# Patient Record
Sex: FEMALE | Race: WHITE
[De-identification: names, ages, dates, MRNs, and addresses within clinical notes are randomized per-mention and may not be internally consistent; named-entity substitution may affect disease eponyms.]

---

## 2021-07-27 ENCOUNTER — HOSPITAL ENCOUNTER (OUTPATIENT)
Dept: HOSPITAL 97 - DS | Age: 61
End: 2021-07-27
Attending: INTERNAL MEDICINE
Payer: COMMERCIAL

## 2021-07-27 VITALS — OXYGEN SATURATION: 95 % | DIASTOLIC BLOOD PRESSURE: 81 MMHG | TEMPERATURE: 97.7 F | SYSTOLIC BLOOD PRESSURE: 127 MMHG

## 2021-07-27 VITALS — BODY MASS INDEX: 14.6 KG/M2

## 2021-07-27 DIAGNOSIS — R74.8: ICD-10-CM

## 2021-07-27 DIAGNOSIS — E87.1: Primary | ICD-10-CM

## 2021-07-27 PROCEDURE — 82728 ASSAY OF FERRITIN: CPT

## 2021-07-27 PROCEDURE — 82306 VITAMIN D 25 HYDROXY: CPT

## 2021-07-27 PROCEDURE — 96365 THER/PROPH/DIAG IV INF INIT: CPT

## 2021-07-27 PROCEDURE — 86334 IMMUNOFIX E-PHORESIS SERUM: CPT

## 2021-07-27 PROCEDURE — 82533 TOTAL CORTISOL: CPT

## 2021-07-27 PROCEDURE — 82024 ASSAY OF ACTH: CPT

## 2021-07-27 PROCEDURE — 83970 ASSAY OF PARATHORMONE: CPT

## 2021-07-27 PROCEDURE — 36415 COLL VENOUS BLD VENIPUNCTURE: CPT

## 2021-07-27 PROCEDURE — 86255 FLUORESCENT ANTIBODY SCREEN: CPT

## 2021-07-27 PROCEDURE — 82977 ASSAY OF GGT: CPT

## 2021-10-15 ENCOUNTER — HOSPITAL ENCOUNTER (EMERGENCY)
Dept: HOSPITAL 97 - ER | Age: 61
Discharge: HOME | End: 2021-10-15
Payer: COMMERCIAL

## 2021-10-15 VITALS — OXYGEN SATURATION: 95 % | DIASTOLIC BLOOD PRESSURE: 83 MMHG | TEMPERATURE: 98.4 F | SYSTOLIC BLOOD PRESSURE: 118 MMHG

## 2021-10-15 DIAGNOSIS — G40.909: Primary | ICD-10-CM

## 2021-10-15 DIAGNOSIS — F17.210: ICD-10-CM

## 2021-10-15 LAB
BUN BLD-MCNC: 15 MG/DL (ref 7–18)
GLUCOSE SERPLBLD-MCNC: 162 MG/DL (ref 74–106)
HCT VFR BLD CALC: 37.9 % (ref 36–45)
LYMPHOCYTES # SPEC AUTO: 1.2 K/UL (ref 0.7–4.9)
PMV BLD: 7.3 FL (ref 7.6–11.3)
POTASSIUM SERPL-SCNC: 3.5 MMOL/L (ref 3.5–5.1)
RBC # BLD: 3.86 M/UL (ref 3.86–4.86)

## 2021-10-15 PROCEDURE — 80048 BASIC METABOLIC PNL TOTAL CA: CPT

## 2021-10-15 PROCEDURE — 36415 COLL VENOUS BLD VENIPUNCTURE: CPT

## 2021-10-15 PROCEDURE — 71045 X-RAY EXAM CHEST 1 VIEW: CPT

## 2021-10-15 PROCEDURE — 99284 EMERGENCY DEPT VISIT MOD MDM: CPT

## 2021-10-15 PROCEDURE — 80185 ASSAY OF PHENYTOIN TOTAL: CPT

## 2021-10-15 PROCEDURE — 85025 COMPLETE CBC W/AUTO DIFF WBC: CPT

## 2021-10-15 NOTE — ER
Nurse's Notes                                                                                     

 [facility]                                                                 

Name: Rona Nelson                                                                              

Age: 61 yrs                                                                                       

Sex: Female                                                                                       

: 1960                                                                                   

MRN: [id number]                                                                                   

Arrival Date: 10/15/2021                                                                          

Time: 18:40                                                                                       

Account#: [id number]                                                                            

Bed 19                                                                                            

Private MD:                                                                                       

Diagnosis: recurrent seizure                                                                      

                                                                                                  

Presentation:                                                                                     

10/15                                                                                             

18:40 Chief complaint: EMS states: seizure, approximately 4 min. Coronavirus screen: At this  tc5 

      time, unable to obtain information related to travel outside the [place] Ebola Screen:         

      Unable to complete the Ebola screening because:. Risk Assessment: Do you want to hurt       

      yourself or someone else? Unable to obtain. Onset of symptoms was October 15, 2021 at       

      18:30.                                                                                      

18:40 Method Of Arrival: EMS                                                                  tc5 

18:40 Acuity: [facility]                                                                           tc5 

19:10 Initial Sepsis Screen: Does the patient meet any 2 criteria? No. Patient's initial      cc4 

      sepsis screen is negative.                                                                  

21:35 Initial Sepsis Screen: Does the patient have a suspected source of infection?.          cc4 

                                                                                                  

Triage Assessment:                                                                                

18:44 General: Appears distressed, slender, Behavior is unresponsive. Smells of ciggarettes.  tc5 

19:10 Pain: Denies pain.                                                                      cc4 

                                                                                                  

Historical:                                                                                       

- Allergies:                                                                                      

18:42 No Known Allergies;                                                                     tc5 

                                                                                                  

- Immunization history:: Adult Immunizations.                                                     

- Social history:: Smoking status: Patient reports the use of cigarette tobacco                   

  products, unknown amount.                                                                       

                                                                                                  

                                                                                                  

Screenin:10 Abuse screen: Denies threats or abuse. Nutritional screening: No deficits noted.        cc4 

      Tuberculosis screening: No symptoms or risk factors identified. Fall Risk None              

      identified.                                                                                 

                                                                                                  

Assessment:                                                                                       

19:10 General: Appears Sleeping; arouses easily to verbal stimuli; oriented to place;         cc4 

      following commands; requesting to use bathroom; assisted onto bedpan \T\ voided 300 ml      

      clear yellow urine; IV NS patent/infusing left AC \T\ open rate with no infiltration          

      noted of site; SR's padded x 2; O2 intact \T\ 2L/NBP; SR with no ectopy; VSS; stretcher       

      low position.. General: no seizure activity noted..                                         

19:10 Neuro: Level of Consciousness is post ictal, Arouses easily \T\ following commands..      cc4

      Oriented to person. Cardiovascular: No deficits noted. Rhythm is sinus rhythm.              

      Respiratory: No deficits noted. Airway is patent Breath sounds are clear bilaterally.       

      GI: No deficits noted. Abdomen is flat, non-distended. : Reports urinary frequency,       

      since  reports months and seeing urologist with no improvement in urgency. EENT:     

      No deficits noted. Derm: No deficits noted. Skin is intact. Musculoskeletal: No             

      deficits noted. Capillary refill < 3 seconds.                                               

20:00 Reassessment: Patient appears in no apparent distress at this time. awake \T\ more alert; cc4

      no seizure activity noted; assisted onto bedpan \T\ voided 350 ml clear yellow urine;       

      states, "I want to go home".                                                                

                                                                                                  

Vital Signs:                                                                                      

18:40  / 84; Pulse 90; Resp 28; Temp 97.7; Pulse Ox 91% 4 lpm ; Weight 40.82 kg; Height tc5 

      5 ft. 2 in. (157.48 cm);                                                                    

19:10  / 91; Pulse 85; Resp 24; Temp 97.6; Pulse Ox 99% on 2 lpm NC;                    cc4 

20:00  / 79; Pulse 85; Resp 18; Pulse Ox 97% on 2 lpm NC;                               cc4 

21:00  / 97; Pulse 85; Resp 16; Pulse Ox 98% on 2 lpm NC;                               cc4 

21:35  / 83; Pulse 85; Resp 16; Temp 98.4; Pulse Ox 95% on R/A;                         cc4 

18:40 Body Mass Index 16.46 (40.82 kg, 157.48 cm)                                             tc5 

                                                                                                  

ED Course:                                                                                        

18:40 Patient arrived in ED.                                                                  tc5 

18:40 Anastasiia Tavarez, RN is Primary Nurse.                                                tc5 

18:42 Triage completed.                                                                       tc5 

18:54 Emmanuel Gomez MD is Attending Physician.                                              kdr 

19:10 Arm band placed on.                                                                     cc4 

19:10 Patient has correct armband on for positive identification. Bed in low position. Call   cc4 

      light in reach. Side rails up X2. Seizure precautions initiated. Oral airway \T\ suction    

      placed \T\ bedside;  arriving to bedside.                                              

19:36 CXR XRAY In Process Unspecified.                                                        EDMS

20:18 Attending Physician role handed off by Emmanuel Gomez MD                               ps1 

20:18 Molina Gregory MD is Attending Physician.                                             ps1 

21:35 IV discontinued, intact, bleeding controlled, No redness/swelling at site. Pressure     cc4 

      dressing applied.                                                                           

22:04 No provider procedures requiring assistance completed.                                  cc4 

                                                                                                  

Administered Medications:                                                                         

21:33 Drug: Keppra (levETIRAcetam) 1000 mg Route: PO;                                         cc4 

21:35 Follow up: Response: No adverse reaction; No change in condition                        cc4 

                                                                                                  

                                                                                                  

Point of Care Testing:                                                                            

      Blood Glucose:                                                                              

18:45 Blood Glucose: 200 mg/dL;                                                               tc5 

      Ranges:                                                                                     

                                                                                                  

Outcome:                                                                                          

20:58 Discharge ordered by MD.                                                                ps1 

21:35 Discharged to home with .                                                        cc4 

21:35 Condition: improved                                                                         

21:35 Discharge instructions given to patient, with  Instructed on discharge               

      instructions, follow up and referral plans. Demonstrated understanding of instructions,     

      follow-up care.                                                                             

23:22 Patient left the ED.                                                                    cc4 

                                                                                                  

Signatures:                                                                                       

Dispatcher [employer]                           EDMS                                                 

Emmanuel Gomez MD MD   kdr                                                  

Molina Gregory MD MD   ps1                                                  

Alivia Beckwith, RN                    RN   cc4                                                  

Anastasiia Tavarez RN                  RN   tc5                                                  

                                                                                                  

**************************************************************************************************

## 2021-10-15 NOTE — RAD REPORT
EXAM DESCRIPTION:  RAD - Chest Single View - 10/15/2021 7:36 pm

 

CLINICAL HISTORY:  Seizure

 

COMPARISON:  Chest Pa And Lat (2 Views) dated 7/19/2021; CHEST PA AND LAT 2 VIEW dated 1/23/2014; DOT
ST SINGLE VIEW dated 12/25/2013; CHEST SINGLE VIEW dated 12/24/2013; CTANGIO CHEST FOR PE dated 12/18
/2013

 

FINDINGS:  Lines: None.

Lungs: No evidence of edema or pneumonia.

Pleural: No significant pleural effusions or pneumothorax.

Cardiac: The heart size is within normal limits.

Bones: No acute fractures. Remote right-sided rib fractures.

Other:

 

IMPRESSION:  No acute cardiopulmonary disease.

## 2021-10-15 NOTE — EDPHYS
Physician Documentation                                                                           

 [facility]                                                                 

Name: Rona Nelson                                                                              

Age: 61 yrs                                                                                       

Sex: Female                                                                                       

: 1960                                                                                   

MRN: [id number]                                                                                   

Arrival Date: 10/15/2021                                                                          

Time: 18:40                                                                                       

Account#: [id number]                                                                            

Bed 19                                                                                            

Private MD:                                                                                       

ED Physician Molina Gregory                                                                      

HPI:                                                                                              

10/15                                                                                             

18:57 This 61 yrs old  Female presents to ER via EMS with complaints of Breakthrough kdr 

      seizure.                                                                                    

18:57 The patient presents with a history of multiple seizures, an unknown number. Character  kdr 

      of seizure(s): Loss of consciousness: the patient experienced loss of consciousness,        

      Motor activity: generalized, shaking all over, Incontinence: none. Seizure onset: just      

      prior to arrival, today. Context: the seizure(s) was witnessed, by a bystander, by EMS      

      personnel, occurred at home, occurred while the patient was Unknown. Contributing           

      factors: unknown. Seizure Hx: it is unknown whether or not the patient has a previous       

      seizure history. Associated injury: The patient did not suffer any apparent associated      

      injury. EMS care: versed, Patient was given 2 mg of Versed intranasal followed by 2 mg      

      of Versed IV. Current symptoms: confusion, Postictal. The patient has experienced           

      similar episodes in the past, multiple times. It is unknown whether or not the patient      

      has recently seen a physician.                                                              

                                                                                                  

Historical:                                                                                       

- Allergies:                                                                                      

18:42 No Known Allergies;                                                                     tc5 

                                                                                                  

- Immunization history:: Adult Immunizations.                                                     

- Social history:: Smoking status: Patient reports the use of cigarette tobacco                   

  products, unknown amount.                                                                       

                                                                                                  

                                                                                                  

ROS:                                                                                              

18:57 Constitutional: Negative for fever, chills, and weight loss, Eyes: Negative for injury, kdr 

      pain, redness, and discharge, Neck: Negative for injury, pain, and swelling,                

      Cardiovascular: Negative for chest pain, palpitations, and edema, Abdomen/GI: Negative      

      for abdominal pain, nausea, vomiting, diarrhea, and constipation.                           

18:57 Respiratory: Positive for Congestion and coarse breath sounds bilaterally.                  

                                                                                                  

Exam:                                                                                             

18:57 Constitutional:  This is a well developed, well nourished patient who is somnolent but  kdr 

      in no acute distress. Head/Face:  Normocephalic, atraumatic. Eyes:  Pupils equal round      

      and reactive to light, extra-ocular motions intact.  Lids and lashes normal.                

      Conjunctiva and sclera are non-icteric and not injected.  Cornea within normal limits.      

      Periorbital areas with no swelling, redness, or edema. Neck:  Trachea midline, no           

      thyromegaly or masses palpated, and no cervical lymphadenopathy.  Supple, full range of     

      motion without nuchal rigidity, or vertebral point tenderness.  No Meningismus.             

      Chest/axilla:  Normal chest wall appearance and motion.  Nontender with no deformity.       

      No lesions are appreciated. Cardiovascular:  Regular rate and rhythm with a normal S1       

      and S2.  No gallops, murmurs, or rubs.  Normal PMI, no JVD.  No pulse deficits.             

      Abdomen/GI:  Soft, non-tender, with normal bowel sounds.  No distension or tympany.  No     

      guarding or rebound.  No evidence of tenderness throughout. Back:  No spinal                

      tenderness.  No costovertebral tenderness.  Full range of motion. Skin:  Warm, dry with     

      normal turgor.  Normal color with no rashes, no lesions, and no evidence of cellulitis.     

      MS/ Extremity:  Pulses equal, no cyanosis.  Neurovascular intact.  Full, normal range       

      of motion.                                                                                  

18:57 Respiratory: the patient does not display signs of respiratory distress,  Respirations:     

      normal, Breath sounds: rales, Coarse breath sound.                                          

                                                                                                  

Vital Signs:                                                                                      

18:40  / 84; Pulse 90; Resp 28; Temp 97.7; Pulse Ox 91% 4 lpm ; Weight 40.82 kg; Height tc5 

      5 ft. 2 in. (157.48 cm);                                                                    

19:10  / 91; Pulse 85; Resp 24; Temp 97.6; Pulse Ox 99% on 2 lpm NC;                    cc4 

20:00  / 79; Pulse 85; Resp 18; Pulse Ox 97% on 2 lpm NC;                               cc4 

21:00  / 97; Pulse 85; Resp 16; Pulse Ox 98% on 2 lpm NC;                               cc4 

21:35  / 83; Pulse 85; Resp 16; Temp 98.4; Pulse Ox 95% on R/A;                         cc4 

18:40 Body Mass Index 16.46 (40.82 kg, 157.48 cm)                                             tc5 

                                                                                                  

MDM:                                                                                              

20:58 Patient medically screened.                                                             ps1 

20:59 Data reviewed: vital signs, nurses notes, lab test result(s). Counseling: I had a       ps1 

      detailed discussion with the patient and/or guardian regarding: the historical points,      

      exam findings, and any diagnostic results supporting the discharge/admit diagnosis, lab     

      results, the need for outpatient follow up, to return to the emergency department if        

      symptoms worsen or persist or if there are any questions or concerns that arise at home.    

                                                                                                  

10/15                                                                                             

18:54 Order name: CBC with Diff; Complete Time: 20:18                                         kdr 

10/15                                                                                             

18:54 Order name: Chem 7; Complete Time: 20:18                                                kdr 

10/15                                                                                             

18:54 Order name: CXR XRAY; Complete Time: :                                              kdr 

10/15                                                                                             

18:54 Order name: Dilantin; Complete Time: 20:18                                              kdr 

                                                                                                  

Administered Medications:                                                                         

21:33 Drug: Keppra (levETIRAcetam) 1000 mg Route: PO;                                         cc4 

21:35 Follow up: Response: No adverse reaction; No change in condition                        cc4 

                                                                                                  

                                                                                                  

Point of Care Testing:                                                                            

      Blood Glucose:                                                                              

18:45 Blood Glucose: 200 mg/dL;                                                               tc5 

      Ranges:                                                                                     

      Critical Glucose Levels:Adult <50 mg/dl or >400 mg/dl  <40 mg/dl or >180 mg/dl       

Disposition Summary:                                                                              

10/15/21 20:58                                                                                    

Discharge Ordered                                                                                 

      Location: Home                                                                          ps1 

      Problem: new                                                                            ps1 

      Symptoms: are unchanged                                                                 ps1 

      Condition: Stable                                                                       ps1 

      Diagnosis                                                                                   

        - recurrent seizure                                                                   ps1 

      Followup:                                                                               ps1 

        - With: Private Physician                                                                  

        - When: As needed                                                                          

        - Reason: Recheck today's complaints, Continuance of care, Re-evaluation by your           

      physician                                                                                   

      Followup:                                                                               ps1 

        - With: Emergency Department                                                               

        - When: As needed                                                                          

        - Reason: Fever > 102 F, If symptoms return, Worsening of condition                        

      Discharge Instructions:                                                                     

        - Discharge Summary Sheet                                                             ps1 

        - Seizure, Adult                                                                      ps1 

      Forms:                                                                                      

        - Medication Reconciliation Form                                                      ps1 

        - Thank You Letter                                                                    ps1 

        - Antibiotic Education                                                                ps1 

        - Prescription Opioid Use                                                             ps1 

Signatures:                                                                                       

Dispatcher [employer]                           EDEmmanuel Crow MD MD   kdr                                                  

Molina Gergory MD MD   ps1                                                  

Alivia Beckwith, RN                    RN   cc4                                                  

Anastasiia Tavarez RN                  RN   tc5                                                  

                                                                                                  

**************************************************************************************************

## 2022-01-05 ENCOUNTER — HOSPITAL ENCOUNTER (EMERGENCY)
Dept: HOSPITAL 97 - ER | Age: 62
LOS: 1 days | Discharge: ANOTHER HOSPITAL | End: 2022-01-06
Payer: COMMERCIAL

## 2022-01-05 DIAGNOSIS — Z86.73: ICD-10-CM

## 2022-01-05 DIAGNOSIS — Z79.82: ICD-10-CM

## 2022-01-05 DIAGNOSIS — G45.0: ICD-10-CM

## 2022-01-05 DIAGNOSIS — R29.711: ICD-10-CM

## 2022-01-05 DIAGNOSIS — Z72.0: ICD-10-CM

## 2022-01-05 DIAGNOSIS — I63.9: Primary | ICD-10-CM

## 2022-01-05 DIAGNOSIS — Z20.822: ICD-10-CM

## 2022-01-05 DIAGNOSIS — Z88.6: ICD-10-CM

## 2022-01-05 DIAGNOSIS — I10: ICD-10-CM

## 2022-01-05 DIAGNOSIS — Z88.5: ICD-10-CM

## 2022-01-05 LAB
ALBUMIN SERPL BCP-MCNC: 3.6 G/DL (ref 3.4–5)
ALP SERPL-CCNC: 134 U/L (ref 45–117)
ALT SERPL W P-5'-P-CCNC: 40 U/L (ref 12–78)
AST SERPL W P-5'-P-CCNC: 55 U/L (ref 15–37)
BUN BLD-MCNC: 18 MG/DL (ref 7–18)
GLUCOSE SERPLBLD-MCNC: 104 MG/DL (ref 74–106)
HCT VFR BLD CALC: 37.8 % (ref 36–45)
INR BLD: 0.84
LYMPHOCYTES # SPEC AUTO: 0.9 K/UL (ref 0.7–4.9)
METHAMPHET UR QL SCN: NEGATIVE
MORPHOLOGY BLD-IMP: (no result)
PMV BLD: 7.6 FL (ref 7.6–11.3)
POTASSIUM SERPL-SCNC: 3.7 MMOL/L (ref 3.5–5.1)
RBC # BLD: 3.77 M/UL (ref 3.86–4.86)
THC SERPL-MCNC: POSITIVE NG/ML
TROPONIN (EMERG DEPT USE ONLY): 0.39 NG/ML (ref 0–0.04)

## 2022-01-05 PROCEDURE — 84484 ASSAY OF TROPONIN QUANT: CPT

## 2022-01-05 PROCEDURE — 99291 CRITICAL CARE FIRST HOUR: CPT

## 2022-01-05 PROCEDURE — 70496 CT ANGIOGRAPHY HEAD: CPT

## 2022-01-05 PROCEDURE — 70498 CT ANGIOGRAPHY NECK: CPT

## 2022-01-05 PROCEDURE — 80307 DRUG TEST PRSMV CHEM ANLYZR: CPT

## 2022-01-05 PROCEDURE — 51702 INSERT TEMP BLADDER CATH: CPT

## 2022-01-05 PROCEDURE — 72125 CT NECK SPINE W/O DYE: CPT

## 2022-01-05 PROCEDURE — 85025 COMPLETE CBC W/AUTO DIFF WBC: CPT

## 2022-01-05 PROCEDURE — 92977: CPT

## 2022-01-05 PROCEDURE — 70450 CT HEAD/BRAIN W/O DYE: CPT

## 2022-01-05 PROCEDURE — 80048 BASIC METABOLIC PNL TOTAL CA: CPT

## 2022-01-05 PROCEDURE — 93005 ELECTROCARDIOGRAM TRACING: CPT

## 2022-01-05 PROCEDURE — 99292 CRITICAL CARE ADDL 30 MIN: CPT

## 2022-01-05 PROCEDURE — 85610 PROTHROMBIN TIME: CPT

## 2022-01-05 PROCEDURE — 85730 THROMBOPLASTIN TIME PARTIAL: CPT

## 2022-01-05 PROCEDURE — 80329 ANALGESICS NON-OPIOID 1 OR 2: CPT

## 2022-01-05 PROCEDURE — 80320 DRUG SCREEN QUANTALCOHOLS: CPT

## 2022-01-05 PROCEDURE — 36415 COLL VENOUS BLD VENIPUNCTURE: CPT

## 2022-01-05 PROCEDURE — 81003 URINALYSIS AUTO W/O SCOPE: CPT

## 2022-01-05 PROCEDURE — 80076 HEPATIC FUNCTION PANEL: CPT

## 2022-01-05 NOTE — P.HP
Certification for Inpatient


With expected LOS: >2 Midnights


Patient will require the following post-hospital care: None


Practitioner: I am a practitioner with admitting privileges, knowledge of 

patient current condition, hospital course, and medical plan of care.


Services: Services provided to patient in accordance with Admission requirements

found in Title 42 Section 412.3 of the Code of Federal Regulations





Patient History


Date of Service: 22


Reason for admission: Prolonged seizure activity


History of Present Illness: 





61-year-old female with past medical history of hypertension, CAD status post 

PCI about a year ago, history of recurrent seizure activity on escalating 

medications follows with Dr. Rashid, on phenytoin, Vimpat and Xcopri; history of 

chronic tobacco use admitted after developing what spouse described as a seizure

activity.  He states patient was mainly shaking her upper extremities but she 

was awake during the episode and able to talk to him.  Event lasted for about 30

-40 minutes before arrival of EMS.  The activity was aborted with administration

of Versed.  Patient was transferred to the emergency room.  On arrival in the 

emergency room she was noted with inability to move left upper and lower 

extremity.  Head CT initially done was negative, CT of the neck shows no 

evidence of cervical fracture.  Patient had a CTA head and neck done with 

findings of high grade left proximal vertebral artery stenosis, moderate 

stenosis of the distal proximal and distal right carotid  vessels.  There was 

also dilatation of the third and lateral ventricles.  Neurology evaluation was 

consulted and possibility of Manuel's paralysis post seizure versus acute CVA was 

considered.  Decision made to administer TPA.  Prior to administration of TPA 

patient weakness started to improve.  At the time of evaluation during end of IV

TPA administration.  Patient power and strength of the extremities have returned

back to normal.  Patient is complaining of cough and chest congestion.  She is 

vaccinated against Covid.  She denies any fever or chills.  She states she has 

been having recurrent UTI symptoms despite chronic nitrofurantoin use.  She 

states she has a CVA 3 years ago and was managed at [facility].  History 

supplemented by spouse sitting at bedside.  Spouse states she is adherent with 

her medication.  Has UA was positive for UTI.  Her urine drug screen was 

positive for THC.


She has been admitted for presumed acute CVA with UTI.  Covid screen is pending


Allergies





codeine Allergy (Verified 13 10:23)


   Nausea/Vomiting





Home Medications: 








Losartan Potassium [Cozaar*] 50 mg PO DAILY #30 tablet 13 


Atorvastatin Calcium [Lipitor] 40 mg PO DAILY 21 


Budesonide/Formoterol Fumarate [Symbicort 160-4.5 Mcg Inhaler] 2 puff IH BID 

21 


Carvedilol [Coreg] 3.125 mg PO BID 21 


Lacosamide [Vimpat*] 50 mg PO BID 21 


Levetiracetam [Keppra] 1,500 mg PO DAILY 21 


Levetiracetam [Keppra] 2,000 mg PO DAILY 21 


Magnesium Oxide [Magnesium] 500 mg PO BID 21 


Montelukast [Singulair] 10 mg PO DAILY 21 


Phenytoin Sodium Extended [Dilantin] 100 mg PO TID 21 


Potassium Chloride 10 meq PO DAILY 21 


Ticagrelor [Brilinta] 90 mg PO DAILY 21 








- Past Medical/Surgical History


Diabetic: No


-: COPD


-: HTN


-: High Cholesterol


-: osteoprena


-: Recurrent UTI


-: CAD status post PCI


-: History of chronic seizure activity


-: History of CVA


-: Chronic tobacco use


-: back surgery


-: 





- Family History


Family History: Reviewed- Non-Contributory





- Social History


Smoking Status: Heavy Tobacco smoker (>10 cigarettes/day)


Smoking therapy provided: Yes


Patient receptive to therapy: No


Alcohol use: Yes


CD- Drugs: No


Caffeine use: No


Place of Residence: Home





Review of Systems


10-point ROS is otherwise unremarkable


General: Weakness


ENT: Nose Congestion


Respiratory: Cough


Cardiovascular: Unremarkable


Gastrointestinal: Unremarkable


Genitourinary: Dysuria, Urgency


Neurological: Weakness, Seizures





Physical Examination





- Physical Exam


General: Alert, Oriented x3, Cooperative (on  )


HEENT: Atraumatic, Normocephalic, PERRLA


Neck: Supple, 2+ carotid pulse no bruit, JVD not distended


Respiratory: Normal air movement, Diminished


Cardiovascular: No edema, Normal pulses, Regular rate/rhythm, Normal S1 S2


Gastrointestinal: Normal bowel sounds, Soft and benign, Non-distended


Musculoskeletal: No clubbing, No swelling


Integumentary: No rashes, No breakdown, No significant lesion


Neurological: Normal gait, Normal speech, Normal strength at 5/5 x4 extr, 

Cranial nerves 3-12 intact (no pr)


External genitalia: No edema, No lesions





- Studies


Laboratory Data (last 24 hrs)





22 19:06: PT 9.6, INR 0.84, APTT 22.1 L


22 19:06: WBC 19.90 H, Hgb 12.6, Hct 37.8, Plt Count 336


22 19:06: Sodium 129 L, Potassium 3.7, BUN 18, Creatinine 0.55, Glucose 

104, Total Bilirubin 0.2, AST 55 H, ALT 40, Alkaline Phosphatase 134 H





Imagings Data: 


   RADIOLOGY SERVICES REPORT


                      (Continued)


Name: JORGE LUIS ORDOÑEZ                                                          

                                      





CC: ROBBIE MORALEZ; JORGE LUIS JUAREZ   / Report: [id number]      


               Radiology Services Report         Page 1 of 





   


                                               


            RADIOLOGY SERVICES REPORT





CC: ROBBIE MORALEZ; JORGE LUIS JUAREZ   / Report: [id number]      


               Radiology Services Report         Page 1 of 1





FINDINGS:  Moderate calcified plaque within the distal right carotid 

artery/proximal right carotid bulb/ proximal right external carotid artery


 


Mild calcified plaque within the remainder of common carotid, internal carotid 

and external carotid arteries bilaterally


 


High-grade stenosis proximal left vertebral artery. Right vertebral artery 

unremarkable. Distal left vertebral artery terminates into the PICA


 


IMPRESSION:  Moderate calcified plaque within the distal right carotid 

artery/proximal right carotid bulb/ proximal right external carotid artery 

results in an approximately 50% stenosis.


 


High-grade stenosis proximal left vertebral artery.


 


NASCET criteria used.


 


Mild  0-49% stenosis


Moderate 50-69% stenosis


Severe 70-99% stenosis








Dictated By: Aron Smart MD   22


Signed By:  Aron Smart MD   22





: CARMINA 22








Assessment and Plan





- Problems (Diagnosis)


(1) TIA (transient ischemic attack)


Current Visit: Yes   Status: Acute   





(2) Seizure-like activity


Current Visit: Yes   Status: Acute   





(3) UTI (urinary tract infection)


Current Visit: Yes   Status: Acute   





(4) Polysubstance (excluding opioids) dependence


Current Visit: Yes   Status: Acute   





(5) Tobacco use


Current Visit: Yes   Status: Acute   





(6) COPD (chronic obstructive pulmonary disease)


Current Visit: Yes   Status: Acute   





(7) CAD (coronary artery disease)


Current Visit: Yes   Status: Acute   





- Plan


Presumed left-sided CVA/TIA versus Manuel's paralysismay be due to TIA versus 

Manuel's paralysis


Resolution of symptoms prior to and start of TPA administration not consistent 

with acute CVA


-We will obtain MRI in the a.m.


Start aspirin/Plavix


Obtain lipid panel/TSH/homocystine/


Neurology eval in a.m.


Plan for transfer to higher level of care given significant left proximal 

vertebral artery as well as moderate right carotid artery stenosis.  Will 

initiate transfer now however if unable to do transfer then will admit here for 

close monitoring and management








Recurrent seizure activitystatus post Keppra loading


Continue Keppra/phenytoin


Follow Keppra phenytoin level and adjust


Defer to neurology


Ativan as needed recurrent activity


For seizure precaution





UTIfollow urine culture/blood culture


Start empirical Rocephin


Hold nitrofurantoin for now





CADhistory of status post PCI, stable, mild elevation in troponin noted


We will trend troponin


Follow with aspirin use








Chronic tobacco usecessation advised, start nicotine patch





History of polysubstance abuseTHC noted on urine tox, cessation advised





COPDstable, as needed duo nebs





DVT prophylaxissubcutaneous Lovenox post TPA window








Advance directivefull code








After discussion with neurology, ER team discussed with to facilitate transfer





- Advance Directives


Does patient have a Living Will: No


Does patient have a Durable POA for Healthcare: No


Time Spent Managing Pts Care (In Minutes): 65

## 2022-01-05 NOTE — RAD REPORT
EXAM DESCRIPTION:  CT - Head C Spine Mpr Wo Con - 1/5/2022 8:25 pm

 

CLINICAL HISTORY:  Head and neck injury status post fall. Head and neck pain . Seizure

 

COMPARISON:  None.

 

TECHNIQUE:  Computed axial tomography of the head and cervical spine was obtained.

 

Sagittal and coronal reconstruction was performed.

 

All CT scans are performed using dose optimization technique as appropriate and may include automated
 exposure control or mA/KV adjustment according to patient size.

 

FINDINGS:  An intracranial bleed is not seen. Prominent cerebral atrophy. The lateral and ventricles 
are moderately dilated. . . An extra-axial fluid collection is not noted.Fluid within the visualized 
sinuses and mastoids is not seen

 

A cervical fracture is not visualized. No dislocation is noted. Mild posterior subluxation C5 on C6 w
ith disc space narrowing and osteophytes.

 

IMPRESSION:  Moderate dilatation of the third and lateral ventricles probably related to white matter
 atrophy. Hydrocephalus is probably less likely and should be correlated clinically.

 

A cervical fracture is not visualized.  If the patient continues to have symptoms to suggest intracra
nial /spinal cord pathology then MRI would be recommended

## 2022-01-05 NOTE — ER
Nurse's Notes                                                                                     

 [facility]                                                                 

Name: Rona Nelson                                                                              

Age: 61 yrs                                                                                       

Sex: Female                                                                                       

: 1960                                                                                   

MRN: [id number]                                                                                   

Arrival Date: 2022                                                                          

Time: 18:24                                                                                       

Account#: [id number]                                                                            

Bed 4                                                                                             

Private MD:                                                                                       

Diagnosis: Cerebral infarction, unspecified;VERTEBROBASILAR SYNDROME                              

                                                                                                  

Presentation:                                                                                     

                                                                                             

18:32 Chief complaint: EMS states: "the  called us reporting that the pt was having    jd3 

      seizures. one the scene we found out that she had been seizing for about 45 min and had     

      accouple more after we started working on her. she has a history of seizures and the        

       tries not to call EMS, but he said with how long she had been seizing that he       

      needed to call. we started a 22 G IV tot he right AC and gave 4 of Zofran and 2 mg of       

      Versed which generally works in the past to help her stop seizing. we then had to put       

      an nonrebreather on her because her oxygen saturation dropped.". Coronavirus screen: At     

      this time, the client does not indicate any symptoms associated with coronavirus-19.        

      Ebola Screen: Patient negative for fever greater than or equal to 101.5 degrees             

      Fahrenheit, and additional compatible Ebola Virus Disease symptoms. Initial Sepsis          

      Screen: Does the patient meet any 2 criteria? No. Patient's initial sepsis screen is        

      negative. Does the patient have a suspected source of infection? No. Patient's initial      

      sepsis screen is negative. Risk Assessment: Do you want to hurt yourself or someone         

      else? Patient reports no desire to harm self or others. Onset of symptoms was 2022.                                                                                   

18:32 Method Of Arrival: EMS: [facility]                                                    jd3 

18:32 Acuity: [facility]                                                                           jd3 

                                                                                                  

Historical:                                                                                       

- Allergies:                                                                                      

18:37 ACETAMINOPHEN;                                                                          jd3 

18:37 Hydrocodone-Acetaminophen;                                                              jd3 

- Home Meds:                                                                                      

18:37 Aspirin Oral [Active]; Lipitor Oral [Active]; Claritin Oral [Active]; losartan oral     jd3 

      [Active]; Phenytoin Oral [Active]; atorvastatin oral [Active]; montelukast oral             

      [Active]; carvedilol oral [Active]; Keppra Oral [Active];                                   

- PMHx:                                                                                           

18:37 Seizure; CVA; Hypertensive disorder; COPD; epilepsy;                                    jd3 

                                                                                                  

- Immunization history:: Adult Immunizations unknown.                                             

- Social history:: Smoking status: Patient reports the use of cigarette tobacco                   

  products.                                                                                       

                                                                                                  

                                                                                                  

Screenin:41 Abuse screen: Denies threats or abuse. Denies injuries from another. Nutritional        as6 

      screening: No deficits noted. Tuberculosis screening: No symptoms or risk factors           

      identified. Fall Risk Fall in past 12 months (25 points). Secondary diagnosis (15           

      points) seizures, IV access (20 points). Gait- Impaired (20 pts.). Mental Status-           

      Oriented to own ability (0 pts). Total Rider Fall Scale indicates High Risk Score (45       

      or more points). Side Rails Up X 2 Frequent Obs/Assessments Occuring Family Present and     

      informed to notify staff if the need to leave the bedside As available patient and          

      family educated on Fall Prevention Program and Strategies.                                  

20:46 The patient has not been NPO before screening. The patient is currently on the          as6 

      following diet: regular The patient is alert, able to follow commands. The patient          

      exhibits slurred or garbled speech. The patient is exhibiting difficulty speaking. The      

      patient is exhibiting difficulty understanding words. The patient is unable to swallow      

      own secretions without drooling or the need for suction. Bedside swallow screening          

      discontinued. Patient kept NPO until cleared by Speech Therapy or Physician. The            

      patient failed the bedside swallow screening. The patient will be kept NPO until            

      cleared by Speech Therapy or Physician. Provider notified of bedside swallow screening      

      results: Jose FIGUEROA.                                                                   

                                                                                                  

Assessment:                                                                                       

19:42 General: Appears uncomfortable, Behavior is cooperative, anxious, drowsy. Pain:         as6 

      Complains of pain in headache. Neuro: Level of Consciousness is obeys commands, post        

      ictal, Oriented to person, place, time, situation, Reports headache numbness in left        

      hand. Cardiovascular: Capillary refill < 3 seconds Patient's skin is warm and dry.          

      Respiratory: Airway is patent Trachea midline Respiratory effort is even, unlabored,        

      Respiratory pattern is regular, symmetrical, Breath sounds with crackles bilaterally.       

      Derm: Skin is intact. Musculoskeletal:.                                                     

20:11 General: pt unable to move l arm, no sensory perception to l arm, provider notified .   as6 

                                                                                             

00:17 General: pt coughing up mild amounts of blood, provider notified .                      as6 

                                                                                                  

Vital Signs:                                                                                      

                                                                                             

18:41  / 92; Pulse 95; Resp 28 S; Temp 97.3(A); Pulse Ox 92% on 95% Non-rebreather      jd3 

      mask; Weight 52.16 kg (R); Height 5 ft. 2 in. (157.48 cm) (R); Pain 0/10;                   

19:40  / 86; Pulse 99; Resp 22 S; Pulse Ox 98% on Non-rebreather mask;                  as6 

20:40  / 75; Pulse 96; Resp 20 S; Pulse Ox 96% on 5 lpm NC;                             as6 

21:09 Weight 35.24 kg;                                                                        as6 

22:00  / 83; Pulse 97; Resp 22 S; Pulse Ox 98% on 3 lpm NC;                             as6 

22:56  / 75; Pulse 92; Resp 18 S; Pulse Ox 95% on Non-rebreather mask;                  as6 

                                                                                             

00:00  / 102; Pulse 103; Resp 20 S; Pulse Ox 93% on 3 lpm NC;                           as6 

                                                                                             

21:09 Body Mass Index 14.21 (35.24 kg, 157.48 cm)                                             as6 

                                                                                                  

Danevang Coma Score:                                                                               

                                                                                             

22:54 Eye Response: to voice(3). Verbal Response: oriented(5). Motor Response: obeys          as6 

      commands(6). Total: 14.                                                                     

                                                                                                  

NIH Stroke Scale Scores:                                                                          

20:30 NIHSS Score: 11                                                                         jmm 

20:44 NIHSS Score: 13                                                                         as6 

                                                                                                  

ED Course:                                                                                        

18:24 Patient arrived in ED.                                                                  ds1 

18:25 Jsoe Saez PA is PHCP.                                                              jmm 

18:25 Jose Monge MD is Attending Physician.                                                [facility] 

18:32 Dequan Torrez, RN is Primary Nurse.                                                  jd3 

18:37 Triage completed.                                                                       jd3 

18:43 Arm band placed on.                                                                     jd3 

19:06 Tay Huntley, RN is Primary Nurse.                                                    as6 

19:41 Bed in low position. Call light in reach. Side rails up X2. Adult w/ patient. Cardiac   as6 

      monitor on. Pulse ox on. NIBP on.                                                           

20:25 CT Head C Spine In Process Unspecified.                                                 EDMS

20:36 CT Head Angio In Process Unspecified.                                                   EDMS

20:36 CT Neck Angio In Process Unspecified.                                                   EDMS

21:00 García cath inserted, using sterile technique, 16 Fr., by me, balloon inflated, to       as6 

      gravity drainage.                                                                           

21:53 Inserted saline lock: 22 gauge in left hand, using aseptic technique.                   as6 

21:53 Maintain EMS IV. Dressing intact. Good blood return noted. Site clean \T\ dry. Gauge \T\    as
6

      site: 22 r forearm .                                                                        

                                                                                             

00:43 CT Head Brain wo Cont In Process Unspecified.                                           EDMS

02:35 No provider procedures requiring assistance completed. Patient transferred, IV remains  as6 

      in place.                                                                                   

02:36 Seizure precautions initiated.                                                          as6 

                                                                                                  

Administered Medications:                                                                         

                                                                                             

20:16 Drug: Zofran (Ondansetron) 4 mg Route: IVP; Site: right antecubital;                    as6 

22:57 Follow up: Response: No adverse reaction                                                as6 

21:49 Drug: Keppra (levETIRAcetam) 20 mg/kg Route: IV; Rate: calculated rate; Site: right     as6 

      forearm;                                                                                    

23:05 Follow up: Response: No adverse reaction; IV Status: Completed infusion; IV Intake:     as6 

      100ml                                                                                       

21:49 Drug: NS 0.9% 1000 ml Route: IV; Rate: 1 bolus; Site: left forearm;                     as6 

21:50 Drug: Aspirin Chewable Tablet 324 mg Route: PO;                                         as6 

22:58 Follow up: Response: No adverse reaction                                                as6 

21:53 Drug: foLIC Acid 1 mg Route: IVPB; Site: right forearm;                                 as6 

23:05 Follow up: Response: No adverse reaction; IV Status: Completed infusion; IV Intake: 62csqf3 

22:10 Drug: Alteplase {Co-Signature: tk1 (Mehreen Richardson).} Route: IV Thrombolytics; Rate:      as6 

      calculated rate;                                                                            

                                                                                             

03:10 Follow up: Response: No adverse reaction                                                as6 

                                                                                             

22:40 Drug: Rocephin (cefTRIAXone) 1 grams Route: IV; Rate: calculated rate; Site: left hand; as6 

23:05 Follow up: Response: No adverse reaction; IV Status: Completed infusion; IV Intake: 20uhpc3 

                                                                                             

00:09 Drug: ProTONIX (pantoprazole) 40 mg Route: IVP; Site: right forearm;                    as6 

03:10 Follow up: Response: No adverse reaction                                                as6 

                                                                                                  

                                                                                                  

Intake:                                                                                           

                                                                                             

23:05 IV: 100ml; Total: 100ml.                                                                as6 

23:05 IV: 50ml; Total: 150ml.                                                                 as6 

23:05 IV: 50ml; Total: 200ml.                                                                 as6 

                                                                                                  

Outcome:                                                                                          

23:58 ER care complete, transfer ordered by MD. crowley 

                                                                                             

02:35 Transferred by ground EMS to [facility], Transfer form completed. X-rays sent as6 

      w/ patient.                                                                                 

      Condition: stable                                                                           

03:10 Patient left the ED.                                                                    as6 

                                                                                                  

                                                                                                  

NIH Stroke Scale - NIH Stroke Score                                                               

Date: 2022                                                                                  

Time: 20:30                                                                                       

Total Score = 11                                                                                  

  1a. Level of Consciousness (LOC) - 0(Alert)                                                     

  1b. Level of Consciousness (LOC) (Month \T\ Age) - 0(Both)                                      

  1c. LOC Commands (Open \T\ Closes Eyes/) - 0(Both)                                          

   2. Best Gaze (Lateral Gaze Paresis) - 0(Normal)                                                

   3. Visual Field Loss - 1(Partial hemianopia)                                                   

   4. Facial Palsy - 0(Normal)                                                                    

  5a. Left Arm: Motor (10-second hold) - 4(No movement)                                           

  5b. Right Arm: Motor (10-second hold) - 0(No drift)                                             

  6a. Left Leg: Motor (5-second hold - always test supine) - 1(Drift)                             

  6b. Right Leg: Motor (5-second hold - always test supine) - 0(No drift)                         

   7. Limb Ataxia (finger/nose \T\ heel/shin - test with eyes open) - 1(Present in one            

      limb)                                                                                       

   8. Sensory Loss (pinprick arms/legs/face) - 1(Mild to moderate loss)                           

   9. Best Language: Aphasia (description/naming/reading) - 1(Mild to moderate                    

      aphasia)                                                                                    

  10. Dysarthria (speech clarity - read or repeat words) - 1(Mild to Moderate)                    

  11. Extinction and Inattention (visual/tactile/auditory/spatial/personal) -                     

      1(Present)                                                                                  

Initials: keo                                                                                     

                                                                                                  

                                                                                                  

NIH Stroke Scale - NIH Stroke Score                                                               

Date: 2022                                                                                  

Time: 20:44                                                                                       

Total Score = 13                                                                                  

  1a. Level of Consciousness (LOC) - 1(Not Alert)                                                 

  1b. Level of Consciousness (LOC) (Month \T\ Age) - 0(Both)                                      

  1c. LOC Commands (Open \T\ Closes Eyes/) - 0(Both)                                          

   2. Best Gaze (Lateral Gaze Paresis) - 0(Normal)                                                

   3. Visual Field Loss - 1(Partial hemianopia)                                                   

   4. Facial Palsy - 0(Normal)                                                                    

  5a. Left Arm: Motor (10-second hold) - 4(No movement)                                           

  5b. Right Arm: Motor (10-second hold) - 0(No drift)                                             

  6a. Left Leg: Motor (5-second hold - always test supine) - 1(Drift)                             

  6b. Right Leg: Motor (5-second hold - always test supine) - 0(No drift)                         

   7. Limb Ataxia (finger/nose \T\ heel/shin - test with eyes open) - 1(Present in one            

      limb)                                                                                       

   8. Sensory Loss (pinprick arms/legs/face) - 1(Mild to moderate loss)                           

   9. Best Language: Aphasia (description/naming/reading) - 1(Mild to moderate                    

      aphasia)                                                                                    

  10. Dysarthria (speech clarity - read or repeat words) - 1(Mild to Moderate)                    

  11. Extinction and Inattention (visual/tactile/auditory/spatial/personal) -                     

      2(Profound)                                                                                 

Initials: as6                                                                                     

                                                                                                  

Signatures:                                                                                       

Dispatcher [employer]                           EDJose Calixto PA PA jmm Sanford, Demi                                ds1                                                  

Dequan Torrez RN                    RN   jTay Kilpatrick RN                      RN   as6                                                  

Mehreen Richardson                                 tk1                                                  

                                                                                                  

Corrections: (The following items were deleted from the chart)                                    

                                                                                             

18:41 18:37 PMHx: Chronic obstructive lung disease; jd3                               jd3         

18:43 18:41  / 92; Pulse 95bpm; Resp 28bpm; Spontaneous; Pulse Ox 92% 02 60%    jd3         

      Non-rebreather mask; Temp 97.3F Axillary; 52.16 kg Reported; Height 5 ft. 2 in.             

      Reported; BMI: 21.0; Pain 0/10; jd3                                                         

                                                                                                  

**************************************************************************************************

## 2022-01-05 NOTE — EDPHYS
Physician Documentation                                                                           

 [facility]                                                                 

Name: Rona Nelson                                                                              

Age: 61 yrs                                                                                       

Sex: Female                                                                                       

: 1960                                                                                   

MRN: [id number]                                                                                   

Arrival Date: 2022                                                                          

Time: 18:24                                                                                       

Account#: [id number]                                                                            

Bed 4                                                                                             

Private MD:                                                                                       

ED Physician Jose Monge                                                                         

HPI:                                                                                              

                                                                                             

18:35 This 61 yrs old Female presents to ER via EMS with complaints of Seizure.               jmm 

18:35 This is a 61-year-old female with a history of epilepsy, CVA, hypertension, COPD that   jm 

      presents emergency department after a 45-minute long seizure which occurred at home.        

      Patient arrived EMS after being administered 2 mg of Versed. Patient denies chest pain.     

      Patient was administered nonrebreather after administration of Versed due to                

      respiratory depression..                                                                    

                                                                                                  

Historical:                                                                                       

- Allergies:                                                                                      

18:37 ACETAMINOPHEN;                                                                          jd3 

18:37 Hydrocodone-Acetaminophen;                                                              jd3 

- Home Meds:                                                                                      

18:37 Aspirin Oral [Active]; Lipitor Oral [Active]; Claritin Oral [Active]; losartan oral     jd3 

      [Active]; Phenytoin Oral [Active]; atorvastatin oral [Active]; montelukast oral             

      [Active]; carvedilol oral [Active]; Keppra Oral [Active];                                   

- PMHx:                                                                                           

18:37 Seizure; CVA; Hypertensive disorder; COPD; epilepsy;                                    jd3 

                                                                                                  

- Immunization history:: Adult Immunizations unknown.                                             

- Social history:: Smoking status: Patient reports the use of cigarette tobacco                   

  products.                                                                                       

                                                                                                  

                                                                                                  

ROS:                                                                                              

18:35 Constitutional: Negative for fever, chills, and weight loss, Cardiovascular: Negative   jm 

      for chest pain, palpitations, and edema, Respiratory: Negative for shortness of breath,     

      cough, wheezing, and pleuritic chest pain.                                                  

18:35 Neuro: Positive for seizure activity.                                                       

18:35 All other systems are negative.                                                             

                                                                                                  

Exam:                                                                                             

18:35 Head/Face:  atraumatic. Eyes:  EOMI, no conjunctival erythema appreciated ENT:  Moist   jmm 

      Mucus Membranes Neck:  Trachea midline, Supple Chest/axilla:  Normal chest wall             

      appearance and motion.   Cardiovascular:  Regular rate and rhythm.  No edema                

      appreciated Respiratory:  Normal respirations, no respiratory distress appreciated          

      Abdomen/GI:  Non distended, soft Back:  Normal ROM Skin:  General appearance color          

      normal                                                                                      

18:35 Constitutional: The patient appears in no acute distress, alert, awake.                     

18:35 Neuro: Orientation: is normal, Mentation: is normal, Memory: is normal.                     

                                                                                                  

Vital Signs:                                                                                      

18:41  / 92; Pulse 95; Resp 28 S; Temp 97.3(A); Pulse Ox 92% on 95% Non-rebreather      jd3 

      mask; Weight 52.16 kg (R); Height 5 ft. 2 in. (157.48 cm) (R); Pain 0/10;                   

19:40  / 86; Pulse 99; Resp 22 S; Pulse Ox 98% on Non-rebreather mask;                  as6 

20:40  / 75; Pulse 96; Resp 20 S; Pulse Ox 96% on 5 lpm NC;                             as6 

21:09 Weight 35.24 kg;                                                                        as6 

22:00  / 83; Pulse 97; Resp 22 S; Pulse Ox 98% on 3 lpm NC;                             as6 

22:56  / 75; Pulse 92; Resp 18 S; Pulse Ox 95% on Non-rebreather mask;                  as6 

                                                                                             

00:00  / 102; Pulse 103; Resp 20 S; Pulse Ox 93% on 3 lpm NC;                           as6 

                                                                                             

21:09 Body Mass Index 14.21 (35.24 kg, 157.48 cm)                                             as6 

                                                                                                  

NIH Stroke Scale Scores:                                                                          

                                                                                             

20:30 NIHSS Score: 11                                                                         [facility] 

20:44 NIHSS Score: 13                                                                         as6 

                                                                                                  

[place] Coma Score:                                                                               

22:54 Eye Response: to voice(3). Verbal Response: oriented(5). Motor Response: obeys          as6 

      commands(6). Total: 14.                                                                     

                                                                                                  

MDM:                                                                                              

18:35 Patient medically screened.                                                             [facility] 

23:54 Data reviewed: vital signs, nurses notes. Counseling: I had a detailed discussion with  [facility] 

      the patient and/or guardian regarding: the historical points, exam findings, and any        

      diagnostic results supporting the discharge/admit diagnosis, lab results, radiology         

      results, the need for further work-up and treatment in the hospital, the need to            

      transfer to another facility. Consent for treatment: Risk, benefits, and alternatives       

      discussed with Pt/guardian concerning: TPA. ED course: I discussed the patient with Dr. Hudson. Recommended TPA. I discussed the patient with Dr. Natalie reilly whom accepted the      

      patient for admission. After evaluation by Dr. Sandra reilly and discussion with Dr. Martinez     

      and evaluation of the CTA, recommended transfer for intra-arterial angiography..            

23:56 ED course: I discussed the patient with [facility] Stroke unit whom accepted the  [facility] 

      patient for transfer. .                                                                     

                                                                                                  

                                                                                             

18:35 Order name: Acetaminophen                                                               [facility] 

                                                                                             

18:35 Order name: Basic Metabolic Panel                                                       [facility] 

                                                                                             

18:35 Order name: CBC with Diff                                                               [facility] 

                                                                                             

18:35 Order name: ETOH Level; Complete Time: 19:43                                            [facility] 

                                                                                             

18:35 Order name: Hepatic Function; Complete Time: 20:40                                      [facility] 

                                                                                             

18:35 Order name: PT-INR; Complete Time: 19:43                                                [facility] 

                                                                                             

18:35 Order name: Ptt, Activated; Complete Time: 19:43                                        [facility] 

                                                                                             

18:35 Order name: Salicylate; Complete Time: 20:40                                            [facility] 

                                                                                             

18:35 Order name: Urine Drug Screen; Complete Time: 20:19                                     [facility] 

                                                                                             

18:35 Order name: Acetaminophen Level; Complete Time: 20:40                                   [facility]

                                                                                             

18:35 Order name: Troponin (emerg Dept Use Only); Complete Time: 20:40                        [facility] 

                                                                                             

18:35 Order name: Basic Metabolic Panel; Complete Time: 20:40                                 MS

                                                                                             

18:35 Order name: CBC with Automated Diff; Complete Time: 20:40                               MS

                                                                                             

19:53 Order name: Urine Dipstick-Ancillary; Complete Time: 19:54                              MS

                                                                                             

19:58 Order name: Manual Differential; Complete Time: 20:40                                   MS

                                                                                             

20:09 Order name: CT Head C Spine; Complete Time: 20:57                                       [facility] 

                                                                                             

20:16 Order name: SARS-COV-2 RT PCR (Document "Date of Onset" if Symptomatic)                 [facility] 

                                                                                             

20:17 Order name: SARS-COV-2 RT PCR; Complete Time: 01:43                                     [facility]

                                                                                             

20:20 Order name: CT Head Angio; Complete Time: 21:18                                         [facility] 

                                                                                             

20:20 Order name: CT Neck Angio; Complete Time: 21:18                                         [facility] 

                                                                                             

00:04 Order name: CT Head Brain wo Cont                                                       [facility] 

                                                                                             

18:35 Order name: EKG; Complete Time: 18:35                                                   [facility] 

                                                                                             

18:35 Order name: EKG - Nurse/Tech; Complete Time: 19:12                                      [facility] 

                                                                                             

18:35 Order name: IV Saline Lock; Complete Time: 19:12                                        [facility] 

                                                                                             

18:35 Order name: Labs collected and sent; Complete Time: 19:12                               [facility] 

                                                                                             

18:35 Order name: Urine Dipstick-Ancillary (obtain specimen); Complete Time: 19:54            [facility] 

                                                                                                  

Administered Medications:                                                                         

20:16 Drug: Zofran (Ondansetron) 4 mg Route: IVP; Site: right antecubital;                    as6 

22:57 Follow up: Response: No adverse reaction                                                as6 

21:49 Drug: Keppra (levETIRAcetam) 20 mg/kg Route: IV; Rate: calculated rate; Site: right     as6 

      forearm;                                                                                    

23:05 Follow up: Response: No adverse reaction; IV Status: Completed infusion; IV Intake:     as6 

      100ml                                                                                       

21:49 Drug: NS 0.9% 1000 ml Route: IV; Rate: 1 bolus; Site: left forearm;                     as6 

21:50 Drug: Aspirin Chewable Tablet 324 mg Route: PO;                                         as6 

22:58 Follow up: Response: No adverse reaction                                                as6 

21:53 Drug: foLIC Acid 1 mg Route: IVPB; Site: right forearm;                                 as6 

23:05 Follow up: Response: No adverse reaction; IV Status: Completed infusion; IV Intake: 50fxku3 

22:10 Drug: Alteplase {Co-Signature: tk1 (Mehreen Richardson).} Route: IV Thrombolytics; Rate:      as6 

      calculated rate;                                                                            

                                                                                             

03:10 Follow up: Response: No adverse reaction                                                as6 

                                                                                             

22:40 Drug: Rocephin (cefTRIAXone) 1 grams Route: IV; Rate: calculated rate; Site: left hand; as6 

23:05 Follow up: Response: No adverse reaction; IV Status: Completed infusion; IV Intake: 34ykhc8 

                                                                                             

00:09 Drug: ProTONIX (pantoprazole) 40 mg Route: IVP; Site: right forearm;                    as6 

03:10 Follow up: Response: No adverse reaction                                                as6 

                                                                                                  

                                                                                                  

Disposition Summary:                                                                              

22 23:58                                                                                    

Transfer Ordered                                                                                  

      Transfer Location: [facility] 

      Reason: Higher level of care                                                            jm 

      Condition: Stable                                                                       jmm 

      Problem: new                                                                            jmm 

      Symptoms: have improved                                                                 jmm 

      Accepting Physician: Dr. Dawn(22 03:10)                                        as6 

      Diagnosis                                                                                   

        - Cerebral infarction, unspecified                                                    [facility] 

        - VERTEBROBASILAR SYNDROME                                                            [facility] 

      Forms:                                                                                      

        - Medication Reconciliation Form                                                      [facility] 

        - SBAR form                                                                           [facility] 

                                                                                                  

NIH Stroke Scale - NIH Stroke Score                                                               

Date: 2022                                                                                  

Time: 20:30                                                                                       

Total Score = 11                                                                                  

  1a. Level of Consciousness (LOC) - 0(Alert)                                                     

  1b. Level of Consciousness (LOC) (Month \T\ Age) - 0(Both)                                      

  1c. LOC Commands (Open \T\ Closes Eyes/) - 0(Both)                                          

   2. Best Gaze (Lateral Gaze Paresis) - 0(Normal)                                                

   3. Visual Field Loss - 1(Partial hemianopia)                                                   

   4. Facial Palsy - 0(Normal)                                                                    

  5a. Left Arm: Motor (10-second hold) - 4(No movement)                                           

  5b. Right Arm: Motor (10-second hold) - 0(No drift)                                             

  6a. Left Leg: Motor (5-second hold - always test supine) - 1(Drift)                             

  6b. Right Leg: Motor (5-second hold - always test supine) - 0(No drift)                         

   7. Limb Ataxia (finger/nose \T\ heel/shin - test with eyes open) - 1(Present in one            

      limb)                                                                                       

   8. Sensory Loss (pinprick arms/legs/face) - 1(Mild to moderate loss)                           

   9. Best Language: Aphasia (description/naming/reading) - 1(Mild to moderate                    

      aphasia)                                                                                    

  10. Dysarthria (speech clarity - read or repeat words) - 1(Mild to Moderate)                    

  11. Extinction and Inattention (visual/tactile/auditory/spatial/personal) -                     

      1(Present)                                                                                  

Initials: [facility]                                                                                     

                                                                                                  

                                                                                                  

NIH Stroke Scale - NIH Stroke Score                                                               

Date: 2022                                                                                  

Time: 20:44                                                                                       

Total Score = 13                                                                                  

  1a. Level of Consciousness (LOC) - 1(Not Alert)                                                 

  1b. Level of Consciousness (LOC) (Month \T\ Age) - 0(Both)                                      

  1c. LOC Commands (Open \T\ Closes Eyes/) - 0(Both)                                          

   2. Best Gaze (Lateral Gaze Paresis) - 0(Normal)                                                

   3. Visual Field Loss - 1(Partial hemianopia)                                                   

   4. Facial Palsy - 0(Normal)                                                                    

  5a. Left Arm: Motor (10-second hold) - 4(No movement)                                           

  5b. Right Arm: Motor (10-second hold) - 0(No drift)                                             

  6a. Left Leg: Motor (5-second hold - always test supine) - 1(Drift)                             

  6b. Right Leg: Motor (5-second hold - always test supine) - 0(No drift)                         

   7. Limb Ataxia (finger/nose \T\ heel/shin - test with eyes open) - 1(Present in one            

      limb)                                                                                       

   8. Sensory Loss (pinprick arms/legs/face) - 1(Mild to moderate loss)                           

   9. Best Language: Aphasia (description/naming/reading) - 1(Mild to moderate                    

      aphasia)                                                                                    

  10. Dysarthria (speech clarity - read or repeat words) - 1(Mild to Moderate)                    

  11. Extinction and Inattention (visual/tactile/auditory/spatial/personal) -                     

      2(Profound)                                                                                 

Initials: as6                                                                                     

                                                                                                  

Addendum:                                                                                         

01/10/2022                                                                                        

     07:02 Co-signature as Attending Physician, Jose Monge MD.                            rn     

     07:02 I agree with the assessment and plan of care. Attestation: The patient's        rn     

           history, exam findings, diagnostics, and a summary of any interventions or             

           procedures was reviewed in detail with Jose FIGUEROA.                                

                                                                                                  

Signatures:                                                                                       

Dispatcher [employer]                           EDJose Calixto PA PA jmm Nieto, Roman, MD MD rn Davies, Jonathon, RN RN [id number] Slawson, Ashby, RN RN   [id number]                                                  

Mehreen Richardson                                 [id number]                                                  

                                                                                                  

Corrections: (The following items were deleted from the chart)                                    

                                                                                             

18:41 18:37 PMHx: Chronic obstructive lung disease; archana magaña         

19:12 18:35 Suicide Screening ([place]) ordered. [facility]                                 lang         

20:12 18:36 Head Brain Wo Cont+CT.RAD.BRZ ordered. [facility]        

21:01 20:17 NIHSS Score: 4 keo crowley         

                                                                                             

03:10  23:58 Dr. Dawn [facility]                                                     [id number]         

                                                                                                  

**************************************************************************************************

## 2022-01-05 NOTE — RAD REPORT
[address] [facility]. They state they are no longer taking transfer tonight. States can potentially triage the patient in the morning. EXAM DESCRIPTION:  Maegan Angio1/5/2022 8:36 pm

 

CLINICAL HISTORY:  Left arm weakness/numbness

 

COMPARISON:  None

 

TECHNIQUE:  50 cc Isovue 370 was administered intravenously. 3D MIP reconstruction performed

 

All CT scans are performed using dose optimization technique as appropriate and may include automated
 exposure control or mA/KV adjustment according to patient size.

 

FINDINGS:  Moderate calcified plaque within the distal right carotid artery/proximal right carotid bu
lb/ proximal right external carotid artery

 

Mild calcified plaque within the remainder of common carotid, internal carotid and external carotid a
rteries bilaterally

 

High-grade stenosis proximal left vertebral artery. Right vertebral artery unremarkable. Distal left 
vertebral artery terminates into the PICA

 

IMPRESSION:  Moderate calcified plaque within the distal right carotid artery/proximal right carotid 
bulb/ proximal right external carotid artery results in an approximately 50% stenosis.

 

High-grade stenosis proximal left vertebral artery.

 

NASCET criteria used.

 

Mild  0-49% stenosis

Moderate 50-69% stenosis

Severe 70-99% stenosis

## 2022-01-05 NOTE — XMS REPORT
Continuity of Care Document

                           Created on:2022



Patient:RONA NELSON

Sex:Female

:1960

External Reference #:[id number]





Demographics







                          Address                   [address] [place] [address]

 

                          Home Phone                [phone] [facility]

 

                          Work Phone                [phone]

 

                          Mobile Phone              [phone]

 

                          Email Address             DECLINE 10/15/21

 

                          Preferred Language        English

 

                          Marital Status            Unknown

 

                          Jewish Affiliation     Unknown

 

                          Race                      Unknown

 

                          Additional Race(s)        Unavailable



                                                    White

 

                          Ethnic Group              Unknown









Author







                          Organization              [facility]

t

 

                          Address                   [address]



                                                    [place] [address]

 

                          Phone                     [phone]









Support







                Name            Relationship    Address         Phone

 

                LESLIE          P               [address] [phone]



                                                [place] [address] 

 

                SUGEY,  R       Unavailable     [address] [phone]



                                                [place] [address] 

 

                NELSON          Unavailable     [address] [phone]



                                                [place] [address] 

 

                SUGEY           Unavailable     [address] [phone]



                                                [place] [address] 

 

                Leslie          Spouse          [address] [phone]



                                                [place] [address] 









Care Team Providers







                    Name                Role                Phone

 

                    AMANDA           Primary Care Physician Unavailable

 

                    ALDA Barba            Attending Clinician Unavailable

 

                    WING WILCOX      Attending Clinician Unavailable

 

                    Grisel Bennett  Attending Clinician [phone]

 

                    Radiology           Attending Clinician Unavailable

 

                    RADIOLOGY           Attending Clinician Unavailable

 

                    HARRISON Carter MD           Attending Clinician [phone]

 

                    HARRISON Ngo DO     Attending Clinician [phone]

 

                    Amanda           Attending Clinician [phone]

 

                    AMANDA           Attending Clinician Unavailable

 

                    Doctor Unassigned,  Name Attending Clinician Unavailable

 

                    GRISEL SIBLEY      Attending Clinician Unavailable

 

                    ROBB Calderon     Attending Clinician [phone]

 

                    Marino ECHOLS           Attending Clinician [phone]

 

                    Pob,  Lab Main      Attending Clinician Unavailable

 

                    MARINO              Attending Clinician Unavailable

 

                    1,  Lab             Attending Clinician Unavailable

 

                    Raquel WILSON,  S       Attending Clinician [phone]

 

                    WALDEMAR                Attending Clinician Unavailable

 

                    Lucio Patel   Admitting Clinician Unavailable

 

                    WILLAM BARNEY      Admitting Clinician Unavailable

 

                    HARRISON Carter MD           Admitting Clinician [phone]

 

                    GRISEL SIBLEY      Admitting Clinician Unavailable

 

                    HARRISON CARTER              Admitting Clinician Unavailable

 

                    AMANDA           Admitting Clinician Unavailable

 

                    LETI SYKES     Admitting Clinician Unavailable









Payers







           Payer Name Policy Type Policy Number Effective Date Expiration Date MAJO nassar

 

           [employer] [place]            [id number] 2020            



                                            00:00:00              

 

           [employer]                 [id number]  2018            



           HMO/POS/OPEN                       00:00:00              



           ACCESS                                                 

 

           [employer] II              [id number]  2018 00:00:00 



                                            00:00:00              







Problems







       Condition Condition Condition Status Onset  Resolution Last   Treating Co

mments 

Source



       Name   Details Category        Date   Date   Treatment Clinician        



                                                 Date                 

 

       Acute  Acute  Disease Active 2020                             [facility]



       cystitis cystitis                                              ity of



       without without               00:00:                             [place]



       hematuria hematuria               00                                 Medi

alfonzo



                                                                      Branch

 

       Seizures Seizures Disease Active 2020                             [facility]

rs



                                                                  ity of



                                   00:00:                             [place]



                                   00                                 Medical



                                                                      Branch

 

       CVA           Diagnosis Active 2019               Mem

oria



                                             14:48:00               l



                CVA                00:00:                             [place]



                                   00                                 



                                                                      



              Active                                                  



                                                                      



                                                                      



              2019                                                  



                                                                      



                                                                      



                                                                      



                                                                      



                                                                      



                                                                      



                                                                      



                                                                      



                     [facility]                                                  



                                                                      



                                                                      

 

       NSTEMI,        Diagnosis Active 2019               Me

moria



       SEIZURES                                22:12:00               l



                NSTEMI,               00:00:                             [place]



              SEIZURES               00                                 



                                                                      



                                                                      



              Active                                                  



                                                                      



                                                                      



              2019                                                  



                                                                      



                                                                      



                                                                      



                                                                      



                                                                      



                                                                      



                                                                      



                                                                      



                     [facility]                                                  



                                                                      



                                                                      

 

       SONG        Diagnosis Active 2019               

[facility]



       BILLING                                08:14:00               l



                                   00:00:                             Julius



              SONG               00                                 



              BILLING                                                  



                                                                      



                                                                      



              Active                                                  



                                                                      



                                                                      



              2019                                                  



                                                                      



                                                                      



                                                                      



                                                                      



                                                                      



                                                                      



                                                                      



                                                                      



                     [facility]                                                  



                                                                      



                                                                      

 

       Cerebral        Problem Active               2021               Mem

oria



       infarction                                    00:56:47               l



       (disorder)   Cerebral                                                  He

rmann



              infarction                                                  



              (disorder)                                                  



                                                                      



                                                                      



               Active                                                  



                                                                      



                                                                      



                                                                      



                                                                      



              Problem                                                  



                                                                      



                                                                      



              2021                                                  



                                                                      



                                                                      



                                                                      



                                                                      



                 [facility]                                                  



              Neuro                                                   



                                                                      



                                                                      

 

       Epilepsy        Problem Active               2021               Mem

oria



       characteri                                    00:56:47               l



       zed by   Epilepsy                                                  [facility]

n



       intractabl characteri                                                  



       e complex zed by                                                  



       partial intractabl                                                  



       seizures e complex                                                  



       (disorder) partial                                                  



              seizures                                                  



              (disorder)                                                  



                                                                      



                                                                      



               Active                                                  



                                                                      



                                                                      



                                                                      



                                                                      



              Problem                                                  



                                                                      



                                                                      



              2021                                                  



                                                                      



                                                                      



                                                                      



                                                                      



                 [facility]                                                  



              Neuro                                                   



                                                                      



                                                                      

 

       Hypertensi        Problem Active               2021               M

emoria



       ve                                        00:56:47               l



       disorder,                                                         [place]



       systemic Hypertensi                                                  



       arterial ve                                                      



       (disorder) disorder,                                                  



              systemic                                                  



              arterial                                                  



              (disorder)                                                  



                                                                      



                                                                      



               Active                                                  



                                                                      



                                                                      



                                                                      



                                                                      



              Problem                                                  



                                                                      



                                                                      



              2021                                                  



                                                                      



                                                                      



                                                                      



                                                                      



                 [facility]                                                  



              Neuro                                                   



                                                                      



                                                                      

 

       Hyperlipid        Problem Active               2021               M

emoria



       emia                                      00:56:47               l



       (disorder)                                                         [facility]



              Hyperlipid                                                  



              emia                                                    



              (disorder)                                                  



                                                                      



                                                                      



               Active                                                  



                                                                      



                                                                      



                                                                      



                                                                      



              Problem                                                  



                                                                      



                                                                      



              2021                                                  



                                                                      



                                                                      



                                                                      



                                                                      



                 [facility]                                                  



              Neuro                                                   



                                                                      



                                                                      

 

       Hyponatrem        Problem Active               2021               M

emoria



       ia                                        00:56:47               l



       (disorder)                                                         [facility] [place]



              Hyponatrem                                                  



              ia                                                      



              (disorder)                                                  



                                                                      



                                                                      



               Active                                                  



                                                                      



                                                                      



                                                                      



                                                                      



              Problem                                                  



                                                                      



                                                                      



              2021                                                  



                                                                      



                                                                      



                                                                      



                                                                      



                 [facility]                                                  



              Neuro                                                   



                                                                      



                                                                      

 

       NON-ST        Diagnosis Active               2019               Mem

oria



       ELEVATION                                    22:12:00               l



       (NSTEMI)   NON-ST                                                  [facility]

n



       MYOCARDIAL ELEVATION                                                  



       INF    (NSTEMI)                                                  



              MYOCARDIAL                                                  



              INF                                                     



                                                                      



                                                                      



              Active                                                  



                                                                      



                                                                      



                                                                      



                                                                      



                                                                      



                                                                      



                                                                      



                                                                      



                                                                      



                                                                      



                   [facility]                                                  



                                                                      



                                                                      

 

       UNSPECIFIE        Diagnosis Active               2019              

 [facility]



       D                                         22:12:00               l



       CONVULSION                                                         [facility]

n



       S      UNSPECIFIE                                                  



              D                                                       



              CONVULSION                                                  



              S                                                       



                                                                      



                 Active                                                  



                                                                      



                                                                      



                                                                      



                                                                      



                                                                      



                                                                      



                                                                      



                                                                      



                                                                      



                                                                      



                      [facility]                                                  



                                                                      



                                                                      

 

       No known No known Disease                                           Unive

rs



       active active                                                  ity of



       problems problems                                                  [facility]







Allergies, Adverse Reactions, Alerts







       Allergy Allergy Status Severity Reaction(s) Onset  Inactive Treating Comm

ents 

Source



       Name   Type                        Date   Date   Clinician        

 

       Hydrocod Propensi Active        Nausea                       Univer

s



       one    ty to                and/or 1-10                        ity of



              adverse               Vomiting 00:00:                      [place]



              reaction                      00                          [facility]

 

       HYDROCOD DRUG   Active        N/V                          Univers



       ONE    INGREDI                      1-10                        ity of



                                          00:00:                      [place]



                                          00                          [facility]

 

       codeine DA     Active SV            -                      [facility]



                                          2-30                        [place]



                                          00:00:                      d



                                          00                          [facility]

 

       hydrocod DA     Active MO            2020                      [facility]



       one                                2-30                        [place]



                                          00:00:                      d



                                          00                          [facility]

 

       codeine DA     Active SV     NAUSEA 2020                      [facility]



                                          2-30                        [place]



                                          00:00:                      d



                                          00                          [facility]

 

       hydrocod DA     Active MO     NAUSEA 2020                      [facility]



       one                                2-30                        [place]



                                          00:00:                      d



                                          00                          [facility]

 

       Codeine Drug   Active        Nausea 2019                      [facility]



              Allergy               and/or 0-23                        ity of



                                   Vomiting 00:00:                      [place]



                                          00                          [facility]

 

       CODEINE DRUG   Active        N/V    2019                      [facility]



              INGREDI                      0-23                        ity of



                                          00:00:                      [place]



                                          00                          [facility]

 

       [place]    Propensi Active        Other - See 2018               [place], 

Univers



       Derivati ty to                comments 0-18                 sneezingC ity

 of



       ves    adverse                      00:00:               [place], [place]



              reaction                      00                   sneezing [facility]

l



              s                                                       Branch

 

       Ragweed Propensi Active        Other - See 2018               [place],

 Univers



              ty to                comments 0-18                 sneezingC ity o

f



              adverse                      00:00:               [place], [place]



              reaction                      00                   sneezing Med[facility]

l



              s                                                       Branch

 

       OAK    Drug   Active High   Other-Cmnt 2018                      Univer

s



       DERIVATI Class                       0-18                        ity of



       VES                                00:00:                      [place]



                                          00                          Medical



                                                                      Branch

 

       RAGWEED Drug   Active High   Other-Cmnt 2018                      Unive

rs



              Class                       0-18                        ity of



                                          00:00:                      [place]



                                          00                          Medical



                                                                      Branch

 

       OAK    Allergy Active High   Other  2018                      [facility]



       DERIVATI                             0-18                        [facility]



       V[facility]                                00:00:                      [facility]



                                          00                          Center

 

       RAGWEED Allergy Active High   Other  2018                      [facility]



                                          0-18                        [facility]



                                          00:00[facility]                      [facility]



                                          00                          Center

 

       No Known DA     Active U             2018                      [facility]



       Allergie                             0-08                        [place]



       s                                  00:00:                      d



                                          00                          Medical



                                                                      Center

 

       No Known DA     Active U             -                      [facility]



       Allergie                             0-08                        [place]



       s                                  00:00:                      d



                                          00                          Medical



                                                                      Center

 

       codeine codeine Active                                           [facility]



                                                                      l



                                                                      [place]







Social History







           Social Habit Start Date Stop Date  Quantity   Comments   Source

 

           History [facility]                                             University o

f



           Alcohol Std                                             [place] [facility]



           Drinks                                                 Branch

 

           History [facility]                                             University o

f



           Alcohol Binge                                             [place] [facility]

al



                                                                  Branch

 

           Sex Assigned At                                             Universit

y of



           Birth                                                  [place] [facility]



                                                                  Branch

 

           Exposure to                       Not sure              [facility]



           SARS-CoV-2                                             [facility]



           (event)                                                Branch

 

           Alcohol intake 2020 Current drinker            Unive

rsity of



                      00:00:00   00:00:00   of alcohol            [place] [facility]



                                            (finding)             Branch

 

           Tobacco use and 2020 Never used            Universit

y of



           exposure   00:00:00   00:00:00                         [place] [facility]



                                                                  Branch

 

           History SDOH 2020 17                    University o

f



           Education  00:00:00   00:00:00                         [place] [facility]



                                                                  Branch

 

           History SDOH 2020 5                     University o

f



           Financial  00:00:00   00:00:00                         [place] [facility]



                                                                  Branch

 

           History SDOH Food 2020 1                     Univers

ity of



           Worry      00:00:00   00:00:00                         [place] [facility]



                                                                  Branch

 

           History SDOH Food 2020 1                     Univers

ity of



           Scarcity   00:00:00   00:00:00                         [place] [facility]



                                                                  Branch

 

           History SDOH 2020 2                     University o

f



           Transport Med 00:00:00   00:00:00                         [place] [facility]

al



                                                                  Branch

 

           History SDOH 2020 2                     [place] [facility]

f



           Transport Non-Med 00:00:00   00:00:00                         [facility]

 

           Social History 2020                       [facility] SILVIA penabiju



                      19:21:28   19:21:28                         

 

           History [facility] 2019-10-23 2019-10-23 5                     [place] [facility]

f



           Alcohol Frequency 00:00:00   00:00:00                         [facility]

 

           Tobacco Comment 2019-10-23 2019-10-23 prn 1  a week            Univer

sity of



                      00:00:00   00:00:00                         [facility]









                Smoking Status  Start Date      Stop Date       Source

 

                Current some day 2020 00:00:00                 [facility] 

[facility] [place]



                smoker                                          [facility]

 

                Unknown if ever smoked                                 [facility]

 

                Social History  2019 04:21:27 2019 04:21:27 [facility]

 [facility]







Medications







       Ordered Filled Start  Stop   Current Ordering Indication Dosage Frequency

 Signature

                    Comments            Components          Source



     Medication Medication Date Date Medication? Clinician                (SIG) 

          



     Name Name                                                   

 

     Levetiracet            Yes                      1,500 mg =           

[facility]



     am 750 MG      4-29                               2 tab, PO,           l



     Oral Tablet      19:26:                               BID, # 360           

[place]



     [Keppra]      00                                 tab, 3           



                                                  Refill(s),           



                                                  Pharmacy:           



                                                  [facility]           



                                                  #[id number],           



                                                  154.94,           



                                                  cm,            



                                                  10/23/20           



                                                  10:48:00           



                                                  CDT,           



                                                  Height,           



                                                  39.545,           



                                                  kg,            



                                                  21           



                                                  14:07:00           



                                                  CDT,           



                                                  Weight           

 

     phenytoin            Yes                      See            [facility]



     100 mg oral      4-29                               Instructio           l



     capsule,      19:26:                               ns, 300mg           [facility]

biju



     extended      00                                 alternatin           



     release                                         g with           



                                                  400mg, #           



                                                  120 cap, 6           



                                                  Refill(s),           



                                                  Pharmacy:           



                                                  [facility]           



                                                  #[id number],           



                                                  154.94,           



                                                  cm,            



                                                  10/23/20           



                                                  10:48:00           



                                                  CDT,           



                                                  Height,           



                                                  39.545,           



                                                  kg,            



                                                  21           



                                                  14:07:00           



                                                  CDT,           



                                                  Weight           

 

     Levetiracet            Yes                      1,500 mg =           

[facility]



     am 750 MG      4-29                               2 tab, PO,           l



     Oral Tablet      19:26:                               BID, # 360           

[place]



     [Keppra]      00                                 tab, 3           



                                                  Refill(s),           



                                                  Pharmacy:           



                                                  [facility]           



                                                  #[id number],           



                                                  154.94,           



                                                  cm,            



                                                  10/23/20           



                                                  10:48:00           



                                                  CDT,           



                                                  Height,           



                                                  39.545,           



                                                  kg,            



                                                  21           



                                                  14:07:00           



                                                  CDT,           



                                                  Weight           

 

     phenytoin            Yes                      See            [facility]



     100 mg oral                                     Instructio           l



     capsule,      19:26:                               ns, 300mg           [facility]

biju



     extended      00                                 alternatin           



     release                                         g with           



                                                  400mg, #           



                                                  120 cap, 6           



                                                  Refill(s),           



                                                  Pharmacy:           



                                                  [facility]           



                                                  [facility]           



                                                  #[id number],           



                                                  154.94,           



                                                  cm,            



                                                  10/23/20           



                                                  10:48:00           



                                                  CDT,           



                                                  Height,           



                                                  39.545,           



                                                  kg,            



                                                  21           



                                                  14:07:00           



                                                  CDT,           



                                                  Weight           

 

     naproxen      0 - No             500mg      500 mg,           Univ

ers



     (NAPROSYN)      1-10 01-10                          Oral,           ity of



     tablet 500      23:00: 22:19                          ONCE, 1           [place]

as



     mg        00   :00                           dose, [facility]



                                                  1/10/21 at           Branch



                                                  1700,           



                                                  Routine           

 

     naproxen            Yes       [id number] 500mg      Take 1           U

nivers



     (NAPROSYN)      1-10                               tablet by           ity 

of



     500 mg      00:00:                               mouth 2           [place]



     tablet      00                                 (two)           Medical



                                                  times           [place]



                                                  daily with           



                                                  meals.           

 

     iohexol      2020- No             100mL      100 mL,           Unive

rs



     (OMNIPAQUE                                Intravenou           it

y of



     350       00:15: 00:11                          s, ONCE, 1           [place]



     BULK-100      00   :00                           dose, Mon           Medica

l



     mL)                                          20           Branch



     injection                                         at 1815,           



     100 mL                                         Routine           

 

     atorvastati      2020      Yes            40mg      40 mg,           Univ

ers



     n (LIPITOR)                                     Oral, QHS,           it

y of



     tablet 40      03:00:                               First dose           [place]



     mg        00                                 on [facility]



                                                  20 at           Branch



                                                  2100,           



                                                  Until           



                                                  Discontinu           



                                                  ed,            



                                                  Routine           

 

     cefTRIAXone      2020 2020- No             1000mg      1,000 mg,         

  Univers



     (ROCEPHIN)      08                          IV             ity of



     1,000 mg in      00:00: 23:59                          Piggyback,          

 [place]



     NaCl 0.9%      00   :00                           Q24H ABX,           Medic

al



     (NS) 50 mL                                         6 doses,           Branc

h



     MINI-BAG                                         First dose           



                                                  (after           



                                                  last           



                                                  modificati           



                                                  on) on Mon           



                                                  20 at           



                                                  1800, Last           



                                                  dose on           



                                                  Sat            



                                                  20 at           



                                                  1800, 50           



                                                  mL<br>Reas           



                                                  on for           



                                                  Anti-Infec           



                                                  tive:           



                                                  Empiric           



                                                  Therapy           



                                                  for            



                                                  Suspected           



                                                  Infection<           



                                                  br>Empiric           



                                                  Therapy           



                                                  Site:           



                                                  Urine<br>D           



                                                  uration of           



                                                  therapy:           



                                                  72 hours           

 

     aspirin 81      2020      Yes            81mg      Take 81 mg           U

nivers



     mg EC      02                               by mouth.           ity of



     tablet      23:07:                                              [address]

 

     azelastine      2020      Yes            1{spray      Use 1           Uni

vers



     137 mcg                           }         Spray in           ity of



     (0.1 %)      23:07:                               each           [place]



     nasal spray      05                                 nostril.           Medi

alfonzo



                                                                 Branch

 

     budesonide-      2020      Yes            2{puff}      Inhale 2          

 Univers



     formoterol      02                               Puffs.           ity of



     (SYMBICORT)      23:07:                                              [place]



     160-4.5                                                      Medical



     mcg/actuati                                                        Branch



     on inhaler                                                        

 

     carvedilol      2020      Yes            3.125mg      Take 3.125         

  Univers



     3.125 mg      02                               mg by           ity of



     tablet      23:07:                               mouth.           [address]

 

     melatonin      2020      Yes                      Take  by           Univ

ers



     10 mg Cap      02                               mouth.           ity of



               23:07:                                              [address]

 

     ticagrelor      2020      Yes            90mg      Take 90 mg           U

nivers



     (BRILINTA)                                     by mouth.           ity 

of



     90 mg      23:07:                                              [address]

 

     cyanocobala      2020      Yes            2500ug      Place           Uni

vers



     min,                                     2,500 mcg           ity of



     vitamin      23:07:                               under the           [place]



     B-12, 5,000      05                                 tongue           Medica

l



     mcg [address]                                         daily.           [facility]

 

     ipratropium      2020      Yes            2{spray      Use 2           Un

neela



     0.03 %                           }         Sprays in           ity of



     nasal spray      23:07:                               each           [place]



               05                                 nostril 2           Medical



                                                  (two)           [facility]



                                                  times           



                                                  daily.           

 

     losartan 50      2020      Yes            50mg      Take 50 mg           

[facility]



     mg tablet      02                               by mouth           [facility] of



               23:07:                               daily.           [address]

 

     Lacosamide      2020      Yes            1{tbl}      Take 1           [facility]

[facility]



     (VIMPAT)                                     tablet by           ity of



     200 mg      23:07:                               mouth 2           [place]



     tablet                                       (two)           Medical



                                                  times           [facility]



                                                  daily.           

 

     montelukast      2020      Yes            10mg      Take 10 mg           

[facility]



     (SINGULAIR)      02                               by mouth           ity 

of



     10 mg      23:07:                               daily.           [place]



     tablet                                                      Medical



                                                                 [facility]

 

     phenytoin      2020      Yes            100mg      Take 100           [facility]i

vers



     sodium      1-02                               mg by           ity of



     extended      23:07:                               mouth 4           [place]



     (DILANTIN      05                                 (four)           Medical



     ORAL)                                         times           [facility]anch



                                                  daily.           

 

     aspirin 81      2020      Yes            81mg      Take 81 mg           U

nivers



     mg EC      02                               by mouth.           ity of



     tablet      23:07:                                              [address]



                                                                 Branch

 

     azelastine      2020      Yes            1{spray      Use 1           [facility]

vers



     137 mcg      02                     }         Spray in           ity of



     (0.1 %)      23:07:                               each           [place]



     nasal spray      05                                 nostril.           Medi

alfonzo



                                                                 Branch

 

     budesonide-      2020      Yes            2{puff}      Inhale 2          

 [facility]



     formoterol      02                               Puffs.           ity of



     (SYMBICORT)      23:07:                                              [place]



     160-4.5      05                                                Medical



     mcg/actuati                                                        [facility]



     on inhaler                                                        

 

     carvedilol      2020      Yes            3.125mg      Take 3.125         

  [facility]



     3.125 mg      1-02                               mg by           ity of



     tablet      23:07:                               mouth.           [address]

 

     melatonin      2020      Yes                      Take  by           [facility]

ers



     10 mg Cap      02                               mouth.           ity of



               23:07:                                              [address]

 

     ticagrelor      2020      Yes            90mg      Take 90 mg           U

nivers



     (BRILINTA)      02                               by mouth.           ity 

of



     90 mg      23:07:                                              [address]



                                                                 Branch

 

     cyanocobala      2020      Yes            2500ug      Place           Uni

vers



     min,      -02                               2,500 mcg           ity of



     vitamin      23:07:                               under the           [place]



     B-12, 5,000      05                                 tongue           Medica

l



     mcg Subl                                         daily.           [facility]

 

     ipratropium      2020      Yes            2{spray      Use 2           Un

neela



     0.03 %                           }         Sprays in           ity of



     nasal spray      23:07:                               each           [place]



               05                                 nostril 2           Medical



                                                  (two)           [facility]



                                                  times           



                                                  daily.           

 

     losartan 50      2020      Yes            50mg      Take 50 mg           

[facility]



     mg tablet      02                               by mouth           ity of



               23:07:                               daily.           [address]

 

     Lacosamide      2020      Yes            1{tbl}      Take 1           Uni

vers



     (VIMPAT)      1-02                               tablet by           ity of



     200 mg      23:07:                               mouth 2           [place]



     tablet      05                                 (two)           Medical



                                                  times           [facility]



                                                  daily.           

 

     montelukast      2020      Yes            10mg      Take 10 mg           

[facility]



     (SINGULAIR)      -02                               by mouth           ity 

of



     10 mg      23:07:                               daily.           [place]



     tablet      05                                                Medical



                                                                 [facility]

 

     phenytoin      2020      Yes            100mg      Take 100           Uni

vers



     sodium      1-02                               mg by           ity of



     extended      23:07:                               mouth 4           [place]



     (DILANTIN      05                                 (four)           Medical



     ORAL)                                         times           [facility]



                                                  daily.           

 

     aspirin 81      2020      Yes            81mg      Take 81 mg           U

nivers



     mg EC      02                               by mouth.           ity of



     tablet      23:07:                                              [address]

 

     azelastine      2020      Yes            1{spray      Use 1           Uni

vers



     137 mcg      02                     }         Spray in           ity of



     (0.1 %)      23:07:                               each           [place]



     nasal spray      05                                 nostril.           Medi

alfonzo



                                                                 Br[facility]

 

     budesonide-      2020      Yes            2{puff}      Inhale 2          

 [facility]



     formoterol      -02                               Puffs.           ity of



     (SYMBICORT)      23:07:                                              [place]



     160-4.5                                                      Medical



     mcg/actuati                                                        [facility]



     on inhaler                                                        

 

     carvedilol      2020      Yes            3.125mg      Take 3.125         

  [facility]



     3.125 mg      1-02                               mg by           ity of



     tablet      23:07:                               mouth.           [address]

 

     melatonin      2020      Yes                      Take  by           Un[facility]

ers



     10 mg Cap      02                               mouth.           ity of



               23:07:                                              [address]

 

     ticagrelor      2020      Yes            90mg      Take 90 mg           U

nivers



     (BRILINTA)      02                               by mouth.           ity 

of



     90 mg      23:07:                                              [address]

 

     cyanocobala      2020      Yes            2500ug      Place           Uni

vers



     min,      1-02                               2,500 mcg           ity of



     vitamin      23:07:                               under the           [place]



     B-12, 5,000      05                                 tongue           Medica

l



     mcg Subl                                         daily.           [facility]

 

     ipratropium      2020      Yes            2{spray      Use 2           Un

neela



     0.03 %      02                     }         Sprays in           ity of



     nasal spray      23:07:                               each           [place]



               05                                 nostril 2           Medical



                                                  (two)           [facility]



                                                  times           



                                                  daily.           

 

     losartan 50      2020      Yes            50mg      Take 50 mg           

[facility]



     mg tablet      02                               by mouth           ity of



               23:07:                               daily.           [address]

 

     Lacosamide      2020      Yes            1{tbl}      Take 1           [facility]i

vers



     (VIMPAT)      1-02                               tablet by           ity of



     200 mg      23:07:                               mouth 2           [place]



     tablet      05                                 (two)           Medical



                                                  times           [facility]



                                                  daily.           

 

     montelukast      2020      Yes            10mg      Take 10 mg           

[facility]



     (SINGULAIR)      02                               by mouth           ity 

of



     10 mg      23:07:                               daily.           [place]



     tablet      [address] [place] [id number]

 

     phenytoin      2020      Yes            100mg      Take 100           Uni

vers



     sodium      1-02                               mg by           ity of



     extended      23:07:                               mouth 4           [place]



     (DILANTIN      05                                 (four)           Medical



     ORAL)                                         times           [facility]anch



                                                  daily.           

 

     aspirin 81      2020      Yes            81mg      Take 81 mg           U

nivers



     mg EC      02                               by mouth.           ity of



     tablet      23:07:                                              [address]

 

     azelastine      2020      Yes            1{spray      Use 1           Uni

vers



     137 mcg      -02                     }         Spray in           ity of



     (0.1 %)      23:07:                               each           [place]



     nasal spray      05                                 nostril.           [facility]

 

     budesonide-      2020      Yes            2{puff}      Inhale 2          

 [facility]



     formoterol      -02                               Puffs.           ity of



     (SYMBICORT)      23:07:                                              [place]



     160-4.5                                                      Medical



     mcg/actuati                                                        Branch



     on inhaler                                                        

 

     carvedilol      2020      Yes            3.125mg      Take 3.125         

  [facility]



     3.125 mg      1-02                               mg by           ity of



     tablet      23:07:                               mouth.           [address]

 

     melatonin      2020      Yes                      Take  by           Univ

ers



     10 mg Cap      -02                               mouth.           ity of



               23:07:                                              [address]

 

     ticagrelor      2020      Yes            90mg      Take 90 mg           U

nivers



     (BRILINTA)      02                               by mouth.           ity 

of



     90 mg      23:07:                                              [address]

 

     cyanocobala      2020      Yes            2500ug      Place           Uni

vers



     min,      1-02                               2,500 mcg           ity of



     vitamin      23:07:                               under the           [place]



     B-12, 5,000      05                                 tongue           Medica

l



     mcg Subl                                         daily.           Branch

 

     ipratropium      2020      Yes            2{spray      Use 2           Un

neela



     0.03 %      02                     }         Sprays in           ity of



     nasal spray      23:07:                               each           [place]



               05                                 nostril 2           Medical



                                                  (two)           Branch



                                                  times           



                                                  daily.           

 

     losartan 50      2020      Yes            50mg      Take 50 mg           

[facility]



     mg tablet      02                               by mouth           ity of



               23:07:                               daily.           [address]

 

     Lacosamide      2020      Yes            1{tbl}      Take 1           Uni

vers



     (VIMPAT)      1-02                               tablet by           ity of



     200 mg      23:07:                               mouth 2           [place]



     tablet      05                                 (two)           Medical



                                                  times           Branch



                                                  daily.           

 

     montelukast      2020      Yes            10mg      Take 10 mg           

[facility]



     (SINGULAIR)                                     by mouth           ity 

of



     10 mg      23:07:                               daily.           [place]



     tablet      05                                                Medical



                                                                 Branch

 

     phenytoin      2020      Yes            100mg      Take 100           Uni

vers



     sodium      1-02                               mg by           ity of



     extended      23:07:                               mouth 4           [place]



     (DILANTIN      05                                 (four)           Medical



     ORAL)                                         times           Branch



                                                  daily.           

 

     ibuprofen      2020 2020- No             200mg      Take 200           Un

neela



     200 mg       11-02                          mg by           ity of



     tablet      19:13: 00:00                          mouth.           [place]



               22   :00                                          Medical



                                                                 Branch

 

     ticagrelor      2020      Yes            90mg      90 mg,           Unive

rs



     (BRILINTA)                                     Oral,           ity of



     tablet 90      15:00:                               DAILY,           [place]



     mg        00                                 First dose           Medical



                                                  on [facility]



                                                  20 at           



                                                  0900,           



                                                  Until           



                                                  Discontinu           



                                                  ed,            



                                                  Routine           

 

     losartan      2020      Yes            50mg      50 mg,           [facility]



     (COZAAR)                                     Oral,           ity of



     tablet 50      15:00:                               DAILY,           [place]



     mg        00                                 First dose           Medical



                                                  on [facility]



                                                  20 at           



                                                  0900,           



                                                  Until           



                                                  Discontinu           



                                                  ed,            



                                                  Routine           

 

     montelukast      2020      Yes            10mg      10 mg,           Univ

ers



     (SINGULAIR)                                     Oral,           ity of



     tablet 10      15:00:                               DAILY,           [place]



     mg        00                                 First dose           Medical



                                                  on [facility]



                                                  20 at           



                                                  0900,           



                                                  Until           



                                                  Discontinu           



                                                  ed,            



                                                  Routine           

 

     aspirin EC      2020      Yes            81mg      81 mg,           Unive

rs



     tablet 81                                     Oral,           ity of



     mg        15:00:                               DAILY,           [place]



               00                                 First dose           Medical



                                                  on [facility]



                                                  20 at           



                                                  0900,           



                                                  Until           



                                                  Discontinu           



                                                  ed,            



                                                  Routine           

 

     thiamine      2020      Yes            100mg      100 mg,           Unive

rs



     (VITAMIN                                     Oral,           ity of



     B1) tablet      15:00:                               DAILY,           [place]



     100 mg      00                                 First dose           Medical



                                                  on [facility]



                                                  20 at           



                                                  0900,           



                                                  Until           



                                                  Discontinu           



                                                  ed,            



                                                  Routine           

 

     foLIC acid      -      Yes            1mg       1 mg,           Univer

s



     (FOLATE)                                     Oral,           ity of



     tablet 1 mg      15:00:                               [place],           [place]

[place]



               00                                 First dose           Medical



                                                  on [facility]



                                                  20 at           



                                                  0900,           



                                                  Until           



                                                  Discontinu           



                                                  ed,            



                                                  Routine           

 

     pantoprazol      2020-1      Yes            40mg      40 mg,           Univ

ers



     e                                        Oral,           ity of



     (PROTONIX)      15:00:                               DAILY,           [place]



     EC tablet      00                                 First dose           Medi

alfonzo



     40 mg                                         on Mon           Branch



                                                  20 at           



                                                  0900,           



                                                  Until           



                                                  Discontinu           



                                                  ed,            



                                                  Routine           

 

     levETIRAcet      2020      Yes            2000mg      2,000 mg,          

 [facility]



     am (KEPPRA)                                     Oral, BID,           it

y of



     tablet      14:00:                               First dose           [place]



     2,000 mg      00                                 (after           Medical



                                                  last           Branch



                                                  modificati           



                                                  on) on Mon           



                                                  20 at           



                                                  0800,           



                                                  Until           



                                                  Discontinu           



                                                  ed,            



                                                  Routine           

 

     phenytoin      2020      Yes            100mg      100 mg,           Univ

ers



     Extended                                     Oral, QID,           ity o

f



     (DILANTIN      14:00:                               First dose           [place]

[place]



     KAPSEAL)      00                                 on Mon           Medical



     capsule 100                                         20 at           Br

anch



     mg                                           0800,           



                                                  Until           



                                                  Discontinu           



                                                  ed             

 

     carvediloL      2020      Yes            3.125mg      3.125 mg,          

 [facility]



     (COREG)                                     Oral, BID           ity of



     tablet      14:00:                               MEALS,           [place]



     3.125 mg      00                                 First dose           Medic

al



                                                  on [facility]



                                                  20 at           



                                                  0800,           



                                                  Until           



                                                  Discontinu           



                                                  ed,            



                                                  Routine           

 

     enoxaparin      2020      Yes            30mg      30 mg,           Un[facility]

rs



     (LOVENOX)                                     Subcutaneo           ity 

of



     injection      04:30:                               us, Q24H,           [facility]

as



     30 mg      00                                 First dose           Medical



                                                  on [facility]



                                                  20 at           



                                                  2230,           



                                                  Until           



                                                  Discontinu           



                                                  ed,            



                                                  Routine           

 

     budesonide-      2020      Yes            2{puff}      2 Puff,           

[facility]



     formoteroL                                     Inhalation           ity

 of



     (SYMBICORT)      04:15:                               , BID,           [place]

s



     160-4.5      00                                 First dose           Medica

l



     mcg/actuati                                         on [facility]



     on inhaler                                         20 at           



     2 Puff                                         2215,           



                                                  Until           



                                                  Discontinu           



                                                  ed,            



                                                  Routine           

 

     clonazePAM      2020 2020- No             .5mg      Take 0.5           Un

neela



     0.5 mg                                mg by           ity of



     tablet      04:01: 00:00                          mouth.           [place]



               50   :00                                          Medical



                                                                 Branch

 

     lisinopril      2020 2020- No             5mg       Take 5 mg           U

nivers



     5 mg tablet                                by mouth.           it

y of



               04:01: 00:00                                         [place]



               50   :00                                          Medical



                                                                 Branch

 

     furosemide      2020 2020- No             20mg      Take 20 mg           

[facility]



     20 mg                                by mouth           ity of



     tablet      04:01: 00:00                          daily.           [place]



               50   :00                                          [facility]

 

     oxazepam      2020      Yes            15mg      15 mg,           [facility]



     (SERAX)                                     Oral,           ity of



     capsule 15      03:35:                               Q4HPRN,           [place]

s



     mg        47                                 Starting           [facility]



                                                  20 at           



                                                  2135,           



                                                  Until           



                                                  Discontinu           



                                                  ed,            



                                                  Routine,           



                                                  Only while           



                                                  awake for           



                                                  DBP equal           



                                                  to or           



                                                  greater           



                                                  than 100,           



                                                  HR equal           



                                                  to or           



                                                  greater           



                                                  than 100.           

 

     acetaminoph      2020      Yes            650mg      650 mg,           Un

neela



     en                                       Oral,           ity of



     (TYLENOL)      03:07:                               Q6HPRN,           [place]



     tablet 650      17                                 Starting           Medic

al



     mg                                           [facility]



                                                  20 at           



                                                  2107,           



                                                  Until           



                                                  Discontinu           



                                                  ed,            



                                                  Routine,           



                                                  Pain           



                                                  (scale           



                                                  4-6)           

 

     docusate      2020      Yes            100mg      100 mg,           Unive

rs



     (COLACE)                                     Oral,           ity of



     capsule 100      03:07:                               QDAILYPRN,           

[place]



     mg        17                                 Starting           [facility]



                                                  20 at           



                                                  2107,           



                                                  Until           



                                                  Discontinu           



                                                  ed,            



                                                  Routine,           



                                                  Constipati           



                                                  on             

 

     levETIRAcet      2020- No        [id number] 1500mg      Take 2        

   [facility]



     am 750 mg                                tablets by           ity

 of



     tablet      00:00: 05:59                          mouth           [place]



               00   :00                           every           Medical



                                                  morning           Branch



                                                  for 90           



                                                  days.           

 

     levETIRAcet      2020- No        [id number] 2000mg      Take 2        

   [facility]



     am 1,000 mg                                tablets by           i

ty of



     tablet      00:00: 05:59                          mouth at           [place]



               00   :00                           bedtime           Medical



                                                  for 90           Branch



                                                  days.           

 

     levETIRAcet      2020- No        [id number] 1500mg      Take 2        

   [facility]



     am 750 mg                                tablets by           ity

 of



     tablet      00:00: 05:59                          mouth           [place]



               00   :00                           every           Medical



                                                  morning           Branch



                                                  for 90           



                                                  days.           

 

     levETIRAcet      2020- No        [id number] 2000mg      Take 2        

   [facility]



     am 1,000 mg                                tablets by           i

ty of



     tablet      00:00: 05:59                          mouth at           [place]



               00   :00                           bedtime           Medical



                                                  for 90           Branch



                                                  days.           

 

     levETIRAcet      2020- No        [id number] 1500mg      Take 2        

   [facility]



     am 750 mg                                tablets by           ity

 of



     tablet      00:00: 05:59                          mouth           [place]



               00   :00                           every           Medical



                                                  morning           Branch



                                                  for 90           



                                                  days.           

 

     levETIRAcet      2020 No        [id number] 2000mg      Take 2        

   [facility]



     am 1,000 mg                                tablets by           i

ty of



     tablet      00:00: 05:59                          mouth at           [place]



               00   :00                           bedtime           Medical



                                                  for 90           Branch



                                                  days.           

 

     levETIRAcet      2020 No        [id number] 1500mg      Take 2        

   [facility]



     am 750 mg                                tablets by           ity

 of



     tablet      00:00: 05:59                          mouth           [place]



               00   :00                           every           Medical



                                                  morning           Branch



                                                  for 90           



                                                  days.           

 

     levETIRAcet      2020 No        [id number] 2000mg      Take 2        

   [facility]



     am 1,000 mg                                tablets by           i

ty of



     tablet      00:00: 05:59                          mouth at           [place]



               00   :00                           bedtime           Medical



                                                  for 90           Branch



                                                  days.           

 

     cefdinir      2020 No        [id number] 300mg      Take 1           U

nivers



     300 mg                                capsule by           ity of



     capsule      00:00: 05:59                          mouth 2           [place]



               00   :00                           (two)           Medical



                                                  times           Branch



                                                  daily for           



                                                  5 days.           

 

     cefTRIAXone      2020 No             1000mg      1,000 mg,         

  [facility]



     (ROCEPHIN)                                IV             ity of



     1,000 mg in      00:00: 23:32                          [place], [place]



     NaCl 0.9%      00   :00                           ONCE, 1           Medical



     (NS) 50 mL                                         dose, Sun           [facility]

ch



     MINI-BAG                                         20 at           



                                                  1800, 50           



                                                  mL<br>Reas           



                                                  on for           



                                                  Anti-Infec           



                                                  tive:           



                                                  Empiric           



                                                  Therapy           



                                                  for            



                                                  Suspected           



                                                  Infection<           



                                                  br>Empiric           



                                                  Therapy           



                                                  Site:           



                                                  Urine<br>D           



                                                  uration of           



                                                  therapy:           



                                                  72 hours           

 

     levETIRAcet      2020 No             1000mg      1,000 mg,         

  [facility]



     am (KEPPRA)                                IV             ity of



     in NACL      22:15: 21:32                          Infusion,           [place]

s



     (ISO-OS)      00   :00                           ONCE, 1           Medical



     1,000                                         dose, Sun           Branch



     mg/100 mL                                         20 at           



     RTU                                          1615, 100           



                                                  mL             

 

     NaCl 0.9%      2020 No             1000mL      at 999           Uni

vers



     (NS) bolus                                mL/hr,           ity of



     infusion      21:15: 22:21                          1,000 mL,           [facility]

as



     1,000 mL      00   :00                           IV             Medical



                                                  Infusion,           Branch



                                                  ONCE, 1           



                                                  dose, Sun           



                                                  20 at           



                                                  1515, ASAP           

 

     phenytoin      -      Yes                      See            [facility]



     100 mg oral      0-23                               Instructio           l



     capsule,      16:05:                               ns, 300mg           [facility]

biju



     extended      00                                 alternatin           



     release                                         g with           



                                                  400mg, #           



                                                  120 cap, 6           



                                                  Refill(s),           



                                                  Pharmacy:           



                                                  [facility]           



                                                  #[id number],           



                                                  154.94,           



                                                  cm,            



                                                  10/23/20           



                                                  10:48:00           



                                                  CDT,           



                                                  Height,           



                                                  44.091,           



                                                  kg,            



                                                  10/23/20           



                                                  10:48:00           



                                                  CDT,           



                                                  Weight           

 

     lacosamide      2020      Yes                      200 mg = 1           M

[facility]



     200 MG Oral      0-23                               tab, PO,           l



     Tablet      16:05:                               BID, # 60           [facility]

n



     [Vimpat]      00                                 tab, 4           



                                                  Refill(s),           



                                                  Pharmacy:           



                                                  [facility]           



                                                  #[id number],           



                                                  154.94,           



                                                  cm,            



                                                  10/23/20           



                                                  10:48:00           



                                                  CDT,           



                                                  Height,           



                                                  44.091,           



                                                  kg,            



                                                  10/23/20           



                                                  10:48:00           



                                                  CDT,           



                                                  Weight           

 

     phenytoin      2020      Yes                      See            [facility]



     100 mg oral      0-23                               Instructio           l



     capsule,      16:05:                               ns, 300mg           [facility]

biju



     extended      00                                 alternatin           



     release                                         g with           



                                                  400mg, #           



                                                  120 cap, 6           



                                                  Refill(s),           



                                                  Pharmacy:           



                                                  [facility]           



                                                  #[id number],           



                                                  154.94,           



                                                  cm,            



                                                  10/23/20           



                                                  10:48:00           



                                                  CDT,           



                                                  Height,           



                                                  44.091,           



                                                  kg,            



                                                  10/23/20           



                                                  10:48:00           



                                                  CDT,           



                                                  Weight           

 

     lacosamide      -      Yes                      200 mg = 1           M

[facility]oria



     200 MG Oral      0-23                               tab, PO,           l



     Tablet      16:05:                               BID, # 60           [facility]

n



     [Vimpat]      00                                 tab, 4           



                                                  Refill(s),           



                                                  Pharmacy:           



                                                  [facility]           



                                                  #[id number],           



                                                  154.94,           



                                                  cm,            



                                                  10/23/20           



                                                  10:48:00           



                                                  CDT,           



                                                  Height,           



                                                  44.091,           



                                                  kg,            



                                                  10/23/20           



                                                  10:48:00           



                                                  CDT,           



                                                  Weight           

 

     lacosamide      2020-0      Yes                      100 mg = 1           M

[facility]



     100 MG Oral      5-26                               tab, PO,           l



     Tablet      21:08:                               BID, # 60           [facility]

n



     [Vimpat]      00                                 tab, 2           



                                                  Refill(s),           



                                                  Pharmacy:           



                                                  [facility]           



                                                  [facility]           



                                                  #[id number]           

 

     lacosamide      2020-0      Yes                      100 mg = 1           M

[facility]oria



     100 MG Oral      5-26                               tab, PO,           l



     Tablet      21:08:                               BID, # 60           [facility]

n



     [Vimpat]      00                                 tab, 2           



                                                  Refill(s),           



                                                  Pharmacy:           



                                                  [facility]           



                                                  [facility]           



                                                  #[id number]           

 

     lacosamide      2020-0      Yes                      50 mg = 1           Me

moria



     50 MG Oral      4-20                               tab, PO,           l



     Tablet      22:01:                               BID, # 60           [facility]

n



     [Vimpat]      00                                 tab, 2           



                                                  Refill(s),           



                                                  Pharmacy:           



                                                  [facility]           



                                                  [facility]           



                                                  #[id number]           

 

     lacosamide      2020-0      Yes                      50 mg = 1           Me

moria



     50 MG Oral      4-20                               tab, PO,           l



     Tablet      22:01:                               BID, # 60           [facility]

n



     [Vimpat]      00                                 tab, 2           



                                                  Refill(s),           



                                                  Pharmacy:           



                                                  [facility]           



                                                  [facility]           



                                                  #[id number]           

 

     phenytoin      2020-0 2020- No             18mg/kg      898 mg           Un

neela



     (DILANTIN)      -20                          (rounded           ity 

of



     injection      01:00: 01:17                          from 898.2           T

[facility]as



     898 mg      00   :00                           mg = 18           Medical



                                                  mg/kg           Branch



                                                  ?49.9 kg),           



                                                  IV             



                                                  Piggyback,           



                                                  Administer           



                                                  over 20           



                                                  Minutes,           



                                                  ONCE, 1           



                                                  dose, Sun           



                                                  20 at           



                                                  2000, STAT           

 

     Levetiracet      2020-0      Yes                      1,500 mg =           

[facility]



     am 750 MG      4-03                               2 tab, PO,           l



     Oral Tablet      20:20:                               BID, # 360           

[facility]



     [Keppra]      00                                 tab, 3           



                                                  Refill(s),           



                                                  Pharmacy:           



                                                  [facility]           



                                                  [facility]ORE           



                                                  #[id number]           

 

     Levetiracet      2020-0      Yes                      1,500 mg =           

[facility]



     am 750 MG      4-03                               2 tab, PO,           l



     Oral Tablet      20:20:                               BID, # 360           

[facility]



     [Keppra]      00                                 tab, 3           



                                                  Refill(s),           



                                                  Pharmacy:           



                                                  [facility]           



                                                  DRUG STORE           



                                                  #[id number]           

 

     lamotrigine      2020-0      Yes                      25 mg = 1           [facility]

[facility]



     25 MG Oral      3-13                               tab, PO,           l



     Tablet      19:16:                               BID, # 60           [facility]

n



     [Lamictal]      00                                 tab, 3           



                                                  Refill(s),           



                                                  Pharmacy:           



                                                  [facility]           



                                                  #[id number]           

 

     lamotrigine      2020-0      Yes                      25 mg = 1           [facility]

[facility]



     25 MG Oral      3-13                               tab, PO,           l



     Tablet      19:16:                               BID, # 60           [facility]

n



     [Lamictal]      00                                 tab, 3           



                                                  Refill(s),           



                                                  Pharmacy:           



                                                  [facility]           



                                                  #[id number]           

 

     losartan 50      2020-0      Yes                      50 mg = 1           [facility]

[facility]



     mg oral      3-13                               tab, PO,           l



     tablet      18:54:                               Daily, 0           [place]



               00                                 Refill(s)           

 

     Potassium      2020-0      Yes                      10 mEq,           [facility]

ia



     Chloride      3-13                               PO, ONCE,           l



               18:54:                               0              [place]



               00                                 Refill(s)           

 

     montelukast      2020-0      Yes                      10 mg = 1           [facility]

[facility]



     10 mg oral      3-13                               tab, PO,           l



     tablet      18:54:                               Daily, 0           [facility]



               00                                 Refill(s)           

 

     Ipratropium      2020-0      Yes                      500            [facility]

a



               3-13                               microgram,           l



               18:54:                               NEB, BID,           [facility]



               00                                 0              



                                                  Refill(s)           

 

     losartan 50      2020-0      Yes                      50 mg = 1           [facility]

[facility]



     mg oral      3-13                               tab, PO,           l



     tablet      18:54:                               Daily, 0           [place]



               00                                 Refill(s)           

 

     Potassium      2020-0      Yes                      10 mEq,           [facility]

ia



     Chloride      3-13                               PO, ONCE,           l



               18:54:                               0              [place]



               00                                 Refill(s)           

 

     montelukast      2020-0      Yes                      10 mg = 1           [facility]

[facility]



     10 mg oral      3-13                               tab, PO,           l



     tablet      18:54:                               Daily, 0           [place]



               00                                 Refill(s)           

 

     Ipratropium      2020-0      Yes                      500            [facility]

a



               3-13                               microgram,           l



               18:54:                               NEB, BID,           [place]



               00                                 0              



                                                  Refill(s)           

 

     aspirin 81      -      Yes            81mg      Take 81 mg           U

nivers



     mg EC      1-14                               by mouth.           ity of



     tablet      16:50:                                              [address]

 

     azelastine      2019      Yes            1{spray      Use 1           Uni

vers



     137 mcg      1-14                     }         Spray in           ity of



     (0.1 %)      16:50:                               each           [place]



     nasal spray                                       nostril.           [facility]

 

     budesonide-      2019      Yes            2{puff}      Inhale 2          

 [facility]



     formoterol      1-14                               Puffs.           ity of



     (SYMBICORT)      16:50:                                              [place]



     160-4.5                                                      Medical



     mcg/actuati                                                        [facility]



     on inhaler                                                        

 

     carvedilol      -      Yes            3.125mg      Take 3.125         

  [facility]



     3.125 mg      1-14                               mg by           ity of



     tablet      16:50:                               mouth.           [address]

 

     clonazePAM      -      Yes            .5mg      Take 0.5           Uni

vers



     0.5 mg      1-14                               mg by           ity of



     tablet      16:50:                               mouth.           [address]

 

     ibuprofen      -      Yes            200mg      Take 200           Uni

vers



     200 mg      1-14                               mg by           ity of



     tablet      16:50:                               mouth.           [address]

 

     lisinopril      -      Yes            5mg       Take 5 mg           Un

[facility]ela



     5 mg tablet      1-14                               by mouth.           ity

 of



               16:50:                                              [address]

 

     melatonin      2019      Yes                      Take  by           Univ

ers



     10 mg Cap      1-14                               mouth.           ity of



               16:50:                                              [address]

 

     ticagrelor      2019      Yes            90mg      Take 90 mg           U

nivers



     (BRILINTA)      1-14                               by mouth.           ity 

of



     90 mg      16:50:                                              [address]

 

     cyanocobala      2019      Yes            2500ug      Place           Uni

vers



     min,      1-14                               2,500 mcg           ity of



     vitamin      16:50:                               under the           [place]



     B-12, 5,000                                       tongue           Med[facility]

l



     mcg Subl                                         daily.           [facility]

 

     furosemide      2019      Yes            20mg      Take 20 mg           U

nivers



     20 mg      1-14                               by mouth           ity of



     tablet      16:50:                               daily.           [address]

 

     ipratropium      2019      Yes            2{spray      Use 2           Un

neela



     0.03 %      1-14                     }         Sprays in           ity of



     nasal spray      16:50:                               each           [place]



               54                                 nostril 2           Medical



                                                  (two)           Branch



                                                  times           



                                                  daily.           

 

     losartan      2019      Yes            100mg      Take 100           Univ

ers



     100 mg      1-14                               mg by           ity of



     tablet      16:50:                               mouth           [place]



               54                                 daily.           Medical



                                                                 Branch

 

     aspirin 81      2019      Yes            81mg      Take 81 mg           U

nivers



     mg EC      1-14                               by mouth.           ity of



     tablet      16:50:                                              [address]

 

     azelastine      2019      Yes            1{spray      Use 1           Uni

vers



     137 mcg      1-14                     }         Spray in           ity of



     (0.1 %)      16:50:                               each           [place]



     nasal spray      54                                 nostril.           Medi

alfonzo



                                                                 Branch

 

     budesonide-      2019      Yes            2{puff}      Inhale 2          

 [facility]



     formoterol      1-14                               Puffs.           ity of



     (SYMBICORT)      16:50:                                              [place]



     160-4.5                                                      Medical



     mcg/actuati                                                        [facility]anch



     on inhaler                                                        

 

     carvedilol      -      Yes            3.125mg      Take 3.125         

  [facility]



     3.125 mg      1-14                               mg by           ity of



     tablet      16:50:                               mouth.           [address]

 

     clonazePAM      2019      Yes            .5mg      Take 0.5           Uni

vers



     0.5 mg      1-14                               mg by           ity of



     tablet      16:50:                               mouth.           [address]

 

     ibuprofen      2019      Yes            200mg      Take 200           Uni

vers



     200 mg      1-14                               mg by           ity of



     tablet      16:50:                               mouth.           [address]

 

     lisinopril      2019      Yes            5mg       Take 5 mg           Un

neela



     5 mg tablet      1-14                               by mouth.           ity

 of



               16:50:                                              [address]

 

     melatonin      2019      Yes                      Take  by           Univ

ers



     10 mg Cap      1-14                               mouth.           ity of



               16:50:                                              [address]

 

     ticagrelor      2019      Yes            90mg      Take 90 mg           U

nivers



     (BRILINTA)      1-14                               by mouth.           ity 

of



     90 mg      16:50:                                              [address]

 

     cyanocobala      2019      Yes            2500ug      Place           Uni

vers



     min,      1-14                               2,500 mcg           ity of



     vitamin      16:50:                               under the           [place]



     B-12, 5,000                                       tongue           Medica

l



     mcg Subl                                         daily.           [facility]

 

     furosemide      2019      Yes            20mg      Take 20 mg           U

nivers



     20 mg      1-14                               by mouth           ity of



     tablet      16:50:                               daily.           [address]

 

     ipratropium      2019      Yes            2{spray      Use 2           Un

neela



     0.03 %      1-14                     }         Sprays in           ity of



     nasal spray      16:50:                               each           [place]



               54                                 nostril 2           Medical



                                                  (two)           Branch



                                                  times           



                                                  daily.           

 

     losartan      2019      Yes            100mg      Take 100           Univ

ers



     100 mg      1-14                               mg by           ity of



     tablet      16:50:                               mouth           [place]



               54                                 daily.           Medical



                                                                 Branch

 

     aspirin 81      -      Yes            81mg      Take 81 mg           U

nivers



     mg EC      1-14                               by mouth.           ity of



     tablet      16:50:                                              [address]

 

     azelastine      2019      Yes            1{spray      Use 1           Uni

vers



     137 mcg      1-14                     }         Spray in           ity of



     (0.1 %)      16:50:                               each           [place]



     nasal spray      54                                 nostril.           Medi

alfonzo



                                                                 Branch

 

     budesonide-      -      Yes            2{puff}      Inhale 2          

 [facility]



     formoterol      1-14                               Puffs.           ity of



     (SYMBICORT)      16:50:                                              [place]



     160-4.5                                                      Medical



     mcg/actuati                                                        Br[facility]



     on inhaler                                                        

 

     carvedilol      -      Yes            3.125mg      Take 3.125         

  [facility]



     3.125 mg      1-14                               mg by           ity of



     tablet      16:50:                               mouth.           [address]

 

     clonazePAM      -      Yes            .5mg      Take 0.5           Uni

vers



     0.5 mg      1-14                               mg by           ity of



     tablet      16:50:                               mouth.           [address]

 

     ibuprofen      -      Yes            200mg      Take 200           Uni

vers



     200 mg      1-14                               mg by           ity of



     tablet      16:50:                               mouth.           [address]

 

     lisinopril      -      Yes            5mg       Take 5 mg           Un

neela



     5 mg tablet      1-14                               by mouth.           ity

 of



               16:50:                                              [address]

 

     melatonin      2019      Yes                      Take  by           Univ

ers



     10 mg Cap      1-14                               mouth.           ity of



               16:50:                                              [address]

 

     ticagrelor      2019      Yes            90mg      Take 90 mg           U

nivers



     (BRILINTA)      1-14                               by mouth.           ity 

of



     90 mg      16:50:                                              [address]

 

     cyanocobala      2019      Yes            2500ug      Place           Uni

vers



     min,      1-14                               2,500 mcg           ity of



     vitamin      16:50:                               under the           [place]



     B-12, 5,000                                       tongue           Medica

l



     mcg Subl                                         daily.           [facility]

 

     furosemide      2019      Yes            20mg      Take 20 mg           U

nivers



     20 mg      1-14                               by mouth           ity of



     tablet      16:50:                               daily.           [address]

 

     ipratropium      -      Yes            2{spray      Use 2           Un

neela



     0.03 %      1-14                     }         Sprays in           ity of



     nasal spray      16:50:                               each           [place]



               54                                 nostril 2           Medical



                                                  (two)           Branch



                                                  times           



                                                  daily.           

 

     losartan      -      Yes            100mg      Take 100           Univ

ers



     100 mg      1-14                               mg by           ity of



     tablet      16:50:                               mouth           [place]



               54                                 daily.           Medical



                                                                 Branch

 

     aspirin 81      -      Yes            81mg      Take 81 mg           U

nivers



     mg EC      1-14                               by mouth.           ity of



     tablet      16:50:                                              [address]

 

     azelastine      -1      Yes            1{spray      Use 1           Uni

vers



     137 mcg      1-14                     }         Spray in           ity of



     (0.1 %)      16:50:                               each           [place]



     nasal spray      54                                 nostril.           Medi

alfonzo



                                                                 Branch

 

     budesonide-      2019      Yes            2{puff}      Inhale 2          

 [facility]



     formoterol      1-14                               Puffs.           ity of



     (SYMBICORT)      16:50:                                              [place]



     160-4.5                                                      Medical



     mcg/actuati                                                        Branch



     on inhaler                                                        

 

     carvedilol      2019      Yes            3.125mg      Take 3.125         

  [facility]



     3.125 mg      1-14                               mg by           ity of



     tablet      16:50:                               mouth.           [address]

 

     clonazePAM      2019      Yes            .5mg      Take 0.5           Uni

vers



     0.5 mg      1-14                               mg by           ity of



     tablet      16:50:                               mouth.           [address]

 

     ibuprofen      2019      Yes            200mg      Take 200           Uni

vers



     200 mg      1-14                               mg by           ity of



     tablet      16:50:                               mouth.           [address]

 

     lisinopril      2019      Yes            5mg       Take 5 mg           Un

neela



     5 mg tablet      1-14                               by mouth.           ity

 of



               16:50:                                              [address]

 

     melatonin      2019      Yes                      Take  by           Univ

ers



     10 mg Cap      1-14                               mouth.           ity of



               16:50:                                              [address]

 

     ticagrelor      2019      Yes            90mg      Take 90 mg           U

nivers



     (BRILINTA)      1-14                               by mouth.           ity 

of



     90 mg      16:50:                                              [address]

 

     cyanocobala      2019      Yes            2500ug      Place           Uni

vers



     min,      1-14                               2,500 mcg           ity of



     vitamin      16:50:                               under the           [place]



     B-12, 5,000      54                                 tongue           Medica

l



     mcg Subl                                         daily.           [facility]

 

     furosemide      2019      Yes            20mg      Take 20 mg           U

nivers



     20 mg      1-14                               by mouth           ity of



     tablet      16:50:                               daily.           [address]

 

     ipratropium      2019      Yes            2{spray      Use 2           Un

neela



     0.03 %      1-14                     }         Sprays in           ity of



     nasal spray      16:50:                               each           [place]



               54                                 nostril 2           Medical



                                                  (two)           Branch



                                                  times           



                                                  daily.           

 

     losartan      2019      Yes            100mg      Take 100           Univ

ers



     100 mg      1-14                               mg by           ity of



     tablet      16:50:                               mouth           [place]



               54                                 daily.           Medical



                                                                 Branch

 

     aspirin 81      2019      Yes            81mg      Take 81 mg           U

nivers



     mg EC      1-14                               by mouth.           ity of



     tablet      16:50:                                              [address]

 

     azelastine      2019      Yes            1{spray      Use 1           Uni

vers



     137 mcg      1-14                     }         Spray in           ity of



     (0.1 %)      16:50:                               each           [place]



     nasal spray      54                                 nostril.           Medi

alfonzo



                                                                 Branch

 

     budesonide-      2019      Yes            2{puff}      Inhale 2          

 [facility]



     formoterol      1-14                               Puffs.           ity of



     (SYMBICORT)      16:50:                                              [place]



     160-4.5                                                      Medical



     mcg/actuati                                                        Branch



     on inhaler                                                        

 

     carvedilol      2019      Yes            3.125mg      Take 3.125         

  [facility]



     3.125 mg      1-14                               mg by           ity of



     tablet      16:50:                               mouth.           [address]

 

     clonazePAM      2019      Yes            .5mg      Take 0.5           Uni

vers



     0.5 mg      1-14                               mg by           ity of



     tablet      16:50:                               mouth.           [address]

 

     ibuprofen      2019      Yes            200mg      Take 200           Uni

vers



     200 mg      1-14                               mg by           ity of



     tablet      16:50:                               mouth.           [address]

 

     lisinopril      2019      Yes            5mg       Take 5 mg           Un

neela



     5 mg tablet      1-14                               by mouth.           ity

 of



               16:50:                                              [address]

 

     melatonin      2019      Yes                      Take  by           Univ

ers



     10 mg Cap      1-14                               mouth.           ity of



               16:50:                                              [address]

 

     ticagrelor      2019      Yes            90mg      Take 90 mg           U

nivers



     (BRILINTA)      1-14                               by mouth.           ity 

of



     90 mg      16:50:                                              [address]

 

     cyanocobala      2019      Yes            2500ug      Place           Uni

vers



     min,      1-14                               2,500 mcg           ity of



     vitamin      16:50:                               under the           [place]



     B-12, 5,000                                       tongue           Med[facility]

l



     mcg Subl                                         daily.           [facility]

 

     furosemide      2019      Yes            20mg      Take 20 mg           U

nivers



     20 mg      1-14                               by mouth           ity of



     tablet      16:50:                               daily.           [address]

 

     ipratropium      2019      Yes            2{spray      Use 2           Un

neela



     0.03 %      1-14                     }         Sprays in           ity of



     nasal spray      16:50:                               each           [place]



               54                                 nostril 2           Medical



                                                  (two)           [facility]



                                                  times           



                                                  daily.           

 

     losartan      2019      Yes            100mg      Take 100           Univ

ers



     100 mg      1-14                               mg by           ity of



     tablet      16:50:                               mouth           [place]



               54                                 daily.           Medical



                                                                 Br[facility]

 

     aspirin 81      2019      Yes            81mg      Take 81 mg           U

nivers



     mg EC      1-14                               by mouth.           ity of



     tablet      16:50:                                              [address]

 

     azelastine      -      Yes            1{spray      Use 1           Uni

vers



     137 mcg      1-14                     }         Spray in           ity of



     (0.1 %)      16:50:                               each           [place]



     nasal spray      54                                 nostril.           Medi

alfonzo



                                                                 Branch

 

     budesonide-      -      Yes            2{puff}      Inhale 2          

 [facility]



     formoterol      1-14                               Puffs.           ity of



     (SYMBICORT)      16:50:                                              [place]



     160-4.5                                                      Medical



     mcg/actuati                                                        [facility]



     on inhaler                                                        

 

     carvedilol      2019      Yes            3.125mg      Take 3.125         

  [facility]



     3.125 mg      1-14                               mg by           ity of



     tablet      16:50:                               mouth.           [address]

 

     clonazePAM      2019      Yes            .5mg      Take 0.5           Uni

vers



     0.5 mg      1-14                               mg by           ity of



     tablet      16:50:                               mouth.           [address]

 

     ibuprofen      2019      Yes            200mg      Take 200           Uni

vers



     200 mg      1-14                               mg by           ity of



     tablet      16:50:                               mouth.           [address]

 

     lisinopril      2019      Yes            5mg       Take 5 mg           Un

neela



     5 mg tablet      1-14                               by mouth.           ity

 of



               16:50:                                              [address]

 

     melatonin      2019      Yes                      Take  by           Univ

ers



     10 mg Cap      1-14                               mouth.           ity of



               16:50:                                              [address]

 

     ticagrelor      2019      Yes            90mg      Take 90 mg           U

nivers



     (BRILINTA)      1-14                               by mouth.           ity 

of



     90 mg      16:50:                                              [address]

 

     cyanocobala      2019      Yes            2500ug      Place           Uni

vers



     min,      1-14                               2,500 mcg           ity of



     vitamin      16:50:                               under the           [place]



     B-12, 5,000                                       tongue           Med[facility]

l



     mcg Subl                                         daily.           [facility]

 

     furosemide      2019      Yes            20mg      Take 20 mg           U

nivers



     20 mg      1-14                               by mouth           ity of



     tablet      16:50:                               daily.           [address]

 

     ipratropium      2019      Yes            2{spray      Use 2           Un

neela



     0.03 %      1-14                     }         Sprays in           ity of



     nasal spray      16:50:                               each           [place]



               54                                 nostril 2           Medical



                                                  (two)           Branch



                                                  times           



                                                  daily.           

 

     losartan      -      Yes            100mg      Take 100           Univ

ers



     100 mg      1-14                               mg by           ity of



     tablet      16:50:                               mouth           [place]



               54                                 daily.           Medical



                                                                 Branch

 

     melatonin            Yes                      Take  by           Univ

ers



     10 mg Cap      7-27                               mouth.           ity of



               21:03:                                              [address]

 

     ticagrelor            Yes            90mg      Take 90 mg           U

nivers



     (BRILINTA)      7-27                               by mouth.           ity 

of



     90 mg      21:03:                                              [place]



     tablet      54                                                Medical



                                                                 Branch

 

     albuterol            Yes            2{puff}      Inhale 2           U

nivers



     (PROVENTIL      7-27                               Puffs.           ity of



     HFA) 90      21:03:                                              [facility] [place] [facility]/actuati                                                      Medical



     on inhaler                                                        Branch

 

     aspirin 81            Yes            81mg      Take 81 mg           U

nivers



     mg EC      7-27                               by mouth.           ity of



     tablet      21:03:                                              [address]

 

     azelastine            Yes            1{spray      Use 1           Uni

vers



     137 mcg      7-27                     }         Spray in           ity of



     (0.1 %)      21:03:                               each           [place]



     nasal spray      54                                 nostril.           [facility]

 

     budesonide-            Yes            2{puff}      Inhale 2          

 [facility]



     formoterol      7-27                               Puffs.           ity of



     (SYMBICORT)      21:03:                                              [place]



     160-4.5      54                                                Medical



     mcg/actuati                                                        [facility]



     on inhaler                                                        

 

     carvedilol            Yes            3.125mg      Take 3.125         

  [facility]



     3.125 mg      7-27                               mg by           ity of



     tablet      21:03:                               mouth.           [address]

 

     clonazePAM            Yes            .5mg      Take 0.5           Uni

vers



     0.5 mg      7-27                               mg by           ity of



     tablet      21:03:                               mouth.           [address]

 

     ibuprofen            Yes            200mg      Take 200           Uni

vers



     200 mg      7-27                               mg by           ity of



     tablet      21:03:                               mouth.           [address]

 

     lisinopril            Yes            5mg       Take 5 mg           Un

neela



     5 mg tablet      7-27                               by mouth.           ity

 of



               21:03:                                              [address]

 

     melatonin            Yes                      Take  by           Univ

ers



     10 mg Cap      7-27                               mouth.           ity of



               21:03:                                              [address]

 

     ticagrelor            Yes            90mg      Take 90 mg           U

nivers



     (BRILINTA)      7-27                               by mouth.           ity 

of



     90 mg      21:03:                                              [address]



                                                                 Branch

 

     albuterol            Yes            2{puff}      Inhale 2           U

nivers



     (PROVENTIL      7-27                               Puffs.           ity of



     HFA) 90      21:03:                                              [facility] [place] [facility]/actuati                                                      Medical



     on inhaler                                                        [facility]

 

     aspirin 81            Yes            81mg      Take 81 mg           U

nivers



     mg EC      7-27                               by mouth.           ity of



     tablet      21:03:                                              [address]

 

     azelastine            Yes            1{spray      Use 1           Uni

vers



     137 mcg      7-27                     }         Spray in           ity of



     (0.1 %)      21:03:                               each           [place]



     nasal spray      54                                 nostril.           Medi

alfonzo



                                                                 Branch

 

     budesonide-            Yes            2{puff}      Inhale 2          

 [facility]



     formoterol      7-27                               Puffs.           ity of



     (SYMBICORT)      21:03:                                              [place]



     160-4.5      54                                                Medical



     mcg/actuati                                                        [facility]



     on inhaler                                                        

 

     carvedilol            Yes            3.125mg      Take 3.125         

  [facility]



     3.125 mg      7-27                               mg by           ity of



     tablet      21:03:                               mouth.           [address]

 

     clonazePAM            Yes            .5mg      Take 0.5           Uni

vers



     0.5 mg      7-27                               mg by           ity of



     tablet      21:03:                               mouth.           [address]

 

     ibuprofen            Yes            200mg      Take 200           Uni

vers



     200 mg      7-27                               mg by           ity of



     tablet      21:03:                               mouth.           [address]

 

     lisinopril            Yes            5mg       Take 5 mg           Un

neela



     5 mg tablet      7-27                               by mouth.           ity

 of



               21:03:                                              [address]

 

     melatonin            Yes                      Take  by           Univ

ers



     10 mg Cap      7-27                               mouth.           ity of



               21:03:                                              [address]

 

     ticagrelor            Yes            90mg      Take 90 mg           U

nivers



     (BRILINTA)      7-27                               by mouth.           ity 

of



     90 mg      21:03:                                              [address]

 

     albuterol            Yes            2{puff}      Inhale 2           U

nivers



     (PROVENTIL      7-27                               Puffs.           ity of



     HFA) 90      21:03:                                              [facility] [place] [facility]/actuati                                                      Medical



     on inhaler                                                        Branch

 

     aspirin 81            Yes            81mg      Take 81 mg           U

nivers



     mg EC      7-27                               by mouth.           ity of



     tablet      21:03:                                              [address]

 

     azelastine            Yes            1{spray      Use 1           Uni

vers



     137 mcg      7-27                     }         Spray in           ity of



     (0.1 %)      21:03:                               each           [place]



     nasal spray      54                                 nostril.           [facility]

 

     budesonide-            Yes            2{puff}      Inhale 2          

 [facility]



     formoterol      7-27                               Puffs.           ity of



     (SYMBICORT)      21:03:                                              [place]



     160-4.5                                                      Medical



     mcg/actuati                                                        Br[facility]



     on inhaler                                                        

 

     carvedilol            Yes            3.125mg      Take 3.125         

  [facility]



     3.125 mg      7-27                               mg by           ity of



     tablet      21:03:                               mouth.           [address]

 

     clonazePAM            Yes            .5mg      Take 0.5           Uni

vers



     0.5 mg      7-27                               mg by           ity of



     tablet      21:03:                               mouth.           [address]

 

     ibuprofen            Yes            200mg      Take 200           Uni

vers



     200 mg      7-27                               mg by           ity of



     tablet      21:03:                               mouth.           [address]

 

     lisinopril            Yes            5mg       Take 5 mg           Un

neela



     5 mg tablet      7-27                               by mouth.           ity

 of



               21:03:                                              [address]

 

     melatonin            Yes                      Take  by           Univ

ers



     10 mg Cap      7-27                               mouth.           ity of



               21:03:                                              [address]

 

     ticagrelor            Yes            90mg      Take 90 mg           U

nivers



     (BRILINTA)      7-27                               by mouth.           ity 

of



     90 mg      21:03:                                              [address]

 

     albuterol            Yes            2{puff}      Inhale 2           U

nivers



     (PROVENTIL      7-27                               Puffs.           ity of



     HFA) 90      21:03:                                              [facility] [place] [facility]/actu[facility]                                                      Medical



     on inhaler                                                        [facility]

 

     aspirin 81            Yes            81mg      Take 81 mg           U

nivers



     mg EC      7-27                               by mouth.           ity of



     tablet      21:03:                                              [address]

 

     azelastine            Yes            1{spray      Use 1           Uni

vers



     137 mcg      7-27                     }         Spray in           ity of



     (0.1 %)      21:03:                               each           [place]



     nasal spray      54                                 nostril.           [facility]

 

     budesonide-            Yes            2{puff}      Inhale 2          

 [facility]



     formoterol      7-27                               Puffs.           ity of



     (SYMBICORT)      21:03:                                              [place]



     160-4.5      [address] [place] [address]



     mcg/actuati                                                        [facility]



     on inhaler                                                        

 

     carvedilol            Yes            3.125mg      Take 3.125         

  [facility]



     3.125 mg      7-27                               mg by           ity of



     tablet      21:03:                               mouth.           [address]

 

     clonazePAM            Yes            .5mg      Take 0.5           Uni

vers



     0.5 mg      7-27                               mg by           ity of



     tablet      21:03:                               mouth.           [address]

 

     ibuprofen            Yes            200mg      Take 200           Uni

vers



     200 mg      7-27                               mg by           ity of



     tablet      21:03:                               mouth.           [address]

 

     lisinopril            Yes            5mg       Take 5 mg           Un

neela



     5 mg tablet      7-27                               by mouth.           ity

 of



               21:03:                                              [address]

 

     melatonin            Yes                      Take  by           Univ

ers



     10 mg Cap      7-27                               mouth.           ity of



               21:03:                                              [address]

 

     ticagrelor            Yes            90mg      Take 90 mg           U

nivers



     (BRILINTA)      7-27                               by mouth.           ity 

of



     90 mg      21:03:                                              [address]

 

     albuterol            Yes            2{puff}      Inhale 2           U

nivers



     (PROVENTIL      7-27                               Puffs.           ity of



     HFA) 90      21:03:                                              [facility] [place] [facility]/actuati                                                      Medical



     on inhaler                                                        [facility]

 

     aspirin 81            Yes            81mg      Take 81 mg           U

nivers



     mg EC      7-27                               by mouth.           ity of



     tablet      21:03:                                              [address]

 

     azelastine            Yes            1{spray      Use 1           Uni

vers



     137 mcg      7-27                     }         Spray in           ity of



     (0.1 %)      21:03:                               each           [place]



     nasal spray      54                                 nostril.           [facility]

 

     budesonide-            Yes            2{puff}      Inhale 2          

 [facility]



     formoterol      7-27                               Puffs.           ity of



     (SYMBICORT)      21:03:                                              [place]



     160-4.5      54                                                Medical



     [facility]



     [facility] inhal[facility]                                                        

 

     carvedilol            Yes            3.125mg      Take 3.125         

  [facility]



     3.125 mg      7-27                               mg by           ity of



     tablet      21:03:                               mouth.           [address]

 

     clonazePAM            Yes            .5mg      Take 0.5           Uni

vers



     0.5 mg      7-27                               mg by           ity of



     tablet      21:03:                               mouth.           [address]

 

     ibuprofen            Yes            200mg      Take 200           Uni

vers



     200 mg      7-27                               mg by           ity of



     tablet      21:03:                               mouth.           [address]

 

     lisinopril            Yes            5mg       Take 5 mg           Un

neela



     5 mg tablet      7-27                               by mouth.           ity

 of



               21:03:                                              [address]

 

     melatonin            Yes                      Take  by           Univ

ers



     10 mg Cap                                     mouth.           ity of



               21:03:                                              [address]

 

     ticagrelor            Yes            90mg      Take 90 mg           U

nivers



     (BRILINTA)      7-                               by mouth.           ity 

of



     90 mg      21:03:                                              [place]



     tablet      [address] [place] [address]

 

     albuterol      -      Yes            2{puff}      Inhale 2           U

nivers



     (PROVENTIL      7-                               Puffs.           ity of



     HFA) 90      21:03:                                              [facility] [place] [facility]                                                      Medical



     on inhaler                                                        [facility]

 

     aspirin 81      2019-      Yes            81mg      Take 81 mg           U

nivers



     mg EC                                     by mouth.           ity of



     tablet      21:03:                                              [address] [address]

 

     azelastine            Yes            1{spray      Use 1           Uni

vers



     137 mcg      -                     }         Spray in           ity of



     (0.1 %)      21:03:                               each           [place]



     nasal spray      54                                 nostril.           [facility]

 

     budesonide-      -      Yes            2{puff}      Inhale 2          

 [facility]



     formoterol      7-27                               Puffs.           ity of



     (SYMBICORT)      21:03:                                              [place]



     160-4.5      54                                                Medical



     mcg/actuati                                                        [place]



     on inhaler                                                        

 

     carvedilol      -      Yes            3.125mg      Take 3.125         

  [facility]



     3.125 mg      7-27                               mg by           ity of



     tablet      21:03:                               mouth.           [address]

 

     clonazePAM      -      Yes            .5mg      Take 0.5           Uni

vers



     0.5 mg      7-27                               mg by           ity of



     tablet      21:03:                               mouth.           [address]

 

     ibuprofen      -      Yes            200mg      Take 200           Uni

vers



     200 mg      7-27                               mg by           ity of



     tablet      21:03:                               mouth.           [address]

 

     lisinopril            Yes            5mg       Take 5 mg           Un

neela



     5 mg tablet                                     by mouth.           ity

 of



               21:03:                                              [address]

 

     levETIRAcet      - 2019- No             1000mg      1,000 mg,         

  [facility]



     am (KEPPRA)                                IV             ity of



     in NACL      18:15: 17:55                          Piggyback,           [place]

as



     (ISO-OS)      00   :00                           ONCE, 1           Medical



     1,000                                         dose, Sat           [place]



     mg/100 mL                                         19 at           



     RTU                                          1315, 100           



                                                  mL             

 

     fosphenytoi       2019- No             1000mg{      1,000 mg         

  [facility]



     n (CEREBYX)                      phenyto      PE, Slow           

ity of



     injection      18:15: 17:59                in'equi      IV Push,           

[place]



     1,000 mg PE      00   :00                 valent}      ONCE, 1           Me

dical



                                                  dose, Sat           [place]



                                                  19 at           



                                                  1315, ASAP           

 

     NaCl 0.9%       2019- No             1000mL      at 999           Uni

vers



     (NS) bolus                                mL/hr,           ity of



     infusion      18:00: 20:38                          1,000 mL,           [place]

as



     1,000 mL      00   :00                           IV             Medical



                                                  Infusion,           [place]



                                                  ONCE, 1           



                                                  dose, Sat           



                                                  19 at           



                                                  1300, STAT           

 

     LORazepam      2019- No             2mg       2 mg, Slow           U

nivers



     (ATIVAN)                                IV Push,           ity of



     injection 2      16:45: 15:32                          ONCE, 1           [place]

[place]



     mg        00   :00                           dose, Sat           [facility]



                                                  19 at           [place]



                                                  1145, STAT           

 

     fluconazole       2019- No        [id number] 150mg      Take 1         

  [facility]



     150 mg                                tablet by           ity of



     tablet      00:00: 04:59                          mouth once           [place]

s



               00   :00                           now for 1           Medical



                                                  dose.           [place]

 

     Ticagrelor            Yes                      90 mg = 1           Me

moria



     90 MG Oral      5-31                               tab, PO,           l



     Tablet      17:48:                               BID, # 60           [facility]

n



     [Brilinta]      00                                 tab, 2           



                                                  Refill(s),           



                                                  Pharmacy:           



                                                  [facility]           



                                                  [id number]           

 

     Levetiracet            Yes                      1,500 mg =           

[facility]



     am 750 MG      5-31                               2 tab, PO,           l



     Oral Tablet      17:48:                               BID, # 120           

[place]



     [Keppra]      00                                 tab, 2           



                                                  Refill(s),           



                                                  Pharmacy:           



                                                  [facility]           



                                                  [facility]           



                                                  [id number]           

 

     Aspirin 81      -      Yes                      81 mg = 1           Me

moria



     MG Chewable      5-31                               tab, CHEW,           l



     Tablet      17:48:                               Daily, # 1           [facility]

nn



               00                                 tab, 3           



                                                  Refill(s),           



                                                  Pharmacy:           



                                                  [facility]           



                                                  [facility]           



                                                  [id number]           

 

     lisinopril            Yes                      5 mg = 1           Mem

oria



     5 mg oral      5-31                               tab, PO,           l



     tablet      17:48:                               Daily, #           [place]



               00                                 30 tab, 2           



                                                  Refill(s),           



                                                  Pharmacy:           



                                                  [facility]           



                                                  [facility] [place] [address]           

 

     carvedilol      -      Yes                      3.125 mg =           M

[facility]



     3.125 mg      5-31                               1 tab, PO,           l



     oral tablet      17:48:                               Q12H, # 60           

[facility]



               00                                 tab, 2           



                                                  Refill(s),           



                                                  Pharmacy:           



                                                  [facility]           



                                                  [facility] [place] [address]           

 

     Ticagrelor      -      Yes                      90 mg = 1           Me

moria



     90 MG Oral      5-31                               tab, PO,           l



     Tablet      17:48:                               BID, # 60           [facility]

n



     [Brilinta]      00                                 tab, 2           



                                                  Refill(s),           



                                                  Pharmacy:           



                                                  [facility]           



                                                  [facility] [place] [address]           

 

     Levetiracet            Yes                      1,500 mg =           

[facility]



     am 750 MG      5-31                               2 tab, PO,           l



     Oral Tablet      17:48:                               BID, # 120           

[place]



     [Keppra]      00                                 tab, 2           



                                                  Refill(s),           



                                                  Pharmacy:           



                                                  [facility]           



                                                  [facility] [place] [address]           

 

     Aspirin 81            Yes                      81 mg = 1           Me

moria



     MG Chewable      5-31                               tab, CHEW,           l



     Tablet      17:48:                               Daily, # 1           [facility]

nn



               00                                 tab, 3           



                                                  Refill(s),           



                                                  Pharmacy:           



                                                  [facility]           



                                                  [facility] [place] [address]           

 

     lisinopril            Yes                      5 mg = 1           Mem

oria



     5 mg oral      5-31                               tab, PO,           l



     tablet      17:48:                               Daily, #           [place]



               00                                 30 tab, 2           



                                                  Refill(s),           



                                                  Pharmacy:           



                                                  [facility]           



                                                  [facility] [place] [address]           

 

     carvedilol            Yes                      3.125 mg =           M

[facility]



     3.125 mg      5-31                               1 tab, PO,           l



     oral tablet      17:48:                               Q12H, # 60           

[place]



               00                                 tab, 2           



                                                  Refill(s),           



                                                  Pharmacy:           



                                                  [facility]           



                                                  [facility] [place] [address]           

 

     potassium      -      No                       Notes:           [facility]

a



     phosphate      5-31                               (Same as:           l



               10:36:                               K              [place]



               00                                 Phosphate.           



                                                  )  Do not           



                                                  infuse           



                                                  phosphorou           



                                                  s              



                                                  concurrent           



                                                  ly in the           



                                                  same line           



                                                  as TPN or           



                                                  IVF that           



                                                  contains           



                                                  calcium.           



                                                  For double           



                                                  lumen           



                                                  central           



                                                  lines,           



                                                  phosphorou           



                                                  s may be           



                                                  infused in           



                                                  a separate           



                                                  lumen from           



                                                  TPN.  1           



                                                  mMol           



                                                  phoshate           



                                                  has 1.47           



                                                  mEq            



                                                  potassium           



                                                  Infuse           



                                                  over 4           



                                                  hours           

 

     sodium      2019-0      No                       Notes:           [facility]



     phosphate      5-31                               Infuse           l



               10:36:                               over 4           [facility]



               00                                 hour. Do           



                                                  not infuse           



                                                  phosphorou           



                                                  s              



                                                  concurrent           



                                                  ly in the           



                                                  same line           



                                                  as TPN or           



                                                  IVF that           



                                                  contains           



                                                  calcium.           



                                                  For double           



                                                  lumen           



                                                  central           



                                                  lines,           



                                                  phosphorou           



                                                  s may be           



                                                  infused in           



                                                  a separate           



                                                  lumen from           



                                                  TPN.           

 

     Potassium            No                       Notes:           [facility]

a



     Chloride      5-31                               (Same as:           l



               10:36:                               KCL)           [place]



               00                                 Infuse           



                                                  over 2           



                                                  hours.           

 

     potassium            No                       Notes:           [facility]

a



     phosphate-s      5-31                               (Same as:           l



     odium      10:36:                               Phos-NaK)           [place]



     phosphate      00                                 Each 1.5           



     250 mg-280                                         gm pkt has           



     mg-160 mg                                         250mg           



     oral powder                                         phosphorou           



     for                                          s. Mix           



     reconstitut                                         w/2.5oz           



     ion                                          water and           



                                                  stir.           

 

     Calcium            No                       Notes:           [facility]



     Gluconate                                     WASTE: F/P           l



               10:36:                               - Sink; E           [place]



                                                -              



                                                  Municipal           



                                                  Trash Bin           

 

     Magnesium            No                       Notes:           [facility]

a



     Sulfate                                     WASTE: F/P           l



               10:36:                               - Sink; E           [place]



                                                -              



                                                  Municipal           



                                                  Trash Bin           

 

     Magnesium            No                       Notes:           [facility]

a



     Oxide                                     (Same as:           l



               10:36:                               Mag-Ox           [place]



               00                                 400)           



                                                  Magnesium           



                                                  oxide           



                                                  [id number]           



                                                  g              



                                                  elemental           



                                                  magnesium           



                                                  Dose=____m           



                                                  g              



                                                  magnesium           



                                                  oxide           



                                                  (___mg           



                                                  elemental           



                                                  magnesium)           

 

     potassium            No                       Notes:           [facility]

a



     phosphate      5-31                               (Same as:           l



               10:36:                               K              [facility]



               00                                 Phosphate.           



                                                  )  Do not           



                                                  infuse           



                                                  phosphorou           



                                                  s              



                                                  concurrent           



                                                  ly in the           



                                                  same line           



                                                  as TPN or           



                                                  IVF that           



                                                  contains           



                                                  calcium.           



                                                  For double           



                                                  lumen           



                                                  central           



                                                  lines,           



                                                  phosphorou           



                                                  s may be           



                                                  infused in           



                                                  a separate           



                                                  lumen from           



                                                  TPN.  1           



                                                  mMol           



                                                  phoshate           



                                                  has 1.47           



                                                  mEq            



                                                  potassium           



                                                  Infuse           



                                                  over 4           



                                                  hours           

 

     sodium            No                       Notes:           [facility]



     phosphate      5-31                               Infuse           l



               10:36:                               over 4           [facility]



               00                                 hour. Do           



                                                  not infuse           



                                                  phosphorou           



                                                  s              



                                                  concurrent           



                                                  ly in the           



                                                  same line           



                                                  as TPN or           



                                                  IVF that           



                                                  contains           



                                                  calcium.           



                                                  For double           



                                                  lumen           



                                                  central           



                                                  lines,           



                                                  phosphorou           



                                                  s may be           



                                                  infused in           



                                                  a separate           



                                                  lumen from           



                                                  TPN.           

 

     Potassium            No                       Notes:           [facility]

a



     Chloride      5-31                               (Same as:           l



               10:36:                               KCL)           [place]



               00                                 Infuse           



                                                  over 2           



                                                  hours.           

 

     potassium            No                       Notes:           [facility]

a



     phosphate-s      5-31                               (Same as:           l



     odium      10:36:                               Phos-NaK)           [facility]



     phosphate      00                                 Each 1.5           



     250 mg-280                                         gm pkt has           



     mg-160 mg                                         250mg           



     oral powder                                         phosphorou           



     for                                          s. Mix           



     reconstitut                                         w/2.5oz           



     ion                                          water and           



                                                  stir.           

 

     Calcium            No                       Notes:           [facility]



     Gluconate                                     WASTE: F/P           l



               10:36:                               - Sink; E           [facility]



               00                                 -              



                                                  [facility]           



                                                  Trash Bin           

 

     Magnesium            No                       Notes:           [facility]

a



     Sulfate                                     WASTE: F/P           l



               10:36:                               - Sink; E           [place]



                                                -              



                                                  Municipal           



                                                  Trash Bin           

 

     Magnesium            No                       Notes:           [facility]

a



     Oxide                                     (Same as:           l



               10:36:                               Mag-Ox           [facility]



               00                                 400)           



                                                  Magnesium           



                                                  oxide           



                                                  [id number]           



                                                  g              



                                                  elemental           



                                                  magnesium           



                                                  Dose=____m           



                                                  g              



                                                  magnesium           



                                                  oxide           



                                                  (___mg           



                                                  elemental           



                                                  magnesium)           

 

     Lisinopril            No                       Notes:           [facility]

ia



               -29                               (Same as:           l



               19:03:                               Prinivil,           [place]



               00                                 Zestril)           

 

     Lisinopril            No                       Notes:           [facility]

ia



               -29                               (Same as:           l



               19:03:                               Prinivil,           [facility]



               00                                 Zestril)           

 

     sodium            No                       Notes:           [facility]



     phosphate      5-28                               Infuse           l



               14:20:                               over 4           [place]



               00                                 hour. Do           



                                                  not infuse           



                                                  phosphorou           



                                                  s              



                                                  concurrent           



                                                  ly in the           



                                                  same line           



                                                  as TPN or           



                                                  IVF that           



                                                  contains           



                                                  calcium.           



                                                  For double           



                                                  lumen           



                                                  central           



                                                  lines,           



                                                  phosphorou           



                                                  s may be           



                                                  infused in           



                                                  a separate           



                                                  lumen from           



                                                  TPN.           

 

     potassium            No                       Notes:           [facility]

a



     phosphate      -                               (Same as:           l



               14:20:                               K              [facility]                                 Phosphate.           



                                                  )  Do not           



                                                  infuse           



                                                  phosphorou           



                                                  s              



                                                  concurrent           



                                                  ly in the           



                                                  same line           



                                                  as TPN or           



                                                  IVF that           



                                                  contains           



                                                  calcium.           



                                                  For double           



                                                  lumen           



                                                  central           



                                                  lines,           



                                                  phosphorou           



                                                  s may be           



                                                  infused in           



                                                  a separate           



                                                  lumen from           



                                                  TPN.  1           



                                                  mMol           



                                                  phoshate           



                                                  has 1.47           



                                                  mEq            



                                                  potassium           



                                                  Infuse           



                                                  over 4           



                                                  hours           

 

     sodium            No                       Notes:           [facility]



     phosphate      5-28                               Infuse           l



               14:20:                               over 4           [facility]



               00                                 hour. Do           



                                                  not infuse           



                                                  phosphorou           



                                                  s              



                                                  concurrent           



                                                  ly in the           



                                                  same line           



                                                  as TPN or           



                                                  IVF that           



                                                  contains           



                                                  calcium.           



                                                  For double           



                                                  lumen           



                                                  central           



                                                  lines,           



                                                  phosphorou           



                                                  s may be           



                                                  infused in           



                                                  a separate           



                                                  lumen from           



                                                  TPN.           

 

     potassium            No                       Notes:           [facility]

a



     phosphate      5-28                               (Same as:           l



               14:20:                               K              [facility]



               00                                 Phosphate.           



                                                  )  Do not           



                                                  infuse           



                                                  phosphorou           



                                                  s              



                                                  concurrent           



                                                  ly in the           



                                                  same line           



                                                  as TPN or           



                                                  IVF that           



                                                  contains           



                                                  calcium.           



                                                  For double           



                                                  lumen           



                                                  central           



                                                  lines,           



                                                  phosphorou           



                                                  s may be           



                                                  infused in           



                                                  a separate           



                                                  lumen from           



                                                  TPN.  1           



                                                  mMol           



                                                  phoshate           



                                                  has 1.47           



                                                  mEq            



                                                  potassium           



                                                  Infuse           



                                                  over 4           



                                                  hours           

 

     potassium            No                       Notes:           [facility]

a



     phosphate-s                                     (Same as:           l



     odium      14:20:                               Phos-NaK)           [place]



     phosphate      00                                 Each 1.5           



     250 mg-280                                         gm pkt has           



     mg-160 mg                                         250mg           



     oral powder                                         phosphorou           



     for                                          s. Mix           



     reconstitut                                         w/2.5oz           



     ion                                          water and           



                                                  stir.           

 

     Magnesium            No                       Notes:           [facility]

a



     Sulfate                                     WASTE: F/P           l



               14:20:                               - Sink; E                                            -              



                                                  [facility]           



                                                  Trash Bin           

 

     Magnesium            No                       Notes:           [facility]

a



     Oxide                                     (Same as:           l



               14:20:                               Mag-Ox           [place]



               00                                 400)           



                                                  Magnesium           



                                                  oxide           



                                                  [id number]           



                                                  g              



                                                  elemental           



                                                  magnesium           



                                                  Dose=____m           



                                                  g              



                                                  magnesium           



                                                  oxide           



                                                  (___mg           



                                                  elemental           



                                                  magnesium)           

 

     Calcium            No                       Notes:           [facility]



     Gluconate                                     WASTE: F/P           l



               14:20:                               - Sink; E                                            -              



                                                  [facility]           



                                                  Trash Bin           

 

     Calcium            No                       Notes:           [facility]



     Carbonate                                     (Same As:           l



     500 MG      14:20:                               Tums)           [place]



     Chewable      00                                 Calcium           



     Tablet                                         Carbonate           



                                                  500 mg =           



                                                  200 mg           



                                                  elemental           



                                                  calcium           



                                                  Dose =           



                                                  ______ mg           



                                                  calcium           



                                                  carbonate           



                                                  (______ mg           



                                                  elemental           



                                                  calcium)           

 

     Potassium            No                       Notes:           [facility]

a



     Chloride                                     (Same as:           l



               14:20:                               KCL)           [place]



               00                                 Infuse           



                                                  over 2           



                                                  hours.           

 

     potassium            No                       Notes:           [facility]

a



     phosphate-s                                     (Same as:           l



     odium      14:20:                               Phos-NaK)           [facility]



     phosphate      00                                 Each 1.5           



     250 mg-280                                         gm pkt has           



     mg-160 mg                                         250mg           



     oral powder                                         phosphorou           



     for                                          s. Mix           



     reconstitut                                         w/2.5oz           



     ion                                          water and           



                                                  stir.           

 

     Magnesium            No                       Notes:           [facility]

a



     Sulfate                                     WASTE: F/P           l



               14:20:                               - Sink; E           [place]



                                                -              



                                                  Municipal           



                                                  Trash Bin           

 

     Magnesium            No                       Notes:           [facility]

a



     Oxide                                     (Same as:           l



               14:20:                               Mag-Ox                                            400)           



                                                  Magnesium           



                                                  oxide           



                                                  [id number]           



                                                  g              



                                                  elemental           



                                                  magnesium           



                                                  Dose=____m           



                                                  g              



                                                  magnesium           



                                                  oxide           



                                                  (___mg           



                                                  elemental           



                                                  magnesium)           

 

     Calcium            No                       Notes:           [facility]



     Gluconate                                     WASTE: F/P           l



               14:20:                               - Sink; E                                            -              



                                                  [facility]           



                                                  Trash Bin           

 

     Calcium            No                       Notes:           [facility]



     Carbonate                                     (Same As:           l



     500 MG      14:20:                               Tums)           [place]



     Chewable      00                                 Calcium           



     Tablet                                         Carbonate           



                                                  500 mg =           



                                                  200 mg           



                                                  elemental           



                                                  calcium           



                                                  Dose =           



                                                  ______ mg           



                                                  calcium           



                                                  carbonate           



                                                  (______ mg           



                                                  elemental           



                                                  calcium)           

 

     Potassium            No                       Notes:           [facility]

a



     Chloride                                     (Same as:           l



               14:20:                               KCL)                                            Infuse           



                                                  over 2           



                                                  hours.           

 

     Lasix            No                       Notes:           [facility]



               -                               (Same as:           l



               14:00:                               Lasix)                                            ***            



                                                  MEDICATION           



                                                  WASTE ***           



                                                  Product           



                                                  Size:  40           



                                                  mg Product           



                                                  Wasted:           



                                                  ___ mg           

 

     carvedilol            No                       Notes:           [facility]

[facility]



               -                               Give with           l



               14:00:                               food.           [facility]                                 (Same As:           



                                                  Coreg)           

 

     Lasix            No                       Notes:           [facility]



               -                               (Same as:           l



               14:00:                               Lasix)                                            ***            



                                                  MEDICATION           



                                                  WASTE ***           



                                                  Product           



                                                  Size:  40           



                                                  mg Product           



                                                  Wasted:           



                                                  ___ mg           

 

     carvedilol            No                       Notes:           [facility]

[facility]



               -28                               Give with           l



               14:00:                               food.                                            (Same As:           



                                                  [facility])           

 

     Budesonide            No                       Notes:           [facility]

[facility]



               -                               (Same As:           l



               01:00:                               Pulmicort)                                                           

 

     Budesonide            No                       Notes:           [facility]

[facility]



               -28                               (Same As:           l



               01:00:                               Pulmicort)                                                           

 

     Calcium            No                       1,000 mg,           [facility]

[facility]



     Gluconate                                     Route:           l



               21:45:                               IVPB, Drug                                            form: INJ,           



                                                  ONCE,           



                                                  Dosing           



                                                  Weight           



                                                  50.5, kg,           



                                                  Start           



                                                  date:           



                                                  19           



                                                  16:45:00           



                                                  CDT, Stop           



                                                  date:           



                                                  19           



                                                  16:45:00           



                                                  CDT            

 

     Calcium            No                       1,000 mg,           [facility]

ia



     Gluconate                                     Route:           l



               21:45:                               IVPB, Drug           [place]                                 form: INJ,           



                                                  ONCE,           



                                                  Dosing           



                                                  Weight           



                                                  50.5, kg,           



                                                  Start           



                                                  date:           



                                                  19           



                                                  16:45:00           



                                                  CDT, Stop           



                                                  date:           



                                                  19           



                                                  16:45:00           



                                                  CDT            

 

     iodixanol      2019-0      No                       70 mL,           [facility]

a



                                              Route:           l



               17:50:                               IVP, Drug           [place]                                 Form:           



                                                  SOLN,           



                                                  Dosing           



                                                  Weight           



                                                  50.5, kg,           



                                                  ONCALL,           



                                                  STAT,           



                                                  Start           



                                                  date:           



                                                  19           



                                                  12:50:00           



                                                  CDT,           



                                                  Duration:           



                                                  1 doses or           



                                                  times,           



                                                  Dose =           



                                                  2.2ml/kg,           



                                                  Max dose =           



                                                  100ml --           



                                                  "To be           



                                                  infused by           



                                                  Radiology           



                                                  Staff           



                                                  ONLY"           

 

     iodixanol      2019-0      No                       70 mL,           [facility]

a



                                              Route:           l



               17:50:                               IVP, Drug           [place]                                 Form:           



                                                  SOLN,           



                                                  Dosing           



                                                  Weight           



                                                  50.5, kg,           



                                                  ONCALL,           



                                                  STAT,           



                                                  Start           



                                                  date:           



                                                  19           



                                                  12:50:00           



                                                  CDT,           



                                                  Duration:           



                                                  1 doses or           



                                                  times,           



                                                  Dose =           



                                                  2.2ml/kg,           



                                                  Max dose =           



                                                  100ml --           



                                                  "To be           



                                                  infused by           



                                                  Radiology           



                                                  Staff           



                                                  ONLY"           

 

     Levofloxaci      -      No                       Notes: Do           [facility]

[facility]



     n         -27                               not give           l



               14:00:                               w/antacids           [facility]



               00                                 , dairy           



                                                  pdt &           



                                                  minerals           



                                                  Take 1 hr           



                                                  before or           



                                                  2 hr after           



                                                  dairy           



                                                  products           

 

     Levofloxaci      -0      No                       Notes: Do           [facility]

[facility]



     n         -27                               not give           l



               14:00:                               w/antacids           [place]



               00                                 , dairy           



                                                  pdt &           



                                                  minerals           



                                                  Take 1 hr           



                                                  before or           



                                                  2 hr after           



                                                  dairy           



                                                  products           

 

     Omeprazole      -      No                       20 mg,           [facility]

[facility]



                                              Route: PO,           l



               12:30:                               Drug form:           [place]



               00                                 ECCAP,           



                                                  Before           



                                                  Breakfast,           



                                                  Dosing           



                                                  Weight           



                                                  50.5, kg,           



                                                  Start           



                                                  date:           



                                                  19           



                                                  7:30:00           



                                                  CDT,           



                                                  Duration:           



                                                  30 day,           



                                                  Stop date:           



                                                  19           



                                                  7:30:00           



                                                  CDT            

 

     Protonix      -0      No                       Notes:           [facility]



               5-27                               Tablet           l



               12:30:                               should not           [place]



               00                                 be chewed           



                                                  or             



                                                  crushed.           



                                                  (Same as:           



                                                  Protonix)           

 

     Omeprazole            No                       20 mg,           [facility]

[facility]



                                              Route: PO,           l



               12:30:                               Drug form:           [place]



               00                                 ECCAP,           



                                                  Before           



                                                  Breakfast,           



                                                  Dosing           



                                                  Weight           



                                                  50.5, kg,           



                                                  Start           



                                                  date:           



                                                  19           



                                                  7:30:00           



                                                  CDT,           



                                                  Duration:           



                                                  30 day,           



                                                  Stop date:           



                                                  19           



                                                  7:30:00           



                                                  CDT            

 

     Protonix      2019-0      No                       Notes:           [facility]



               5-27                               Tablet           l



               12:30:                               should not           [place]



               00                                 be chewed           



                                                  or             



                                                  crushed.           



                                                  (Same as:           



                                                  Protonix)           

 

     Sodium      2019-0      No                       250 mL,           [facility]



     Chloride      5-27                               Rate: To           l



     0.9%      12:10:                               prime line           [place]



     (titrate)      00                                 and flush           



     250 mL                                         remaining           



                                                  blood           



                                                  products.,           



                                                  Dosing           



                                                  Weight           



                                                  50.5, kg,           



                                                  Route: IV,           



                                                  Total           



                                                  Volume:           



                                                  250, Start           



                                                  Date:           



                                                  19           



                                                  7:10:00           



                                                  CDT,           



                                                  Duration:           



                                                  1 day,           



                                                  Stop date:           



                                                  19           



                                                  7:09:00           



                                                  CDT,           



                                                  Replace           



                                                  Every: 24           



                                                  hr             

 

     Sodium      2019-0      No                       250 mL,           [facility]



     Chloride      5-27                               Rate: To           l



     0.9%      12:10:                               prime line           [facility]



     (titrate)      00                                 and flush           



     250 mL                                         remaining           



                                                  blood           



                                                  products.,           



                                                  Dosing           



                                                  Weight           



                                                  50.5, kg,           



                                                  Route: IV,           



                                                  Total           



                                                  Volume:           



                                                  250, Start           



                                                  Date:           



                                                  19           



                                                  7:10:00           



                                                  CDT,           



                                                  Duration:           



                                                  1 day,           



                                                  Stop date:           



                                                  19           



                                                  7:09:00           



                                                  CDT,           



                                                  Replace           



                                                  Every: 24           



                                                  hr             

 

     Sodium      2019-0      No                       250 mL,           [facility]



     Chloride      5-27                               Rate: To           l



     0.9%      11:02:                               prime line           [place]



     (titrate)      00                                 and flush           



     250 mL                                         remaining           



                                                  blood           



                                                  products.,           



                                                  Dosing           



                                                  Weight           



                                                  50.5, kg,           



                                                  Route: IV,           



                                                  Total           



                                                  Volume:           



                                                  250,           



                                                  Priority:           



                                                  Routine,           



                                                  Start           



                                                  Date:           



                                                  19           



                                                  6:02:00           



                                                  CDT,           



                                                  Duration:           



                                                  30 day,           



                                                  Stop date:           



                                                  19           



                                                  6:01:00           



                                                  CDT,           



                                                  Replace           



                                                  Every: 24           



                                                  hr             

 

     Sodium      2019-0      No                       250 mL,           [facility]



     Chloride      5-27                               Rate: To           l



     0.9%      11:02:                               prime line           [place]



     (titrate)      00                                 and flush           



     250 mL                                         remaining           



                                                  blood           



                                                  products.,           



                                                  Dosing           



                                                  Weight           



                                                  50.5, kg,           



                                                  Route: IV,           



                                                  Total           



                                                  Volume:           



                                                  250,           



                                                  Priority:           



                                                  Routine,           



                                                  Start           



                                                  Date:           



                                                  19           



                                                  6:02:00           



                                                  CDT,           



                                                  Duration:           



                                                  30 day,           



                                                  Stop date:           



                                                  19           



                                                  6:01:00           



                                                  CDT,           



                                                  Replace           



                                                  Every: 24           



                                                  hr             

 

     NS (Bolus)            No                       250 mL,           [facility]

[facility]



     IV        -                               1000           l



               04:55:                               ml/hr,           [facility]



                                                Infuse           



                                                  Over: 15           



                                                  minutes,           



                                                  Route: IV,           



                                                  250, Drug           



                                                  form: INJ,           



                                                  ONCE,           



                                                  Priority:           



                                                  STAT,           



                                                  Dosing           



                                                  Weight           



                                                  50.5 kg,           



                                                  Start           



                                                  date:           



                                                  19           



                                                  23:55:00           



                                                  CDT, Stop           



                                                  date:           



                                                  19           



                                                  23:55:00           



                                                  CDT            

 

     NS (Bolus)            No                       250 mL,           [facility]

[facility]



     IV        -                               1000           l



               04:55:                               ml/hr,           [place]



               00                                 Infuse           



                                                  Over: 15           



                                                  minutes,           



                                                  Route: IV,           



                                                  250, Drug           



                                                  form: INJ,           



                                                  ONCE,           



                                                  Priority:           



                                                  STAT,           



                                                  Dosing           



                                                  Weight           



                                                  50.5 kg,           



                                                  Start           



                                                  date:           



                                                  19           



                                                  23:55:00           



                                                  CDT, Stop           



                                                  date:           



                                                  19           



                                                  23:55:00           



                                                  CDT            

 

     nxstage            No                       Notes:           [facility]



     pureflow      5-27                               NxStage           l



     rfp-[id number]      04:19:                               [id number] =           [facility]

nn



     5000ml SOLN      00                                 K4/Ca3           



     5,000 mL                                         Total           



                                                  ingredient           



                                                  s in bag           



                                                  Na             



                                                  140meq/L;           



                                                  K 4meq/L;           



                                                  HCO            



                                                  35meq/L;           



                                                  Ca 3meq/L;           



                                                  Magnesium           



                                                  1meq/L; CL           



                                                  113meq/L;           



                                                  Glucose           



                                                  100mg/dL;           



                                                  "Break           



                                                  seal           



                                                  Between           



                                                  compartmen           



                                                  ts and mix           



                                                  before           



                                                  hanging"           

 

     nxstage            No                       Notes:           [facility]



     pureflow      5-27                               NxStage           l



     rfp-401      04:19:                               RFP-401 =           [place]

nn



     5000ml SOLN      00                                 K4/Ca3           



     5,000 mL                                         Total           



                                                  ingredient           



                                                  s in bag           



                                                  Na             



                                                  140meq/L;           



                                                  K 4meq/L;           



                                                  HCO            



                                                  35meq/L;           



                                                  Ca 3meq/L;           



                                                  Magnesium           



                                                  1meq/L; CL           



                                                  113meq/L;           



                                                  Glucose           



                                                  100mg/dL;           



                                                  "Break           



                                                  seal           



                                                  Between           



                                                  compartmen           



                                                  ts and mix           



                                                  before           



                                                  hanging"           

 

     Calcium            No                       250 mL,           [facility]



     Chloride      5-27                               250 ml/hr,           l



     0.0014      03:51:                               Infuse           [facility]



     MEQ/ML /      00                                 Over: 1           



     Potassium                                         hr, Route:           



     Chloride                                         IV, 250,           



     0.004                                         Drug form:           



     MEQ/ML /                                         INJ, ONCE,           



     Sodium                                         Priority:           



     Chloride                                         STAT,           



     0.103                                         Dosing           



     MEQ/ML /                                         Weight           



     Sodium                                         50.5 kg,           



     Lactate                                         Start           



     0.028                                         date:           



     MEQ/ML                                         19           



     Injectable                                         22:51:00           



     Solution                                         CDT, Stop           



                                                  date:           



                                                  19           



                                                  22:51:00           



                                                  CDT            

 

     Calcium      2019-      No                       250 mL,           [facility]



     Chloride      5-27                               250 ml/hr,           l



     0.0014      03:51:                               Infuse           [facility]



     MEQ/ML /      00                                 Over: 1           



     Potassium                                         hr, Route:           



     Chloride                                         IV, 250,           



     0.004                                         Drug form:           



     MEQ/ML /                                         INJ, ONCE,           



     Sodium                                         Priority:           



     Chloride                                         STAT,           



     0.103                                         Dosing           



     MEQ/ML /                                         Weight           



     Sodium                                         50.5 kg,           



     Lactate                                         Start           



     0.028                                         date:           



     MEQ/ML                                         19           



     Injectable                                         22:51:00           



     Solution                                         CDT, Stop           



                                                  date:           



                                                  19           



                                                  22:51:00           



                                                  CDT            

 

     Flagyl            No                       Notes:           [facility]



               5-26                               (Same as:           l



               16:00:                               Flagyl)           [place]



               00                                 Take with           



                                                  food/           



                                                  avoid           



                                                  alcohol           

 

     Flagyl            No                       Notes:           [facility]



               5-26                               (Same as:           l



               16:00:                               Flagyl)           [facility]



               00                                 Take with           



                                                  food/           



                                                  avoid           



                                                  alcohol           

 

     Melatonin            No                       Notes:           [facility]

a



               5-26                               (Same as:           l



               15:34:                               Melatonin)           [facility]



               00                                                

 

     Melatonin            No                       Notes:           [facility]

a



               5-26                               (Same as:           l



               15:34:                               Melatonin)           [place]



               00                                                

 

     Levofloxaci            No                       Notes: Do           [facility]

[facility]



     n         5-26                               not give           l



               15:24:                               w/antacids           [facility]



               00                                 , dairy           



                                                  pdt &           



                                                  minerals           



                                                  Take 1 hr           



                                                  before or           



                                                  2 hr after           



                                                  dairy           



                                                  products           

 

     Levofloxaci            No                       Notes: Do           [facility]

[facility]



     n         5-26                               not give           l



               15:24:                               w/antacids           [place]



               00                                 , dairy           



                                                  pdt &           



                                                  minerals           



                                                  Take 1 hr           



                                                  before or           



                                                  2 hr after           



                                                  dairy           



                                                  products           

 

     Vancomycin            No                         mg:           [facility]

[facility]



               5-26                               infuse           l



               15:00:                               over 2.5           [place]



               00                                 hours  For           



                                                  adult           



                                                  patients           



                                                  only:           



                                                  Round to           



                                                  nearest           



                                                  250 mg per           



                                                  Medical           



                                                  Staff           



                                                  approval           



                                                  ***            



                                                  MEDICATION           



                                                  WASTE ***           



                                                  Product           



                                                  Size:           



                                                  1000 mg           



                                                  Product           



                                                  Wasted:           



                                                  ___ mg           

 

     Vancomycin      2019-      No                         mg:           [facility]

[facility]



               5-26                               infuse           l



               15:00:                               over 2.5           [place]



               00                                 hours  For           



                                                  adult           



                                                  patients           



                                                  only:           



                                                  Round to           



                                                  nearest           



                                                  250 mg per           



                                                  Medical           



                                                  Staff           



                                                  approval           



                                                  ***            



                                                  MEDICATION           



                                                  WASTE ***           



                                                  Product           



                                                  Size:           



                                                  1000 mg           



                                                  Product           



                                                  Wasted:           



                                                  ___ mg           

 

     potassium            No                       Notes:           [facility]

a



     phosphate-s      -26                               (Same as:           l



     odium      14:54:                               Phos-NaK)           



     phosphate      00                                 Each 1.5           



     250 mg-280                                         gm pkt has           



     mg-160 mg                                         250mg           



     oral powder                                         phosphorou           



     for                                          s. Mix           



     reconstitut                                         w/2.5oz           



     ion                                          water and           



                                                  stir.           

 

     potassium            No                       Notes:           [facility]

a



     phosphate      5-26                               (Same as:           l



               14:54:                               K              [facility]                                 Phosphate.           



                                                  )  Do not           



                                                  infuse           



                                                  phosphorou           



                                                  s              



                                                  concurrent           



                                                  ly in the           



                                                  same line           



                                                  as TPN or           



                                                  IVF that           



                                                  contains           



                                                  calcium.           



                                                  For double           



                                                  lumen           



                                                  central           



                                                  lines,           



                                                  phosphorou           



                                                  s may be           



                                                  infused in           



                                                  a separate           



                                                  lumen from           



                                                  TPN.  1           



                                                  mMol           



                                                  phoshate           



                                                  has 1.47           



                                                  mEq            



                                                  potassium           



                                                  Infuse           



                                                  over 4           



                                                  hours           

 

     Potassium            No                       Notes:           [facility]

a



     Chloride      -                               (Same as:           l



               14:54:                               K-Dur 20)                                            "Do Not           



                                                  Crush"           



                                                  Give with           



                                                  food and           



                                                  full glass           



                                                  of water           



                                                  For            



                                                  patients           



                                                  unable to           



                                                  swallow           



                                                  tablet,           



                                                  dissolve           



                                                  in one           



                                                  half glass           



                                                  of water.           



                                                  Allow           



                                                  about 2           



                                                  minutes           



                                                  for the           



                                                  tablets to           



                                                  disintegra           



                                                  te. Stir           



                                                  before           



                                                  giving to           



                                                  prepare           



                                                  slurry and           



                                                  administer           



                                                  . Please           



                                                  exclude           



                                                                      Patient



                                                            



                                                  s with           



                                                  feeding           



                                                  tube less           



                                                  than 14           



                                                  [place]           



                                                  ([place],           



                                                  J-tube           



                                                  etc) and           



                                                  pediatric           



                                                  and            



                                                             



                                                  patients.           

 

     sodium            No                       Notes:           [facility]



     phosphate      -                               Infuse           l



               14:54:                               over 4           [facility]



               00                                 hour. Do           



                                                  not infuse           



                                                  phosphorou           



                                                  s              



                                                  concurrent           



                                                  ly in the           



                                                  same line           



                                                  as TPN or           



                                                  IVF that           



                                                  contains           



                                                  calcium.           



                                                  For double           



                                                  lumen           



                                                  central           



                                                  lines,           



                                                  phosphorou           



                                                  s may be           



                                                  infused in           



                                                  a separate           



                                                  lumen from           



                                                  TPN.           

 

     Magnesium            No                       Notes:           [facility]

a



     Sulfate                                     WASTE: F/P           l



               14:54:                               - Sink; E                                            -              



                                                  Municipal           



                                                  Trash Bin           

 

     Magnesium            No                       Notes:           [facility]

a



     Oxide      -                               (Same as:           l



               14:54:                               Mag-Ox                                            400)           



                                                  Magnesium           



                                                  oxide           



                                                  [id number]           



                                                  g              



                                                  elemental           



                                                  magnesium           



                                                  Dose=____m           



                                                  g              



                                                  magnesium           



                                                  oxide           



                                                  (___mg           



                                                  elemental           



                                                  magnesium)           

 

     Calcium            No                       Notes:           [facility]



     Chloride                                     WASTE: F/P           l



               14:54:                               - Sink; E                                            -              



                                                  Municipal           



                                                  Trash Bin           

 

     potassium            No                       Notes:           [facility]

a



     phosphate-s      -                               (Same as:           l



     odium      14:54:                               Phos-NaK)           [place]



     phosphate      00                                 Each 1.5           



     250 mg-280                                         gm pkt has           



     mg-160 mg                                         250mg           



     oral powder                                         phosphorou           



     for                                          s. Mix           



     reconstitut                                         w/2.5oz           



     ion                                          water and           



                                                  stir.           

 

     potassium            No                       Notes:           [facility]

a



     phosphate      5-26                               (Same as:           l



               14:54:                               K              [facility]



                                                Phosphate.           



                                                  )  Do not           



                                                  infuse           



                                                  phosphorou           



                                                  s              



                                                  concurrent           



                                                  ly in the           



                                                  same line           



                                                  as TPN or           



                                                  IVF that           



                                                  contains           



                                                  calcium.           



                                                  For double           



                                                  lumen           



                                                  central           



                                                  lines,           



                                                  phosphorou           



                                                  s may be           



                                                  infused in           



                                                  a separate           



                                                  lumen from           



                                                  TPN.  1           



                                                  mMol           



                                                  phoshate           



                                                  has 1.47           



                                                  mEq            



                                                  potassium           



                                                  Infuse           



                                                  over 4           



                                                  hours           

 

     Potassium            No                       Notes:           [facility]

a



     Chloride      -                               (Same as:           l



               14:54:                               K-Dur 20)                                            "Do Not           



                                                  Crush"           



                                                  Give with           



                                                  food and           



                                                  full glass           



                                                  of water           



                                                  For            



                                                  patients           



                                                  unable to           



                                                  swallow           



                                                  tablet,           



                                                  dissolve           



                                                  in one           



                                                  half glass           



                                                  of water.           



                                                  Allow           



                                                  about 2           



                                                  minutes           



                                                  for the           



                                                  tablets to           



                                                  disintegra           



                                                  te. Stir           



                                                  before           



                                                  giving to           



                                                  prepare           



                                                  slurry and           



                                                  administer           



                                                  . Please           



                                                  exclude           



                                                                      Patient



                                                            



                                                  s with           



                                                  feeding           



                                                  tube less           



                                                  than 14           



                                                  [place]           



                                                  ([place],           



                                                  J-tube           



                                                  etc) and           



                                                  pediatric           



                                                  and            



                                                             



                                                  patients.           

 

     sodium            No                       Notes:           [facility]



     phosphate      5-26                               Infuse           l



               14:54:                               over 4           [place]



               00                                 hour. Do           



                                                  not infuse           



                                                  phosphorou           



                                                  s              



                                                  concurrent           



                                                  ly in the           



                                                  same line           



                                                  as TPN or           



                                                  IVF that           



                                                  contains           



                                                  calcium.           



                                                  For double           



                                                  lumen           



                                                  central           



                                                  lines,           



                                                  phosphorou           



                                                  s may be           



                                                  infused in           



                                                  a separate           



                                                  lumen from           



                                                  TPN.           

 

     Magnesium            No                       Notes:           [facility]

a



     Sulfate                                     WASTE: F/P           l



               14:54:                               - Sink; E                                            -              



                                                  Municipal           



                                                  Trash Bin           

 

     Magnesium            No                       Notes:           [facility]

a



     Oxide      -                               (Same as:           l



               14:54:                               Mag-Ox           [facility]



               00                                 400)           



                                                  Magnesium           



                                                  oxide           



                                                  512kg=654n           



                                                  g              



                                                  elemental           



                                                  magnesium           



                                                  Dose=____m           



                                                  g              



                                                  magnesium           



                                                  oxide           



                                                  (___mg           



                                                  elemental           



                                                  magnesium)           

 

     Calcium            No                       Notes:           [facility]



     Chloride      -                               WASTE: F/P           l



               14:54:                               - Sink; E                                            -              



                                                  Municipal           



                                                  Trash Bin           

 

     nxstage            No                       Notes:           [facility]



     pureflow      -26                               NxStage           l



     rfp-400      14:53:                               RFP-400 =           [facility]

nn



     5000ml SOLN      00                                 K2/Ca3           



     5,000 mL                                         Total           



                                                  ingredient           



                                                  s in bag           



                                                  Na             



                                                  140meq/L;           



                                                  K 2meq/L;           



                                                  HCO            



                                                  35meq/L;           



                                                  Ca 3meq/L;           



                                                  Magnesium           



                                                  1meq/L; CL           



                                                  111meq/L;           



                                                  Glucose           



                                                  100mg/dL;           



                                                  "Break           



                                                  seal           



                                                  Between           



                                                  compartmen           



                                                  ts and mix           



                                                  before           



                                                  hanging"           

 

     nxstage            No                       Notes:           [facility]



     pureflow      5-26                               NxStage           l



     rfp-400      14:53:                               RFP-400 =           [facility]

nn



     5000ml SOLN      00                                 K2/Ca3           



     5,000 mL                                         Total           



                                                  ingredient           



                                                  s in bag           



                                                  Na             



                                                  140meq/L;           



                                                  K 2meq/L;           



                                                  HCO            



                                                  35meq/L;           



                                                  Ca 3meq/L;           



                                                  Magnesium           



                                                  1meq/L; CL           



                                                  111meq/L;           



                                                  Glucose           



                                                  100mg/dL;           



                                                  "Break           



                                                  seal           



                                                  Between           



                                                  compartmen           



                                                  ts and mix           



                                                  before           



                                                  hanging"           

 

     Fentanyl            No                       Notes:           [facility]



               5-26                               (Same as:           l



               14:44:                               Sublimaze)           [facility]



                                                 Preservat           



                                                  fly free.           

 

     Fentanyl            No                       Notes:           [facility]



               5-26                               (Same as:           l



               14:44:                               Sublimaze)           [place]



                                                 Preservat           



                                                  fly free.           

 

     heparin            No                       Notes:           [facility]



               5-26                               porcine           l



               13:00:                               heparin           [place]



                                                               

 

     heparin            No                       Notes:           [facility]



               5-26                               porcine           l



               13:00:                               heparin           [facility]



                                                               

 

     Lasix            No                       Notes:           [facility]



               5-26                               (Same as:           l



               12:13:                               Lasix)           [place]



                                                ***            



                                                  MEDICATION           



                                                  WASTE ***           



                                                  Product           



                                                  Size:  40           



                                                  mg Product           



                                                  Wasted:           



                                                  ___ mg           

 

     Lasix            No                       Notes:           [facility]



               5-26                               (Same as:           l



               12:13:                               Lasix)           [facility]



                                                ***            



                                                  MEDICATION           



                                                  WASTE ***           



                                                  Product           



                                                  Size:  40           



                                                  mg Product           



                                                  Wasted:           



                                                  ___ mg           

 

     Keppra            No                       Notes:           [facility]



               5-26                               Same as           l



               03:00:                               Keppra           [facility]



                                                               

 

     Keppra            No                       Notes:           [facility]



               5-26                               Same as           l



               03:00:                               Keppra           [place]



                                                               

 

     Seroquel            No                       Notes:           [facility]



               5-26                               (Same as:           l



               02:00:                               SEROquel)           [facility]



                                                               

 

     Seroquel            No                       Notes:           [facility]



               5-26                               (Same as:           l



               02:00:                               SEROquel)           [place]



                                                               

 

     Vancomycin            No                        2001 mg:           [facility]

[facility]



               5-26                               infuse           l



               01:24:                               over 2.5           [facility]



               00                                 hours  For           



                                                  adult           



                                                  patients           



                                                  only:           



                                                  Round to           



                                                  nearest           



                                                  250 mg per           



                                                  Medical           



                                                  Staff           



                                                  approval           



                                                  ***            



                                                  MEDICATION           



                                                  WASTE ***           



                                                  Product           



                                                  Size:           



                                                  1000 mg           



                                                  Product           



                                                  Wasted:           



                                                  ___ mg           

 

     Vancomycin            No                         mg:           [facility]

[facility]



               5-26                               infuse           l



               01:24:                               over 2.5           [place]



               00                                 hours  For           



                                                  adult           



                                                  patients           



                                                  only:           



                                                  Round to           



                                                  nearest           



                                                  250 mg per           



                                                  Medical           



                                                  Staff           



                                                  approval           



                                                  ***            



                                                  MEDICATION           



                                                  WASTE ***           



                                                  Product           



                                                  Size:           



                                                  1000 mg           



                                                  Product           



                                                  Wasted:           



                                                  ___ mg           

 

     Ativan            Yes                      Notes:           [facility]



               5-25                               (Same as:           l



               23:43:                               Ativan)           



               00                                                

 

     Ativan            Yes                      Notes:           [facility]



               5-25                               (Same as:           l



               23:43:                               Ativan)                                                           

 

     Calcium            No                       1,000 mL,           [facility]

[facility]



     Chloride      5-25                               1,000           l



     0.0014      22:06:                               ml/hr,           [facility]



     MEQ/ML /      00                                 Infuse           



     Potassium                                         Over: 1           



     Chloride                                         hr, Route:           



     0.004                                         IV, 1,000,           



     MEQ/ML /                                         Drug form:           



     Sodium                                         INJ, ONCE,           



     Chloride                                         Priority:           



     0.103                                         STAT,           



     MEQ/ML /                                         Dosing           



     Sodium                                         Weight           



     Lactate                                         50.5 kg,           



     0.028                                         Start           



     MEQ/ML                                         date:           



     Injectable                                         19           



     Solution                                         17:06:00           



                                                  CDT, Stop           



                                                  date:           



                                                  19           



                                                  17:06:00           



                                                  CDT            

 

     Calcium            No                       1,000 mL,           [facility]

ia



     Chloride      5-25                               1,000           l



     0.0014      22:06:                               ml/hr,           [place]



     MEQ/ML /      00                                 Infuse           



     Potassium                                         Over: 1           



     Chloride                                         hr, Route:           



     0.004                                         IV, 1,000,           



     MEQ/ML /                                         Drug form:           



     Sodium                                         INJ, ONCE,           



     Chloride                                         Priority:           



     0.103                                         STAT,           



     MEQ/ML /                                         Dosing           



     Sodium                                         Weight           



     Lactate                                         50.5 kg,           



     0.028                                         Start           



     MEQ/ML                                         date:           



     Injectable                                         19           



     Solution                                         17:06:00           



                                                  CDT, Stop           



                                                  date:           



                                                  19           



                                                  17:06:00           



                                                  CDT            

 

     Phenytoin            No                       Notes:           [facility]

a



               5-25                               (Same as:           l



               22:00:                               Dilantin)                                            Do not           



                                                  open,           



                                                  crush, or           



                                                  chew.           

 

     Phenytoin            No                       Notes:           [facility]

a



               5-25                               (Same as:           l



               22:00:                               Dilantin)           [place]                                 Do not           



                                                  open,           



                                                  crush, or           



                                                  chew.           

 

     NS (Bolus)            No                       1,000 mL,           [facility]

[facility]



     IV        5-25                               1,000           l



               16:15:                               ml/hr,           [place]



               00                                 Infuse           



                                                  Over: 1           



                                                  hr, Route:           



                                                  IV, 1,000,           



                                                  Drug form:           



                                                  INJ, ONCE,           



                                                  Priority:           



                                                  STAT,           



                                                  Dosing           



                                                  Weight           



                                                  50.5 kg,           



                                                  Start           



                                                  date:           



                                                  19           



                                                  11:15:00           



                                                  CDT, Stop           



                                                  date:           



                                                  19           



                                                  11:15:00           



                                                  CDT            

 

     NS (Bolus)            No                       1,000 mL,           [facility]

[facility]



     IV        5-25                               1,000           l



               16:15:                               ml/hr,           [facility]



               00                                 Infuse           



                                                  Over: 1           



                                                  hr, Route:           



                                                  IV, 1,000,           



                                                  Drug form:           



                                                  INJ, ONCE,           



                                                  Priority:           



                                                  STAT,           



                                                  Dosing           



                                                  Weight           



                                                  50.5 kg,           



                                                  Start           



                                                  date:           



                                                  19           



                                                  11:15:00           



                                                  CDT, Stop           



                                                  date:           



                                                  19           



                                                  11:15:00           



                                                  CDT            

 

     Ipratropium            No                       Notes: SEE           

[facility]



     Bromide 0.2      5-25                               RT             l



     MG/ML      16:10:                               DOCUMENTAT           [facility]

n



     Inhalant      00                                 ION (Same           



     Solution                                         as:Atroven           



                                                  t)             

 

     Xopenex            No                       Notes: SEE           [facility]

[facility]



               5-25                               RT             l



               16:10:                               DOCUMENTAT           [facility]



               00                                 ION (Same           



                                                  as:Xopenex           



                                                  )              



                                                  Non-Formul           



                                                  sasha            

 

     Ipratropium            No                       Notes: SEE           

[facility]



     Bromide 0.2      5-25                               RT             l



     MG/ML      16:10:                               DOCUMENTAT           [facility]

n



     Inhalant      00                                 ION (Same           



     Solution                                         as:Atroven           



                                                  t)             

 

     Xopenex            No                       Notes: SEE           [facility]

[facility]



               5-25                               RT             l



               16:10:                               DOCUMENTAT           [place]



               00                                 ION (Same           



                                                  as:Xopenex           



                                                  )              



                                                  Non-Formul           



                                                  sasha            

 

     Sodium            No                       Notes:           [facility]



     Chloride      5-25                               Same as:           l



     1.2 MEQ/ML      16:09:                               HYPER-SAL           [facility]

[facility]



     Inhalant      00                                                



     Solution                                                        

 

     Sodium            No                       Notes:           [facility]



     Chloride      5-25                               Same as:           l



     1.2 MEQ/ML      16:09:                               HYPER-SAL           [facility]

[facility]



     Inhalant      00                                                



     Solution                                                        

 

     Losartan            No                       Notes:           [facility]



               5-25                               (Same as:           l



               14:00:                               Cozaar)           [facility]



               00                                                

 

     Losartan      2019-0      No                       Notes:           [facility]



               5-25                               (Same as:           l



               14:00:                               Cozaar)           [place]                                                

 

     Protonix      -0      No                       Notes:           [facility]



               5-25                               Tablet           l



               12:30:                               should not                                            be chewed           



                                                  or             



                                                  crushed.           



                                                  (Same as:           



                                                  Protonix)           

 

     Omeprazole            No                       20 mg,           [facility]

[facility]



               5-25                               Route: PO,           l



               12:30:                               Drug form:           [place]



               00                                 ECCAP,           



                                                  Before           



                                                  Breakfast,           



                                                  Dosing           



                                                  Weight           



                                                  50.5, kg,           



                                                  Start           



                                                  date:           



                                                  19           



                                                  7:30:00           



                                                  CDT,           



                                                  Duration:           



                                                  30 day,           



                                                  Stop date:           



                                                  19           



                                                  7:30:00           



                                                  CDT            

 

     Protonix      0      No                       Notes:           [facility]



               5-25                               Tablet           l



               12:30:                               should not                                            be chewed           



                                                  or             



                                                  crushed.           



                                                  (Same as:           



                                                  Protonix)           

 

     Omeprazole            No                       20 mg,           [facility]

[facility]



               5-25                               Route: PO,           l



               12:30:                               Drug form:           [place]                                 ECCAP,           



                                                  Before           



                                                  Breakfast,           



                                                  Dosing           



                                                  Weight           



                                                  50.5, kg,           



                                                  Start           



                                                  date:           



                                                  19           



                                                  7:30:00           



                                                  CDT,           



                                                  Duration:           



                                                  30 day,           



                                                  Stop date:           



                                                  19           



                                                  7:30:00           



                                                  CDT            

 

     vancomycin            No                         mg:           [facility]

[facility]



     + Sodium      5-25                               infuse           l



     Chloride      10:00:                               over 2.5           [facility]

nn



     0.9%       00                                 hours  For           



     mL                                           adult           



                                                  patients           



                                                  only:           



                                                  Round to           



                                                  nearest           



                                                  250 mg per           



                                                  Medical           



                                                  Staff           



                                                  approval           



                                                  ***            



                                                  MEDICATION           



                                                  WASTE ***           



                                                  Product           



                                                  Size:           



                                                  1000 mg           



                                                  Product           



                                                  Wasted:           



                                                  ___ mg           

 

     vancomycin      -      No                         mg:           [facility]

[facility]



     + Sodium      5-25                               infuse           l



     Chloride      10:00:                               over 2.5           [facility]

nn



     0.9%       00                                 hours  For           



     mL                                           adult           



                                                  patients           



                                                  only:           



                                                  Round to           



                                                  nearest           



                                                  250 mg per           



                                                  Medical           



                                                  Staff           



                                                  approval           



                                                  ***            



                                                  MEDICATION           



                                                  WASTE ***           



                                                  Product           



                                                  Size:           



                                                  1000 mg           



                                                  Product           



                                                  Wasted:           



                                                  ___ mg           

 

     Calcium      -0      No                       1,000 mL,           [facility]

[facility]



     Chloride      5-25                               1,000           l



     0.0014      09:37:                               ml/hr,           [facility]



     MEQ/ML /      00                                 Infuse           



     Potassium                                         Over: 1           



     Chloride                                         hr, Route:           



     0.004                                         IV, 1,000,           



     MEQ/ML /                                         Drug form:           



     Sodium                                         INJ, ONCE,           



     Chloride                                         Priority:           



     0.103                                         STAT,           



     MEQ/ML /                                         Dosing           



     Sodium                                         Weight           



     Lactate                                         50.5 kg,           



     0.028                                         Start           



     MEQ/ML                                         date:           



     Injectable                                         19           



     Solution                                         4:37:00           



                                                  CDT, Stop           



                                                  date:           



                                                  19           



                                                  4:37:00           



                                                  CDT            

 

     Calcium      2019-0      No                       1,000 mL,           [facility]

[facility]



     Chloride      5-25                               1,000           l



     0.0014      09:37:                               ml/hr,           [place]



     MEQ/ML /      00                                 Infuse           



     Potassium                                         Over: 1           



     Chloride                                         hr, Route:           



     0.004                                         IV, 1,000,           



     MEQ/ML /                                         Drug form:           



     Sodium                                         INJ, ONCE,           



     Chloride                                         Priority:           



     0.103                                         STAT,           



     MEQ/ML /                                         Dosing           



     Sodium                                         Weight           



     Lactate                                         50.5 kg,           



     0.028                                         Start           



     MEQ/ML                                         date:           



     Injectable                                         19           



     Solution                                         4:37:00           



                                                  CDT, Stop           



                                                  date:           



                                                  19           



                                                  4:37:00           



                                                  CDT            

 

     NS (B[facility])      2019-0      No                       1,000 mL,           [facility]

[facility]



     IV        5-25                               1,000           l



               09:35:                               ml/hr,           [place]



               00                                 Infuse           



                                                  Over: 1           



                                                  hr, Route:           



                                                  IV, 1,000,           



                                                  Drug form:           



                                                  INJ, ONCE,           



                                                  Priority:           



                                                  STAT,           



                                                  Dosing           



                                                  Weight           



                                                  50.5 kg,           



                                                  Start           



                                                  date:           



                                                  19           



                                                  4:35:00           



                                                  CDT, Stop           



                                                  date:           



                                                  19           



                                                  4:35:00           



                                                  CDT            

 

     NS (B[facility])      2019-0      No                       1,000 mL,           [facility]

[facility]



     IV        5-25                               1,000           l



               09:35:                               ml/hr,           [facility]



               00                                 Infuse           



                                                  Over: 1           



                                                  hr, Route:           



                                                  IV, 1,000,           



                                                  Drug form:           



                                                  INJ, ONCE,           



                                                  Priority:           



                                                  STAT,           



                                                  Dosing           



                                                  Weight           



                                                  50.5 kg,           



                                                  Start           



                                                  date:           



                                                  19           



                                                  4:35:00           



                                                  CDT, Stop           



                                                  date:           



                                                  19           



                                                  4:35:00           



                                                  CDT            

 

     NS (Bolus)      2019-0      No                       500 mL,           [facility]

[facility]



     IV        5-25                               2000           l



               09:32:                               ml/hr,           [facility]



               00                                 Infuse           



                                                  Over: 15           



                                                  minutes,           



                                                  Route: IV,           



                                                  500, Drug           



                                                  form: INJ,           



                                                  ONCE,           



                                                  Dosing           



                                                  Weight           



                                                  50.5 kg,           



                                                  Start           



                                                  date:           



                                                  19           



                                                  4:32:00           



                                                  CDT, Stop           



                                                  date:           



                                                  19           



                                                  4:32:00           



                                                  CDT            

 

     NS (Bolus)      2019-0      No                       500 mL,           [facility]

[facility]



     IV        5-25                               2000           l



               09:32:                               ml/hr,           [place]



               00                                 Infuse           



                                                  Over: 15           



                                                  minutes,           



                                                  Route: IV,           



                                                  500, Drug           



                                                  form: INJ,           



                                                  ONCE,           



                                                  Dosing           



                                                  Weight           



                                                  50.5 kg,           



                                                  Start           



                                                  date:           



                                                  19           



                                                  4:32:00           



                                                  CDT, Stop           



                                                  date:           



                                                  19           



                                                  4:32:00           



                                                  CDT            

 

     heparin      -      No                       Notes:           [facility]



     additive      5-25                               Total           l



     25,000 unit      03:35:                               Concentrat           

[place]



     [12       00                                 ion = 50           



     unit/kg/hr]                                         unit/ ml           



     + Premix                                         Total           



     Diluent                                         volume =           



     Sodium                                         500 ml           



     Chloride                                         Send Med           



     0.45% 500                                         Request 2           



     mL                                           hours           



                                                  prior to           



                                                  next bag           

 

     Heparin 30            No                       Route:           [facility]

[facility]



     unit/kg      5-25                               IVP, PRN,           l



     Bolus      03:35:                               1,500           [facility]



     (Heparin      00                                 unit, 1.5           



     Dosing                                         mL, Drug           



     Weight)                                         form: INJ,           



                                                  PRN,           



                                                  Heparin           



                                                  Protocol,           



                                                  Start           



                                                  date:           



                                                  19           



                                                  22:35:00           



                                                  CDT Stop           



                                                  date:           



                                                  19           



                                                  22:34:00           



                                                  CDT, 30           



                                                  day            

 

     Heparin 60            No                       Route:           [facility]

[facility]



     unit/kg      5-25                               IVP, PRN,           l



     Bolus      03:35:                               3,000           [place]



     (Heparin      00                                 unit, 3           



     Dosing                                         mL, Drug           



     Weight)                                         form: INJ,           



                                                  PRN,           



                                                  Heparin           



                                                  Protocol,           



                                                  Start           



                                                  date:           



                                                  19           



                                                  22:35:00           



                                                  CDT Stop           



                                                  date:           



                                                  19           



                                                  22:34:00           



                                                  CDT, 30           



                                                  day            

 

     Heparin -            No                       3,000           [facility]



     one time      5-25                               unit, 3           l



     bolus for      03:35:                               mL, Route:           

[facility]



     ACS       00                                 IVP, Drug           



                                                  form: INJ,           



                                                  ONCE,           



                                                  Dosing           



                                                  Weight           



                                                  50.5, kg,           



                                                  Priority:           



                                                  STAT,           



                                                  Start           



                                                  date:           



                                                  19           



                                                  22:35:00           



                                                  CDT, Stop           



                                                  date:           



                                                  19           



                                                  22:35:00           



                                                  CDT            

 

     heparin      -0      No                       Notes:           [facility]



     additive      5-25                               Total           l



     25,000 unit      03:35:                               Concentrat           

[place]



     [12       00                                 ion = 50           



     unit/kg/hr]                                         unit/ ml           



     + Premix                                         Total           



     Diluent                                         volume =           



     Sodium                                         500 ml           



     Chloride                                         Send Med           



     0.45% 500                                         Request 2           



     mL                                           hours           



                                                  prior to           



                                                  next bag           

 

     Heparin 30            No                       Route:           [facility]

[facility]



     unit/kg      5-25                               IVP, PRN,           l



     Bolus      03:35:                               1,500           [facility]



     (Heparin      00                                 unit, 1.5           



     Dosing                                         mL, Drug           



     Weight)                                         form: INJ,           



                                                  PRN,           



                                                  Heparin           



                                                  Protocol,           



                                                  Start           



                                                  date:           



                                                  19           



                                                  22:35:00           



                                                  CDT Stop           



                                                  date:           



                                                  19           



                                                  22:34:00           



                                                  CDT, 30           



                                                  day            

 

     Heparin 60            No                       Route:           [facility]

[facility]



     unit/kg      5-25                               IVP, PRN,           l



     Bolus      03:35:                               3,000           [facility]



     (Heparin      00                                 unit, 3           



     Dosing                                         mL, Drug           



     Weight)                                         form: INJ,           



                                                  PRN,           



                                                  Heparin           



                                                  Protocol,           



                                                  Start           



                                                  date:           



                                                  19           



                                                  22:35:00           



                                                  CDT Stop           



                                                  date:           



                                                  19           



                                                  22:34:00           



                                                  CDT, 30           



                                                  day            

 

     Heparin -            No                       3,000           [facility]



     one time      5-25                               unit, 3           l



     bolus for      03:35:                               mL, Route:           [facility]

[facility]



     ACS       00                                 IVP, Drug           



                                                  form: INJ,           



                                                  ONCE,           



                                                  Dosing           



                                                  Weight           



                                                  50.5, kg,           



                                                  Priority:           



                                                  STAT,           



                                                  Start           



                                                  date:           



                                                  19           



                                                  22:35:00           



                                                  CDT, Stop           



                                                  date:           



                                                  19           



                                                  22:35:00           



                                                  CDT            

 

     NS (Bolus)            No                       500 mL,           [facility]

[facility]



     IV        5-25                               500 ml/hr,           l



               02:25:                               Infuse           [place]



               00                                 Over: 1           



                                                  hr, Route:           



                                                  IV, 500,           



                                                  Drug form:           



                                                  INJ, ONCE,           



                                                  Priority:           



                                                  STAT,           



                                                  Dosing           



                                                  Weight           



                                                  50.5 kg,           



                                                  Start           



                                                  date:           



                                                  19           



                                                  21:25:00           



                                                  CDT, Stop           



                                                  date:           



                                                  19           



                                                  21:25:00           



                                                  CDT            

 

     NS (Bolus)            No                       500 mL,           [facility]

[facility]



     IV        5-25                               500 ml/hr,           l



               02:25:                               Infuse           [place]



               00                                 Over: 1           



                                                  hr, Route:           



                                                  IV, 500,           



                                                  Drug form:           



                                                  INJ, ONCE,           



                                                  Priority:           



                                                  STAT,           



                                                  Dosing           



                                                  Weight           



                                                  50.5 kg,           



                                                  Start           



                                                  date:           



                                                  19           



                                                  21:25:00           



                                                  CDT, Stop           



                                                  date:           



                                                  19           



                                                  21:25:00           



                                                  CDT            

 

     Flagyl            No                       Notes:           [facility]



               5-25                               (Same as:           l



               02:00:                               Flagyl)                                            Avoid           



                                                  alcohol.           

 

     cefepime            No                       Notes:           [facility]



               5-25                               (Same As:           l



               02:00:                               Maxipime)                                             ***           



                                                  MEDICATION           



                                                  WASTE ***           



                                                  Product           



                                                  Size:           



                                                  1000 mg           



                                                  Product           



                                                  Wasted:           



                                                  ___ mg           

 

     Flagyl            No                       Notes:           [facility]



               5-25                               (Same as:           l



               02:00:                               Flagyl)           [place]



               00                                 Avoid           



                                                  alcohol.           

 

     cefepime            No                       Notes:           [facility]



               5-25                               (Same As:           l



               02:00:                               Maxipime)           [place]



               00                                  ***           



                                                  MEDICATION           



                                                  WASTE ***           



                                                  Product           



                                                  Size:           



                                                  1000 mg           



                                                  Product           



                                                  Wasted:           



                                                  ___ mg           

 

     vancomycin            No                        2001 mg:           [facility]

[facility]



               5-25                               infuse           l



               01:42:                               over 2.5           [place]



               00                                 hours           

 

     vancomycin            No                        2001 mg:           [facility]

[facility]



               5-25                               infuse           l



               01:42:                               over 2.5           [facility]



               00                                 hours           

 

     pantoprazol            No                       Notes: For           

[facility]



     e         5-25                               IV push           l



               01:11:                               reconstitu           [facility]



               00                                 te with 10           



                                                  ml 0.9%           



                                                  sodium           



                                                  chloride           



                                                  and push           



                                                  over 2           



                                                  minutes.           



                                                  (Same as:           



                                                  Protonix)           

 

     pantoprazol            No                       Notes: For           

[facility]



     e         5-25                               IV push           l



               01:11:                               reconstitu           [place]



               00                                 te with 10           



                                                  ml 0.9%           



                                                  sodium           



                                                  chloride           



                                                  and push           



                                                  over 2           



                                                  minutes.           



                                                  (Same as:           



                                                  Protonix)           

 

     NS (Bolus)            No                       500 mL,           [facility]

[facility]



     IV        5-24                               250 ml/hr,           l



               23:39:                               Infuse           [facility]



               00                                 Over: 2           



                                                  hr, Route:           



                                                  IV, 500,           



                                                  Drug form:           



                                                  INJ, ONCE,           



                                                  Priority:           



                                                  STAT,           



                                                  Dosing           



                                                  Weight           



                                                  50.5 kg,           



                                                  Start           



                                                  date:           



                                                  19           



                                                  18:39:00           



                                                  CDT, Stop           



                                                  date:           



                                                  19           



                                                  18:39:00           



                                                  CDT            

 

     NS (Bolus)            No                       500 mL,           [facility]

[facility]



     IV        5-24                               250 ml/hr,           l



               23:39:                               Infuse           [facility]



               00                                 Over: 2           



                                                  hr, Route:           



                                                  IV, 500,           



                                                  Drug form:           



                                                  INJ, ONCE,           



                                                  Priority:           



                                                  STAT,           



                                                  Dosing           



                                                  Weight           



                                                  50.5 kg,           



                                                  Start           



                                                  date:           



                                                  19           



                                                  18:39:00           



                                                  CDT, Stop           



                                                  date:           



                                                  19           



                                                  18:39:00           



                                                  CDT            

 

     Ondansetron            No                       Notes:           [facility]

[facility]



     2 MG/ML      5-24                               (Same as:           l



     Injectable      18:55:                               Zofran)           [facility]

[facility]



     Solution      00                                 ***            



     [Zofran]                                         MEDICATION           



                                                  WASTE ***           



                                                  Product           



                                                  Size:  4           



                                                  mg Product           



                                                  Wasted:           



                                                  ___ mg           

 

     Ondansetron            No                       Notes:           [facility]

[facility]



     2 MG/ML      5-24                               (Same as:           l



     Injectable      18:55:                               Zofran)           [facility]

[facility]



     Solution                                       ***            



     [Zofran]                                         MEDICATION           



                                                  WASTE ***           



                                                  Product           



                                                  Size:  4           



                                                  mg Product           



                                                  Wasted:           



                                                  ___ mg           

 

     heparin            No                       Notes:           [facility]



               5-23                               porcine           l



               21:00:                               heparin           [facility]                                                

 

     heparin            No                       Notes:           [facility]



               5-23                               porcine           l



               21:00:                               heparin           [facility]                                                

 

     Losartan            No                       Notes:           [facility]



               5-23                               (Same as:           l



               17:57:                               Cozaar)                                                           

 

     Losartan            No                       Notes:           [facility]



               5-23                               (Same as:           l



               17:57:                               Cozaar)                                                           

 

     Brilinta            No                       Notes:           [facility]



               5-23                               (Same as:           l



               14:00:                               Brilinta)                                                           

 

     Plavix            No                       Notes:           [facility]



               5-23                               (Same As:           l



               14:00:                               Plavix)                                                           

 

     Flagyl            No                       Notes:           [facility]



               5-23                               (Same as:           l



               14:00:                               Flagyl)                                            Avoid           



                                                  alcohol.           

 

     Brilinta            No                       Notes:           [facility]



               5-23                               (Same as:           l



               14:00:                               Brilinta)                                                           

 

     Plavix            No                       Notes:           [facility]



               5-23                               (Same As:           l



               14:00:                               Plavix)                                                           

 

     Flagyl            No                       Notes:           [facility]



               5-23                               (Same as:           l



               14:00:                               Flagyl)                                            Avoid           



                                                  alcohol.           

 

     Ondansetron            No                       Notes:           [facility]

[facility]



               5-23                               (Same as:           l



               13:56:                               Zofran)           [facility]



                                                ***            



                                                  MEDICATION           



                                                  WASTE ***           



                                                  Product           



                                                  Size:  4           



                                                  mg Product           



                                                  Wasted:           



                                                  ___ mg           

 

     Ondansetron            No                       Notes:           [facility]

[facility]



               5-23                               (Same as:           l



               13:56:                               Zofran)           [place]



                                                ***            



                                                  MEDICATION           



                                                  WASTE ***           



                                                  Product           



                                                  Size:  4           



                                                  mg Product           



                                                  Wasted:           



                                                  ___ mg           

 

     Lactated            No                       1,000 mL,           [facility]

[facility]



     Ringers IV                                     Rate: 75           l



     1,000 mL      06:10:                               ml/hr,                                            Infuse           



                                                  over: 13.3           



                                                  hr, Route:           



                                                  IV, Dosing           



                                                  Weight           



                                                  50.5 kg,           



                                                  Total           



                                                  Volume:           



                                                  1,000,           



                                                  Start           



                                                  date:           



                                                  19           



                                                  1:10:00           



                                                  CDT,           



                                                  Duration:           



                                                  30 day,           



                                                  Stop date:           



                                                  19           



                                                  1:09:00           



                                                  CDT, 1.51,           



                                                  m2             

 

     Lactated      2019-0      No                       1,000 mL,           [facility]

[facility]



     Ringers IV      5-23                               Rate: 75           l



     1,000 mL      06:10:                               ml/hr,           [facility]



               00                                 Infuse           



                                                  over: 13.3           



                                                  hr, Route:           



                                                  IV, Dosing           



                                                  Weight           



                                                  50.5 kg,           



                                                  Total           



                                                  Volume:           



                                                  1,000,           



                                                  Start           



                                                  date:           



                                                  19           



                                                  1:10:00           



                                                  CDT,           



                                                  Duration:           



                                                  30 day,           



                                                  Stop date:           



                                                  19           



                                                  1:09:00           



                                                  CDT, 1.51,           



                                                  m2             

 

     normal      2019-0      No                       1,000 mL,           [facility]

a



     saline 0.9%      5-23                               Rate: 75           l



     IV 1,000 mL      03:37:                               ml/hr,           [facility]

[facility]



                                                Infuse           



                                                  over: 13.3           



                                                  hr, Route:           



                                                  IVPB,           



                                                  Dosing           



                                                  Weight           



                                                  50.5 kg,           



                                                  Total           



                                                  Volume:           



                                                  1,000,           



                                                  Start           



                                                  date:           



                                                  19           



                                                  22:37:00           



                                                  CDT,           



                                                  Duration:           



                                                  30 day,           



                                                  Stop date:           



                                                  19           



                                                  22:36:00           



                                                  CDT, 1.51,           



                                                  m2             

 

     normal      2019-0      No                       1,000 mL,           [facility]

a



     saline 0.9%      5-23                               Rate: 75           l



     IV 1,000 mL      03:37:                               ml/hr,           [facility]

[facility]



               00                                 Infuse           



                                                  over: 13.3           



                                                  hr, Route:           



                                                  IVPB,           



                                                  Dosing           



                                                  Weight           



                                                  50.5 kg,           



                                                  Total           



                                                  Volume:           



                                                  1,000,           



                                                  Start           



                                                  date:           



                                                  19           



                                                  22:37:00           



                                                  CDT,           



                                                  Duration:           



                                                  30 day,           



                                                  Stop date:           



                                                  19           



                                                  22:36:00           



                                                  CDT, 1.51,           



                                                  m2             

 

     NS (Bolus)      2019-0      No                       500 mL,           [facility]

[facility]



     IV        5-23                               500 ml/hr,           l



               03:13:                               Infuse           [facility]



               00                                 Over: 1           



                                                  hr, Route:           



                                                  IV, 500,           



                                                  Drug form:           



                                                  INJ, ONCE,           



                                                  Priority:           



                                                  STAT,           



                                                  Dosing           



                                                  Weight           



                                                  50.5 kg,           



                                                  Start           



                                                  date:           



                                                  19           



                                                  22:13:00           



                                                  CDT, Stop           



                                                  date:           



                                                  19           



                                                  22:13:00           



                                                  CDT            

 

     NS (Bolus)      2019-0      No                       500 mL,           [facility]

[facility]



     IV        5-23                               500 ml/hr,           l



               03:13:                               Infuse           [facility]



               00                                 Over: 1           



                                                  hr, Route:           



                                                  IV, 500,           



                                                  Drug form:           



                                                  INJ, ONCE,           



                                                  Priority:           



                                                  STAT,           



                                                  Dosing           



                                                  Weight           



                                                  50.5 kg,           



                                                  Start           



                                                  date:           



                                                  19           



                                                  22:13:00           



                                                  CDT, Stop           



                                                  date:           



                                                  19           



                                                  22:13:00           



                                                  CDT            

 

     Lipitor            No                       Notes:           [facility]



                                              (Same as:           l



               02:00:                               Lipitor)                                                           

 

     Lipitor            No                       Notes:           [facility]



                                              (Same as:           l



               02:00:                               Lipitor)                                                           

 

     Versed            No                       Notes:           [facility]



                                              (Same as:           l



               01:16:                               Versed)                                            ***            



                                                  MEDICATION           



                                                  WASTE ***           



                                                  Product           



                                                  Size:  2           



                                                  mg Product           



                                                  Wasted:           



                                                  ___ mg           

 

     Versed            No                       Notes:           [facility]



                                              (Same as:           l



               01:16:                               Versed)           [facility]                                 ***            



                                                  MEDICATION           



                                                  WASTE ***           



                                                  Product           



                                                  Size:  2           



                                                  mg Product           



                                                  Wasted:           



                                                  ___ mg           

 

     Brilinta            No                       Notes:           [facility]



                                              (Same as:           l



               00:26:                               Brilinta)                                                           

 

     Brilinta            No                       Notes:           [facility]



                                              (Same as:           l



               00:26:                               Brilinta)                                                           

 

     Brilinta            No                       Notes:           [facility]



                                              (Same as:           l



               22:29:                               Brilinta)                                                           

 

     Brilinta            No                       Notes:           [facility]



                                              (Same as:           l



               22:29:                               Brilinta)                                                           

 

     Nitroglycer            No                       Notes:           [facility]

[facility]



     in                                       (Same           l



               22:15:                               as:Nitroqu                                            ick,           



                                                  Nitrostat)           



                                                  "Do Not           



                                                  Crush"           



                                                  Sublingual           



                                                  tablet           

 

     Sodium            No                       500 mL,           [facility]



     Chloride                                     Rate: 20           l



     0.9%       22:15:                               ml/hr,           [facility]

[facility]



     mL        00                                 Infuse           



                                                  over: 25           



                                                  hr, Route:           



                                                  IV, Dosing           



                                                  Weight           



                                                  50.5 kg,           



                                                  Total           



                                                  Volume:           



                                                  500, Start           



                                                  date:           



                                                  19           



                                                  17:15:00           



                                                  CDT,           



                                                  Duration:           



                                                  24 hr,           



                                                  Stop date:           



                                                  19           



                                                  17:14:00           



                                                  CDT, 1.51,           



                                                  m2             

 

     Nitroglycer            No                       Notes:           [facility]

[facility]



     in                                       (Same           l



               22:15:                               as:Nitroqu           [place]



               00                                 ick,           



                                                  Nitrostat)           



                                                  "Do Not           



                                                  Crush"           



                                                  Sublingual           



                                                  tablet           

 

     Sodium            No                       500 mL,           [facility]



     Chloride                                     Rate: 20           l



     0.9%       22:15:                               ml/hr,           [facility]

[facility]



     mL        00                                 Infuse           



                                                  over: 25           



                                                  hr, Route:           



                                                  IV, Dosing           



                                                  Weight           



                                                  50.5 kg,           



                                                  Total           



                                                  Volume:           



                                                  500, Start           



                                                  date:           



                                                  19           



                                                  17:15:00           



                                                  CDT,           



                                                  Duration:           



                                                  24 hr,           



                                                  Stop date:           



                                                  19           



                                                  17:14:00           



                                                  CDT, 1.51,           



                                                  m2             

 

     propofol 10      2019-0      No                       Notes: If           [facility]

[facility]



     mg/mL                                     Diprivan -           l



     (Titrate.)      21:26:                               change           [facility]

nn



     IV 1,000 mg      00                                 bottle &           



                                                  tubing           



                                                  every 12           



                                                  hr Per           



                                                  state           



                                                  nursing           



                                                  law            



                                                  propofol           



                                                  can only           



                                                  be given           



                                                  by a nurse           



                                                  if patient           



                                                  is             



                                                  intubated           



                                                  or being           



                                                  intubated           



                                                  (unless           



                                                  the nurse           



                                                  is a           



                                                  CRNA).           



                                                  Same as:           



                                                  Diprivan           

 

     Vecuronium            Yes                      Notes:           [facility]

[facility]



               -                               (Same As:           l



               21:26:                               Norcuron)           [facility]



               00                                                

 

     propofol 10      2019-0      No                       Notes: If           [facility]

[facility]



     mg/mL                                     Diprivan -           l



     (Titrate.)      21:26:                               change           [facility]

nn



     IV 1,000 mg      00                                 bottle &           



                                                  tubing           



                                                  every 12           



                                                  hr Per           



                                                  state           



                                                  nursing           



                                                  law            



                                                  propofol           



                                                  can only           



                                                  be given           



                                                  by a nurse           



                                                  if patient           



                                                  is             



                                                  intubated           



                                                  or being           



                                                  intubated           



                                                  (unless           



                                                  the nurse           



                                                  is a           



                                                  CRNA).           



                                                  Same as:           



                                                  Diprivan           

 

     Vecuronium            Yes                      Notes:           [facility]

[facility]



               -22                               (Same As:           l



               21:26:                               Norcuron)           [facility]



                                                               

 

     Plavix            No                       Notes:           [facility]



               5-22                               (Same As:           l



               20:00:                               Plavix)           [facility]



               00                                                

 

     Plavix            No                       Notes:           [facility]



               5-22                               (Same As:           l



               20:00:                               Plavix)           [place]



                                                               

 

     Dexmedetomi            No                       Notes: Use           

[facility]



     dine      5-22                               the            l



               19:05:                               following           [place]



               00                                 cdm for           



                                                  [id number].           

 

     Dexmedetomi            No                       Notes: Use           

[facility]



     dine      5-22                               the            l



               19:05:                               following           [facility]



               00                                 cdm for           



                                                  [id number].           

 

     Aspirin            No                       Notes: Do           [facility]

[facility]



               5-22                               not crush           l



               14:00:                               or chew.           [place]



               00                                 (Same As:           



                                                  Ecotrin)           

 

     pantoprazol            No                       Notes: For           

[facility]



     e         5-22                               IV push           l



               14:00:                               reconstitu           [facility]



               00                                 te with 10           



                                                  ml 0.9%           



                                                  sodium           



                                                  chloride           



                                                  and push           



                                                  over 2           



                                                  minutes.           



                                                  (Same as:           



                                                  Protonix)           

 

     Aspirin            No                       Notes: Do           [facility]

[facility]



               5-22                               not crush           l



               14:00:                               or chew.           [facility]



               00                                 (Same As:           



                                                  Ecotrin)           

 

     pantoprazol            No                       Notes: For           

[facility]



     e         5-22                               IV push           l



               14:00:                               reconstitu           [place]



               00                                 te with 10           



                                                  ml 0.9%           



                                                  sodium           



                                                  chloride           



                                                  and push           



                                                  over 2           



                                                  minutes.           



                                                  (Same as:           



                                                  Protonix)           

 

     Heparin 30            No                       Route:           [facility]

[facility]



     unit/kg      5-22                               IVP, PRN,           l



     Bolus      05:58:                               1,500           [place]



     (Heparin      00                                 unit, 1.5           



     Dosing                                         mL, Drug           



     Weight)                                         form: INJ,           



                                                  PRN,           



                                                  Heparin           



                                                  Protocol,           



                                                  Start           



                                                  date:           



                                                  19           



                                                  0:58:00           



                                                  CDT Stop           



                                                  date:           



                                                  19           



                                                  0:57:00           



                                                  CDT, 30           



                                                  day            

 

     heparin            No                       Notes:           [facility]



     additive      5-22                               Total           l



     25,000 unit      05:58:                               Concentrat           

[facility]



     [12       00                                 ion = 50           



     unit/kg/hr]                                         unit/ ml           



     + Premix                                         Total           



     Diluent                                         volume =           



     Sodium                                         500 ml           



     Chloride                                         Send Med           



     0.45% 500                                         Request 2           



     mL                                           hours           



                                                  prior to           



                                                  next bag           

 

     Heparin -            No                       2,500           [facility]



     one time      5-22                               unit, 2.5           l



     bolus for      05:58:                               mL, Route:           [facility]

[facility]



     ACS       00                                 IVP, Drug           



                                                  form: INJ,           



                                                  ONCE,           



                                                  Dosing           



                                                  Weight           



                                                  50.5, kg,           



                                                  Priority:           



                                                  STAT,           



                                                  Start           



                                                  date:           



                                                  19           



                                                  0:58:00           



                                                  CDT, Stop           



                                                  date:           



                                                  19           



                                                  0:58:00           



                                                  CDT            

 

     Heparin 60            No                       Route:           [facility]

[facility]



     unit/kg      5-22                               IVP, PRN,           l



     Bolus      05:58:                               3,000           [facility]



     (Heparin      00                                 unit, 3           



     Dosing                                         mL, Drug           



     Weight)                                         form: INJ,           



                                                  PRN,           



                                                  Heparin           



                                                  Protocol,           



                                                  Start           



                                                  date:           



                                                  19           



                                                  0:58:00           



                                                  CDT Stop           



                                                  date:           



                                                  19           



                                                  0:57:00           



                                                  CDT, 30           



                                                  day            

 

     Heparin 30            No                       Route:           [facility]

[facility]



     unit/kg      5-22                               IVP, PRN,           l



     Bolus      05:58:                               1,500           [facility]



     (Heparin      00                                 unit, 1.5           



     Dosing                                         mL, Drug           



     Weight)                                         form: INJ,           



                                                  PRN,           



                                                  Heparin           



                                                  Protocol,           



                                                  Start           



                                                  date:           



                                                  19           



                                                  0:58:00           



                                                  CDT Stop           



                                                  date:           



                                                  19           



                                                  0:57:00           



                                                  CDT, 30           



                                                  day            

 

     heparin            No                       Notes:           [facility]



     additive      5-22                               Total           l



     25,000 unit      05:58:                               Concentrat           

[place]



     [12       00                                 ion = 50           



     unit/kg/hr]                                         unit/ ml           



     + Premix                                         Total           



     Diluent                                         volume =           



     Sodium                                         500 ml           



     Chloride                                         Send Med           



     0.45% 500                                         Request 2           



     mL                                           hours           



                                                  prior to           



                                                  next bag           

 

     Heparin -            No                       2,500           [facility]



     one time      5-22                               unit, 2.5           l



     bolus for      05:58:                               mL, Route:           [facility]

[facility]



     ACS       00                                 IVP, Drug           



                                                  form: INJ,           



                                                  ONCE,           



                                                  Dosing           



                                                  Weight           



                                                  50.5, kg,           



                                                  Priority:           



                                                  STAT,           



                                                  Start           



                                                  date:           



                                                  19           



                                                  0:58:00           



                                                  CDT, Stop           



                                                  date:           



                                                  19           



                                                  0:58:00           



                                                  CDT            

 

     Heparin 60            No                       Route:           [facility]

[facility]



     unit/kg      5-22                               IVP, PRN,           l



     Bolus      05:58:                               3,000           [facility]



     (Heparin      00                                 unit, 3           



     Dosing                                         mL, Drug           



     Weight)                                         form: INJ,           



                                                  PRN,           



                                                  Heparin           



                                                  Protocol,           



                                                  Start           



                                                  date:           



                                                  19           



                                                  0:58:00           



                                                  CDT Stop           



                                                  date:           



                                                  19           



                                                  0:57:00           



                                                  CDT, 30           



                                                  day            

 

     ocular            No                       Notes:           [facility]



     lubricant      5-22                               (Same as:           l



               05:00:                               Lacri-Lube           [place]



               00                                 ,              



                                                  Puralube,           



                                                  Duratears           



                                                  Naturale,           



                                                  Artificial           



                                                  Tears, and           



                                                  Tears           



                                                  Again )           

 

     heparin            No                       Notes:           [facility]



     sodium,      5-22                               porcine           l



     porcine      05:00:                               heparin           [place]



     2500 UNT/ML      00                                                



     Injectable                                                        



     Solution                                                        

 

     ocular            No                       Notes:           [facility]



     lubricant      5-22                               (Same as:           l



               05:00:                               Lacri-Lube           [place]



               00                                 ,              



                                                  Puralube,           



                                                  Duratears           



                                                  Naturale,           



                                                  Artificial           



                                                  Tears, and           



                                                  Tears           



                                                  Again )           

 

     heparin            No                       Notes:           [facility]



     sodium,      5-22                               porcine           l



     porcine      05:00:                               heparin           [facility]



     2500 UNT/ML      00                                                



     Injectable                                                        



     Solution                                                        

 

     Albuterol            No                       Notes:           [facility]

a



     0.833 MG/ML      5-22                               (Same as:           l



              04:47:                               Duoneb)           [place]



     Ipratropium      00                                                



     Bromide                                                        



     0.167 MG/ML                                                        



     Inhalant                                                        



     Solution                                                        



     [DuoNeb]                                                        

 

     Albuterol            No                       Notes:           [facility]

a



     0.833 MG/ML      5-22                               (Same as:           l



     /         04:47:                               Duoneb)           [place]



     Ipratropium      00                                                



     Bromide                                                        



     0.167 MG/ML                                                        



     Inhalant                                                        



     Solution                                                        



     [DuoNeb]                                                        

 

     Budesonide            No                       Notes:           [facility]

[facility]



     0.25 MG/ML      5-22                               (Same As:           l



     Inhalant      04:21:                               Pulmicort)           [facility]

[facility]



     Solution      00                                                



     [Pulmicort]                                                        

 

     Budesonide            No                       Notes:           [facility]

[facility]



     0.25 MG/ML      5-22                               (Same As:           l



     Inhalant      04:21:                               Pulmicort)           [facility]

[facility]



     Solution      00                                                



     [Pulmicort]                                                        

 

     Potassium            No                       Notes:           [facility]

a



     Chloride      5-22                               (Same as:           l



               03:01:                               Potassium           [place]                                 Chloride)           

 

     Potassium            No                       Notes:           [facility]

a



     Chloride      5-22                               (Same as:           l



               03:01:                               Potassium           [place]                                 Chloride)           

 

     Keppra            No                       Notes:           [facility]



               5-22                               Same as           l



               02:00:                               Keppra                                            Mix with           



                                                  100 mL NS,           



                                                  LR or D5W           



                                                   ***           



                                                  MEDICATION           



                                                  WASTE ***           



                                                  Product           



                                                  Size:  500           



                                                  mg Product           



                                                  Wasted:           



                                                  ___ mg           

 

     chlorhexidi            No                       Notes:           [facility]

[facility]



     ne        -22                               (Same As:           l



     gluconate      02:00:                               Peridex)           [facility]

[facility]



     1.2 MG/ML                                                      



     Mouthwash                                                        

 

     Saline            No                       Notes:           [facility]



     Flush 0.9%      5-22                               (Same as:           l



               02:00:                               BD             [facility]                                 Posiflush)           

 

     Keppra            No                       Notes:           [facility]



               5-22                               Same as           l



               02:00:                               Keppra                                            Mix with           



                                                  100 mL NS,           



                                                  LR or D5W           



                                                   ***           



                                                  MEDICATION           



                                                  WASTE ***           



                                                  Product           



                                                  Size:  500           



                                                  mg Product           



                                                  Wasted:           



                                                  ___ mg           

 

     chlorhexidi            No                       Notes:           [facility]

[facility]



     ne        5-22                               (Same As:           l



     gluconate      02:00:                               Peridex)           [facility]

[facility]



     1.2 MG/ML                                                      



     Mouthwash                                                        

 

     Saline      2019-0      No                       Notes:           [facility]



     Flush 0.9%      5-22                               (Same as:           l



               02:00:                               BD             [facility]



               00                                 Posiflush)           

 

     Symbicort            Yes                      2 puff,           [facility]

[facility]



     160/4.5      5-22                               INHALER,           l



     inhalation      00:35:                               BID, # 1           [facility]

[facility]



     aerosol      00                                 ea, 3           



     with                                         Refill(s)           



     adapter                                                        

 

     phenytoin            Yes                      300 mg = 3           [facility]

[facility]



     100 mg oral      5-22                               cap, PO,           l



     capsule,      00:35:                               Bedtime, 0           [facility]

[facility]



     extended      00                                 Refill(s)           



     release                                                        

 

     losartan            No                       100 mg = 1           [facility]

[facility]



     100 mg oral      5-22                               tab, PO,           l



     tablet      00:35:                               Daily, #           [facility]



               00                                 30 tab, 0           



                                                  Refill(s)           

 

     Furosemide            No                       20 mg = 1           [facility]

[facility]



     20 MG Oral      5-22                               tab, PO,           l



     Tablet      00:35:                               Daily, #           [facility]



     [Lasix]      00                                 30 tab, 0           



                                                  Refill(s)           

 

     clonazePAM            Yes                      0.5 mg = 1           [facility]

[facility]



     0.5 mg oral      5-22                               tab, PO,           l



     tablet      00:35:                               BID, PRN           [facility]



               00                                 anxiety, #           



                                                  60 tab, 0           



                                                  Refill(s)           

 

     bisoprolol            No                       5 mg = 1           [facility]

[facility]



     5 mg oral      5-22                               tab, PO,           l



     tablet      00:35:                               Daily, #           [facility]



               00                                 30 tab, 0           



                                                  Refill(s)           

 

     atorvastati            Yes                      40 mg = 1           [facility]

[facility]



     n 40 MG      5-22                               tab, PO,           l



     Oral Tablet      00:35:                               Bedtime, #           

[place]



     [Lipitor]      00                                 30 tab, 0           



                                                  Refill(s)           

 

     Albuterol            Yes                      2.49 mg =           [facility]

[facility]



     0.83 MG/ML      5-22                               3 mL, NEB,           l



     Inhalant      00:35:                               Q6H, PRN           [facility]

nn



     Solution      00                                 as needed           



                                                  for            



                                                  shortness           



                                                  of breath,           



                                                  # 120 ea,           



                                                  3              



                                                  Refill(s)           

 

     thiamine            Yes                      100 mg = 1           [facility]

[facility]



     100 mg oral      5-22                               tab, PO,           l



     tablet      00:35:                               Daily, 0           [facility]



               00                                 Refill(s)           

 

     Vitamin B12            Yes                      100            [facility]

a



     100 mcg      5-22                               microgram           l



     oral tablet      00:35:                               = 1 tab,           [facility]

[facility]



               00                                 PO, Daily,           



                                                  # 30 tab,           



                                                  0              



                                                  Refill(s)           

 

     Symbicort            Yes                      2 puff,           [facility]

[facility]



     160/4.5      5-22                               INHALER,           l



     inhalation      00:35:                               BID, # 1           [facility]

[facility]



     aerosol      00                                 ea, 3           



     with                                         Refill(s)           



     adapter                                                        

 

     phenytoin            Yes                      300 mg = 3           [facility]

[facility]



     100 mg oral      5-22                               cap, PO,           l



     capsule,      00:35:                               Bedtime, 0           [facility]

[facility]



     extended      00                                 Refill(s)           



     release                                                        

 

     losartan            No                       100 mg = 1           [facility]

[facility]



     100 mg oral      5-22                               tab, PO,           l



     tablet      00:35:                               Daily, #           [place]



               00                                 30 tab, 0           



                                                  Refill(s)           

 

     Furosemide            No                       20 mg = 1           [facility]

[facility]



     20 MG Oral      5-22                               tab, PO,           l



     Tablet      00:35:                               Daily, #           [place]



     [Lasix]      00                                 30 tab, 0           



                                                  Refill(s)           

 

     clonazePAM            Yes                      0.5 mg = 1           [facility]

[facility]



     0.5 mg oral      5-22                               tab, PO,           l



     tablet      00:35:                               BID, PRN           [place]



               00                                 anxiety, #           



                                                  60 tab, 0           



                                                  Refill(s)           

 

     bisoprolol            No                       5 mg = 1           [facility]

[facility]



     5 mg oral      5-22                               tab, PO,           l



     tablet      00:35:                               Daily, #           [place]



               00                                 30 tab, 0           



                                                  Refill(s)           

 

     atorvastati            Yes                      40 mg = 1           [facility]

[facility]



     n 40 MG      5-22                               tab, PO,           l



     Oral Tablet      00:35:                               Bedtime, #           

[facility]



     [Lipitor]      00                                 30 tab, 0           



                                                  Refill(s)           

 

     Albuterol            Yes                      2.49 mg =           [facility]

[facility]



     0.83 MG/ML      5-22                               3 mL, NEB,           l



     Inhalant      00:35:                               Q6H, PRN           [facility]

nn



     Solution      00                                 as needed           



                                                  for            



                                                  shortness           



                                                  of breath,           



                                                  # 120 ea,           



                                                  3              



                                                  Refill(s)           

 

     thiamine            Yes                      100 mg = 1           [facility]

[facility]



     100 mg oral      5-22                               tab, PO,           l



     tablet      00:35:                               Daily, 0           [facility]



               00                                 Refill(s)           

 

     Vitamin B12            Yes                      100            [facility]

[facility]



     100 mcg      5-22                               microgram           l



     oral tablet      00:35:                               = 1 tab,           [facility]

[facility]



               00                                 PO, Daily,           



                                                  # 30 tab,           



                                                  0              



                                                  Refill(s)           

 

     Ceftriaxone            No                       Notes:           [facility]

[facility]



               5-22                               (Same As:           l



               00:34:                               Rocephin).           [place]



               00                                    ***           



                                                  MEDICATION           



                                                  WASTE ***           



                                                  Product           



                                                  Size:           



                                                  1000 mg           



                                                  Product           



                                                  Wasted:           



                                                  ___ mg           

 

     Ceftriaxone            No                       Notes:           [facility]

[facility]



               -22                               (Same As:           l



               00:34:                               Rocephin).           [place]



               00                                    ***           



                                                  MEDICATION           



                                                  WASTE ***           



                                                  Product           



                                                  Size:           



                                                  1000 mg           



                                                  Product           



                                                  Wasted:           



                                                  ___ mg           

 

     chlorhexidi            No                       Notes:           [facility]

[facility]



     ne                                       (Same As:           l



     gluconate      00:19:                               Peridex)           [facility]

[facility]



     1.2 MG/ML      00                                                



     Mouthwash                                                        

 

     potassium            No                       Notes:           [facility]

[facility]



     phosphate                                     (Same as:           l



               00:19:                               K              [facility]



               00                                 Phosphate.           



                                                  )  Do not           



                                                  infuse           



                                                  phosphorou           



                                                  s              



                                                  concurrent           



                                                  ly in the           



                                                  same line           



                                                  as TPN or           



                                                  IVF that           



                                                  contains           



                                                  calcium.           



                                                  For double           



                                                  lumen           



                                                  central           



                                                  lines,           



                                                  phosphorou           



                                                  s may be           



                                                  infused in           



                                                  a separate           



                                                  lumen from           



                                                  TPN.  1           



                                                  mMol           



                                                  phoshate           



                                                  has 1.47           



                                                  mEq            



                                                  potassium           



                                                  Infuse           



                                                  over 4           



                                                  hours           

 

     Magnesium            No                       Notes:           [facility]

a



     Oxide                                     (Same as:           l



               00:19:                               Mag-Ox           [place]



               00                                 400)           



                                                  Magnesium           



                                                  oxide           



                                                  [id number]=[id number]           



                                                  g              



                                                  elemental           



                                                  magnesium           



                                                  Dose=____m           



                                                  g              



                                                  magnesium           



                                                  oxide           



                                                  (___mg           



                                                  elemental           



                                                  magnesium)           

 

     potassium            No                       Notes:           [facility]

a



     phosphate-s                                     (Same as:           l



     odium      00:19:                               Phos-NaK)           [place]



     phosphate      00                                 Each 1.5           



     250 mg-280                                         gm pkt has           



     mg-160 mg                                         250mg           



     oral powder                                         phosphorou           



     for                                          s. Mix           



     reconstitut                                         w/2.5oz           



     ion                                          water and           



                                                  stir.           

 

     Magnesium            No                       Notes:           [facility]

a



     Sulfate                                     WASTE: F/P           l



               00:19:                               - Sink; E           [place]



               00                                 -              



                                                  Municipal           



                                                  Trash Bin           

 

     Calcium            No                       Notes:           [facility]



     Carbonate                                     (Same As:           l



     500 MG      00:19:                               Tums)           [place]



     Chewable      00                                 Calcium           



     Tablet                                         Carbonate           



                                                  500 mg =           



                                                  200 mg           



                                                  elemental           



                                                  calcium           



                                                  Dose =           



                                                  ______ mg           



                                                  calcium           



                                                  carbonate           



                                                  (______ mg           



                                                  elemental           



                                                  calcium)           

 

     Calcium            No                       Notes:           [facility]



     Gluconate                                     WASTE: F/P           l



               00:19:                               - Sink; E           [facility]



               00                                 -              



                                                  Municipal           



                                                  Trash Bin           

 

     Potassium            No                       Notes:           [facility]

a



     Chloride      5-22                               (Same as:           l



               00:19:                               KCL)           [place]



               00                                 Infuse           



                                                  over 2           



                                                  hours.           

 

     sodium            No                       Notes:           [facility]



     phosphate      5-22                               Infuse           l



               00:19:                               over 4           [place]



               00                                 hour. Do           



                                                  not infuse           



                                                  phosphorou           



                                                  s              



                                                  concurrent           



                                                  ly in the           



                                                  same line           



                                                  as TPN or           



                                                  IVF that           



                                                  contains           



                                                  calcium.           



                                                  For double           



                                                  lumen           



                                                  central           



                                                  lines,           



                                                  phosphorou           



                                                  s may be           



                                                  infused in           



                                                  a separate           



                                                  lumen from           



                                                  TPN.           

 

     Saline            No                       Notes:           [facility]



     Flush 0.9%      5-22                               (Same as:           l



               00:19:                               BD             [place]



               00                                 Posiflush)           

 

     Nystatin            No                       Notes:           [facility]



     100 UNT/MG      -22                               (Same           l



     Topical      00:19:                               as:Mycosta           [facility]

[facility]



     Powder      00                                 tin,           



                                                  Nilstat)           



                                                  for            



                                                  external           



                                                  use only.           

 

     chlorhexidi            No                       Notes:           [facility]

[facility]



     ne        -22                               (Same As:           l



     gluconate      00:19:                               Peridex)           [facility]

[facility]



     1.2 MG/ML      00                                                



     Mouthwash                                                        

 

     potassium            No                       Notes:           [facility]

a



     phosphate      5-22                               (Same as:           l



               00:19:                               K              [facility]



               00                                 Phosphate.           



                                                  )  Do not           



                                                  infuse           



                                                  phosphorou           



                                                  s              



                                                  concurrent           



                                                  ly in the           



                                                  same line           



                                                  as TPN or           



                                                  IVF that           



                                                  contains           



                                                  calcium.           



                                                  For double           



                                                  lumen           



                                                  central           



                                                  lines,           



                                                  phosphorou           



                                                  s may be           



                                                  infused in           



                                                  a separate           



                                                  lumen from           



                                                  TPN.  1           



                                                  mMol           



                                                  phoshate           



                                                  has 1.47           



                                                  mEq            



                                                  potassium           



                                                  Infuse           



                                                  over 4           



                                                  hours           

 

     Magnesium            No                       Notes:           [facility]

a



     Oxide      -22                               (Same as:           l



               00:19:                               Mag-Ox           [facility]



               00                                 400)           



                                                  Magnesium           



                                                  oxide           



                                                  [id number]           



                                                  g              



                                                  elemental           



                                                  magnesium           



                                                  Dose=____m           



                                                  g              



                                                  magnesium           



                                                  oxide           



                                                  (___mg           



                                                  elemental           



                                                  magnesium)           

 

     potassium            No                       Notes:           [facility]

a



     phosphate-s      -22                               (Same as:           l



     odium      00:19:                               Phos-NaK)           [place]



     phosphate      00                                 Each 1.5           



     250 mg-280                                         gm pkt has           



     mg-160 mg                                         250mg           



     oral powder                                         phosphorou           



     for                                          s. Mix           



     reconstitut                                         w/2.5oz           



     ion                                          water and           



                                                  stir.           

 

     Magnesium            No                       Notes:           [facility]

a



     Sulfate      -22                               WASTE: F/P           l



               00:19:                               - Sink; E           [facility]



               00                                 -              



                                                  Municipal           



                                                  Trash Bin           

 

     Calcium            No                       Notes:           [facility]



     Carbonate      -22                               (Same As:           l



     500 MG      00:19:                               Tums)           [facility]



     Chewable      00                                 Calcium           



     Tablet                                         Carbonate           



                                                  500 mg =           



                                                  200 mg           



                                                  elemental           



                                                  calcium           



                                                  Dose =           



                                                  ______ mg           



                                                  calcium           



                                                  carbonate           



                                                  (______ mg           



                                                  elemental           



                                                  calcium)           

 

     Calcium            No                       Notes:           [facility]



     Gluconate      -                               WASTE: F/P           l



               00:19:                               - Sink; E           [place]



               00                                 -              



                                                  Municipal           



                                                  Trash Bin           

 

     Potassium            No                       Notes:           [facility]

a



     Chloride      -                               (Same as:           l



               00:19:                               KCL)           [facility]



                                                Infuse           



                                                  over 2           



                                                  hours.           

 

     sodium            No                       Notes:           [facility]



     phosphate      -                               Infuse           l



               00:19:                               over 4           [place]



               00                                 hour. Do           



                                                  not infuse           



                                                  phosphorou           



                                                  s              



                                                  concurrent           



                                                  ly in the           



                                                  same line           



                                                  as TPN or           



                                                  IVF that           



                                                  contains           



                                                  calcium.           



                                                  For double           



                                                  lumen           



                                                  central           



                                                  lines,           



                                                  phosphorou           



                                                  s may be           



                                                  infused in           



                                                  a separate           



                                                  lumen from           



                                                  TPN.           

 

     Saline            No                       Notes:           [facility]



     Flush 0.9%                                     (Same as:           l



               00:19:                               BD             [facility]



                                                Posiflush)           

 

     Nystatin            No                       Notes:           [facility]



     100 UNT/MG                                     (Same           l



     Topical      00:19:                               as:[facility]           [facility]

[facility]



     Powder      00                                 tin,           



                                                  Nilstat)           



                                                  for            



                                                  external           



                                                  use only.           

 

     Dilantin            No                       Notes:           [facility]



               5-21                               (Same as:           l



               22:23:                               Dilantin)                                             Rinse           



                                                  packet/syr           



                                                  jnenifer with           



                                                  water           



                                                  "Caution           



                                                  of             



                                                  interactio           



                                                  n with           



                                                  continuous           



                                                  NG             



                                                  feedings:           



                                                  Withhold           



                                                  administra           



                                                  tion of           



                                                  nutritiona           



                                                  l              



                                                  supplement           



                                                  s for 1-2           



                                                  hours           



                                                  before and           



                                                  after           



                                                  phenytoin           



                                                  dose".           

 

     Dilantin            No                       Notes:           [facility]



               5-21                               (Same as:           l



               22:23:                               Dilantin)           [place]



                                                 Rinse           



                                                  packet/syr           



                                                  jennifer with           



                                                  water           



                                                  "Caution           



                                                  of             



                                                  interactio           



                                                  n with           



                                                  continuous           



                                                  NG             



                                                  feedings:           



                                                  Withhold           



                                                  administra           



                                                  tion of           



                                                  nutritiona           



                                                  l              



                                                  supplement           



                                                  s for 1-2           



                                                  hours           



                                                  before and           



                                                  after           



                                                  phenytoin           



                                                  dose".           

 

     atorvastati            No                       Notes:           [facility]

[facility]



     n         5-21                               (Same as:           l



               21:56:                               Lipitor)           [facility]                                                

 

     atorvastati            No                       Notes:           [facility]

[facility]



     n         5-21                               (Same as:           l



               21:56:                               Lipitor)                                                           

 

     Aspirin            No                       Notes:           [facility]



               5-21                               Refrigerat           l



               18:34:                               e.             [place]                                                

 

     Aspirin      2019-0      No                       Notes:           [facility]



               5-21                               Refrigerat           l



               18:34:                               e.                                                             

 

     Midazolam      -0      No                       Notes:           [facility]

a



               5-21                               (Same as:           l



               18:07:                               Versed)                                                           

 

     Midazolam      -0      No                       Notes:           [facility]

a



               5-21                               (Same as:           l



               18:07:                               Versed)                                                           

 

     Fentanyl      -0      No                       50             [facility]



               5-21                               microgram,           l



               18:04:                               Route:                                            IVP, ONCE,           



                                                  Dosing           



                                                  Weight           



                                                  50.5, kg,           



                                                  Priority:           



                                                  STAT,           



                                                  Start           



                                                  date:           



                                                  19           



                                                  13:04:00           



                                                  CDT, Stop           



                                                  date:           



                                                  19           



                                                  13:04:00           



                                                  CDT            

 

     Midazolam      2019-0      No                       2 mg,           [facility]



               5-21                               Route:           l



               18:04:                               IVP, ONCE,                                            Dosing           



                                                  Weight           



                                                  50.5, kg,           



                                                  Priority:           



                                                  STAT,           



                                                  Start           



                                                  date:           



                                                  19           



                                                  13:04:00           



                                                  CDT, Stop           



                                                  date:           



                                                  19           



                                                  13:04:00           



                                                  CDT            

 

     Fentanyl      -0      No                       50             [facility]



               5-21                               microgram,           l



               18:04:                               Route:                                            IVP, ONCE,           



                                                  Dosing           



                                                  Weight           



                                                  50.5, kg,           



                                                  Priority:           



                                                  STAT,           



                                                  Start           



                                                  date:           



                                                  19           



                                                  13:04:00           



                                                  CDT, Stop           



                                                  date:           



                                                  19           



                                                  13:04:00           



                                                  CDT            

 

     Midazolam      -0      No                       2 mg,           [facility]



               5-21                               Route:           l



               18:04:                               IVP, ONCE,                                            Dosing           



                                                  Weight           



                                                  50.5, kg,           



                                                  Priority:           



                                                  STAT,           



                                                  Start           



                                                  date:           



                                                  19           



                                                  13:04:00           



                                                  CDT, Stop           



                                                  date:           



                                                  19           



                                                  13:04:00           



                                                  CDT            

 

     Isolyte S      -0      No                       Notes:           [facility]

a



     PH 7.4      5-21                               (Same as:           l



     1,000 mL      17:50:                               Isolyte S           [facility]

[facility]



               00                                 PH 7.4)           

 

     Isolyte S      -0      No                       Notes:           [facility]

a



     PH 7.4      5-21                               (Same as:           l



     1,000 mL      17:50:                               Isolyte S           [facility]

[facility]



               00                                 PH 7.4)           

 

     Fentanyl      -0      No                       1,000           [facility]



               5-21                               microgram,           l



               17:49:                               20 mL,                                            Rate:           



                                                  Titrate,           



                                                  Start           



                                                  Dose: 50           



                                                  microgram/           



                                                  hr,            



                                                  Titration:           



                                                  25             



                                                  microgram/           



                                                  hour every           



                                                  15             



                                                  minutes,           



                                                  Goal(s):           



                                                  BPS 3, Max           



                                                  Dose: 300           



                                                  microgram/           



                                                  hr, Route:           



                                                  IV, Dosing           



                                                  Weight           



                                                  50.5 kg,           



                                                  Total           



                                                  Volume:           



                                                  20, Start           



                                                  date:           



                                                  19           



                                                  12:49:00           



                                                  CDT,           



                                                  Durat...           

 

     Fentanyl      2019-      No                       1,000           [facility]



               5-21                               microgram,           l



               17:49:                               20 mL,           [facility]



               00                                 Rate:           



                                                  Titrate,           



                                                  Start           



                                                  Dose: 50           



                                                  microgram/           



                                                  hr,            



                                                  Titration:           



                                                  25             



                                                  microgram/           



                                                  hour every           



                                                  15             



                                                  minutes,           



                                                  Goal(s):           



                                                  BPS 3, Max           



                                                  Dose: 300           



                                                  microgram/           



                                                  hr, Route:           



                                                  IV, Dosing           



                                                  Weight           



                                                  50.5 kg,           



                                                  Total           



                                                  Volume:           



                                                  20, Start           



                                                  date:           



                                                  19           



                                                  12:49:00           



                                                  CDT,           



                                                  Durat...           

 

     Iohexol      -      No                       60 mL,           [facility]



               5-21                               Route:           l



               17:31:                               IVP, Drug                                            Form:           



                                                  SOLN,           



                                                  Dosing           



                                                  Weight           



                                                  50.5, kg,           



                                                  ONCALL,           



                                                  STAT,           



                                                  Start           



                                                  date:           



                                                  19           



                                                  12:31:00           



                                                  CDT,           



                                                  Duration:           



                                                  1 doses or           



                                                  times,           



                                                  Dose =           



                                                  2.2ml/kg,           



                                                  Max dose =           



                                                  100ml --           



                                                  "To be           



                                                  infused by           



                                                  Radiology           



                                                  Staff           



                                                  ONLY"           

 

     Iohexol            No                       60 mL,           [facility]



               5-21                               Route:           l



               17:31:                               IVP, Drug                                            Form:           



                                                  SOLN,           



                                                  Dosing           



                                                  Weight           



                                                  50.5, kg,           



                                                  ONCALL,           



                                                  STAT,           



                                                  Start           



                                                  date:           



                                                  19           



                                                  12:31:00           



                                                  CDT,           



                                                  Duration:           



                                                  1 doses or           



                                                  times,           



                                                  Dose =           



                                                  2.2ml/kg,           



                                                  Max dose =           



                                                  100ml --           



                                                  "To be           



                                                  infused by           



                                                  Radiology           



                                                  Staff           



                                                  ONLY"           

 

     Saline            No                       Notes:           [facility]



     Flush 0.9%      5-21                               Same as:           l



               16:52:                               BD             [place]                                 Posiflush           



                                                  Ster[place]           

 

     Keppra            No                       Notes:           [facility]



               5-21                               Same as           l



               16:52:                               Keppra           [place]



               00                                 Mix with           



                                                  100 mL NS,           



                                                  LR or D5W           



                                                   ***           



                                                  MEDICATION           



                                                  WASTE ***           



                                                  Product           



                                                  Size:  500           



                                                  mg Product           



                                                  Wasted:           



                                                  ___ mg           

 

     Saline            No                       Notes:           [facility]



     Flush 0.9%      5-21                               Same as:           l



               16:52:                               BD             [place]



               00                                 Posiflush           



                                                  Sterile           

 

     Keppra            No                       Notes:           [facility]



               5-21                               Same as           l



               16:52:                               Keppra           [place]



               00                                 Mix with           



                                                  100 mL NS,           



                                                  LR or D5W           



                                                   ***           



                                                  MEDICATION           



                                                  WASTE ***           



                                                  Product           



                                                  Size:  500           



                                                  mg Product           



                                                  Wasted:           



                                                  ___ mg           

 

     phenytoin      2018      Yes            100mg      Take 100           [facility]

vers



     Extended      0-26                               mg by           ity of



     100 mg      00:00:                               mouth.           [place]



     capsule                                                      [facility]

 

     atorvastati      2018      Yes            40mg      Take 40 mg           

[facility]



     n 40 mg      0-26                               by mouth.           ity of



     tablet      00:00:                                              [place]



               00                                                [facility]

 

     levETIRAcet      2018      Yes            750mg      Take 750           U

nivers



     am 750 mg      0-26                               mg by           ity of



     tablet      00:00:                               mouth.           [place]



                                                               [facility]

 

     phenytoin      2018      Yes            100mg      Take 100           [facility]i

vers



     Extended      0-26                               mg by           ity of



     100 mg      00:00:                               mouth.           [place]



     capsule                                                      [facility]

 

     atorvastati      2018      Yes            40mg      Take 40 mg           

[facility]



     n 40 mg      0-26                               by mouth.           ity of



     tablet      00:00:                                              [place]



                                                               [facility]

 

     levETIRAcet      2018      Yes            750mg      Take 750           U

nivers



     am 750 mg      0-26                               mg by           ity of



     tablet      00:00:                               mouth.           [place]



                                                               [facility]

 

     phenytoin      2018      Yes            100mg      Take 100           Uni

vers



     Extended      0-26                               mg by           ity of



     100 mg      00:00:                               mouth.           [place]



     capsule                                                      [facility]

 

     atorvastati      2018      Yes            40mg      Take 40 mg           

[facility]



     n 40 mg      0-26                               by mouth.           ity of



     tablet      00:00:                                              [place]



                                                               [facility]

 

     levETIRAcet      2018      Yes            750mg      Take 750           U

nivers



     am 750 mg      0-26                               mg by           ity of



     tablet      00:00:                               mouth.           [place]



                                                               [facility]

 

     phenytoin      2018      Yes            100mg      Take 100           Uni

vers



     Extended      0-26                               mg by           ity of



     100 mg      00:00:                               mouth.           [place]



     capsule                                                      [facility]

 

     atorvastati      2018      Yes            40mg      Take 40 mg           

[facility]



     n 40 mg      0-26                               by mouth.           ity of



     tablet      00:00:                                              [place]



                                                               [facility]

 

     levETIRAcet      2018      Yes            750mg      Take 750           U

nivers



     am 750 mg      0-26                               mg by           ity of



     tablet      00:00:                               mouth 2           [place]



                                                (two)           Medical



                                                  times           [facility]



                                                  daily.           

 

     phenytoin      2018      Yes            100mg      Take 100           [facility]

vers



     Extended      0-26                               mg by           ity of



     100 mg      00:00:                               mouth 4           [place]



     capsule                                       (four)           Medical



                                                  times           [facility]



                                                  daily.           

 

     atorvastati      2018      Yes            40mg      Take 40 mg           

[facility]



     n 40 mg      0-26                               by mouth.           ity of



     tablet      00:00:                                              [place]



               00                                                [facility]



                                                                 [facility]

 

     levETIRAcet      -      Yes            750mg      Take 750           U

nivers



     am 750 mg      0-26                               mg by           ity of



     tablet      00:00:                               mouth 2           [place]



               00                                 (two)           Medical



                                                  times           [facility]



                                                  daily.           

 

     phenytoin      2018      Yes            100mg      Take 100           [facility]

vers



     Extended      0-26                               mg by           ity of



     100 mg      00:00:                               mouth 4           [place]



     capsule                                       (four)           Medical



                                                  times           [facility]



                                                  daily.           

 

     atorvastati      2018      Yes            40mg      Take 40 mg           

[facility]



     n 40 mg      0-26                               by mouth.           ity of



     tablet      00:00:                                              [place]



               00                                                [facility]



                                                                 [facility]

 

     levETIRAcet      2018      Yes            750mg      Take 750           U

nivers



     am 750 mg      0-26                               mg by           ity of



     tablet      00:00:                               mouth 2           [place]



                                                (two)           Medical



                                                  times           [facility]



                                                  daily.           

 

     phenytoin      2018      Yes            100mg      Take 100           [facility]i

vers



     Extended      0-26                               mg by           ity of



     100 mg      00:00:                               mouth 4           [place]



     capsule                                       (four)           Medical



                                                  times           [facility]



                                                  daily.           

 

     atorvastati      2018      Yes            40mg      Take 40 mg           

[facility]



     n 40 mg      0-26                               by mouth.           ity of



     tablet      00:00:                                              [place]



               00                                                [facility]



                                                                 [facility]

 

     levETIRAcet      2018      Yes            750mg      Take 750           U

nivers



     am 750 mg      0-26                               mg by           ity of



     tablet      00:00:                               mouth 2           [place]



                                                (two)           Medical



                                                  times           [facility]



                                                  daily.           

 

     phenytoin      2018      Yes            100mg      Take 100           [facility]i

vers



     Extended      0-26                               mg by           ity of



     100 mg      00:00:                               mouth 4           [place]



     capsule                                       (four)           Medical



                                                  times           [facility]



                                                  daily.           

 

     atorvastati      2018      Yes            40mg      Take 40 mg           

[facility]



     n 40 mg      0-26                               by mouth.           ity of



     tablet      00:00:                                              [place]



               00                                                [facility]



                                                                 [facility]

 

     atorvastati      2018      Yes            40mg      Take 40 mg           

[facility]



     n 40 mg      0-26                               by mouth.           ity of



     tablet      00:00:                                              [place]



               00                                                [facility]



                                                                 [facility]

 

     levETIRAcet      2018      Yes            750mg      Take 750           U

nivers



     am 750 mg      0-26                               mg by           ity of



     tablet      00:00:                               mouth 2           [place]



               00                                 (two)           Medical



                                                  times           [facility]



                                                  daily.           

 

     phenytoin      2018      Yes            100mg      Take 100           [facility]i

vers



     Extended      0-26                               mg by           ity of



     100 mg      00:00:                               mouth 4           [place]



     capsule                                       (four)           Medical



                                                  times           [facility]



                                                  daily.           

 

     atorvastati      2018      Yes            40mg      Take 40 mg           

[facility]



     n 40 mg      0-26                               by mouth.           ity of



     tablet      00:00:                                              [place]



                                                               [facility]



                                                                 [facility]

 

     levETIRAcet      2018      Yes            750mg      Take 750           U

nivers



     am 750 mg      0-26                               mg by           ity of



     tablet      00:00:                               mouth 2           [place]



                                                (two)           Medical



                                                  times           [facility]



                                                  daily.           

 

     phenytoin      2018      Yes            100mg      Take 100           [facility]i

vers



     Extended      0-26                               mg by           ity of



     100 mg      00:00:                               mouth 4           [place]



     capsule                                       (four)           Medical



                                                  times           [facility]



                                                  daily.           

 

     atorvastati      2018      Yes            40mg      Take 40 mg           

[facility]



     n 40 mg      0-26                               by mouth.           ity of



     tablet      00:00:                                              [place]



                                                               [facility]

 

     levETIRAcet      2018      Yes            750mg      Take 750           U

nivers



     am 750 mg      0-26                               mg by           ity of



     tablet      00:00:                               mouth.           [place]



                                                               [facility]

 

     phenytoin      2018      Yes            100mg      Take 100           Uni

vers



     Extended      0-26                               mg by           ity of



     100 mg      00:00:                               mouth.           [place]



     capsule                                                      [facility]

 

     atorvastati      2018      Yes            40mg      Take 40 mg           

[facility]



     n 40 mg      0-26                               by mouth.           ity of



     tablet      00:00:                                              [place]



                                                               [facility]

 

     atorvastati      2018      Yes            40mg      Take 40 mg           

[facility]



     n 40 mg      0-26                               by mouth.           ity of



     tablet      00:00:                                              [place]



                                                               [facility]

 

     atorvastati      2018      Yes            40mg      Take 40 mg           

[facility]



     n 40 mg      0-26                               by mouth.           ity of



     tablet      00:00:                                              [place]



                                                               [facility]

 

     atorvastati      2018      Yes            40mg      Take 40 mg           

[facility]



     n 40 mg      0-26                               by mouth.           ity of



     tablet      00:00:                                              [place]



                                                               [facility]

 

     levETIRAcet      2018      Yes            750mg      Take 750           U

nivers



     am 750 mg      0-26                               mg by           ity of



     tablet      00:00:                               mouth.           [address]

 

     phenytoin      2018      Yes            100mg      Take 100           Uni

vers



     Extended      0-26                               mg by           ity of



     100 mg      00:00:                               mouth.           [place]



     capsule                                                      [facility]

 

     atorvastati      2018      Yes            40mg      Take 40 mg           

[facility]



     n 40 mg      0-26                               by mouth.           ity of



     tablet      00:00:                                              [place]



                                                               [facility]

 

     levETIRAcet      2018      Yes            750mg      Take 750           U

nivers



     am 750 mg      0-26                               mg by           ity of



     tablet      00:00:                               mouth.           [place]



               00                                                Medical



                                                                 [facility]

 

     levETIRAcet      2018- No             1500mg      Take 1,500        

   [facility]



     am 750 mg      0-26 11-02                          mg by           ity of



     tablet      00:00: 00:00                          mouth 2           [place]



               00   :00                           (two)           Medical



                                                  times           [facility]



                                                  daily.           

 

     phenytoin      2018- No             100mg      Take 100           Un

[facility]



     Extended      0-26 11-01                          mg by           ity of



     100 mg      00:00: 00:00                          mouth 4           [place]



     capsule      00   :00                           (four)           Medical



                                                  times           [facility]



                                                  daily.           







Immunizations







           Ordered    Filled Immunization Date       Status     Comments   [facility]

e



           Immunization Name Name                                        

 

           Pneumococcal            2020 Completed             [facility]

f



           Polysaccharide,            00:00:00                         [place] [facility]

ical



           PPSV23 (PNEUMOVAX)                                             [facility]

 

           Pneumococcal            2020 Completed             [facility] o

f



           Polysaccharide,            00:00:00                         [place] [facility]

ical



           PPSV23 (PNEUMOVAX)                                             [facility]

 

           Pneumococcal            2020 Completed             [facility] o

f



           Polysaccharide,            00:00:00                         [place] [facility]

ical



           PPSV23 (PNEUMOVAX)                                             [facility]

 

           Pneumococcal            2020 Completed             [facility] o

f



           Polysaccharide,            00:00:00                         [place] [facility]

ical



           PPSV23 (PNEUMOVAX)                                             [facility]

 

           Pneumococcal            2019-10-05 Completed             [facility] o

f



           Polysaccharide,            00:00:00                         [place] [facility]

ical



           PPSV23 (PNEUMOVAX)                                             [facility]

 

           Pneumococcal            2019-10-05 Completed             [facility] o

f



           Polysaccharide,            00:00:00                         [place] [facility]

ical



           PPSV23 (PNEUMOVAX)                                             [facility]

 

           Pneumococcal            2019-10-05 Completed             [facility] o

f



           Polysaccharide,            00:00:00                         [place] [facility]

ical



           PPSV23 (PNEUMOVAX)                                             [facility]

 

           Pneumococcal            2019-10-05 Completed             [facility] o

f



           Polysaccharide,            00:00:00                         [place] [facility]

ical



           PPSV23 (PNEUMOVAX)                                             [facility]

 

           Pneumococcal 13            2018 Completed             [facility]

y of



           Conjugate, PCV13            00:00:00                         [place] [facility]

d[facility]



           (Prevnar 13)                                             [facility]

 

           Pneumococcal 13            2018 Completed             [facility]

y of



           Conjugate, PCV13            00:00:00                         [place] [facility]

d[facility]



           (Prevnar 13)                                             [facility]

 

           Pneumococcal 13            2018 Completed             [facility]

y of



           Conjugate, PCV13            00:00:00                         [place] [facility]

d[facility]



           (Prevnar 13)                                             [facility]

 

           Pneumococcal 13            2018 Completed             [facility]

y of



           Conjugate, PCV13            00:00:00                         [place] [facility]

d[facility]



           (Prevnar 13)                                             [facility]







Vital Signs







             Vital Name   Observation Time Observation Value Comments     Source

 

             WEIGHT       2021   45.3 kg                   



                          04:45:00                               

 

             HEIGHT       2021   152.4 cm                  



                          07:00:00                               

 

             WEIGHT       2021   44.3 kg                   



                          07:00:00                               

 

             HEIGHT       2021   152.4 cm                  



                          21:45:00                               

 

             WEIGHT       2021   45.36 kg                  



                          21:45:00                               

 

             WEIGHT       2021   45.3 kg                   



                          04:45:00                               

 

             HEIGHT       2021   152.4 cm                  



                          07:00:00                               

 

             WEIGHT       2021   44.3 kg                   



                          07:00:00                               

 

             HEIGHT       2021   152.4 cm                  



                          21:45:00                               

 

             WEIGHT       2021   45.36 kg                  



                          21:45:00                               

 

             Systolic blood 2021   157 mm[Hg]                [facility]



             pressure     02:00:00                               [facility]

 

             Diastolic blood 2021   86 mm[Hg]                 [facility]

f



             pressure     02:00:00                               [facility]

 

             Heart rate   2021   82 /min                   [facility]



                          02:00:00                               [facility]

 

             Body temperature 2021   37.06 Veronica                 [facility]



                          02:00:00                               [facility]

 

             Respiratory rate 2021   25 /min                   [facility]



                          02:00:00                               [facility]

 

             Oxygen saturation 2021   96 /min                   [facility]

 



             in Arterial blood 02:00:00                               [facility]

[facility]



             by Pulse oximetry                                        [facility]

 

             Body weight  2021-01-10   43.092 kg                 [facility] 



                          21:16:00                               [facility]

 

             BMI          2021-01-10   17.38 kg/m2               [facility] 



                          21:16:00                               [facility]

 

             Systolic blood 2020   99 mm[Hg]                 [facility]



             pressure     17:54:00                               [facility]

 

             Diastolic blood 2020   69 mm[Hg]                 [facility]

f



             pressure     17:54:00                               [facility]

 

             Heart rate   2020   77 /min                   [facility] 



                          17:54:00                               [facility]

 

             Body temperature 2020   37 Veronica                    [facility]



                          17:54:00                               [facility]

 

             Respiratory rate 2020   15 /min                   [facility]



                          17:54:00                               [facility]

 

             Oxygen saturation 2020   91 /min      notified nurse Lilli styles of



             in Arterial blood 17:54:00                               [facility]

[facility]



             by Pulse oximetry                                        [facility]

 

             Body height  2020   157.5 cm                  [facility]



                          20:52:00                               [facility]

 

             Body weight  2020   45.36 kg                  [facility]



                          20:52:00                               [facility]

 

             BMI          2020   18.29 kg/m2               [facility]



                          20:52:00                               [facility]

 

             Systolic blood 2020   99 mm[Hg]                 [facility]



             pressure     17:54:00                               [facility]

 

             Diastolic blood 2020   69 mm[Hg]                 [facility]

f



             pressure     17:54:00                               [facility]

 

             Heart rate   2020   77 /min                   [facility]



                          17:54:00                               [facility]

 

             Body temperature 2020   37 Veronica                    [facility]



                          17:54:00                               [facility]

 

             Respiratory rate 2020   15 /min                   [facility]



                          17:54:00                               [facility]

 

             Oxygen saturation 2020   91 /min      notified nurse Lilli styles of



             in Arterial blood 17:54:00                               [facility]

[facility]



             by Pulse oximetry                                        Branch

 

             Body height  2020   157.5 cm                  [facility]



                          20:52:00                               [facility]

 

             Body weight  2020   45.36 kg                  [facility]



                          20:52:00                               [facility]

 

             BMI          2020   18.29 kg/m2               [facility]



                          20:52:00                               [facility]

 

             Systolic blood 2020   107 mm[Hg]                [facility]



             pressure     00:24:12                               [facility]

 

             Diastolic blood 2020   81 mm[Hg]                 [facility]

f



             pressure     00:24:12                               [facility]

 

             Heart rate   2020   89 /min                   [facility]



                          00:24:12                               [facility]

 

             Respiratory rate 2020   16 /min                   [facility]



                          00:24:12                               [facility]

 

             Body temperature 2020   37.22 Veronica                 [facility]



                          23:45:26                               [facility]

 

             Body height  2020   157.5 cm                  [facility]



                          23:12:00                               [facility]

 

             Body weight  2020   45.36 kg                  [facility]



                          23:12:00                               [facility]

 

             BMI          2020   18.29 kg/m2               [facility]



                          23:12:00                               [facility]

 

             Oxygen saturation 2020   93 /min                   [facility]



             in Arterial blood 23:12:00                               [facility]

[facility]



             by Pulse oximetry                                        Branch

 

             Systolic blood 2020   107 mm[Hg]                [facility]



             pressure     00:24:12                               [facility]

 

             Diastolic blood 2020   81 mm[Hg]                 [facility]

f



             pressure     00:24:12                               [facility]



                                                                 Branch

 

             Heart rate   2020   89 /min                   [facility]



                          00:24:12                               [facility]



                                                                 Branch

 

             Respiratory rate 2020   16 /min                   [facility]



                          00:24:12                               [facility]

 

             Body temperature 2020   37.22 Veronica                 [facility]



                          23:45:26                               [facility]



                                                                 Branch

 

             Body height  2020   157.5 cm                  [facility]



                          23:12:00                               [facility]

 

             Body weight  2020   45.36 kg                  [facility]



                          23:12:00                               [facility]

 

             BMI          2020   18.29 kg/m2               [facility]



                          23:12:00                               [facility]



                                                                 Branch

 

             Oxygen saturation 2020   93 /min                   [facility]



             in Arterial blood 23:12:00                               [facility]

[facility]



             by Pulse oximetry                                        Branch

 

             Systolic blood 2020   111 mm[Hg]                [facility]



             pressure     23:00:00                               [facility]



                                                                 Branch

 

             Diastolic blood 2020   83 mm[Hg]                 [facility]

f



             pressure     23:00:00                               [facility]



                                                                 Branch

 

             Heart rate   2020   100 /min                  [facility]



                          23:00:00                               [facility]



                                                                 Branch

 

             Respiratory rate 2020   29 /min                   [facility]



                          23:00:00                               [facility]

 

             Oxygen saturation 2020   97 /min                   [facility]



             in Arterial blood 23:00:00                               [facility]

[facility]



             by Pulse oximetry                                        Branch

 

             Body weight  2020   49.896 kg                 [facility]



                          22:09:00                               [facility]

 

             BMI          2020   19.49 kg/m2               [facility]



                          22:09:00                               [facility]

 

             Body temperature 2020   36.56 Veronica                 [facility]



                          22:08:00                               [facility]



                                                                 Branch

 

             Systolic blood 2020   111 mm[Hg]                [facility]



             pressure     23:00:00                               [facility]



                                                                 Branch

 

             Diastolic blood 2020   83 mm[Hg]                 [facility]

f



             pressure     23:00:00                               [facility]



                                                                 Branch

 

             Heart rate   2020   100 /min                  [facility]



                          23:00:00                               [facility]



                                                                 Branch

 

             Respiratory rate 2020   29 /min                   [facility]



                          23:00:00                               [facility]



                                                                 Branch

 

             Oxygen saturation 2020   97 /min                   [facility]



             in Arterial blood 23:00:00                               [facility]

[facility]



             by Pulse oximetry                                        Branch

 

             Body weight  2020   49.896 kg                 [facility]



                          22:09:00                               [facility]



                                                                 Branch

 

             BMI          2020   19.49 kg/m2               [facility]



                          22:09:00                               [facility]

 

             Body temperature 2020   36.56 Veronica                 [facility]



                          22:08:00                               [facility]

 

             Systolic blood 2019   113 mm[Hg]                [facility]



             pressure     20:40:00                               [facility]

 

             Diastolic blood 2019   88 mm[Hg]                 [place] [facility]

f



             pressure     20:40:00                               [facility]

 

             Heart rate   2019   93 /min                   [facility]



                          20:40:00                               [facility]

 

             Respiratory rate 2019   24 /min                   [facility]



                          20:40:00                               [facility]

 

             Oxygen saturation 2019   100 /min                  [facility]



             in Arterial blood 20:30:00                               [facility]

[facility]



             by Pulse oximetry                                        [facility]

 

             Body temperature 2019   36.94 Veronica                 [facility]



                          16:30:00                               [facility]

 

             Body weight  2019   47.628 kg                 [facility]



                          15:30:00                               [facility]

 

             Systolic (mm Hg) 2021                             [facility]



                          19:07:00                               

 

             Diastolic (mm Hg) 2021                             [facility]

[facility]



                          19:07:00                               

 

             Heart Rate   2021                             [facility]

n



                          19:07:00                               

 

             Respitory Rate 2021                             [facility]

[facility]



                          19:07:00                               

 

             Weight       2021                             [facility]

n



                          19:07:00                               

 

             Systolic (mm Hg) 2020-10-23                             [facility]

[facility]



                          15:48:00                               

 

             Diastolic (mm Hg) 2020-10-23                             [facility]

ermann



                          15:48:00                               

 

             Heart Rate   2020-10-23                             [facility]

n



                          15:48:00                               

 

             Respitory Rate 2020-10-23                             [facility]

[facility]



                          15:48:00                               

 

             Height       2020-10-23   154.94 cm                 [facility]

n



                          15:48:00                               

 

             Weight       2020-10-23                             [facility]

n



                          15:48:00                               

 

             BMI Calculated 2020-10-23                             [facility]

[facility]



                          15:48:00                               

 

             Systolic (mm Hg) 2020                             [facility]

[facility]



                          18:44:00                               

 

             Diastolic (mm Hg) 2020                             [facility]

[facility]



                          18:44:00                               

 

             Heart Rate   2020                             [facility]

n



                          18:44:00                               

 

             Respitory Rate 2020                             [facility]

[facility]



                          18:44:00                               

 

             Height       2020   157.48 cm                 [facility]

n



                          18:44:00                               

 

             Weight       2020                             [facility]

n



                          18:44:00                               

 

             BMI Calculated 2020                             [facility]

[facility]



                          18:44:00                               

 

             Systolic (mm Hg) 2019                             [facility]

[facility]



                          19:02:00                               

 

             Diastolic (mm Hg) 2019                             [facility]

[facility]



                          19:02:00                               

 

             Respitory Rate 2019                             [facility]

[facility]



                          19:02:00                               

 

             Systolic (mm Hg) 2019                             [facility]

[facility]



                          17:00:00                               

 

             Diastolic (mm Hg) 2019                             [facility]

[facility]



                          17:00:00                               

 

             Respitory Rate 2019                             [facility]

[facility]



                          17:00:00                               

 

             Temperature Oral 2019   98.8 F                    [facility]

[facility]



             (F)          17:00:00                               

 

             Systolic (mm Hg) 2019                             [facility]

[facility]



                          16:00:00                               

 

             Diastolic (mm Hg) 2019                             [facility]

[facility]



                          16:00:00                               

 

             Respitory Rate 2019                             [facility]

[facility]



                          16:00:00                               

 

             Temperature Oral 2019   97.9 F                    [facility]

[facility]



             (F)          13:00:00                               

 

             Temperature Oral 2019   98.0 F                    [facility]

[facility]



             (F)          10:39:00                               

 

             Height       2019   160.02 cm                 [facility]

n



                          01:29:00                               

 

             Weight       2019                             [facility]

n



                          01:29:00                               

 

             Height       2019   160.02 cm                 [facility]

n



                          12:21:00                               

 

             Height       2019   160.02 cm                 [facility]

n



                          08:29:00                               

 

             BMI Calculated 2019                             [facility]

[facility]



                          05:05:00                               

 

             Weight       2019                             [facility]

n



                          05:05:00                               

 

             BMI Calculated 2019                             [facility]

[facility]



                          16:42:00                               

 

             Weight       2019                             [facility]

n



                          16:42:00                               

 

             Heart Rate   2019                             [facility]

n



                          16:42:00                               







Procedures







                Procedure       Date / Time     Performing Clinician Source



                                Performed                       

 

                CT PELVIS WO CONTRAST 2021 00:57:34 FAUSTINA Sibley [facility]

 

                COVID-19 (ID NOW RAPID 2021-01-10 23:39:00 FAUSTINA Sibley [facility]

 

                XR LUMBAR SPINE 3 VW 2021-01-10 23:13:23 FAUSTINA Sibley [facility]

 

                XR FEMUR 2 VW LEFT 2021-01-10 22:39:58 FAUSTINA Sibley [facility]

 

                XR HIPS 2 VW LEFT 2021-01-10 22:39:58 FAUSTINA Sibley [facility]

 

                CONSENT/REFUSAL FOR 2021-01-10 21:05:48 Doctor Unassigned, Ashley

[facility]



                DIAGNOSIS AND TREATMENT                 [facility]         [facility] 

Branch

 

                OSMOLALITY SERUM 2020 10:41:00 Iván Harrington [facility] [place]

 

                PHENYTOIN FREE  2020 10:41:00 Iván Harrington [facility] [place]

 

                SEDIMENTATION RATE 2020 10:41:00 [facility]án [facility]

[facility]

 

                BASIC METABOLIC PANEL (NA, 2020 10:40:00 Nelida Harrington [facility]



                K, CL, CO2, GLUCOSE, BUN,                 [facility]



                CREATININE, CA)                                 

 

                CBC WITH DIFF   2020 10:40:00 Iván Harrington [facility] [place]

 

                OSMOLALITY URINE 2020 04:10:00 Iván Harrington [facility] [place]

 

                GALV/CLC ONLY - URINE DRUG 2020 04:10:00 Nelida Harrington [facility]



                ([id number]) -                 [facility]



                COMPREHENSIVE DRUG SCREEN                                 

 

                CREATININE, URINE RANDOM 2020 04:10:00 Iván Harrington

 [facility]

 

                UREA NITROGEN, URINE 2020 04:10:00 Iván Harrington

[facility] [place]



                RANDOM                          [facility]

 

                SODIUM, URINE RANDOM 2020 04:10:00 Iván Harrington

[facility]

 

                CT HEAD WO CONTRAST 2020 23:16:27 Carol Ngo [facility]

[facility]

 

                AC PANEL 21 + LACTIC ACID 2020 22:56:00 Carol Ngo

 [facility]

 

                XR CHEST 1 VW   2020 21:43:14 Carol Ngo [facility]

 

                CREATINE KINASE 2020 21:13:00 Carol Ngo [facility]



                                                                Medical Branch

 

                LIPASE          2020 21:13:00 Carol Ngo [facility]

 

                HEPATIC FUNCTION PANEL 2020 21:13:00 Carol Ngo [facility]



                ([id number]) (ALB,T.PRO,BILI                                 Medical 

Branch



                T,BU/BC,ALT,AST,ALK PHOS)                                 

 

                BASIC METABOLIC PANEL (NA, 2020 21:13:00 Carol Ngo [facility]



                K, CL, CO2, GLUCOSE, BUN,                                 Medica

l Branch



                CREATININE, CA)                                 

 

                PHENYTOIN       2020 21:13:00 Carol Ngo [facility]

 

                CBC WITH DIFF   2020 21:12:00 Carol Ngo [facility]

 

                URINALYSIS      2020 21:12:00 Carol Ngo [facility]

 

                COVID-19 (ID NOW RAPID 2020 21:12:00 Carol Ngo [facility]



                TESTING)                                        [facility]

 

                LACTIC ACID WHOLE BLOOD 2020 21:03:00 Carol Ngo [facility]

 

                EMERGENCY DEPARTMENT 2020 05:01:00 Doctor Unassigned, [facility]



                DOCUMENTS                       Needles         Medical Branch

 

                US RETROPERITONEAL 2020-10-19 17:30:11 Requisition, Paper [facility]



                COMPLETE                                        Medical Branch

 

                ASSIGNMENT OF BENEFITS 2020-10-19 15:54:32 Doctor Unassigned, [facility]



                                                Needles         Medical Branch

 

                CBC WITH DIFFERENTIAL 2020 22:22:00 Arjun Pichardo [facility]

 

                HEPATIC FUNCTION PANEL 2020 22:21:00 Arjun Pichardo [facility]



                ([id number]) (ALB,T.PRO,BILI                                 Medical 

Branch



                T,BU/BC,ALT,AST,ALK PHOS)                                 

 

                BASIC METABOLIC PANEL (NA, 2020 22:21:00 Arjun Pichardo [facility]



                K, CL, CO2, GLUCOSE, BUN,                                 Medica

l Branch



                CREATININE, CA)                                 

 

                PHENYTOIN       2020 22:21:00 Arjun Pichardo o

f [facility]

 

                POCT GLUCOSE (AUTOMATED) 2020 22:15:00 Arjun Pichardo

[facility]

 

                PHYSICIAN ORDERS 2020 06:01:00 Doctor Unassigned, [facility]

[facility] [place]



                                                Needles         [facility]

 

                ASSIGNMENT OF BENEFITS 2019 15:21:26 Doctor [facility]igned, 

[facility]

 

                URINALYSIS      2019 17:51:00 Meena García  [facility]

 

                BLOOD CULTURE SCREEN 2019 17:40:00 Meena García  [facility]

 

                LACTIC ACID WHOLE BLOOD 2019 17:40:00 Meena García  [facility]

 

                CT HEAD WO CONTRAST 2019 15:53:02 Meena García  [facility]

 

                XR CHEST 1 VW   2019 15:52:21 Meena García  [facility]

 

                TROPONIN I      2019 15:33:00 Meena García  [facility]

 

                COMP. METABOLIC PANEL 2019 15:33:00 Meena García  [facility]



                ([id number])                                         [facility]

 

                PHENYTOIN       2019 15:33:00 Meena García  [facility]

 

                PROTHROMBIN TIME / INR 2019 15:33:00 Meena García  [facility]

 

                ACTIVATED PARTIAL THRMPLAS 2019 15:33:00 Meena García  [facility]

 

                BLOOD CULTURE SCREEN 2019 15:32:00 Meena García  [facility]

 

                CBC WITH DIFFERENTIAL 2019 15:32:00 Meena García  [facility]

 

                Hip replacement                                 [facility]

 

                Operation on lumbar spine                                 Lesia Beal







Encounters







        Start   End     Encounter Admission Attending Care    Care    Encounter 

Source



        Date/Time Date/Time Type    Type    Clinicians Facility Department ID   

   

 

        2021-10-31         Emergency                 [facility]    [id number] 

[facility]



        20:34:59                                                         [facility]

 

        2021-10-30         Emergency                 [facility]    [id number] 

Univers



        16:18:49                                                         [facility]

 

        2021-10-30         Emergency                 [facility]    [id number] 

Univers



        02:17:58                                                         ity of



                                                                        [facility]

 

        2021-10-28         Emergency                 [facility]    [id number] 

Univers



        21:46:44                                                         ity of



                                                                        [facility]

 

        2021-10-28         Emergency                 [facility]    [id number] 

Univers



        18:23:54                                                         ity of



                                                                        [facility]

 

        2021         Inpatient         Jackson Barba HCAPM   JUDI     [id number]

-20 [facility]



        08:30:00                                                   [facility]

 

        2020         Inpatient EL      Jackson Barba HCAPM   DAYS    [id number]

-20 [facility]



        13:00:00                                                   [facility]

 

        2021-10-29 2021-10-29 Outpatient                 [facility]    [facility]    [id number]

[id number] [facility]



        13:45:00 13:45:00                                         08      l



                                                                        Julius

 

        2021 Emergency ER              [facility]    Emergency [id number]

[id number] [facility]



        21:27:00 21:27:00                                                 

 

        2021 Outpatient                 nullFlavo MNA     [id number]

[id number] Memoria



        18:45:00 04:59:59                         r       Neurology 07      l



                                                        Avi Madrid

 

        2021 Ambulatory                 nullFlavo MNA     [id number]

[id number] [facility]



        16:45:00 16:45:00 Pre-Reg                 r       Neurology 06      l



                                                        Avi Madrid

 

        2021-01-10 2021-01-10 Emergency         Maryjo, FAUSTINA [facility]    1.2.840.114 [id number]

[id number] Univers



        15:14:00 20:16:00                 Grisel   Tati 350.1.13.10         i

ty of



                                                [place] 4.2.7.2.686         [facility]  [phone]         Medi

alfonzo



                                                        084             Branch

 

        2020 [facility]         Radiology [facility]    1.2.840.114 [id number]

[id number] 



        17:10:00 23:59:00 Encounter                 [place] 350.1.13.10        

 



                                                [place] 4.2.7.2.686         



                                                [place]  [phone]         



                                                        [facility]             

 

        2020 [facility]         Radiology [facility]    1.2.840.114 [id number]

[id number] Univers



        17:10:00 23:59:00 Encounter                 [place] 350.1.13.10        

 ity of



                                                [place] 4.2.7.2.686         [facility]

s



                                                [place]  [phone]         Medi

alfonzo



                                                        801             Branch

 

        2020 Outpatient R       RADIOLOGY [facility]    [id number]

-20 Univers



        17:10:00 17:10:00                                           ity [facility]

 

        2020 Outpatient R       RADIOLOGY [facility]    [id number]

[id number] Univers



        00:00:00 00:00:00                                                 ity [facility]

 

        2020-11-10 2020-11-10 RefVioleta Millan UNIVERSIT 1.2.840.114 

[id number] 



        00:00:00 00:00:00                         Y HEALTH 350.1.13.10         



                                                CLINICS 4.2.7.2.686         



                                                        [phone]         



                                                        Critical access hospital             

 

        2020-11-10 2020-11-10 Violeta Levine UNIVERSIT 1.2.840.114 

[id number] Univers



        00:00:00 00:00:00                         Y HEALTH 350.1.13.10         i

[facility] 4.2.7.2.686         [place]

[place]



                                                        [phone]         [address]

 

        2020 Emergency         Carol Ngo  1.2.8

40.114 [id number] 



        14:48:00 17:06:00                 Violeta Carter   350.1.13.10       

  



                                                [facility] 4.2.7.2.686         



                                                        [phone]         



                                                        [facility]             

 

        2020 Emergency         Carol Ngo  1.2.8

40.114 [id number] 

[facility]



        14:48:00 17:06:00                 Violeta Carter   350.1.13.10       

  [facility] 4.2.7.2.686         [place]

as



                                                        [phone]         [address]

 

        2020-10-23 2020-10-24 Outpatient                 nullFlavo MNA     [id number]

[id number] [facility]



        15:15:00 04:59:59                         r       Neurology 05      l



                                                        Avi Madrid

 

        2020-10-19 2020-10-19 [facility]    1.2.840.114 [id number]

[id number] 



        10:45:00 23:59:00 Encounter         Gerber Duffy 350.1.13.10        

 



                                                [place] 4.2.7.2.686         



                                                [place]  [phone]         



                                                        806             

 

        2020-10-19 2020-10-19 [facility]    1.2.840.114 [id number]

[id number] Univers



        10:45:00 23:59:00 Encounter         Gerber Duffy 350.1.13.10        

 ity [facility] 4.2.7.2.686         [facility]  [phone]         Medi

alfonzo



                                                        806             Branch

 

        2020-10-19 2020-10-19 [facility]patient R       [facility]    [id number]

[id number] Univers



        10:45:00 10:45:00                 GERBER                   2010  ity [facility]

 

        2020-10-19 2020-10-19 Outpatient R       [facility]    [id number]

[id number] Univers



        00:00:00 00:00:00                 GERBER                           [facility]

 

        2020-10-19 2020-10-19 Orders          Doctor RODRÍGUEZ    1.2.840.114 [id number]

02 



        00:00:00 00:00:00 Only            Unassigned, DALLIN   350.1.13.10       

  



                                        [place] \[facility] [place] [facility]\"" 4.2.7.2.686         



                                                        [phone]         



                                                        009             

 

        2020-10-19 2020-10-19 Orders          Doctor  MARCOS    1.2.840.114 [id number]

02 Univers



        00:00:00 00:00:00 Only            Unassigned, DALLIN   350.1.13.10       

  ity 



                                        [place] \[facility] [place] [facility]\"" 4.2.7.2.686         [place]

as



                                                        [phone]         Medi

alfonzo



                                                        009             Branch

 

        2020 Outpatient                 nullFlavo MNA     [id number]

[id number] [facility]



        15:00:00 04:59:59                         r       Neurology 04      l



                                                        Avi         [place]

 

        2020 Ambulatory                 nullFlavo MNA     [id number]

[id number] [facility]



        15:00:00 15:00:00 Pre-Reg                 r       Neurology 03      l



                                                        Avi Madrid

 

        2020 Emergency X       FAUSTINA SIBLEY [facility]    ERT     [id number]

[id number] Univers



        10:20:43 12:42:00                                                 ity [facility]

 

        2020 Emergency         FAUSTINA Sibley [facility]    1.2.840.114 [id number]

[id number] 



        18:10:43 19:50:00                 Grisel Duffy 350.1.13.10         



                                                [place] 4.2.7.2.686         



                                                [place]  [phone]         



                                                        [facility]             

 

        2020 Emergency         FAUSTINA Sibley [facility]    1.2.840.114 [id number]

[id number] [facility]



        18:10:43 19:50:00                 Grisel Duffy 350.1.13.10         i

ty of



                                                [place] 4.2.7.2.686         [facility]  [phone]         [address]

 

        2020 Outpatient                 nullFlavo MNA     [id number]

[id number] [facility]



        18:15:00 04:59:59                         r       Neurology 01      l



                                                        Portal         [place]

 

        2020 Emergency         Celine Vera [facility]    1.2.840.

114 [id number] 



        17:07:29 21:14:00                 Arjun Pichardo [place] 350.1.13.10  

       



                                                [place] 4.2.7.2.686         



                                                [place]  [phone]         



                                                        [facility]             

 

        2020 Emergency         VeraCeline casanova [facility]    1.2.840.

114 [id number] 

[facility]



        17:07:29 21:14:00                 PichardoArjun Tati 350.1.13.10  

       ity of



                                                [place] 4.2.7.2.686         [facility]  [phone]         [address]

 

        2020 Outpatient                 nullFlavo MNA     [id number]

[id number] [facility]



        20:30:00 04:59:59                         r       Neurology 02      l



                                                        Portal         Julius

 

        2020 Outpatient                 nullFlavo MNA     [id number]

[id number] [facility]



        18:30:00 04:59:59                         r       Neurology 00      l



                                                        Portal         [place]ius

 

        2020 Technician         Ronald Corona [facility]    1.2.840.114 [id number]

[id number] 



        12:41:09 12:56:09 Visit           Lab Main [place] 350.1.13.10         



                                                [place] 4.2.7.2.[address] [place] [address] [phone]         



                                                [address]                 

 

        2020 Technician         Ronald Corona Lab Main [facility]    1.2.8

40.114 [id number] 

[facility]



        12:41:09 12:56:09 Visit           Gerber Patel 350.1.13.10 

        ity of



                                                [place] 4.2.7.2.686         [place]

[place]



                                                [place] [phone]         [address]                 

 

        2020 Orders          Doctor  MARCOS    1.2.840.114 [id number]

93 



        00:00:00 00:00:00 Only            Unassigned, DALLIN   350.1.13.10       

  



                                        [facility] 4.2.7.2.686         



                                                        [phone]         



                                                        009             

 

        2020 Orders          Doctor  MARCOS    1.2.840.114 [id number]

93 [facility]



        00:00:00 00:00:00 Only            Unassigned, DALLIN   350.1.13.10       

  ity of



                                        [facility] 4.2.7.2.686         [place]

as



                                                        [phone]         [address]

 

        2019 Emergency X       MARINO, [facility]    ERT     [id number]

76 Univers



        21:56:59 02:36:00                 ARJUN montoya [facility]

 

        2019 Outpatient R       AMANDA [facility]    [id number]

[id number] [facility]



        11:44:11 11:44:00                 GERBER montoya [facility]

 

        2019 Technician         1, [facility]    1.2.840.114 

[id number] 



        12:34:19 13:04:45 Visit                   Tati 350.1.13.10         



                                                [place] 4.2.7.2.686         



                                                [place]  [phone]         



                                                        [facility]             

 

        2019 Technician         1, [facility] Lab [facility]    1.2.840.114 

[id number] [facility]



        12:34:19 13:04:45 Visit           Gerber Patel 350.1.13.10 

        ity of



                                                [place] 4.2.7.2.686         [place]

[place]



                                                [place]  [phone]         [address]

 

        2019 Technician         1, Adc Lab [facility]    1.2.840.114 

[id number] [facility]



        10:21:56 10:36:56 Visit           Gerber Patel 350.1.13.10 

        ity of



                                                [place] 4.2.7.2.686         [facility]  [phone]         Medi

alfonzo



                                                        353             Branch

 

        2019 Orders          Doctor  MARCOS    1.2.840.114 [id number]

01 



        00:00:00 00:00:00 Only            Unassigned, DALLIN   350.1.13.10       

  ity of



                                        [place] \[facility] [place] [facility]\"" 4.2.7.2.686         [place]

as



                                                        [phone]         Medi

alfonzo



                                                        009             Branch

 

        2019 Emergency         García,  [facility]    1.2.583.283 [id number]

[id number] Univers



        10:22:50 16:11:00                 Meena S [place] 350.1.13.10         i

ty of



                                                [place] 4.2.7.2.686         [facility]  [phone]         Medi

alfonzo



                                                        084             Branch

 

        2019 Inpatient                 [facility] [id number]

[id number] [facility]



        16:38:00 21:00:00                         [address]

 

        2019 Outpatient                 [facility]    JUDI     [id number]   

 [facility]



        13:09:00 13:09:00                                                 

 

        2019 Inpatient E               [facility]    CAR     [id number]    

[facility]



        19:19:00 11:28:00                                                 







Results







           Test Description Test Time  Test Comments Results    Result Comments 

Source









                    FUNGUS CULTURE, BLOOD (ISOLATOR) 2021 06:27:00 









                      Test Item  Value      Reference Range Interpretation Comme

nts









             CULTURE (RODOLFO) (test code = 1095) No fungus isolated             

              



POCT-GLUCOSE [id number] 11:44:00





             Test Item    Value        Reference Range Interpretation Comments

 

             POC-GLUCOSE METER 111 mg/dL           H            : Notified

 RN/MD:



             (RODOLFO) (test code =                                        TESTED

 AT [facility] 6720



             1538)                                               [facility],



                                                                 [address]:



                                                                 /Techni

leno ID



                                                                 = [id number] for Joni Castaneda



BLOOD [id number] 09:00:00





             Test Item    Value        Reference Range Interpretation Comments

 

             CULTURE (RODOLFO) (test No growth in 5 days                         

  



             code = 1095)                                        



The specimen volume collected for this blood culture was below the optimum (10 
mL per bottle or 20 mL total).  Use of lower volumes may adversely affect 
recovery and/or detection times of some organisms.BLOOD [id number] 
09:00:00





             Test Item    Value        Reference Range Interpretation Comments

 

             CULTURE (BEAKER) (test No growth in 5 days                         

  



             code = 1095)                                        



The specimen volume collected for this blood culture was below the optimum (10 
mL per bottle or 20 mL total).  Use of lower volumes may adversely affect 
recovery and/or detection times of some organisms.POCT-GLUCOSE [id number] 
08:29:00





             Test Item    Value        Reference Range Interpretation Comments

 

             POC-GLUCOSE METER 76 mg/dL                         : TESTED A

T [facility] 6720



             (BEAKER) (test code =                                        [place],



             1538)                                               [address]:



                                                                 /Techni

leno ID =



                                                                 [id number] for Joni White



BASIC METABOLIC [id number] 05:44:00





             Test Item    Value        Reference Range Interpretation Comments

 

             SODIUM (BEAKER) 128 meq/L    136-145      L            



             (test code = 381)                                        

 

             POTASSIUM (BEAKER) 3.7 meq/L    3.5-5.1                   



             (test code = 379)                                        

 

             CHLORIDE (BEAKER) 96 meq/L            L            



             (test code = 382)                                        

 

             CO2 (BEAKER) (test 24 meq/L     22-29                     



             code = 355)                                         

 

             BLOOD UREA NITROGEN 6 mg/dL      7-21         L            



             (BEAKER) (test code                                        



             = 354)                                              

 

             CREATININE (BEAKER) 0.42 mg/dL   0.57-1.25    L            



             (test code = 358)                                        

 

             GLUCOSE RANDOM 84 mg/dL                         



             (BEAKER) (test code                                        



             = 652)                                              

 

             CALCIUM (BEAKER) 8.2 mg/dL    8.4-10.2     L            



             (test code = 697)                                        

 

             EGFR (BEAKER) (test 153 mL/min/1.73                           ESTIM

ATED GFR IS



             code = 1092) sq m                                   NOT AS ACCURATE

 AS



                                                                 CREATININE



                                                                 CLEARANCE IN



                                                                 PREDICTING



                                                                 GLOMERULAR



                                                                 FILTRATION RATE

.



                                                                 ESTIMATED GFR I

S



                                                                 NOT APPLICABLE 

FOR



                                                                 DIALYSIS PATIEN

TS.



 ID - JAVIER [id number] 05:44:00





             Test Item    Value        Reference Range Interpretation Comments

 

             MAGNESIUM (BEAKER) (test code = 1.5 mg/dL    1.6-2.6      L        

    



             627)                                                



 ID - JAVIER [id number] 05:44:00





             Test Item    Value        Reference Range Interpretation Comments

 

             PHOSPHORUS (BEAKER) (test code = 2.7 mg/dL    2.3-4.7              

     



             604)                                                



 ID - JAVIER LPOCT-GLUCOSE [id number] 22:07:00





             Test Item    Value        Reference Range Interpretation Comments

 

             POC-GLUCOSE METER 117 mg/dL           H            : TESTED A

T [facility] [address]



             (BEAKER) (test code =                                        CINTHIA ZAMUD[facility],



             1538)                                               [id number]:



                                                                 /Techni

leno ID



                                                                 = [id number] for [place]

NNIS,



                                                                 JUDY



POCT-GLUCOSE [id number] 17:55:00





             Test Item    Value        Reference Range Interpretation Comments

 

             POC-GLUCOSE METER 105 mg/dL                        : TESTED A

T [facility] [address]



             (BEAKER) (test code =                                        CINTHIA ZAMUD[facility],



             1538)                                               [id number]:



                                                                 /Techni

leno ID



                                                                 = [id number] for [place]

INSONU,



                                                                 SHWETA



CORTISOL,60 [id number] 13:19:00





             Test Item    Value        Reference Range Interpretation Comments

 

             CORTISOL BASELINE NETWORKED 15.4 mcg/dL                            



             (BEAKER) (test code = 2307)                                        

 

             CORTISOL 30 MINUTE NETWORKED 23.0 mcg/dL                           

 



             (BEAKER) (test code = 2308)                                        

 

             CORTISOL, 60 MINUTE (BEAKER) 26.2 ug/dL                            

 



             (test code = 1805)                                        



    ACTH STIMULATION TEST INTERPRETATION GUIDELINES(Synonyms: Cortrosyn Test, 
Cosyntropin or Corticotropin Stimulation Test)Adenocorticotropic hormone 
(ACTH)is a tropic hormone, made in the pituitary gland, which travels trhough 
the bloodstream and stimulates the cortex of the adrenal glands to release 
cortisol. Cortisol is a primary hormone, which aids the body's metabolism of 
fats, carbohydrates, and protein as well as sodium and potassium regulation.ACTH
 Stimulation Test: Exogenous administrationof biologically active ACTH 
stimulates the secretion of cortisol from the adrenal gland. This test is used 
to evaluate adrenal function by measuring cortisol levels at baseline and at 30 
and 60 minutesafter the administration of 250 micrograms of cosyntropin 
(Cortrosyn). Patients who have received exogenous corticosteroids immediately 
prior to performing the ACTH Stimulation Test will often have elevated baseline 
cortisol levels, which may lead to erroneous interpretation of test results. The
 notable exception is with dexamethasone.Normal Response: An increase in 
cortisol after stimulation by ACTHis normal. Post-stimulation cortisol 
concentration should be greater than 20 mcg/dL or the rate of rise from baseline
 cortisol should be greater than or equal to 9 mcg/dL.Patients with sepsis or 
septicshock: According to a study by Padma et al (WILLIAM 2000,283(8):3833-03), 
the ACTH Stimulation Test provides important prognostic information. This study 
defined 3 groups of patients with sepsis or septic shock:  1. Good Survival: Low
 basal cortisol (&lt;or=34 mcg/dL) and high ACTH response     (&gt;9mcg/dL)  2. 
 Intermediate Survival: Low basal cortisol (&lt;34 mcg/dL) and low response     
 to ACTH (&lt;or=9 mcg/dL) OR      High basal cortisol (&gt;34 mcg/dL) or high 
ACTH response (&gt;9 mcg/dL)  3.Poor Survival: High basal cortisol (&gt;34 
mcg/dL) and low ACTH response      (&lt;or=9 mcg/dL).Treatment of patients with 
relative adrenal dysfunction may be indicated based on test results and the cli
nical condition of the patient. Additional information, including treatment 
recommendations, is available in critically ill patients, approved by the 
Pharmacy, Nutrition, and Therapeutics Committee on 12/10/2004 and available 
through the Pharmacy Policy and Procedure Section on [employer]. ID - 
MEILY ALCANTARAISOL,30 [id number] 13:18:00





             Test Item    Value        Reference Range Interpretation Comments

 

             CORTISOL BASELINE NETWORKED 15.4 mcg/dL                            



             (BEAKER) (test code = 2307)                                        

 

             CORTISOL, 30 MINUTE (BEAKER) 23.0 ug/dL                            

 



             (test code = 1804)                                        



    ACTH STIMULATION TEST INTERPRETATION GUIDELINES(Synonyms: Cortrosyn Test, 
Cosyntropin or Corticotropin Stimulation Test)Adenocorticotropic hormone 
(ACTH)is a tropic hormone, made in the pituitary gland, which travels trhough 
the bloodstream and stimulates the cortex of the adrenal glands to release 
cortisol. Cortisol is a primary hormone, which aids the body's metabolism of 
fats, carbohydrates, and protein as well as sodium and potassium regulation.ACTH
 Stimulation Test: Exogenous administrationof biologically active ACTH 
stimulates the secretion of cortisol from the adrenal gland. This test is used 
to evaluate adrenal function by measuring cortisol levels at baseline and at 30 
and 60 minutesafter the administration of 250 micrograms of cosyntropin 
(Cortrosyn). Patients who have received exogenous corticosteroids immediately 
prior to performing the ACTH Stimulation Test will often have elevated baseline 
cortisol levels, which may lead to erroneous interpretation of test results. The
 notable exception is with dexamethasone.Normal Response: An increase in 
cortisol after stimulation by ACTHis normal. Post-stimulation cortisol 
concentration should be greater than 20 mcg/dL or the rate of rise from baseline
 cortisol should be greater than or equal to 9 mcg/dL.Patients with sepsis or 
septicshock: According to a study by Padma et al (WILLIAM 2000,283(8):5663-71), 
the ACTH Stimulation Test provides important prognostic information. This study 
defined 3 groups of patients with sepsis or septic shock:  1. Good Survival: Low
 basal cortisol (&lt;or=34 mcg/dL) and high ACTH response     (&gt;9mcg/dL)  2. 
 Intermediate Survival: Low basal cortisol (&lt;34 mcg/dL) and low response     
 to ACTH (&lt;or=9 mcg/dL) OR      High basal cortisol (&gt;34 mcg/dL) or high 
ACTH response (&gt;9 mcg/dL)  3.Poor Survival: High basal cortisol (&gt;34 
mcg/dL) and low ACTH response      (&lt;or=9 mcg/dL).Treatment of patients with 
relative adrenal dysfunction may be indicated based on test results and the cli
nical condition of the patient. Additional information, including treatment 
recommendations, is available in critically ill patients, approved by the 
Pharmacy, Nutrition, and Therapeutics Committee on 12/10/2004 and available 
through the Pharmacy Policy and Procedure Section on [employer]. ID - 
EMERSONCORTISOL,[id number] 12:15:00





             Test Item    Value        Reference Range Interpretation Comments

 

             CORTISOL, BASELINE (BEAKER) (test 15.4 ug/dL                       

      



             code = 1803)                                        



    ACTH STIMULATION TEST INTERPRETATION GUIDELINES(Synonyms: Cortrosyn Test, 
Cosyntropin or Corticotropin Stimulation Test)Adenocorticotropic hormone 
(ACTH)is a tropic hormone, made in the pituitary gland, which travels trhough 
the bloodstream and stimulates the cortex of the adrenal glands to release 
cortisol. Cortisol is a primary hormone, which aids the body's metabolism of 
fats, carbohydrates, and protein as well as sodium and potassium regulation.ACTH
 Stimulation Test: Exogenous administrationof biologically active ACTH 
stimulates the secretion of cortisol from the adrenal gland. This test is used 
to evaluate adrenal function by measuring cortisol levels at baseline and at 30 
and 60 minutesafter the administration of 250 micrograms of cosyntropin 
(Cortrosyn). Patients who have received exogenous corticosteroids immediately 
prior to performing the ACTH Stimulation Test will often have elevated baseline 
cortisol levels, which may lead to erroneous interpretation of test results. The
 notable exception is with dexamethasone.Normal Response: An increase in 
cortisol after stimulation by ACTHis normal. Post-stimulation cortisol 
concentration should be greater than 20 mcg/dL or the rate of rise from baseline
 cortisol should be greater than or equal to 9 mcg/dL.Patients with sepsis or 
septicshock: According to a study by Padma et al (WILLIAM 2000,283(8):1038-45), 
the ACTH Stimulation Test provides important prognostic information. This study 
defined 3 groups of patients with sepsis or septic shock:  1. Good Survival: Low
 basal cortisol (&lt;or=34 mcg/dL) and high ACTH response     (&gt;9mcg/dL)  2. 
 Intermediate Survival: Low basal cortisol (&lt;34 mcg/dL) and low response     
 to ACTH (&lt;or=9 mcg/dL) OR      High basal cortisol (&gt;34 mcg/dL) or high 
ACTH response (&gt;9 mcg/dL)  3.Poor Survival: High basal cortisol (&gt;34 
mcg/dL) and low ACTH response      (&lt;or=9 mcg/dL).Treatment of patients with 
relative adrenal dysfunction may be indicated based on test results and the cli
nical condition of the patient. Additional information, including treatment 
recommendations, is available in critically ill patients, approved by the 
Pharmacy, Nutrition, and Therapeutics Committee on 12/10/2004 and available 
through the Pharmacy Policy and Procedure Section on [employer]. ID - 
EMILY MPOCT-GLUCOSE [id number] 12:00:00





             Test Item    Value        Reference Range Interpretation Comments

 

             POC-GLUCOSE METER 95 mg/dL                         : TESTED A

T [facility] [address]



             (BEAKER) (test code =                                        CINTHIA ZAMUDIO [facility],



             1538)                                               [id number]:



                                                                 /Techni

leno ID =



                                                                 [id number] for SHWETA WHEELER



BASIC METABOLIC [id number] 08:39:00





             Test Item    Value        Reference Range Interpretation Comments

 

             SODIUM (BEAKER) 129 meq/L    136-145      L            



             (test code = 381)                                        

 

             POTASSIUM (BEAKER) 3.8 meq/L    3.5-5.1                   



             (test code = 379)                                        

 

             CHLORIDE (BEAKER) 96 meq/L            L            



             (test code = 382)                                        

 

             CO2 (BEAKER) (test 24 meq/L     22-29                     



             code = 355)                                         

 

             BLOOD UREA NITROGEN 5 mg/dL      7-21         L            



             (BEAKER) (test code                                        



             = 354)                                              

 

             CREATININE (BEAKER) 0.50 mg/dL   0.57-1.25    L            



             (test code = 358)                                        

 

             GLUCOSE RANDOM 83 mg/dL                         



             (BEAKER) (test code                                        



             = 652)                                              

 

             CALCIUM (BEAKER) 8.1 mg/dL    8.4-10.2     L            



             (test code = 697)                                        

 

             EGFR (BEAKER) (test 125 mL/min/1.73                           ESTIM

ATED GFR IS



             code = 1092) sq m                                   NOT AS ACCURATE

 AS



                                                                 CREATININE



                                                                 CLEARANCE IN



                                                                 PREDICTING



                                                                 GLOMERULAR



                                                                 FILTRATION RATE

.



                                                                 ESTIMATED GFR I

S



                                                                 NOT APPLICABLE 

FOR



                                                                 DIALYSIS PATIEN

TS.



 ID - EMILY [id number] 08:39:00





             Test Item    Value        Reference Range Interpretation Comments

 

             MAGNESIUM (BEAKER) (test code = 1.7 mg/dL    1.6-2.6               

    



             627)                                                



 ID - EMILY [id number] 08:39:00





             Test Item    Value        Reference Range Interpretation Comments

 

             PHOSPHORUS (BEAKER) (test code = 3.1 mg/dL    2.3-4.7              

     



             604)                                                



 ID - EMILY MHEMOGLOBIN AND [id number] 08:20:00





             Test Item    Value        Reference Range Interpretation Comments

 

             HEMOGLOBIN (BEAKER) (test code = 11.2 GM/DL   11.2-15.7            

     



             410)                                                

 

             HEMATOCRIT (BEAKER) (test code = 31.8 %       34.1-44.9    L       

     



             411)                                                



 ID - 6000POCT-GLUCOSE [id number] 08:07:00





             Test Item    Value        Reference Range Interpretation Comments

 

             POC-GLUCOSE METER 89 mg/dL                         : TESTED A

T [facility] [address]



             (BEAKER) (test code =                                        [facility] [place],



             1538)                                               [id number]:



                                                                 /Techni

leno ID =



                                                                 [id number] for SHWETA WHEELER



[id number] 07:39:00





             Test Item    Value        Reference Range Interpretation Comments

 

             CORTISOL, TOTAL (BEAKER) (test code 9.8 ug/dL    3.7-19.4          

        



             = 2755)                                             



 ID - EMILY [facility]BC W/PLT COUNT &amp; AUTO [id number] 07:05:00





             Test Item    Value        Reference Range Interpretation Comments

 

             WHITE BLOOD CELL COUNT (BEAKER) 9.8 K/ L     3.5-10.5              

    



             (test code = 775)                                        

 

             RED BLOOD CELL COUNT (BEAKER) 2.93 M/ L    3.93-5.22    L          

  



             (test code = 761)                                        

 

             HEMOGLOBIN (BEAKER) (test code = 9.9 GM/DL    11.2-15.7    L       

     



             410)                                                

 

             HEMATOCRIT (BEAKER) (test code = 27.9 %       34.1-44.9    L       

     



             411)                                                

 

             MEAN CORPUSCULAR VOLUME (BEAKER) 95.2 fL      79.4-94.8    H       

     



             (test code = 753)                                        

 

             MEAN CORPUSCULAR HEMOGLOBIN 33.8 pg      25.6-32.2    H            



             (BEAKER) (test code = 751)                                        

 

             MEAN CORPUSCULAR HEMOGLOBIN CONC 35.5 GM/DL   32.2-35.5            

     



             (BEAKER) (test code = 752)                                        

 

             RED CELL DISTRIBUTION WIDTH 13.2 %       11.7-14.4                 



             (BEAKER) (test code = 412)                                        

 

             PLATELET COUNT (BEAKER) (test 255 K/CU MM  150-450                 

  



             code = 756)                                         

 

             MEAN PLATELET VOLUME (BEAKER) 10.5 fL      9.4-12.3                

  



             (test code = 754)                                        

 

             NUCLEATED RED BLOOD CELLS 0 /100 WBC   0-0                       



             (BEAKER) (test code = 413)                                        

 

             NEUTROPHILS RELATIVE PERCENT 73 %                                  

 



             (BEAKER) (test code = 429)                                        

 

             LYMPHOCYTES RELATIVE PERCENT 12 %                                  

 



             (BEAKER) (test code = 430)                                        

 

             MONOCYTES RELATIVE PERCENT 12 %                                   



             (BEAKER) (test code = 431)                                        

 

             EOSINOPHILS RELATIVE PERCENT 3 %                                   

 



             (BEAKER) (test code = 432)                                        

 

             BASOPHILS RELATIVE PERCENT 0 %                                    



             (BEAKER) (test code = 437)                                        

 

             NEUTROPHILS ABSOLUTE COUNT 7.11 K/ L    1.56-6.13    H            



             (BEAKER) (test code = 670)                                        

 

             LYMPHOCYTES ABSOLUTE COUNT 1.14 K/ L    1.18-3.74    L            



             (BEAKER) (test code = 414)                                        

 

             MONOCYTES ABSOLUTE COUNT (BEAKER) 1.17 K/ L    0.24-0.36    H      

      



             (test code = 415)                                        

 

             EOSINOPHILS ABSOLUTE COUNT 0.27 K/ L    0.04-0.36                 



             (BEAKER) (test code = 416)                                        

 

             BASOPHILS ABSOLUTE COUNT (BEAKER) 0.04 K/ L    0.01-0.08           

      



             (test code = 417)                                        

 

             IMMATURE GRANULOCYTES-RELATIVE 0 %          0-1                    

   



             PERCENT (BEAKER) (test code =                                      

  



             2801)                                               



BASIC METABOLIC [id number] 00:32:00





             Test Item    Value        Reference Range Interpretation Comments

 

             SODIUM (BEAKER) 128 meq/L    136-145      L            



             (test code = 381)                                        

 

             POTASSIUM (BEAKER) 4.1 meq/L    3.5-5.1                   



             (test code = 379)                                        

 

             CHLORIDE (BEAKER) 95 meq/L            L            



             (test code = 382)                                        

 

             CO2 (BEAKER) (test 24 meq/L     22-29                     



             code = 355)                                         

 

             BLOOD UREA NITROGEN 6 mg/dL      7-21         L            



             (BEAKER) (test code                                        



             = 354)                                              

 

             CREATININE (BEAKER) 0.53 mg/dL   0.57-1.25    L            



             (test code = 358)                                        

 

             GLUCOSE RANDOM 100 mg/dL                        



             (BEAKER) (test code                                        



             = 652)                                              

 

             CALCIUM (BEAKER) 7.7 mg/dL    8.4-10.2     L            



             (test code = 697)                                        

 

             EGFR (BEAKER) (test 117 mL/min/1.73                           ESTIM

ATED GFR IS



             code = 1092) sq m                                   NOT AS ACCURATE

 AS



                                                                 CREATININE



                                                                 CLEARANCE IN



                                                                 PREDICTING



                                                                 GLOMERULAR



                                                                 FILTRATION RATE

.



                                                                 ESTIMATED GFR I

S



                                                                 NOT APPLICABLE 

FOR



                                                                 DIALYSIS PATIEN

TS.



 ID - EMILY MPOCT-GLUCOSE [id number] 21:23:00





             Test Item    Value        Reference Range Interpretation Comments

 

             POC-GLUCOSE METER 138 mg/dL           H            : TESTED A

T [facility] [address]



             (BEAKER) (test code =                                        [facility],



             1538)                                               [id number]:



                                                                 /Techni

leno ID



                                                                 = [id number] for An

sah,



                                                                 Irina



POCT-GLUCOSE [id number] 17:58:00





             Test Item    Value        Reference Range Interpretation Comments

 

             POC-GLUCOSE METER 123 mg/dL           H            : TESTED A

T [facility] [address]



             (BEAKER) (test code =                                        [facility],



             1538)                                               [address]:



                                                                 /Techni

leno ID



                                                                 = [id number] for Ca

vitt,



                                                                 Eminae



OSMOLALITY, [id number] 16:27:00





             Test Item    Value        Reference Range Interpretation Comments

 

             OSMOLALITY, SERUM (BEAKER) (test 260 mOsm/kg  275-295      L       

     



             code = 615)                                         



BASIC METABOLIC [id number] 16:01:00





             Test Item    Value        Reference Range Interpretation Comments

 

             SODIUM (BEAKER) 126 meq/L    136-145      L            



             (test code = 381)                                        

 

             POTASSIUM (BEAKER) 3.4 meq/L    3.5-5.1      L            



             (test code = 379)                                        

 

             CHLORIDE (BEAKER) 91 meq/L            L            



             (test code = 382)                                        

 

             CO2 (BEAKER) (test 23 meq/L     22-29                     



             code = 355)                                         

 

             BLOOD UREA NITROGEN 6 mg/dL      7-21         L            



             (BEAKER) (test code                                        



             = 354)                                              

 

             CREATININE (BEAKER) 0.51 mg/dL   0.57-1.25    L            



             (test code = 358)                                        

 

             GLUCOSE RANDOM 105 mg/dL                        



             (BEAKER) (test code                                        



             = 652)                                              

 

             CALCIUM (BEAKER) 8.4 mg/dL    8.4-10.2                  



             (test code = 697)                                        

 

             EGFR (BEAKER) (test 123 mL/min/1.73                           ESTIM

ATED GFR IS



             code = 1092) sq m                                   NOT AS ACCURATE

 AS



                                                                 CREATININE



                                                                 CLEARANCE IN



                                                                 PREDICTING



                                                                 GLOMERULAR



                                                                 FILTRATION RATE

.



                                                                 ESTIMATED GFR I

S



                                                                 NOT APPLICABLE 

FOR



                                                                 DIALYSIS PATIEN

TS.



 ID - [id number] 16:01:00





             Test Item    Value        Reference Range Interpretation Comments

 

             MAGNESIUM (BEAKER) (test code = 2.4 mg/dL    1.6-2.6               

    



             627)                                                



 ID - [id number] 16:01:00





             Test Item    Value        Reference Range Interpretation Comments

 

             PHOSPHORUS (BEAKER) (test code = 2.2 mg/dL    2.3-4.7      L       

     



             604)                                                



 ID - [id number] 15:13:00





             Test Item    Value        Reference Range Interpretation Comments

 

             FERRITIN (BEAKER) (test code = 351.59 ng/mL 5..00  H         

   



             361)                                                



 ID - RMPOCT-GLUCOSE [id number] 13:25:00





             Test Item    Value        Reference Range Interpretation Comments

 

             POC-GLUCOSE METER 134 mg/dL           H            : TESTED A

T [facility] [address]



             (BEAKER) (test code =                                        [place],



             1538)                                               [id number]:



                                                                 /Techni

leno ID



                                                                 = [id number] for Ca

vitt,



                                                                 Eminae



IRON, TIBC, % SAT. (WITHOUT FERRITIN)2021 12:33:00





             Test Item    Value        Reference Range Interpretation Comments

 

             IRON (BEAKER) (test code = 547) 37.0 ug/dL   40.0-160.0   L        

    

 

             TOTAL IRON BINDING CAPACITY 196 ug/dL    250-450      L            



             (BEAKER) (test code = 769)                                        

 

             IRON % SATURATION (2) (BEAKER) 19 %         20-55        L         

   



             (test code = 2590)                                        



 ID - JAVIER LCT, CHEST, WITH [id number] 12:30:00Unlisted Reason 
for Exam - Click Yes and Enter Reason Below-&gt;YesUnlisted Reason for 
Exam-&gt;smoker with weight loss, hyponatremia; eval for lung malignancy
************************************************************[facility]        : 1960        
Sex: F************************************************************FINAL REPORT 
PATIENT ID:   [id number] TECHNIQUE: CT of the chest, abdomen, and pelvis WITH 
intravenous contrast and WITHOUT oral contrast. Dose modulation, iterative 
reconstruction, and/or weight-based adjustment of the mA/kV was utilized to 
reduce the radiation dose to as low as reasonably achievable. INDICATION: 
Unlisted Reason for Examsmoker with weight loss, hyponatremia; eval for lung 
malignancy. History of alcohol abuse. Evaluate for occult malignancy, 
pancreatitis COMPARISON: None.  FINDINGS: LINES/TUBES: None. LUNGS AND AIRWAYS: 
Mild right and moderate left basilar atelectasis. There are groundglass 
opacities which are scattered throughout the lungs remain the periphery.PLEURA: 
Small bilateral pleural effusionsHEART AND MEDIASTINUM: A right thyroid nodule 
measures 0.9 cm. This is likely clinically insignificant, and no further imaging
 is recommended. No significant mediastinal, hilar, or axillary lymphadenopathy.
 The heart and pericardium are within normal limits. Marked calcification of the
 coronary arteries. Moderate calcification of the descending thoracic aorta and 
arch branch vessels. HEPATOBILIARY: There is likely a Riedel's lobe. A 
hyperenhancing focus at the periphery of segment VII measures 0.6 cm on axial 
image 49. Gallbladder is unremarkable. No biliary ductal dilatation.SPLEEN: No 
splenomegaly.PANCREAS: No focal masses or ductal dilatation. ADRENALS: No adre
nal nodules.KIDNEYS/URETERS: No hydronephrosis, stones, or masses. A left upper 
pole renal hypodensity measures 0.8 cm and is too small to further characterize.
 PELVIC ORGANS/BLADDER: There is focal thickening of the right lateral bladder 
wall which measures up to 1 cm in thickness and 4.4 cm in length. Mild 
thickening of the left lateral urinary bladder wall. PERITONEUM/RETROPERITONEUM:
 No free air or fluid.LYMPH NODES: No lymphadenopathy.VESSELS: There is 
occlusion of both superficial femoral arteries. There is marked calcification of
 the abdominal pelvic vasculature with moderate, less than 50% narrowing of the 
distal abdominal aorta. GI TRACT: No distention or wall thickening. BONES AND 
SOFT TISSUES: Age-indeterminate, moderate compression deformity of the inferior 
endplate of T10. Chronic right posterior ribs 9, 10, 11, and 12 fractures. Old, 
healed left lateral ninth rib fracture. Subacute, healing right L2 and L3 
transverse process fractures. Age-indeterminate, approximately 50% compression 
deformity of the superior endplate of L1 with mild retropulsion. Prior posterior
 fusion at L5-S1.Old, unhealed left greater trochanter fracture. The bones are 
diffusely demineralized. IMPRESSION: 1.The groundglass opacities in the lungs 
are a pattern which would be typical for COVID-19 pneumonia. Other causes of 
multifocal pneumonia are also in the differential. Interstitial lung diseases 
are also possible including acute interstitial pneumonitis. Consider a follow-up
 CT in 3-6 months to document resolution and exclude a neoplasm. 2.There is 
focal thickening of the right lateral bladder wall from an indeterminate cause. 
There is less prominent, mild focal thickening of the left lateral urinary
bladder wall. A urology consultation is recommended for further evaluation and 
possible cystoscopy. 3.Small bilateral pleural effusions with mild right and 
moderate left basilar atelectasis. 4.A hyperenhancing focus at the periphery of 
segment VII measures 0.6 cm and is most likely either perfusional,a flash 
filling hemangioma, or focal nodular hyperplasia. If the patient does not have a
 known malignancy or known liver disease, no routine follow-up imaging is 
recommended. 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 6.Age-
indeterminate compression fractures of T10 and L1. There is mild retropulsion at
 L1. Signed: Bhavana Velarde MDReport Verified Date/Time:  2021 12:30:56 
Reading Location: [facility] C013Y CT Body Reading Room      Electronically signed 
by: BHAVANA VELARDE MD on 2021 12:30 PMCT, [id number] 12:30:00
Unlisted Reason for Exam - Click Yes and Enter Reason Below-&gt;YesUnlisted 
Reason for Exam-&gt;unexplained weight loss, hyponatremia, history of alcohol 
abuse; evaluate for occult malignancy, pancreaetiitsWill this procedure require 
oral contrast?-&gt;No
************************************************************[facility] NELSONABIOLAMERYL        : 1960        
Sex: F************************************************************FINAL REPORT 
PATIENT ID:   [id number] TECHNIQUE: CT of the chest, abdomen, and pelvis WITH 
intravenous contrast and WITHOUT oral contrast. Dose modulation, iterative 
reconstruction, and/or weight-based adjustment of the mA/kV was utilized to 
reduce the radiation dose to as low as reasonably achievable. INDICATION: 
Unlisted Reason for Examsmoker with weight loss, hyponatremia; eval for lung 
malignancy. History of alcohol abuse. Evaluate for occult malignancy, 
pancreatitis COMPARISON: None.  FINDINGS: LINES/TUBES: None. LUNGS AND AIRWAYS: 
Mild right and moderate left basilar atelectasis. There are groundglass 
opacities which are scattered throughout the lungs remain the periphery.PLEURA: 
Small bilateral pleural effusionsHEART AND MEDIASTINUM: A right thyroid nodule 
measures 0.9 cm. This is likely clinically insignificant, and no further imaging
 is recommended. No significant mediastinal, hilar, or axillary lymphadenopathy.
 The heart and pericardium are within normal limits. Marked calcification of the
 coronary arteries. Moderate calcification of the descending thoracic aorta and 
arch branch vessels. HEPATOBILIARY: There is likely a Riedel's lobe. A 
hyperenhancing focus at the periphery of segment VII measures 0.6 cm on axial 
image 49. Gallbladder is unremarkable. No biliary ductal dilatation.SPLEEN: No 
splenomegaly.PANCREAS: No focal masses or ductal dilatation. ADRENALS: No adre
nal nodules.KIDNEYS/URETERS: No hydronephrosis, stones, or masses. A left upper 
pole renal hypodensity measures 0.8 cm and is too small to further characterize.
 PELVIC ORGANS/BLADDER: There is focal thickening of the right lateral bladder 
wall which measures up to 1 cm in thickness and 4.4 cm in length. Mild 
thickening of the left lateral urinary bladder wall. PERITONEUM/RETROPERITONEUM:
 No free air or fluid.LYMPH NODES: No lymphadenopathy.VESSELS: There is 
occlusion of both superficial femoral arteries. There is marked calcification of
 the abdominal pelvic vasculature with moderate, less than 50% narrowing of the 
distal abdominal aorta. GI TRACT: No distention or wall thickening. BONES AND 
SOFT TISSUES: Age-indeterminate, moderate compression deformity of the inferior 
endplate of T10. Chronic right posterior ribs 9, 10, 11, and 12 fractures. Old, 
healed left lateral ninth rib fracture. Subacute, healing right L2 and L3 
transverse process fractures. Age-indeterminate, approximately 50% compression 
deformity of the superior endplate of L1 with mild retropulsion. Prior posterior
 fusion at L5-S1.Old, unhealed left greater trochanter fracture. The bones are 
diffusely demineralized. IMPRESSION: 1.The groundglass opacities in the lungs 
are a pattern which would be typical for COVID-19 pneumonia. Other causes of 
multifocal pneumonia are also in the differential. Interstitial lung diseases 
are also possible including acute interstitial pneumonitis. Consider a follow-up
 CT in 3-6 months to document resolution and exclude a neoplasm. 2.There is 
focal thickening of the right lateral bladder wall from an indeterminate cause. 
There is less prominent, mild focal thickening of the left lateral urinary
bladder wall. A urology consultation is recommended for further evaluation and 
possible cystoscopy. 3.Small bilateral pleural effusions with mild right and 
moderate left basilar atelectasis. 4.A hyperenhancing focus at the periphery of 
segment VII measures 0.6 cm and is most likely either perfusional,a flash 
filling hemangioma, or focal nodular hyperplasia. If the patient does not have a
 known malignancy or known liver disease, no routine follow-up imaging is 
recommended. 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 6.Age-
indeterminate compression fractures of T10 and L1. There is mild retropulsion at
 L1. Signed: Bhavana Velarde MDReport Verified Date/Time:  2021 12:30:56 
Reading Location: [facility] C013Y CT Body Reading Room      Electronically signed 
by: BHAVANA VELARDE MD on 2021 12:30 PMBASIC METABOLIC [id number] 
08:56:00





             Test Item    Value        Reference Range Interpretation Comments

 

             SODIUM (BEAKER) 124 meq/L    136-145      L            



             (test code = 381)                                        

 

             POTASSIUM (BEAKER) 3.6 meq/L    3.5-5.1                   



             (test code = 379)                                        

 

             CHLORIDE (BEAKER) 90 meq/L            L            



             (test code = 382)                                        

 

             CO2 (BEAKER) (test 24 meq/L     22-29                     



             code = 355)                                         

 

             BLOOD UREA NITROGEN 4 mg/dL      7-21         L            



             (BEAKER) (test code                                        



             = 354)                                              

 

             CREATININE (BEAKER) 0.52 mg/dL   0.57-1.25    L            



             (test code = 358)                                        

 

             GLUCOSE RANDOM 76 mg/dL                         



             (BEAKER) (test code                                        



             = 652)                                              

 

             CALCIUM (BEAKER) 8.8 mg/dL    8.4-10.2                  



             (test code = 697)                                        

 

             EGFR (BEAKER) (test 120 mL/min/1.73                           ESTIM

ATED GFR IS



             code = 1092) sq m                                   NOT AS ACCURATE

 AS



                                                                 CREATININE



                                                                 CLEARANCE IN



                                                                 PREDICTING



                                                                 GLOMERULAR



                                                                 FILTRATION RATE

.



                                                                 ESTIMATED GFR I

S



                                                                 NOT APPLICABLE 

FOR



                                                                 DIALYSIS PATIEN

TS.



 ID - PIJOCELYNN [id number] 08:56:00





             Test Item    Value        Reference Range Interpretation Comments

 

             MAGNESIUM (BEAKER) (test code = 1.4 mg/dL    1.6-2.6      L        

    



             627)                                                



 ID - JAVIER [id number] 08:56:00





             Test Item    Value        Reference Range Interpretation Comments

 

             PHOSPHORUS (BEAKER) (test code = 2.2 mg/dL    2.3-4.7      L       

     



             604)                                                



 ID - JAVIER LHEPATIC FUNCTION [id number] 08:56:00





             Test Item    Value        Reference Range Interpretation Comments

 

             TOTAL PROTEIN (BEAKER) (test code = 6.9 gm/dL    6.0-8.3           

        



             770)                                                

 

             ALBUMIN (BEAKER) (test code = 1145) 3.5 g/dL     3.5-5.0           

        

 

             BILIRUBIN TOTAL (BEAKER) (test code 0.5 mg/dL    0.2-1.2           

        



             = 377)                                              

 

             BILIRUBIN DIRECT (BEAKER) (test 0.3 mg/dL    0.1-0.5               

    



             code = 706)                                         

 

             ALKALINE PHOSPHATASE (BEAKER) (test 125 U/L                  

        



             code = 346)                                         

 

             AST (SGOT) (BEAKER) (test code = 33 U/L       5-34                 

     



             353)                                                

 

             ALT (SGPT) (BEAKER) (test code = 22 U/L       6-55                 

     



             347)                                                



 ID - JAVIER [id number] 08:56:00





             Test Item    Value        Reference Range Interpretation Comments

 

             LIPASE (BEAKER) (test code = 749) 37 U/L       8-78                

      



 ID - JAVIER GASPARLALITY, [id number] 07:33:00





             Test Item    Value        Reference Range Interpretation Comments

 

             OSMOLALITY URINE 366 mOsm/kg  See_Comment                [Automated

 message]



             (BEAKER) (test code =                                        The sy

stem which



             614)                                                generated this 

result



                                                                 transmitted ref

erence



                                                                 range: 50-1,200



                                                                 mOsm/kg. The



                                                                 reference range

 was



                                                                 not used to int

erpret



                                                                 this result as



                                                                 normal/abnormal

.



CBC W/PLT COUNT &amp; AUTO [id number] 05:51:00





             Test Item    Value        Reference Range Interpretation Comments

 

             WHITE BLOOD CELL COUNT (BEAKER) 13.0 K/ L    3.5-10.5     H        

    



             (test code = 775)                                        

 

             RED BLOOD CELL COUNT (BEAKER) 3.87 M/ L    3.93-5.22    L          

  



             (test code = 761)                                        

 

             HEMOGLOBIN (BEAKER) (test code = 12.8 GM/DL   11.2-15.7            

     



             410)                                                

 

             HEMATOCRIT (BEAKER) (test code = 37.2 %       34.1-44.9            

     



             411)                                                

 

             MEAN CORPUSCULAR VOLUME (BEAKER) 96.1 fL      79.4-94.8    H       

     



             (test code = 753)                                        

 

             MEAN CORPUSCULAR HEMOGLOBIN 33.1 pg      25.6-32.2    H            



             (BEAKER) (test code = 751)                                        

 

             MEAN CORPUSCULAR HEMOGLOBIN CONC 34.4 GM/DL   32.2-35.5            

     



             (BEAKER) (test code = 752)                                        

 

             RED CELL DISTRIBUTION WIDTH 13.3 %       11.7-14.4                 



             (BEAKER) (test code = 412)                                        

 

             PLATELET COUNT (BEAKER) (test 237 K/CU MM  150-450                 

  



             code = 756)                                         

 

             MEAN PLATELET VOLUME (BEAKER) 9.9 fL       9.4-12.3                

  



             (test code = 754)                                        

 

             NUCLEATED RED BLOOD CELLS 0 /100 WBC   0-0                       



             (BEAKER) (test code = 413)                                        

 

             NEUTROPHILS RELATIVE PERCENT 78 %                                  

 



             (BEAKER) (test code = 429)                                        

 

             LYMPHOCYTES RELATIVE PERCENT 11 %                                  

 



             (BEAKER) (test code = 430)                                        

 

             MONOCYTES RELATIVE PERCENT 8 %                                    



             (BEAKER) (test code = 431)                                        

 

             EOSINOPHILS RELATIVE PERCENT 2 %                                   

 



             (BEAKER) (test code = 432)                                        

 

             BASOPHILS RELATIVE PERCENT 0 %                                    



             (BEAKER) (test code = 437)                                        

 

             NEUTROPHILS ABSOLUTE COUNT 10.17 K/ L   1.56-6.13    H            



             (BEAKER) (test code = 670)                                        

 

             LYMPHOCYTES ABSOLUTE COUNT 1.44 K/ L    1.18-3.74                 



             (BEAKER) (test code = 414)                                        

 

             MONOCYTES ABSOLUTE COUNT (BEAKER) 1.06 K/ L    0.24-0.36    H      

      



             (test code = 415)                                        

 

             EOSINOPHILS ABSOLUTE COUNT 0.23 K/ L    0.04-0.36                 



             (BEAKER) (test code = 416)                                        

 

             BASOPHILS ABSOLUTE COUNT (BEAKER) 0.02 K/ L    0.01-0.08           

      



             (test code = 417)                                        

 

             IMMATURE GRANULOCYTES-RELATIVE 1 %          0-1                    

   



             PERCENT (BEAKER) (test code =                                      

  



             2801)                                               



CREATININE, RANDOM [id number] 04:29:00





             Test Item    Value        Reference Range Interpretation Comments

 

             CREATININE URINE (BEAKER) (test 32.5 mg/dL                         

    



             code = 375)                                         



Reference Range: No NormalsOperator ID - PIAYA LSODIUM, RANDOM [id number] 
04:29:00





             Test Item    Value        Reference Range Interpretation Comments

 

             SODIUM URINE (BEAKER) (test code = 111 meq/L                       

       



             243)                                                



Reference Range: No NormalsOperator ID - PIAYA LPOCT-GLUCOSE [id number] 
22:15:00





             Test Item    Value        Reference Range Interpretation Comments

 

             POC-GLUCOSE METER 124 mg/dL           H            : TESTED A

T [facility] [address]



             (BEAKER) (test code =                                        [place],



             [address]38)                                               [id number]:



                                                                 /Techni

leno ID



                                                                 = [id number] for ALEXYS YANCEY



VITAMIN D, [id number] 19:16:00





             Test Item    Value        Reference Range Interpretation Comments

 

             VITAMIN D 25-OH (Cyber HoldingsAKER) (test 24.3 ng/mL   6.6-49.9               

   



             code = 2764)                                        



Effective 10/11/2017: Reference Range ChangeNew: 6.6-49.9 ng/mL   Previous: 
13.0-47.8 ng/mLRecommended Vitamin D Target Range: 30.0-40.0 ng/mLOperator ID - 
DBHIV-1 ANTIGEN WITH HIV-1/2 [id number] 19:16:00





             Test Item    Value        Reference Range Interpretation Comments

 

             HIV-1 ANTIGEN WITH HIV 1\T\2 Nonreactive  Nonreactive              

 



             ANTIBODY (2) (Cyber HoldingsAKER) (test code                                   

     



             = 2586)                                             



 ID - DBVITAMIN [id number] 18:03:00





             Test Item    Value        Reference Range Interpretation Comments

 

             VITAMIN B12 (BEAKER) (test code = > pg/mL      213-816      H      

      



             774)                                                



 ID - DBOSMOLALITY, [id number] 17:38:00





             Test Item    Value        Reference Range Interpretation Comments

 

             OSMOLALITY, SERUM (BEAKER) (test 259 mOsm/kg  275-295      L       

     



             code = 615)                                         



HEPARIN ASSAY - LOW MOLECULAR [id number] 17:16:00





             Test Item    Value        Reference Range Interpretation Comments

 

             LOVENOX-ANTI 10A (BEAKER) (test 0.29 u/ml    0.60-2.00    L        

    



             code = 1605)                                        



Anti-Factor 10-A Level (Heparin Assay for Low Molecular Weight 
Heparin)Monitoring Guidelines: Blood samples should be obtained 4 hours post 
subcutaneous injection (time of Peak level) Therapeutic Peak Levels: 0.6-1.0 
units/mL  twice daily enoxaparin 1.0-2.0 units/mL  once daily enoxaparinRef: 
CHEST 2012;141:e24s-e43sPOCT-GLUCOSE [id number] 12:04:00





             Test Item    Value        Reference Range Interpretation Comments

 

             POC-GLUCOSE METER 81 mg/dL                         : TESTED A

[facility] [address]



             (RushFiles) (test code =                                        CINTHIA ZAMUDIO [facility],



             1538)                                               [id number]:



                                                                 /Techni

leno ID =



                                                                 [id number] for Harmony Bruno



CBC W/PLT COUNT &amp; AUTO [id number] 09:21:00





             Test Item    Value        Reference Range Interpretation Comments

 

             WHITE BLOOD CELL COUNT (BEAKER) 17.8 K/ L    3.5-10.5     H        

    



             (test code = 775)                                        

 

             RED BLOOD CELL COUNT (BEAKER) 3.10 M/ L    3.93-5.22    L          

  



             (test code = 761)                                        

 

             HEMOGLOBIN (BEAKER) (test code = 10.5 GM/DL   11.2-15.7    L       

     



             410)                                                

 

             HEMATOCRIT (BEAKER) (test code = 29.5 %       34.1-44.9    L       

     



             411)                                                

 

             MEAN CORPUSCULAR VOLUME (BEAKER) 95.2 fL      79.4-94.8    H       

     



             (test code = 753)                                        

 

             MEAN CORPUSCULAR HEMOGLOBIN 33.9 pg      25.6-32.2    H            



             (BEAKER) (test code = 751)                                        

 

             MEAN CORPUSCULAR HEMOGLOBIN CONC 35.6 GM/DL   32.2-35.5    H       

     



             (BEAKER) (test code = 752)                                        

 

             RED CELL DISTRIBUTION WIDTH 13.4 %       11.7-14.4                 



             (BEAKER) (test code = 412)                                        

 

             PLATELET COUNT (BEAKER) (test 218 K/CU MM  150-450                 

  



             code = 756)                                         

 

             MEAN PLATELET VOLUME (BEAKER) 10.0 fL      9.4-12.3                

  



             (test code = 754)                                        

 

             NUCLEATED RED BLOOD CELLS 0 /100 WBC   0-0                       



             (BEAKER) (test code = 413)                                        



(CELLAVISION MANUAL DIFF)2021 09:21:00





             Test Item    Value        Reference Range Interpretation Comments

 

             NEUTROPHILS - REL 89 %                                   



             (CELLAVISION)(BEAKER) (test code =                                 

       



             2816)                                               

 

             LYMPHOCYTES - REL 8 %                                    



             (CELLAVISION)(BEAKER) (test code =                                 

       



             2817)                                               

 

             MONOCYTES - REL 3 %                                    



             (CELLAVISION)(BEAKER) (test code =                                 

       



             2818)                                               

 

             NEUTROPHILS - ABS 15.84 K/ul   1.56-6.13    H            



             (CELLAVISION)(BEAKER) (test code =                                 

       



             2830)                                               

 

             LYMPHOCYTES - ABS 1.42 K/ul    1.18-3.74                 



             (CELLAVISION)(BEAKER) (test code =                                 

       



             2831)                                               

 

             MONOCYTES - ABS 0.53 K/uL    0.24-0.36    H            



             (CELLAVISION)(BEAKER) (test code =                                 

       



             2832)                                               

 

             TOTAL COUNTED (BEAKER) (test code 100                              

      



             = 1351)                                             

 

             RBC MORPHOLOGY (BEAKER) (test code Normal                          

       



             = 762)                                              

 

             WBC MORPHOLOGY (BEAKER) (test code Normal                          

       



             = 487)                                              

 

             PLT MORPHOLOGY (BEAKER) (test code Normal                          

       



             = 486)                                              

 

             ARTIFACT (CELLAVISION)(BEAKER) Present                             

   



             (test code = 3432)                                        

 

             PLATELET CONCENTRATION Adequate                               



             (CELLAVISION)(BEAKER) (test code =                                 

       



             3438)                                               



 ID - Rabia OverholtUser comments: Slide comments:POCT-GLUCOSE METER
2021 09:13:00





             Test Item    Value        Reference Range Interpretation Comments

 

             POC-GLUCOSE METER 87 mg/dL                         : TESTED A

T [facility] [address]



             (BEAKER) (test code =                                        CHUCHOSADIE ZAMUDIO [facility],



             1538)                                               [id number]:



                                                                 /Techni

leno ID =



                                                                 [id number] for Harmony Bruno



MR, BRAIN, [id number] 08:27:00Unlisted Reason for Exam - Click Yes and Enter
Reason Below-&gt;No Deos the patient have an implanted electronic device?-&gt;No
************************************************************[facility]        : 1960        
Sex: F************************************************************FINAL REPORT 
PATIENT ID:   [id number] MR, BRAIN, WITH \T\ WITHOUT CONTRAST INDICATION:Seizure, 
abnormal neuro exam TECHNIQUE: Multiplanar, multisequence MR imaging of the 
brain was obtained before and after uneventful administration of gadolinium 
contrast. COMPARISON: 2018 FINDINGS:Severe global volume loss. 
Passive expansion of the ventricular and sulcal CSF spaces. Relatively mild, 
predominantly periventricular white matter disease. No recent infarct or 
hemorrhage. Midline is normally positioned. No migrational anomaly. Postcontrast
sequences demonstrate no abnormal parenchymal, leptomeningeal or dural 
enhancement. No abnormality of the skull base or calvarium is present. The 
visualized paranasal sinuses, mastoid air cells, and orbits are within normal 
limits.   IMPRESSION:  Advanced involutional changes. No migrational anomaly or 
acute structural abnormality to explain seizure activity. Signed: JR Duffy Robert MDRepross Verified Date/Time:  2021 08:27:21 Reading Location: 
[facility] C013V Neuro Reading Room    Electronically signed by: KRYSTAL MARCANO
[facility] on 2021 08:27 AMPOCT-GLUCOSE [id number] 05:44:00





             Test Item    Value        Reference Range Interpretation Comments

 

             POC-GLUCOSE METER 91 mg/dL                         : TESTED A

T [facility] [address]



             (BEAKER) (test code =                                        CINTHIA ZAM[address],



             1538)                                               [id number]:



                                                                 /Techni

leno ID =



                                                                 [id number] for ALEXYS KELLY



BASIC METABOLIC [id number] 05:36:00





             Test Item    Value        Reference Range Interpretation Comments

 

             SODIUM (BEAKER) 130 meq/L    136-145      L            



             (test code = 381)                                        

 

             POTASSIUM (BEAKER) 2.8 meq/L    3.5-5.1      L            



             (test code = 379)                                        

 

             CHLORIDE (BEAKER) 93 meq/L            L            



             (test code = 382)                                        

 

             CO2 (BEAKER) (test 27 meq/L     22-29                     



             code = 355)                                         

 

             BLOOD UREA NITROGEN 4 mg/dL      7-21         L            



             (BEAKER) (test code                                        



             = 354)                                              

 

             CREATININE (BEAKER) 0.43 mg/dL   0.57-1.25    L            



             (test code = 358)                                        

 

             GLUCOSE RANDOM 90 mg/dL                         



             (BEAKER) (test code                                        



             = 652)                                              

 

             CALCIUM (BEAKER) 8.3 mg/dL    8.4-10.2     L            



             (test code = 697)                                        

 

             EGFR (BEAKER) (test 149 mL/min/1.73                           ESTIM

ATED GFR IS



             code = 1092) sq m                                   NOT AS ACCURATE

 AS



                                                                 CREATININE



                                                                 CLEARANCE IN



                                                                 PREDICTING



                                                                 GLOMERULAR



                                                                 FILTRATION RATE

.



                                                                 ESTIMATED GFR I

S



                                                                 NOT APPLICABLE 

FOR



                                                                 DIALYSIS PATIEN

TS.



 ID - EMILY MPOCT-GLUCOSE [id number] 18:27:00





             Test Item    Value        Reference Range Interpretation Comments

 

             POC-GLUCOSE METER 81 mg/dL                         : TESTED A

JUAN [facility] [address]



             (RODOLFO) (test code =                                        [place],



             1538)                                               [id number]:



                                                                 /Techni

leno ID =



                                                                 [id number] for SOPHIA CASTILLO



EEG W VID 12-26 HR CONTINUOUS MONITORING (VEEG)2021 15:39:00Reason for 
exam:-&gt;status epilepticus
************************************************************[facility]        : 1960        
Sex: F************************************************************[facility] Video EEG REPORT   NAME: Rona Nelson MRN: [id number] DATE(s) OF TEST:
2021- 2021 DATE OF REPORT: 2021 ACC: [id number] Start
time/date: 9:49 AM, 2021 Stop time/date: 9:42 AM, 2021  ICD-10: R56.9 
CPT Code: 34917   HISTORY:  61 F with PMH of epilepsy, HTN, HFrEF, tobacco and 
alcohol use, prior R temporal stroke admitted with status epilepticus on . 
 MEDICATIONS THAT COULD AFFECT EEG: Keppra, dilantin, vimpat     TECHNICAL 
SUMMARY:  [facility] with 32 input 
channels reviewed with bipolar and referential montages using the modified 
combinatorial system nomenclature.     DESCRIPTION OF RECORD: During the 
maximally alert state no posterior dominant rhythm was seen. Background was co
ntinuous, reactive to external stimuli and predominantly represented by 2-6 Hz 
frequency with superimposed faster beta activity. Asymmetry was appreciated 
especially in right temporal region where faster beta activity was lacking. 
Normal sleep architecture was not noted.    INTERICTAL EPILEPTIFORM DISCHARGES: 
Occasional sharp wave discharges was noted in the right temporal region with 
maximum electronegativity at F8/FT10 and a times at P8.   EVENTS/SEIZURES:  
, 17:56:16- 17:56:35 EEG: eMhdi wave discharges at 0.5 Hz were noted in the
right temporal region (F8,T8,P8)  which became more rhythmic with further 
evolution in frequency (3-4 Hz) and amplitude before abruptly ending at 
17:56:35. Clinical: No clinical correlate observed on the video   HV: 
Hyperventilation was not done.     PHOTIC STIMULATION: Photic stimulation was 
not done.     ELECTROCARDIOGRAM EVENTS: Normal Sinus Rhythm   IMPRESSION: 
Abnormal Video EEG in coma due to: 1.   Electrographic seizure, Ictal EEG 
pattern, regional, right temporal. No clinical signs.  2.   Continuous, slow 
generalized, delta/theta range, reactive 3.Continuous slow, lateralized, right 
hemisphere, maximal in the right temporal region 4.   Occasionalsharp waves, 
regional, right temporal.    CLINICAL CORRELATION:  This EEG record is noted for
interitcal epileptiform discharges in the right temporal region indicate 
underlying focal cortical irritability and increased risk of seizures further 
supported by an electrographic seizure that was captured arising from that 
region with no clear clinical signs. There is also evidence of a moderate degree
ofencephalopathy, non-specific in etiology. Focal slowing in right temporal 
region is indicative of focal cortical dysfunction. NICU team was notified of 
above findings.     Natalie Garcia MD Neurophysiology Fellow, PGY 5   I have 
personally reviewed this EEG and the report and I agree with the above note.   
Santosh Bernal MD Clinical Neurophysiology/Epilepsy Attending         
Electronically signed by: SANTOSH BERNAL MD on 2021 03:39 PMPHENYTOIN 
LEVEL, TOTAL AND [id number] 14:59:00





             Test Item    Value        Reference Range Interpretation Comments

 

             PHENYTOIN (DILANTIN) 31.0 ug/mL   10.0-20.0    H            Test pe

rformed at



             [facility]

 [place]



             = 605)                                              Diagnostic



                                                                 Laboratories

 

             PHENYTOIN FREE 1.77 mcg/ml  1.00-2.00                 



             (BEAKER) (test code                                        



             = 847)                                              



POCT-GLUCOSE [id number] 12:22:00





             Test Item    Value        Reference Range Interpretation Comments

 

             POC-GLUCOSE METER 150 mg/dL           H            : TESTED A

T [facility] [address]



             (BEAKER) (test code                                        [address] 

[place],



             = 1538)                                             [id number]:



                                                                 /Techni

leno ID =



                                                                 [id number] for Makeda paredes



                                                                 9pca2), Meryem



[id number] 09:29:00





             Test Item    Value        Reference Range Interpretation Comments

 

             MAGNESIUM (BEAKER) (test code = 2.1 mg/dL    1.6-2.6               

    



             627)                                                



 ID - JASMIN [id number] 09:29:00





             Test Item    Value        Reference Range Interpretation Comments

 

             POTASSIUM (BEAKER) (test code = 4.0 meq/L    3.5-5.1               

    



             379)                                                



 ID - JASMIN CRAD, CHEST, 1 VIEW, NON [id number] 09:00:00Reason for 
exam:-&gt;intubationShould this be performed at the bedside?-&gt;Yes
************************************************************[facility]        : 1960        
Sex: F************************************************************FINAL REPORT 
PATIENT ID:   [id number]  Chest one view. Clinical history: intubation Comparison:
2021 Discussion: A frontal chest is provided. Cardiomediastinal 
contours are unchanged.   Lines and tubes are in stable position.   There are 
left greater than right bibasilar opacities, which may reflect atelectasis or 
consolidation. No pneumothorax or large effusion. Signed: Biju Cabrera 
Verified Date/Time:  2021 09:00:01 Reading Location: [address] 
Consult Reading Room        Electronically signed by: BIJU CABRERA M.D. on 
2021 09:00 AMPHENYTOIN LEVEL, [id number] 05:02:00





             Test Item    Value        Reference Range Interpretation Comments

 

             PHENYTOIN (DILANTIN) (BEAKER) 19.0 ug/mL   10.0-20.0               

  



             (test code = 605)                                        



 ID Vane HERRERA LBASIC METABOLIC [id number] 04:36:00





             Test Item    Value        Reference Range Interpretation Comments

 

             SODIUM (BEAKER) 131 meq/L    136-145      L            



             (test code = 381)                                        

 

             POTASSIUM (BEAKER) 3.0 meq/L    3.5-5.1      L            



             (test code = 379)                                        

 

             CHLORIDE (BEAKER) 98 meq/L                         



             (test code = 382)                                        

 

             CO2 (BEAKER) (test 24 meq/L     22-29                     



             code = 355)                                         

 

             BLOOD UREA NITROGEN 8 mg/dL      7-21                      



             (BEAKER) (test code                                        



             = 354)                                              

 

             CREATININE (BEAKER) 0.51 mg/dL   0.57-1.25    L            



             (test code = 358)                                        

 

             GLUCOSE RANDOM 128 mg/dL           H            



             (BEAKER) (test code                                        



             = 652)                                              

 

             CALCIUM (BEAKER) 7.4 mg/dL    8.4-10.2     L            



             (test code = 697)                                        

 

             EGFR (BEAKER) (test 123 mL/min/1.73                           ESTIM

ATED GFR IS



             code = 1092) sq m                                   NOT AS ACCURATE

 AS



                                                                 CREATININE



                                                                 CLEARANCE IN



                                                                 PREDICTING



                                                                 GLOMERULAR



                                                                 FILTRATION RATE

.



                                                                 ESTIMATED GFR I

S



                                                                 NOT APPLICABLE 

FOR



                                                                 DIALYSIS PATIEN

TS.



 ID - JAVIER [id number] 04:27:00





             Test Item    Value        Reference Range Interpretation Comments

 

             MAGNESIUM (BEAKER) (test code = 1.6 mg/dL    1.6-2.6               

    



             627)                                                



 ID - JAVIER [id number] 04:27:00





             Test Item    Value        Reference Range Interpretation Comments

 

             PHOSPHORUS (BEAKER) (test code = 3.0 mg/dL    2.3-4.7              

     



             604)                                                



 ID - JAVIER LHIGH SENSITIVITY TROPONIN -60-67 04:15:00





             Test Item    Value        Reference Range Interpretation Comments

 

             HIGH SENSITIVITY 366 pg/ml    See_Comment  H             [Automated

 message]



             TROPONIN I (test code                                        The sy

stem which



             = [id number])                                          generated this 

result



                                                                 transmitted ref

erence



                                                                 range: <=17. Th

e



                                                                 reference range

 was



                                                                 not used to int

erpret



                                                                 this result as



                                                                 normal/abnormal

.



 ID - PIAYA LThe  STAT High Sensitivity Troponin-I results 
should be used in conjunction with other diagnostic information such as ECG, 
clinical observations and information, and patient symptoms to aid in the 
diagnosis of MI.BLOOD GAS, [id number] 04:01:00





             Test Item    Value        Reference Range Interpretation Comments

 

             PH ARTERIAL (BEAKER) (test code = 7.46         7.35-7.45    H      

      



             383)                                                

 

             PCO2 ARTERIAL (BEAKER) (test code 36 mm Hg     35-45               

      



             = 384)                                              

 

             PO2 ARTERIAL (BEAKER) (test code = 197 mm Hg    80-90        H     

       



             385)                                                

 

             O2 SATURATION ARTERIAL (BEAKER) 99.4 %       96.0-97.0    H        

    



             (test code = 386)                                        

 

             HCO3 ARTERIAL (BEAKER) (test code 25 mmol/L    21-29               

      



             = 388)                                              

 

             BASE EXCESS ARTERIAL (BEAKER) 1.2 mmol/L   -2.0-3.0                

  



             (test code = 387)                                        

 

             PATIENT TEMPERATURE (BEAKER) (test 37.2                            

       



             code = 1818)                                        

 

             FIO2 (BEAKER) (test code = 1819) 70.0                              

     



CBC W/PLT COUNT &amp; AUTO [id number] 03:56:00





             Test Item    Value        Reference Range Interpretation Comments

 

             WHITE BLOOD CELL COUNT (BEAKER) 20.7 K/ L    3.5-10.5     H        

    



             (test code = 775)                                        

 

             RED BLOOD CELL COUNT (BEAKER) 3.03 M/ L    3.93-5.22    L          

  



             (test code = 761)                                        

 

             HEMOGLOBIN (BEAKER) (test code = 10.2 GM/DL   11.2-15.7    L       

     



             410)                                                

 

             HEMATOCRIT (BEAKER) (test code = 28.8 %       34.1-44.9    L       

     



             411)                                                

 

             MEAN CORPUSCULAR VOLUME (BEAKER) 95.0 fL      79.4-94.8    H       

     



             (test code = 753)                                        

 

             MEAN CORPUSCULAR HEMOGLOBIN 33.7 pg      25.6-32.2    H            



             (BEAKER) (test code = 751)                                        

 

             MEAN CORPUSCULAR HEMOGLOBIN CONC 35.4 GM/DL   32.2-35.5            

     



             (BEAKER) (test code = 752)                                        

 

             RED CELL DISTRIBUTION WIDTH 13.7 %       11.7-14.4                 



             (BEAKER) (test code = 412)                                        

 

             PLATELET COUNT (BEAKER) (test 239 K/CU MM  150-450                 

  



             code = 756)                                         

 

             MEAN PLATELET VOLUME (BEAKER) 9.7 fL       9.4-12.3                

  



             (test code = 754)                                        

 

             NUCLEATED RED BLOOD CELLS 0 /100 WBC   0-0                       



             (BEAKER) (test code = 413)                                        

 

             NEUTROPHILS RELATIVE PERCENT 85 %                                  

 



             (BEAKER) (test code = 429)                                        

 

             LYMPHOCYTES RELATIVE PERCENT 7 %                                   

 



             (BEAKER) (test code = 430)                                        

 

             MONOCYTES RELATIVE PERCENT 7 %                                    



             (BEAKER) (test code = 431)                                        

 

             EOSINOPHILS RELATIVE PERCENT 0 %                                   

 



             (BEAKER) (test code = 432)                                        

 

             BASOPHILS RELATIVE PERCENT 0 %                                    



             (BEAKER) (test code = 437)                                        

 

             NEUTROPHILS ABSOLUTE COUNT 17.58 K/ L   1.56-6.13    H            



             (BEAKER) (test code = 670)                                        

 

             LYMPHOCYTES ABSOLUTE COUNT 1.41 K/ L    1.18-3.74                 



             (BEAKER) (test code = 414)                                        

 

             MONOCYTES ABSOLUTE COUNT (BEAKER) 1.46 K/ L    0.24-0.36    H      

      



             (test code = 415)                                        

 

             EOSINOPHILS ABSOLUTE COUNT 0.02 K/ L    0.04-0.36    L            



             (BEAKER) (test code = 416)                                        

 

             BASOPHILS ABSOLUTE COUNT (BEAKER) 0.05 K/ L    0.01-0.08           

      



             (test code = 417)                                        

 

             IMMATURE GRANULOCYTES-RELATIVE 1 %          0-1                    

   



             PERCENT (BEAKER) (test code =                                      

  



             2801)                                               



POCT-GLUCOSE [id number] 21:25:00





             Test Item    Value        Reference Range Interpretation Comments

 

             POC-GLUCOSE METER 126 mg/dL           H            : TESTED A

T [facility] [address]



             (BEAKER) (test code =                                        [facility],



             1538)                                               [id number]:



                                                                 /Techni

leno ID



                                                                 = [id number] for SHAHANA NOVOAO,



                                                                 ITZEL



POCT-GLUCOSE [id number] 17:56:00





             Test Item    Value        Reference Range Interpretation Comments

 

             POC-GLUCOSE METER 126 mg/dL           H            : TESTED A

T [facility] [address]



             (BEAKER) (test code                                        [facility],



             = 1538)                                             [id number]:



                                                                 /Techni

leno ID =



                                                                 [id number] for Destiny seals



                                                                 (pca2), Janie



RAPID DRUG SCREEN, [id number] 17:04:00





             Test Item    Value        Reference Range Interpretation Comments

 

             BARBITURATE URINE (BEAKER) (test Negative     Negative             

     



             code = 725)                                         

 

             BENZODIAZEPINE SCREEN URINE (BEAKER) Positive     Negative     A   

         



             (test code = 726)                                        

 

             COCAINE (METAB.) SCREEN (BEAKER) Negative     Negative             

     



             (test code = 1164)                                        

 

             METHADONE SCREEN (BEAKER) (test code Negative     Negative         

         



             = 1436)                                             

 

             OPIATE SCREEN URINE (BEAKER) (test Positive     Negative     A     

       



             code = 734)                                         

 

             CANNABINOID SCREEN URINE (BEAKER) Positive     Negative     A      

      



             (test code = 727)                                        

 

             AMPH/METHAMPH SCREEN (BEAKER) (test Negative     Negative          

        



             code = 1438)                                        

 

             PHENCYCLIDINE SCREEN URINE (BEAKER) Negative     Negative          

        



             (test code = 608)                                        

 

             PH UA (BEAKER) (test code = 467) 6.5          5.0-8.0              

     



DRUG                CUTOFF CONC.Cocaine               300 ng/mL  Cannabinoid    
       50 ng/mLBenzodiazepine        200 ng/mLBarbiturate           200 
ng/mLPhencyclidine          25 ng/mLOpiate         300 ng/mLMethadone           
 300 ng/mLAmphetamine/         1000 ng/mL  MethamphetamineThis assay provides an
unconfirmed qualitative test result for the clinical management of patients in 
emergency situations. Chain of custody not maintained. Some over-the-counter 
medications, as well as adulterants, may cause inaccurate results. Clinical 
correlation should be applied. A more comprehensivedrug screen or confirmation 
of a detected drug may be performed upon request. ID - CDPMAGNESIUM
2021 15:59:00





             Test Item    Value        Reference Range Interpretation Comments

 

             MAGNESIUM (BEAKER) 2.0 mg/dL    1.6-2.6                   Specimen 

slightly



             (test code = 627)                                        hemolyzed



 ID - JAVIER LHIGH SENSITIVITY TROPONIN -29-08 15:20:00





             Test Item    Value        Reference Range Interpretation Comments

 

             HIGH SENSITIVITY 694 pg/ml    See_Comment  HH            [Automated

 message]



             TROPONIN I (test code                                        The 

stem which



             = 1527180)                                          generated this 

result



                                                                 transmitted ref

erence



                                                                 range: <=17. Th

e



                                                                 reference range

 was



                                                                 not used to int

erpret



                                                                 this result as



                                                                 normal/abnormal

.



 ID - JESSICAAYA LThe  STAT High Sensitivity Troponin-I results 
should be used in conjunction with other diagnostic information such as ECG, 
clinical observations and information, and patient symptoms to aid in the 
diagnosis of MI.[id number] 15:18:00





             Test Item    Value        Reference Range Interpretation Comments

 

             SODIUM (BEAKER) (test code = 381) 129 meq/L    136-145      L      

      



 ID - JAVIER LPOCT-GLUCOSE [id number] 12:30:00





             Test Item    Value        Reference Range Interpretation Comments

 

             POC-GLUCOSE METER 126 mg/dL           H            : TESTED MARY

T [facility] [address]



             (RODOLFO) (test code                                        [place] 

[facility],



             = 1538)                                             [address]:



                                                                 /Techni

leno ID =



                                                                 [id number] for Destiny seals



                                                                 (pca2)Janie



SARS-COV2/RT-PCR (\[facility] [place] [facility]\"" &amp; REF LABS)2021 12:13:00





             Test Item    Value        Reference Range Interpretation Comments

 

             SARS-COV2/RT-PCR (test Negative     Not Detected, Negative,        

      



             code = 4690772)              See external report for              



                                       linked test               

 

             SARS-COV-2 PERFORMING LAB [facility] LENNY                             



             (test code = 7445231)                                        



Negative result for this test determines that SARS-CoV-2 RNA was not present in 
the specimen above the Limit of Detection (LOD).  However, Negative results do 
not preclude SARS-CoV-2 infection and should not be used as the sole basis for 
treatment or patient management decisions. Negative results mustbe combined with
clinical observations, patient history, and epidemiological information. A false
negative result may occur if a specimen is improperly collected, transported or 
handled.  A false negative result should be considered if patient's recent 
exposures or clinical presentation indicate that COVID-19 (SARS-CoV-2) is likely
and diagnostic tests for other causes of illness are negative.  Re-testing 
should be considered in cases of suspected false negatives.The limit of 
detection for this assay is 800 copies/mL.This SARS CoV-2 test is a real-time 
RT-PCR test intended for the qualitative detection of nucleic acid from 
SARS-CoV-2 in a nasopharyngeal swab specimen collected from individuals susp
ected of COVID-19 by their healthcare provider.This test has not been Food and 
Drug Administration (FDA) cleared or approved.  This is a modified version of an
approved Emergency Use Authorization (EUA) and is in the process of review by 
the FDA.   Once authorized by the FDA, the issued EUA will be effective until 
the declaration that circumstances exist justifying the authorization of the 
emergency use of in vitro diagnostic tests for detection and/or diagnosis of 
COVID-19 is terminated under Section 564(b)(2) of the Act or the EUA is revoked 
under Section 564(g) of the Act.Fact Sheet for Healthcare 
Providers:https://www.[employer].com/sites/default/files/product/documents/Fact_Luz barronu_WS_Dvdapvyvp_Shix_BLUY-PrL-7.pdfFact Sheet for Healthcare 
Patients:https://www.[employer]/sites/default/files/product/
documents/Gxze_Hdfxm_Htzcrzrq_Rfjf_QIWH-XuO-2.pdfPerforming Laboratory:[facility], [place] [address]SUSXQRYWHJ W/ REFLEX 
URINE [id number] 11:01:00





             Test Item    Value        Reference Range Interpretation Comments

 

             COLOR (BEAKER) (test code = 470) Light Yellow                      

     

 

             CLARITY (BEAKER) (test code = Clear                                

  



             469)                                                

 

             SPECIFIC GRAVITY UA (BEAKER) 1.016        1.001-1.035              

 



             (test code = 468)                                        

 

             PH UA (BEAKER) (test code = 467) 6.5          5.0-8.0              

     

 

             PROTEIN UA (BEAKER) (test code = 50 mg/dL     Negative     A       

     



             464)                                                

 

             GLUCOSE UA (BEAKER) (test code = Negative     Negative             

     



             365)                                                

 

             KETONES UA (BEAKER) (test code = Negative     Negative             

     



             371)                                                

 

             BILIRUBIN UA (BEAKER) (test code Negative     Negative             

     



             = 462)                                              

 

             BLOOD UA (BEAKER) (test code = Trace        Negative     A         

   



             461)                                                

 

             NITRITE UA (BEAKER) (test code = Negative     Negative             

     



             465)                                                

 

             LEUKOCYTE ESTERASE UA (BEAKER) Large        Negative     A         

   



             (test code = 466)                                        

 

             UROBILINOGEN UA (BEAKER) (test 0.2 mg/dL    0.2-1.0                

   



             code = 463)                                         

 

             RBC UA (BEAKER) (test code = 0 /HPF                                

 



             519)                                                

 

             WBC UA (BEAKER) (test code = 114 /HPF                              

 



             520)                                                

 

             BACTERIA (BEAKER) (test code = None Seen                           

   



             517)                                                

 

             MUCUS (BEAKER) (test code = Rare                                   



             1574)                                               

 

             SQUAMOUS EPITHELIAL (BEAKER) 2 /HPF                                

 



             (test code = 516)                                        

 

             CRYSTALS, URINE (BEAKER) (test None Seen                           

   



             code = 1521)                                        

 

             SOURCE(BEAKER) (test code =                                        



             2795)                                               



 ID - [auto] ID - [auto] ID - techPROCALCITONIN
2021 09:32:00





             Test Item    Value        Reference Range Interpretation Comments

 

             PROCALCITONIN (BEAKER) (test code 0.09 ng/mL   <0.05        H      

      



             = 3036)                                             



SEPSIS RISK (ng/mL)Low:          0.05-0.50Intermediate: 0.51-2.00High:         
&gt;=2.01TSH/FREE T4 IF [id number] 09:17:00





             Test Item    Value        Reference Range Interpretation Comments

 

             THYROID STIMULATING HORMONE 4.395 uIU/mL 0.350-4.940               



             (BEAKER) (test code = 772)                                        



 ID - JAVIER LHIGH SENSITIVITY TROPONIN -83-44 09:10:00





             Test Item    Value        Reference Range Interpretation Comments

 

             HIGH SENSITIVITY 976 pg/ml    See_Comment  HH            [Automated

 message]



             TROPONIN I (test code                                        The sy

stem which



             = [id number])                                          generated this 

result



                                                                 transmitted ref

erence



                                                                 range: <=17. Th

e



                                                                 reference range

 was



                                                                 not used to int

erpret



                                                                 this result as



                                                                 normal/abnormal

.



 ID - JAVIER LThe  STAT High Sensitivity Troponin-I results 
should be used in conjunction with other diagnostic information such as ECG, 
clinical observations and information, and patient symptoms to aid in the 
diagnosis of MI.PHENYTOIN LEVEL, [id number] 09:01:00





             Test Item    Value        Reference Range Interpretation Comments

 

             PHENYTOIN (DILANTIN) (BEAKER) 25.3 ug/mL   10.0-20.0    H          

  



             (test code = 605)                                        



 ID - JAVIER [id number] 08:55:00





             Test Item    Value        Reference Range Interpretation Comments

 

             PREALBUMIN (BEAKER) (test code = 17 mg/dL     14-45                

     



             586)                                                



 ID - JAVIER [id number] 08:52:00





             Test Item    Value        Reference Range Interpretation Comments

 

             ETHANOL (BEAKER) < mg/dL      See_Comment                [Automated

 message] The



             (test code = 400)                                        system i

 generated



                                                                 this result tra

nsmitted



                                                                 reference range

: <=10.



                                                                 The reference r

xenia was



                                                                 not used to int

erpret



                                                                 this result as



                                                                 normal/abnormal

.



 ID - JAVIER LLACTIC ACID, [id number] 08:51:00





             Test Item    Value        Reference Range Interpretation Comments

 

             LACTATE BLOOD ARTERIAL (2) 0.8 mmol/L   0.5-2.2                   



             (BEAKER) (test code = 2874)                                        



 ID - JAVIER LCBC W/PLT COUNT &amp; AUTO [id number] 08:33:00





             Test Item    Value        Reference Range Interpretation Comments

 

             WHITE BLOOD CELL COUNT (BEAKER) 19.1 K/ L    3.5-10.5     H        

    



             (test code = 775)                                        

 

             RED BLOOD CELL COUNT (BEAKER) 3.46 M/ L    3.93-5.22    L          

  



             (test code = 761)                                        

 

             HEMOGLOBIN (BEAKER) (test code = 11.6 GM/DL   11.2-15.7            

     



             410)                                                

 

             HEMATOCRIT (BEAKER) (test code = 32.8 %       34.1-44.9    L       

     



             411)                                                

 

             MEAN CORPUSCULAR VOLUME (BEAKER) 94.8 fL      79.4-94.8            

     



             (test code = 753)                                        

 

             MEAN CORPUSCULAR HEMOGLOBIN 33.5 pg      25.6-32.2    H            



             (BEAKER) (test code = 751)                                        

 

             MEAN CORPUSCULAR HEMOGLOBIN CONC 35.4 GM/DL   32.2-35.5            

     



             (BEAKER) (test code = 752)                                        

 

             RED CELL DISTRIBUTION WIDTH 13.7 %       11.7-14.4                 



             (BEAKER) (test code = 412)                                        

 

             PLATELET COUNT (BEAKER) (test 294 K/CU MM  150-450                 

  



             code = 756)                                         

 

             MEAN PLATELET VOLUME (BEAKER) 9.4 fL       9.4-12.3                

  



             (test code = 754)                                        

 

             NUCLEATED RED BLOOD CELLS 0 /100 WBC   0-0                       



             (BEAKER) (test code = 413)                                        

 

             NEUTROPHILS RELATIVE PERCENT 75 %                                  

 



             (BEAKER) (test code = 429)                                        

 

             LYMPHOCYTES RELATIVE PERCENT 15 %                                  

 



             (BEAKER) (test code = 430)                                        

 

             MONOCYTES RELATIVE PERCENT 10 %                                   



             (BEAKER) (test code = 431)                                        

 

             EOSINOPHILS RELATIVE PERCENT 0 %                                   

 



             (BEAKER) (test code = 432)                                        

 

             BASOPHILS RELATIVE PERCENT 0 %                                    



             (BEAKER) (test code = 437)                                        

 

             NEUTROPHILS ABSOLUTE COUNT 14.33 K/ L   1.56-6.13    H            



             (BEAKER) (test code = 670)                                        

 

             LYMPHOCYTES ABSOLUTE COUNT 2.77 K/ L    1.18-3.74                 



             (BEAKER) (test code = 414)                                        

 

             MONOCYTES ABSOLUTE COUNT (BEAKER) 1.85 K/ L    0.24-0.36    H      

      



             (test code = 415)                                        

 

             EOSINOPHILS ABSOLUTE COUNT 0.06 K/ L    0.04-0.36                 



             (BEAKER) (test code = 416)                                        

 

             BASOPHILS ABSOLUTE COUNT (BEAKER) 0.03 K/ L    0.01-0.08           

      



             (test code = 417)                                        

 

             IMMATURE GRANULOCYTES-RELATIVE 1 %          0-1                    

   



             PERCENT (BEAKER) (test code =                                      

  



             2801)                                               



(CELLAVISION MANUAL DIFF)2021 08:33:00





             Test Item    Value        Reference Range Interpretation Comments

 

             TOTAL COUNTED (BEAKER) (test code =                                

        



             1351)                                               

 

             WBC MORPHOLOGY (BEAKER) (test code = Normal                        

         



             487)                                                

 

             PLT MORPHOLOGY (BEAKER) (test code = Normal                        

         



             486)                                                

 

             ANISOCYTOSIS (BEAKER) (test code = 1+ few                          

       



             961)                                                

 

             MACROCYTES (BEAKER) (test code = 964) 1+ few                       

          



[id number] 07:32:00





             Test Item    Value        Reference Range Interpretation Comments

 

             MAGNESIUM (BEAKER) (test code = 1.6 mg/dL    1.6-2.6               

    



             627)                                                



 ID - JAVIER [id number] 07:32:00





             Test Item    Value        Reference Range Interpretation Comments

 

             PHOSPHORUS (BEAKER) (test code = 4.7 mg/dL    2.3-4.7              

     



             604)                                                



 ID - JAVIER LB-TYPE NATRIURETIC FACTOR (BNP)2021 07:27:00





             Test Item    Value        Reference Range Interpretation Comments

 

             B-TYPE NATRIURETIC PEPTIDE (BEAKER) 251 pg/mL    0-100        H    

        



             (test code = 700)                                        



 ID - PIJOCELYNN [id number] 07:13:00





             Test Item    Value        Reference Range Interpretation Comments

 

             SODIUM (BEAKER) (test code = 381) 129 meq/L    136-145      L      

      



 ID - JAVIER LCALCIUM, [id number] 06:47:00





             Test Item    Value        Reference Range Interpretation Comments

 

             CALCIUM IONIZED (BEAKER) (test 1.05 mmol/L  1.12-1.27    L         

   



             code = 698)                                         

 

             PH, BLOOD (BEAKER) (test code = 7.37                               

    



             1810)                                               



BLOOD GAS, [id number] 06:46:00





             Test Item    Value        Reference Range Interpretation Comments

 

             PH ARTERIAL (BEAKER) (test code = 7.37         7.35-7.45           

      



             383)                                                

 

             PCO2 ARTERIAL (BEAKER) (test code 48 mm Hg     35-45        H      

      



             = 384)                                              

 

             PO2 ARTERIAL (BEAKER) (test code = 167 mm Hg    80-90        H     

       



             385)                                                

 

             O2 SATURATION ARTERIAL (BEAKER) 99.0 %       96.0-97.0    H        

    



             (test code = 386)                                        

 

             HCO3 ARTERIAL (BEAKER) (test code 27 mmol/L    21-29               

      



             = 388)                                              

 

             BASE EXCESS ARTERIAL (BEAKER) 1.0 mmol/L   -2.0-3.0                

  



             (test code = 387)                                        

 

             PATIENT TEMPERATURE (BEAKER) (test 37.4                            

       



             code = 1818)                                        

 

             FIO2 (BEAKER) (test code = 1819) 70.0                              

     



COMPREHENSIVE METABOLIC [id number] 05:01:00





             Test Item    Value        Reference Range Interpretation Comments

 

             TOTAL PROTEIN 5.8 gm/dL    6.0-8.3      L            



             (BEAKER) (test code =                                        



             770)                                                

 

             ALBUMIN (BEAKER) 3.3 g/dL     3.5-5.0      L            



             (test code = 1145)                                        

 

             ALKALINE PHOSPHATASE 115 U/L                          



             (BEAKER) (test code =                                        



             346)                                                

 

             BILIRUBIN TOTAL 0.3 mg/dL    0.2-1.2                   



             (BEAKER) (test code =                                        



             377)                                                

 

             SODIUM (BEAKER) (test 129 meq/L    136-145      L            



             code = 381)                                         

 

             POTASSIUM (BEAKER) 4.0 meq/L    3.5-5.1                   



             (test code = 379)                                        

 

             CHLORIDE (BEAKER) 96 meq/L            L            



             (test code = 382)                                        

 

             CO2 (BEAKER) (test 25 meq/L     22-29                     



             code = 355)                                         

 

             BLOOD UREA NITROGEN 12 mg/dL     7-21                      



             (BEAKER) (test code =                                        



             354)                                                

 

             CREATININE (BEAKER) 0.62 mg/dL   0.57-1.25                 



             (test code = 358)                                        

 

             GLUCOSE RANDOM 117 mg/dL           H            



             (BEAKER) (test code =                                        



             652)                                                

 

             CALCIUM (BEAKER) 7.9 mg/dL    8.4-10.2     L            



             (test code = 697)                                        

 

             AST (SGOT) (BEAKER) 32 U/L       5-34                      



             (test code = 353)                                        

 

             ALT (SGPT) (BEAKER) 20 U/L       6-55                      



             (test code = 347)                                        

 

             EGFR (BEAKER) (test 98 mL/min/1.73                           ESTIMA

CHRISTIN GFR IS



             code = 1092) sq m                                   NOT AS ACCURATE

 AS



                                                                 CREATININE



                                                                 CLEARANCE IN



                                                                 PREDICTING



                                                                 GLOMERULAR



                                                                 FILTRATION RATE

.



                                                                 ESTIMATED GFR I

S



                                                                 NOT APPLICABLE 

FOR



                                                                 DIALYSIS PATIEN

TS.



 ID - PIAYA [id number] 04:44:00





             Test Item    Value        Reference Range Interpretation Comments

 

             PARTIAL THROMBOPLASTIN TIME 33.1 seconds 22.5-36.0                 



             (BEAKER) (test code = 760)                                        



PROTHROMBIN [id number] 04:43:00





             Test Item    Value        Reference Range Interpretation Comments

 

             PROTIME (BEAKER) 13.1 seconds 11.9-14.2                 



             (test code = 759)                                        

 

             INR (BEAKER) (test 1.01         See_Comment                [Automat

ed message]



             code = 370)                                         The system [employer]



                                                                 generated this 

result



                                                                 transmitted ref

erence



                                                                 range: <=5.90. 

The



                                                                 reference range

 was



                                                                 not used to int

erpret



                                                                 this result as



                                                                 normal/abnormal

.



RECOMMENDED COUMADIN/WARFARIN INR THERAPY RANGESSTANDARD DOSE: 2.0 - 3.0   
Includes: PROPHYLAXIS forvenous thrombosis, systemic embolization; TREATMENT for
venous thrombosis and/or pulmonary embolus.HIGH RISK: Target INR is 2.5-3.5 for 
patients with mechanical heart valves.RAD, ABDOMEN/KUB, 1 VIEW [id number] 
00:33:00Reason for exam:-&gt;feeding tube placementShould this be performed at 
the bedside?-&gt;Yes
************************************************************[facility]        : 1960        
Sex: F************************************************************FINAL REPORT 
PATIENT ID:   [id number] Abdomen one view Comparison: None. Reason for exam:  
feeding tube placement Findings: Supine view of the abdomen was performed. There
is an enteric tube with tip in the gastric body. There is a nonspecific and 
nonobstructive bowel gas pattern. There is lumbosacral spinal fixation hardware.
There is a mild age-indeterminate compression fracture deformity of L1; MRI or 
bone scan may be performed for further evaluation as clinically warranted. There
is a right total hip arthroplasty. There are vascular calcifications. There is a
retrocardiac opacity,which may represent atelectasis and/or pneumonia. Signed: 
Kole Gallardoeport Verified Date/Time: 2021 00:33:31     
Electronically signed by: KOLE GALLARDO MD on 2021 12:33 AMRAD, 
CHEST, 1 VIEW, NON [id number] 00:00:00Reason for exam:-&gt;CEREBROVASCULAR 
ACCIDENTShould this be performed at the bedside?-&gt;Yes
************************************************************[facility]        : 1960        
Sex: F************************************************************FINAL REPORT 
PATIENT ID:   [id number] RAD, CHEST, 1 VIEW, NON DEPT INDICATION: CEREBROVASCULAR 
ACCIDENT COMPARISON: 10/19/2018 10/20/2018 FINDINGS: Portable frontal view of 
the chest.   IMPRESSION:  Support Lines: ET tube terminates 4.5 cm above the 
jacquie. Enteric tube descends into theabdomen with tip overlying the proximal 
stomach. Lungs and pleura: Left lung base hazy opacities suggestive of 
atelectasis and/or small effusion. Right lung base scarring or atelectasis 
present. No pneumothorax.Heart and mediastinum: Within normal limits.Additional 
findings: Right ninth rib remote fracture deformity. Signed: Rahul To 
MDReport Verified Date/Time:  2021 00:00:35     Electronically signed by: 
RAHUL TO MD on 2021 12:00 AMBLOOD GAS, [id number] 
23:22:00





             Test Item    Value        Reference Range Interpretation Comments

 

             PH ARTERIAL (BEAKER) (test code = 7.36         7.35-7.45           

      



             383)                                                

 

             PCO2 ARTERIAL (BEAKER) (test code 39 mm Hg     35-45               

      



             = 384)                                              

 

             PO2 ARTERIAL (BEAKER) (test code 118 mm Hg    80-90        H       

     



             = 385)                                              

 

             O2 SATURATION ARTERIAL (BEAKER) 98.2 %       96.0-97.0    H        

    



             (test code = 386)                                        

 

             HCO3 ARTERIAL (BEAKER) (test code 21 mmol/L    21-29               

      



             = 388)                                              

 

             BASE EXCESS ARTERIAL (BEAKER) -3.7 mmol/L  -2.0-3.0     L          

  



             (test code = 387)                                        

 

             PATIENT TEMPERATURE (BEAKER) 36.9                                  

 



             (test code = 1818)                                        

 

             FIO2 (BEAKER) (test code = 1819) 70.0                              

     



CT, BRAIN/STROKE [id number] 22:15:00
************************************************************[facility]        : 1960        
Sex: F************************************************************FINAL REPORT 
PATIENT ID:   [id number]  EXAM: CT head without contrast. CLINICAL HISTORY: TIA, 
initial exam. COMPARISON: Head CT 10/18/2018. TECHNIQUE: CT images of the head 
were obtained without intravenous contrast.  This exam was performed according 
to our departmental dose optimization program which includes automated exposure 
control, adjustment of the mA and/or kV according to patient's size and/or use 
of iterative reconstructive technique. FINDINGS:There is generalized parenchymal
atrophy. 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 There is colpocephaly. 
There are mild white matter microvascular ischemic changes.There is no acute 
intracranial hemorrhage, extra-axial fluid collection, mass effect, herniation, 
hydrocephalus or large demarcated acute territorial infarct.  The basal cisterns
are patent. There are bilateral lens replacements.  The visualized paranasal 
sinuses andtympanomastoid cavities are clear.  The skull base and calvarium are 
intact. IMPRESSION: Dysgenesis of the corpus callosum.Mild white matter 
microvascular ischemic changes. No CT evidence of an acute intracranial process.
MRI may be performed for further evaluation if clinical suspicion for acute 
intracranial pathology persists. Discussed with neuro ICU resident at 
approximately 10:14 PM 2021. Signed: Kole Gallardo Verified 
Date/Time:  2021 22:15:30     Electronically signed by: KOLE GALLARDO MD on 2021 10:15 PMCOVID-19 (ID NOW RAPID TESTING)2021 
00:20:00





             Test Item    Value        Reference Range Interpretation Comments

 

             SARS-CoV-2 Rapid ID NOW Not Detected Not Detected              



             (test code = 19736-0)                                        

 

             KAYLIN (test code = KAYLIN) ID NOW COVID-19 Assay                        

   



                          is an isothermal                           



                          nucleic acid                           



                          amplification test                           



                          intended for the                           



                          qualitative detection                           



                          of nucleic acid from                           



                          SARS-CoV-2 viral RNA                           



                          in nasopharyngeal (NP)                           



                          specimens. It is used                           



                          under Emergency Use                           



                          Authorization (EUA) by                           



                          FDA. The limit of                           



                          detection (LOD) of the                           



                          assay is 125 Genome                           



                          Equivalents/mL. A                           



                          positive result is                           



                          indicative of the                           



                          presence of SARS-CoV-2                           



                          RNA. ?Clinical                           



                          correlation with                           



                          patient history and                           



                          other diagnostic                           



                          information is                           



                          necessary to determine                           



                          patient infection                           



                          status. A negative                           



                          (Not Detected) result                           



                          does not preclude                           



                          SARS-CoV-2 infection.                           



                          In patients with                           



                          clinical symptoms and                           



                          other tests that are                           



                          consistent with                           



                          SARS-CoV-2 infection,                           



                          negative results                           



                          should be treated as                           



                          presumptive negative                           



                          and a new specimen                           



                          should be tested with                           



                          alternative PCR                           



                          molecular test.                           



                          Invalid: Please                           



                          collect a new specimen                           



                          for repeat patient                           



                          testing if clinically                           



                          indicated.                             

 

             Lab Interpretation Normal                                 



             (test code = 34398-5)                                        



[facility]





             Test Item    Value        Reference Range Interpretation Comments

 

             SURG (test code = --------------------------                       

    



             SURG)        --------------------------                           



                          --------------------------                           



                          --------------RUN DATE:                           



                          21                               



                          [facility]                           



                                             PAGE 1                           



                           RUN TIME: 1544                           



                                          Specimen                           



                          Inquiry                                



                          RUN USER: INTERFACE                           



                                                                 



                                                                 



                          --------------------------                           



                          --------------------------                           



                          --------------------------                           



                          --------------PATIENT:                           



                          RONA NELSON                           



                             ACCT #: [id number]                           



                          LOC:  L.DSU      U #:                           



                          [id number]                             



                                                                 



                          AGE/SX: 60/F         ROOM:                           



                                     RE/05/21REG DR:                           



                          Jackson Barba MD                           



                             :    60                           



                          BED:             DIS:                           



                                                                 



                                         STATUS: [facility]      TLOC:                           



                          --------------------------                           



                          --------------------------                           



                          --------------------------                           



                          -------------- SPEC #:                           



                          [id number]         RECD:                           



                               STATUS:                           



                          JENNIFERJUAN           REQ #:                           



                          [id number]                               



                                  ISAAK:                           



                               [facility] DR:                           



                          Jackson Barba MD                           



                              ENTERED:                           



                              SP TYPE:                           



                          SURG           OTHR DR:                           



                          Shell Patel MD                           



                             ORDERED:  SURG PATH LVL                           



                          5                                      



                                                                 



                                         COPIES TO:                           



                           Shell Patel MD                           



                          [address] [place] [address] [phone]    Jackson Barba MD   [address] [address] [address] [phone] HISTOLOGY:                           



                          TISSUE          ID    BLK                           



                           PCS EARLINE LEV PROCEDURE                           



                            DISPOSITION                           



                          _______________ ____                           



                          ______ ___ ___ ___                           



                          _______________                           



                          ___________   URINARY                           



                          BLADDER A      1                           



                             2                                   



                          PROCEDURES: SURG PATH LVL                           



                          5 () TISSUES:                           



                                    A. URINARY                           



                          BLADDER, NOS - BLADDER                           



                          MASS BIOPSY                            



                          CLINICAL HISTORY                           



                          HEMATURIA - R31.29                           



                             CPT CODES    CPT                           



                          CODE(S): 66599      ,                           



                               ,            ,                           



                             ,           ,                           



                           ,         FINAL DIAGNOSIS                           



                             Urinary bladder,                           



                          biopsy:       URINARY                           



                          PAPILLOMA        GROSS                           



                          DESCRIPTION    Bladder                           



                          mass biopsy.  Received in                           



                          formalin are nine                           



                          fragments of soft pink-tan                           



                            tissue, together                           



                          measuring 1.3 x 0.9 x 0.4                           



                          cm.  The entire specimen                           



                          is   submitted as A.                           



                          bk/nr       Grossing                           



                          performed at [facility]                           



                          Pathology, [address] [place] [address], [address],                           



                                                                 



                             ** CONTINUED ON NEXT                           



                          PAGE **                                



                          --------------------------                           



                          --------------------------                           



                          --------------------------                           



                          --------------RUN DATE:                           



                          21                               



                          [facility]                           



                                             PAGE 2                           



                           RUN TIME: 1544                           



                                          Specimen                           



                          Inquiry                                



                          RUN USER: INTERFACE                           



                                                                 



                                                                 



                          --------------------------                           



                          --------------------------                           



                          --------------------------                           



                          --------------SPEC #:                           



                          [facility]:[id number]        PATIENT:                           



                          RONA NELSON                           



                              #[id number]                           



                          (Continued)---------------                           



                          --------------------------                           



                          --------------------------                           



                          -------------------------                           



                                GROSS DESCRIPTION                           



                                (Continued)                           



                          [place], [place] [address].                           



                          Medical Director: Dylon Brar M.D.                           



                          MICROSCOPIC DESCRIPTION                           



                          Bladder mass biopsy.                           



                          Sections a mixture of                           



                          urothelial and squamous                           



                          mucosa.  The   biopsy                           



                          demonstrates a papillary                           



                          appearance.  No dysplasia                           



                          or malignancy is                           



                          identified.  These                           



                          findings are consistent                           



                          with a urinary                           



                          papilloma.----------------                           



                          --------------------------                           



                          --------------------------                           



                          ------------------------                           



                          Signed SIGNATURE ON FILE                           



                                                                 



                          Errol Brand 21                           



                          1543                                   



                          --------------------------                           



                          --------------------------                           



                          --------------------------                           



                          --------------                           



                                                                 



                                                                 



                               ** END OF REPORT **                           



COVID 19 INHOUSE [id number] 08:21:00





             Test Item    Value        Reference Range Interpretation Comments

 

             COVID 19 INHOUSE AG NEGATIVE     Negative                  Per manu

facturer,



             (test code =                                        negative result

s should



             [id number])                                        be treated aspr

esumptive



                                                                 and, if inconsi

stent with



                                                                 clinical signs



                                                                 andsymptoms or 

necessary



                                                                 for patient man

agement,



                                                                 should betested

 with an



                                                                 alternative mol

ecular



                                                                 assay. Negative

 resultsdo



                                                                 not preclude SA

RS-CoV-2



                                                                 infection and s

hould not



                                                                 be usedas the s

ole basis



                                                                 for patient man

agement



                                                                 decisions.  Neg

ative



                                                                 results should 

be



                                                                 considered in t

he context



                                                                 of apatient's r

ecent



                                                                 exposures, hist

ory,



                                                                 presence of cli

nicalsigns



                                                                 and symptoms co

nsistent



                                                                 with COVID-19.



- XR CHEST 1 -19-77 08:21:00
********************************[facility]****[facility]        : 1960      
 Sex: F************************************************************ Name: 
RONA NELSON Dovray                      : 0
1960 Age/S: 60  / F         [id number]              Unit #: 
[id number]     Loc:      [place] [address]                Phys: Jackson Barba MD             Acct: [id number]  Dis Date:               Status: REG SDC    
PHONE #: [phone]     Exam Date: 2021  08                     FAX #:
Reason: SURGERY                                            EXAMS:            
CPT:           [id number] XR CHEST 1 V                               [id number]       
     Fluoro Time:            DAP (Gy m2):          Air Kerma (mGy):             
Single View Chest.     Location: S17                Clinical Indication: 
60-year-old with surgery               Comparison: None               Findings: 
An AP view of the chest was obtained. There is minimal       linear atelectasis 
or scar of the right costophrenic angle. Lungs are       otherwise clear. Heart 
size isnormal. No acute osseous abnormality. A       remote right ninth rib 
fracture is present.     Impression:                    Minimal right basilar 
atelectasis or scar.                    **Electronically Signed by SILVIA Humphrey **            **             on 2021 at 0821      **              
       Reported and signed by: Krystal Humphrey M.D.CC: Shell Patel MD; 
Jackson Barba MD                                        PAGE  1                  
    Signed Report               Name: RONA NELSON          [facility]                      : 1960 Age/S: 60  / F         [id number]              Unit #: [id number]     Loc:  [place] [address]       
        Phys: Jackson Barba MD         Acct: [id number]  Dis Date:            
  [phone]     Exam Date: 2021  08          
          FAX #:                  Reason: SURGERY                               
            EXAMS:        CPT:           [id number] XR CHEST 1 V                 
             [id number]             FluoroTime:            DAP (Gy m2):          Air 
Kerma (mGy):         &lt;Continued&gt; Technologist: Janis Lee RT(R)    
                           Trnscb Date/Time: 2021 (821) t.[id number]       
          Orig Print D/T: S: 2021 (0852)                                  
          PAGE  2                       Signed ReportBASIC METABOLIC PANEL
2021 07:51:00





             Test Item    Value        Reference Range Interpretation Comments

 

             SODIUM (test code = NA) 129 mmol/L   134-147      L            

 

             POTASSIUM (test code = 4.7 mmol/L   3.4-5.0      N            



             K)                                                  

 

             CHLORIDE (test code = 99 mmol/L    100-108      L            



             CL)                                                 

 

             CARBON DIOXIDE (test 24 mmol/L    21-32        N            



             code = CO2)                                         

 

             ANION GAP (test code = 6.0 GAP calc 4.0-15.0     N            



             GAP)                                                

 

             GLUCOSE (test code = 80 MG/DL            N            



             GLU)                                                

 

             BLOOD UREA NITROGEN 8 MG/DL      7-18         N            



             (test code = BUN)                                        

 

             GLOMERULAR FILTRATION >=60 max estimate >60                       



             RATE (test code = GFR) estGFR                                 

 

             CREATININE (test code = 0.5 MG/DL    0.6-1.0      L            



             CREAT)                                              

 

             CALCIUM (test code = CA) 8.8 MG/DL    8.5-10.1     N            



BASIC METABOLIC [id number] 15:40:00





             Test Item    Value        Reference Range Interpretation Comments

 

             SODIUM (test code = NA) 125 mmol/L   134-147      L            

 

             POTASSIUM (test code = 4.3 mmol/L   3.4-5.0      N            



             K)                                                  

 

             CHLORIDE (test code = 95 mmol/L    100-108      L            



             CL)                                                 

 

             CARBON DIOXIDE (test 23 mmol/L    21-32        N            



             code = CO2)                                         

 

             ANION GAP (test code = 7.0 GAP calc 4.0-15.0     N            



             GAP)                                                

 

             GLUCOSE (test code = 74 MG/DL            N            



             GLU)                                                

 

             BLOOD UREA NITROGEN 9 MG/DL      7-18         N            



             (test code = BUN)                                        

 

             GLOMERULAR FILTRATION >=60 max estimate >60                       



             RATE (test code = GFR) estGFR                                 

 

             CREATININE (test code = 0.5 MG/DL    0.6-1.0      L            



             CREAT)                                              

 

             CALCIUM (test code = CA) 9.0 MG/DL    8.5-10.1     N            



PROTHROMBIN [id number] 14:15:00





             Test Item    Value        Reference Range Interpretation Comments

 

             PT PATIENT (test code = PTP) 9.5 SECONDS  9.3-12.9     N           

 

 

             INTERNATIONAL NORMAL RATIO 0.84 INR Unit 0.8-1.2      N            



             (test code = INR)                                        



THROMBOPLASTIN TIME [id number] 14:15:00





             Test Item    Value        Reference Range Interpretation Comments

 

             THROMBOPLASTIN TIME PARTIAL 29.5 SECONDS 26-35        N            



             (test code = PTT)                                        



CBC W/AUTO [id number] 14:03:00





             Test Item    Value        Reference Range Interpretation Comments

 

             WHITE BLOOD CELL (test code = 10.2 K/mm3   3.5-11.0     N          

  



             WBC)                                                

 

             RED BLOOD CELL (test code = 4.46 M/mm3   4.70-6.10    L            



             RBC)                                                

 

             HEMOGLOBIN (test code = HGB) 14.9 G/DL    10.4-14.9    N           

 

 

             HEMATOCRIT (test code = HCT) 43.6 %       31.5-44.1    N           

 

 

             MEAN CELL VOLUME (test code = 97.8 Fl      84.5-98.6    N          

  



             MCV)                                                

 

             MEAN CELL HGB (test code = MCH) 33.4 pg      27.0-34.2    N        

    

 

             MEAN CELL HGB CONCETRATION 34.2 G/DL    31.5-34.0    H            



             (test code = MCHC)                                        

 

             RED CELL DISTRIBUTION WIDTH 14.4 SD      11.5-14.5    N            



             (test code = RDW)                                        

 

             PLATELET COUNT (test code = 329 K/mm3    150-450      N            



             PLT)                                                

 

             MEAN PLATELET VOLUME (test code 9.20 fL      7.0-10.5     N        

    



             = MPV)                                              

 

             NEUTROPHIL % (test code = NT%) 62.8 %       40-76        N         

   

 

             IMMATURE GRANULOCYTE % (test 0.4 %        0.0-5.0      N           

 



             code = IG%)                                         

 

             LYMPHOCYTE % (test code = LY%) 25.1 %       20.5-51.1    N         

   

 

             MONOCYTE % (test code = MO%) 10.0 %       1.7-9.3      H           

 

 

             EOSINOPHIL % (test code = EO%) 1.4 %        0.0-6.0      N         

   

 

             BASOPHIL % (test code = BA%) 0.3 %        0.0-2.0      N           

 

 

             NUCLEATED RBC % (test code = 0.0 /100WBC% 0.0-1.0      N           

 



             NRBC%)                                              

 

             NEUTROPHIL # (test code = NT#) 6.4 K/mm3    1.8-7.6      N         

   

 

             IMMATURE GRANULOCYTE # (test 0.04 x10 3/uL 0.00-0.03    H          

  



             code = IG#)                                         

 

             LYMPHOCYTE # (test code = LY#) 2.6 K/mm3    0.6-3.2      N         

   

 

             MONOCYTE # (test code = MO#) 1.0 K/mm3    0.3-1.1      N           

 

 

             EOSINOPHIL # (test code = EO#) 0.1 K/mm3    0.0-0.4      N         

   

 

             BASOPHIL # (test code = BA#) 0.0 K/mm3    0.0-0.1      N           

 

 

             NUCLEATED RBC # (test code = 0.0 K/mm3    0.0-0.1      N           

 



             NRBC#)                                              

 

             MANUAL DIFF REQUIRED (test code NO DIFF/SCN  CRITERIA              

    



             = MDIFF)                                            



CT Head W/O [id number] 14:07:21 No acute intracranial hemorrhage or mass
effect. 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 
andtemporal horns of the lateral ventricles. Partially empty sella. Preliminary
Report Dictated by Resident: Mac Coy MD., have 
reviewed this study and agree with theabove report.CT HEAD WO CONTRAST HISTORY: 
Seizure. COMPARISON: CT head without contrast 2019 TECHNIQUE: ?Noncontrast
CT imaging of the head was performed and coronaland sagittal reconstructions 
were obtained and reviewed. FINDINGS: Agenesis of the corpus callosum is noted. 
Asymmetric dilatation of theposterior body, occipital and temporal horns of the 
lateral ventricle,similar to prior. The cerebral sulci are normal in caliber 
andconfiguration. No midline shift or pathologicalextra-axial fluidcollection is
present. The basal cisterns are unremarkable. There is no acute intracranial 
hemorrhage or significant mass effect. Noparenchymal attenuation abnormality. 
The gray-white matter differentiationis preserved. Partially empty sella is 
seen. The mastoid air cells and paranasal air sinuses are clear. 
Groundglassappearance of the skull is noted, might be related to metabolic ?b
onedisease. The calvarium and central skull base are intact. High riding right 
jugular bulb. [facility], Radiant Results Inft User - 2020  8:08 AM CSTCT HEAD 
WO CONTRASTHISTORY: Seizure.COMPARISON: CT head without contrast 
2019TECHNIQUE:  Noncontrast CT imaging of the head was performed and coron
[place] sagittal reconstructions were obtained and reviewed.FINDINGS:Agenesis of 
the corpus callosum is noted. Asymmetric dilatation of theposterior body, 
occipital and temporal horns of the lateral ventricle,similar to prior. The 
cerebral sulci are normal in caliber andconfiguration. No midline shift or 
pathological extra-axial fluidcollection is present. The basal cisterns are 
unremarkable.There is no acute intracranial hemorrhage or significant mass 
effect. Noparenchymal attenuation abnormality. The gray-white matter 
differentiationis preserved. Partially empty sella is seen.The mastoid air cells
and paranasal air sinuses are clear. Groundglassappearance of the skull is 
noted, might be related tometabolic  bonedisease. The calvarium and central 
skull base are intact.High riding right jugular bulb.IMPRESSIONNo acute 
intracranial hemorrhage or mass effect.Corpus callosal agenesis and dilation of
the posterior body, occipital, andtemporal horns of the lateral 
ventricles.Partially empty sella.Preliminary Report Dictated by Resident: Mac Santana MD., have reviewed thisstudy and agree with 
theabove report.[facility] [id number] 
13:55:00





             Test Item    Value        Reference Range Interpretation Comments

 

             PHENY FREE (test code 1.1 ug/mL    1-2                       



             = [id number])                                        

 

             KAYLIN (test code = KAYLIN) Toxic Range: ? ? ? ?                         

  



                          Greater than 2.5 ug/mL                           



                           Test developed and                           



                          characteristics                           



                          determined by [facility]                           



                          Laboratory Services.                           

 

             Lab Interpretation Normal                                 



             (test code = 47746-9)                                        



[facility] WITH [id number] 12:39:00





             Test Item    Value        Reference Range Interpretation Comments

 

             WBC (test code =              See_Comment  H             [Automated



             0390-2)                                             message] The



                                                                 system which



                                                                 generated this



                                                                 result transmit

christin



                                                                 reference range

:



                                                                 4.30 - 11.10



                                                                 10*3/?L. The



                                                                 reference range



                                                                 was not used to



                                                                 interpret this



                                                                 result as



                                                                 normal/abnormal

.

 

             RBC (test code =              See_Comment                [Automated



             250-8)                                              message] The



                                                                 system which



                                                                 generated this



                                                                 result transmit

christin



                                                                 reference range

:



                                                                 3.93 - 5.25



                                                                 10*6/?L. The



                                                                 reference range



                                                                 was not used to



                                                                 interpret this



                                                                 result as



                                                                 normal/abnormal

.

 

             HGB (test code = 13.3 g/dL    11.6-15                   



             718-7)                                              

 

             HCT (test code = 37.1 %       35.7-45.2                 



             4544-3)                                             

 

             MCV (test code = 93.7 fL      80.6-95.5                 



             787-2)                                              

 

             MCH (test code = 33.6 pg      25.9-32.8    H            



             785-6)                                              

 

             MCHC (test code = 35.8 g/dL    31.6-35.1    H            



             786-4)                                              

 

             RDW-SD (test code = 45.0 fL      39-49.9                   



             38593-7)                                            

 

             RDW-CV (test code = 13.2 %       12-15.5                   



             788-0)                                              

 

             PLT (test code =              See_Comment                [Automated



             777-3)                                              message] The



                                                                 system which



                                                                 generated this



                                                                 result transmit

christin



                                                                 reference range

:



                                                                 166 - 358 10*3/

?L.



                                                                 The reference



                                                                 range was not u

sed



                                                                 to interpret th

is



                                                                 result as



                                                                 normal/abnormal

.

 

             MPV (test code = 9.4 fL       9.5-12.9     L            



             24616-2)                                            

 

             NRBC/100 WBC (test              See_Comment                [Automat

ed



             code = [id number])                                        message] 

The



                                                                 system which



                                                                 generated this



                                                                 result transmit

christin



                                                                 reference range

:



                                                                 0.0 - 10.0 /100



                                                                 WBCs. The



                                                                 reference range



                                                                 was not used to



                                                                 interpret this



                                                                 result as



                                                                 normal/abnormal

.

 

             NRBC x10^3 (test code <0.01        See_Comment                [Auto

mated



             = [id number])                                        message] The



                                                                 system which



                                                                 generated this



                                                                 result transmit

christin



                                                                 reference range

:



                                                                 10*3/?L. The



                                                                 reference range



                                                                 was not used to



                                                                 interpret this



                                                                 result as



                                                                 normal/abnormal

.

 

             GRAN MAT (NEUT) % 84.1 %                                 



             (test code = 770-8)                                        

 

             IMM GRAN % (test code 0.50 %                                 



             = 0313468670)                                        

 

             LYMPH % (test code = 7.1 %                                  



             736-9)                                              

 

             MONO % (test code = 8.0 %                                  



             5905-5)                                             

 

             EOS % (test code = 0.1 %                                  



             713-8)                                              

 

             BASO % (test code = 0.2 %                                  



             706-2)                                              

 

             GRAN MAT x10^3(ANC) 18.51 10*3/uL 1.88-7.09    H            



             (test code =                                        



             9294001111)                                         

 

             IMM GRAN x10^3 (test 0.12 10*3/uL 0-0.06       H            



             code = 1768030751)                                        

 

             LYMPH x10^3 (test code 1.56 10*3/uL 1.32-3.29                 



             = 731-0)                                            

 

             MONO x10^3 (test code 1.75 10*3/uL 0.33-0.92    H            



             = 742-7)                                            

 

             EOS x10^3 (test code = <0.03        0.03-0.39    L            



             711-2)                                              

 

             BASO x10^3 (test code 0.05 10*3/uL 0.01-0.07                 



             = 704-7)                                            

 

             JAYCEE CELLS (test code 2+           See_Comment  A             [Auto

mated



             = 2190-9)                                           message] The



                                                                 system which



                                                                 generated this



                                                                 result transmit

christin



                                                                 reference range

:



                                                                 (none). The



                                                                 reference range



                                                                 was not used to



                                                                 interpret this



                                                                 result as



                                                                 normal/abnormal

.

 

             Lab Interpretation Abnormal                               



             (test code = 59633-2)                                        



[id number]





             Test Item    Value        Reference Range Interpretation Comments

 

             OSMOLALITY (test code =              See_Comment  L             [Au

tomated message]



             8835169123)                                         The system [employer]



                                                                 generated this 

result



                                                                 transmitted ref

erence



                                                                 range: 278 - 30

5



                                                                 mOsm/kg. The



                                                                 reference range

 was



                                                                 not used to int

erpret



                                                                 this result as



                                                                 normal/abnormal

.

 

             Lab Interpretation (test Abnormal                               



             code = 72481-6)                                        



[facility] [id number] 
12:09:00





             Test Item    Value        Reference Range Interpretation Comments

 

             ESR (test code =              See_Comment                [Automated

 message]



             0773280379)                                         The system [employer]



                                                                 generated this 

result



                                                                 transmitted ref

erence



                                                                 range: 0 - 20 m

m/HR.



                                                                 The reference r

xenia



                                                                 was not used to



                                                                 interpret this 

result



                                                                 as normal/abnor

mal.

 

             Lab Interpretation (test Normal                                 



             code = 32317-3)                                        



[facility] METABOLIC PANEL (NA, K, CL, CO2, 
GLUCOSE, BUN, CREATININE, CA)2020 11:24:00





             Test Item    Value        Reference Range Interpretation Comments

 

             NA (test code = 128 mmol/L   135-145      L            



             3906824861)                                         

 

             K (test code = 3.3 mmol/L   3.5-5        L            



             3807180859)                                         

 

             CL (test code = 99 mmol/L                        



             1986042444)                                         

 

             CO2 TOTAL (test code = 16 mmol/L    23-31        L            



             6018928188)                                         

 

             AGAP (test code =              2-16                      



             1469308498)                                         

 

             BUN (test code = 8 mg/dL      7-23                      



             0215854756)                                         

 

             GLUCOSE (test code = 76 mg/dL                         



             3876945207)                                         

 

             CREATININE (test code = 0.35 mg/dL   0.5-1.04     L            



             2831233498)                                         

 

             CALCIUM (test code = 8.4 mg/dL    8.6-10.6     L            



             7328102405)                                         

 

             eGFR Calculation              mL/min/1.73m2              



             (Non-)                                        



             (test code =                                        



             7282243565)                                         

 

             eGFR Calculation              mL/min/1.73m2              



             (African American)                                        



             (test code =                                        



             6272427911)                                         

 

             KAYLIN (test code = KAYLIN) Association of                           



                          Glomerular Filtration                           



                          Rate (GFR) and Staging                           



                          of Kidney Disease*                           



                          +---------------------                           



                          --+-------------------                           



                          --+-------------------                           



                          ------+| GFR                           



                          (mL/min/1.73 m2) ?|                           



                          With Kidney Damage ?|                           



                          ?Without Kidney                           



                          Damage+---------------                           



                          --------+-------------                           



                          --------+-------------                           



                          ------------+| ?>90 ?                           



                          ? ? ? ? ? ? ? ?|                           



                          ?Stage one ? ? ? ? ?|                           



                          ? Normal ? ? ? ? ? ? ?                           



                          ?+--------------------                           



                          ---+------------------                           



                          ---+------------------                           



                          -------+| ?60-89 ? ? ?                           



                          ? ? ? ? ?| ?Stage two                           



                          ? ? ? ? ?| ? Decreased                           



                          GFR ? ? ? ?                            



                          +---------------------                           



                          --+-------------------                           



                          --+-------------------                           



                          ------+| ?30-59 ? ? ?                           



                          ? ? ? ? ?| ?Stage                           



                          three ? ? ? ?| ? Stage                           



                          three ? ? ? ? ?                           



                          +---------------------                           



                          --+-------------------                           



                          --+-------------------                           



                          ------+| ?15-29 ? ? ?                           



                          ? ? ? ? ?| ?Stage four                           



                          ? ? ? ? | ? Stage four                           



                          ? ? ? ? ?                              



                          ?+--------------------                           



                          ---+------------------                           



                          ---+------------------                           



                          -------+| ?<15 (or                           



                          dialysis) ? ?| ?Stage                           



                          five ? ? ? ? | ? Stage                           



                          five ? ? ? ? ?                           



                          ?+--------------------                           



                          ---+------------------                           



                          ---+------------------                           



                          -------+ *Each stage                           



                          assumes the associated                           



                          GFR level has been in                           



                          effect for at least                           



                          three months. ?Stages                           



                          1 to 5, with or                           



                          without kidney                           



                          disease, indicate                           



                          chronic kidney                           



                          disease. Notes:                           



                          Determination of                           



                          stages one and two                           



                          (with eGFR                             



                          >59mL/min/1.73 m2)                           



                          requires estimation of                           



                          kidney damage for at                           



                          least three months as                           



                          defined by structural                           



                          or functional                           



                          abnormalities of the                           



                          kidney, manifested by                           



                          either:Pathological                           



                          abnormalities or                           



                          Markers of kidney                           



                          damage (including                           



                          abnormalities in the                           



                          composition of the                           



                          blood or urine or                           



                          abnormalities in                           



                          imaging tests).                           

 

             Lab Interpretation Abnormal                               



             (test code = 00096-5)                                        



[facility]IMMUNOASSAY[facility] - 
COMPREHENSIVE DRUG [id number] 05:25:00





             Test Item    Value        Reference Range Interpretation Comments

 

             AMPHET (test code = Negative     Negative                  



             6457780162)                                         

 

             ALYCIA U (test code = Negative     Negative                  



             7133879149)                                         

 

             BENZO U (test code = Negative     Negative                  



             9622049815)                                         

 

             Cocaine Metabolite (test Negative     Negative                  



             code = 0335817963)                                        

 

             METHADONE (test code = Negative     Negative                  



             9026890115)                                         

 

             OPIATES (test code = Negative     Negative                  



             2819690937)                                         

 

             PCP (test code = Negative     Negative                  



             5246664087)                                         

 

             THC (test code = Presumptive Positive Negative     A            



             9986936345)                                         

 

             KAYLIN (test code = KAYLIN) Urine Drug Cutoff                           



                          Ranges Cocaine: ? ? ?                           



                          ? ? ? 150                              



                          ng/mLBenzodiazepines:                           



                          ? ? 200                                



                          ng/mLMethadone: ? ? ?                           



                          ? ? 300                                



                          ng/mLAmphetamine: ? ?                           



                          ? ? 1,000                              



                          ng/mLOpiates: ? ? ? ?                           



                          ? ? 300                                



                          ng/mLCannabinoids: ?                           



                          ? ? ?50                                



                          ng/mLPhencyclidine: ?                           



                          ? ? 25                                 



                          ng/mLBarbiturates: ?                           



                          ? ? ?200 ng/mL  The                           



                          results are to be                           



                          used only for medical                           



                          (i.e., treatment)                           



                          purposes. Unconfirmed                           



                          screening results                           



                          must not be used for                           



                          non-medical purposes                           



                          (e.g., employment                           



                          testing, legal                           



                          testing).                              

 

             Lab Interpretation (test Abnormal                               



             code = 77230-8)                                        



[facility] URINE [id number] 05:04:00





             Test Item    Value        Reference Range Interpretation Comments

 

             CREAT U (test code = 2209233279) 29.2 mg/dL                        

     



[facility] Center[facility]UM, URINE [id number] 05:04:00





             Test Item    Value        Reference Range Interpretation Comments

 

             NA URINE (test code = 9901748851) 103 mmol/L                       

      



[facility] Center[facility] NITROGEN, URINE [id number] 05:04:00





             Test Item    Value        Reference Range Interpretation Comments

 

             UREA N UR (test code = 6132097006) 324 mg/dL                       

       



[facility]MOL[facility]ITY [id number] 04:57:00





             Test Item    Value        Reference Range Interpretation Comments

 

             OSMO U (test code =              See_Comment                [Automa

christin message]



             0183350901)                                         The system [employer]

h



                                                                 generated this 

result



                                                                 transmitted ref

erence



                                                                 range: 50-1,100



                                                                 mOsm/kg. The re

ference



                                                                 range was not u

sed to



                                                                 interpret this 

result



                                                                 as normal/abnor

mal.

 

             Lab Interpretation (test Normal                                 



             code = 67897-4)                                        



[facility] 1 [id number] 23:40:22 No acute 
cardiopulmonary process. Bibasilar atelectasis. Preliminary Report Dictated by 
Resident: Sherif Morgan MD., have reviewed this study and
agree with theabove report.PROCEDURE: XR CHEST 1 VW CLINICAL INDICATION: sob  
TECHNIQUE: Frontal chest radiograph was obtained. COMPARISON: 2019 
FINDINGS: The lungs show no acute infiltrate. There is bibasilar atelectasis 
(leftslightly worse than right). No pleural effusion or pneumothorax is seen.  
The heart is normal in size.A coronary stent is noted. No acute bony abnormality
is noted. Moderate right posterior ninth ribfracture. [facility], Radiant Results Inft
User - 2020  5:41 PM CSTPROCEDURE: XR CHEST 1 VWCLINICAL INDICATION: sob 
TECHNIQUE: Frontal chest radiograph was obtained.COMPARISON: 
2019FINDINGS:The lungs show no acute infiltrate. There is bibasilar 
atelectasis (leftslightly worse than right). No pleural effusion or pneumothorax
is seen. The heart is normal in size. A coronary stent is noted.No acute bony 
abnormality is noted. Moderate right posterior ninth ribfracture.IMPRESSIONNo 
acute cardiopulmonaryprocess. Bibasilar atelectasis.Preliminary Report Dictated 
by Resident: Sherif Shelley MD., have reviewed this study 
and agree with theabove report.[facility] Center[facility] + 
LACTIC [id number] 23:02:00





             Test Item    Value        Reference Range Interpretation Comments

 

             PH (test code =              7.32-7.42                 



             7399772000)                                         

 

             PCO2 CARLOS ENRIQUE (test code =              See_Comment  L             [Auto

mated



             7371168018)                                         message] The sy

stem



                                                                 which generated



                                                                 this result



                                                                 transmitted



                                                                 reference range

: 41



                                                                 - 51 mmHg. The



                                                                 reference range

 was



                                                                 not used to



                                                                 interpret this



                                                                 result as



                                                                 normal/abnormal

.

 

             PO2 CARLOS ENRIQUE (test code =              See_Comment                [Autom

ated



             6136294882)                                         message] The sy

stem



                                                                 which generated



                                                                 this result



                                                                 transmitted



                                                                 reference range

: 25



                                                                 - 40 mmHg. The



                                                                 reference range

 was



                                                                 not used to



                                                                 interpret this



                                                                 result as



                                                                 normal/abnormal

.

 

             HCO3 CARLOS ENRIQUE (test code =              See_Comment  L             [Auto

mated



             7854350976)                                         message] The sy

stem



                                                                 which generated



                                                                 this result



                                                                 transmitted



                                                                 reference range

: 24



                                                                 - 28 mEq/L. The



                                                                 reference range

 was



                                                                 not used to



                                                                 interpret this



                                                                 result as



                                                                 normal/abnormal

.

 

             AC VBE(BEAKER) (test              mEq/L                     



             code = 4089164440)                                        

 

             THB CARLOS ENRIQUE (test code = 15.4 g/dL    12-16                     



             5130650207)                                         

 

             %O2HB CARLOS ENRIQUE (test code = 66.0 %       52-63        H            



             9010734302)                                         

 

             %COHB CARLOS ENRIQUE (test code = 2.6 %        0-1.5        H            



             8078095560)                                         

 

             %METHB CARLOS ENRIQUE (test code = 0.3 %        0.4-1.5      L            



             5630271018)                                         

 

             VOL%O2 CARLOS ENRIQUE (test code = 14.2 %       6-12         H            



             7816126790)                                         

 

             NA (test code = 130 mmol/L   135-145      L            



             1803791278)                                         

 

             K+ (test code = 4.5 mmol/L   3.5-5                     



             5408089064)                                         

 

             AC CA IONZ (test code = 4.10 mg/dL   4.5-5.3      L            



             5732199937)                                         

 

             GLUCOSE (test code = 131 mg/dL           H            



             7273590722)                                         

 

             LACTIC ACID (test code 3.39 mmol/L                            



             = 6562173741)                                        

 

             Lab Interpretation Abnormal                               



             (test code = 10300-9)                                        



[facility]





             Test Item    Value        Reference Range Interpretation Comments

 

             PHENYTOIN (test code = 10.5 ug/mL   10-20                     



             9701280779)                                         

 

             KAYLIN (test code = KAYLIN) Toxic Range: ? ? ?                           



                          ? ? ? ? ? 0-3                           



                          Months ? ? ? ? ? ?                           



                          ? Greater than 14                           



                          ug/mL ? ? 3 Months                           



                          - 150 Years ? ?                           



                          Greater than 20                           



                          ug/mL                                  

 

             Lab Interpretation (test Normal                                 



             code = 80618-9)                                        



[facility] 
22:07:00





             Test Item    Value        Reference Range Interpretation Comments

 

             SARS-CoV-2 Rapid ID NOW Not Detected Not Detected              



             (test code = 46607-3)                                        

 

             KAYLIN (test code = KAYLIN) ID NOW COVID-19 Assay                        

   



                          is an isothermal                           



                          nucleic acid                           



                          amplification test                           



                          intended for the                           



                          qualitative detection                           



                          of nucleic acid from                           



                          SARS-CoV-2 viral RNA                           



                          in nasopharyngeal (NP)                           



                          specimens. It is used                           



                          under Emergency Use                           



                          Authorization (EUA) by                           



                          FDA. The limit of                           



                          detection (LOD) of the                           



                          assay is 125 Genome                           



                          Equivalents/mL. A                           



                          positive result is                           



                          indicative of the                           



                          presence of SARS-CoV-2                           



                          RNA. ?Clinical                           



                          correlation with                           



                          patient history and                           



                          other diagnostic                           



                          information is                           



                          necessary to determine                           



                          patient infection                           



                          status. A negative                           



                          (Not Detected) result                           



                          does not preclude                           



                          SARS-CoV-2 infection.                           



                          In patients with                           



                          clinical symptoms and                           



                          other tests that are                           



                          consistent with                           



                          SARS-CoV-2 infection,                           



                          negative results                           



                          should be treated as                           



                          presumptive negative                           



                          and a new specimen                           



                          should be tested with                           



                          alternative PCR                           



                          molecular test.                           



                          Invalid: Please                           



                          collect a new specimen                           



                          for repeat patient                           



                          testing if clinically                           



                          indicated.                             

 

             Lab Interpretation Normal                                 



             (test code = 27560-9)                                        



[facility]ALB, T.PRO, BILI T, 
BU/BC, ALT, AST, ALK PHOS)2020 21:54:00





             Test Item    Value        Reference Range Interpretation Comments

 

             TOTAL BILI (test code = 0814175118) 0.5 mg/dL    0.1-1.1           

        

 

             BILI UNCON (test code = 1779624854) 0.4 mg/dL    0.1-1.1           

        

 

             BILI CONJ (test code = 2555125266) 0.0 mg/dL    0-0.3              

       

 

             T PROTEIN (test code = 0002750850) 7.6 g/dL     6.3-8.2            

       

 

             ALBUMIN (test code = 8388209490) 4.4 g/dL     3.5-5                

     

 

             ALK PHOS (test code = 0470438339) 171 U/L             H      

      

 

             ALTv (test code = 1742-6) 37 U/L       5-35         H            

 

             AST(SGOT) (test code = 3946632112) 90 U/L       13-40        H     

       

 

             Lab Interpretation (test code = Abnormal                           

    



             10716-8)                                            



[facility] (NA, K, CL, CO2, 
GLUCOSE, BUN, CREATININE, CA)2020 21:47:00





             Test Item    Value        Reference Range Interpretation Comments

 

             NA (test code = 126 mmol/L   135-145      L            



             0372226748)                                         

 

             K (test code = 3.6 mmol/L   3.5-5                     



             9469212007)                                         

 

             CL (test code = 94 mmol/L           L            



             4826680841)                                         

 

             CO2 TOTAL (test code = 11 mmol/L    23-31        L            



             8474968877)                                         

 

             AGAP (test code =              2-16         H            



             0963532556)                                         

 

             BUN (test code = 13 mg/dL     7-23                      



             0460550177)                                         

 

             GLUCOSE (test code = 219 mg/dL           H            



             1091318943)                                         

 

             CREATININE (test code = 0.53 mg/dL   0.5-1.04                  



             5057446402)                                         

 

             CALCIUM (test code = 8.9 mg/dL    8.6-10.6                  



             8695642852)                                         

 

             eGFR Calculation              mL/min/1.73m2              



             (Non-)                                        



             (test code =                                        



             1358654224)                                         

 

             eGFR Calculation              mL/min/1.73m2              



             (African American)                                        



             (test code =                                        



             5770489996)                                         

 

             KAYLIN (test code = KAYLIN) Association of                           



                          Glomerular Filtration                           



                          Rate (GFR) and Staging                           



                          of Kidney Disease*                           



                          +---------------------                           



                          --+-------------------                           



                          --+-------------------                           



                          ------+| GFR                           



                          (mL/min/1.73 m2) ?|                           



                          With Kidney Damage ?|                           



                          ?Without Kidney                           



                          Damage+---------------                           



                          --------+-------------                           



                          --------+-------------                           



                          ------------+| ?>90 ?                           



                          ? ? ? ? ? ? ? ?|                           



                          ?Stage one ? ? ? ? ?|                           



                          ? Normal ? ? ? ? ? ? ?                           



                          ?+--------------------                           



                          ---+------------------                           



                          ---+------------------                           



                          -------+| ?60-89 ? ? ?                           



                          ? ? ? ? ?| ?Stage two                           



                          ? ? ? ? ?| ? Decreased                           



                          GFR ? ? ? ?                            



                          +---------------------                           



                          --+-------------------                           



                          --+-------------------                           



                          ------+| ?30-59 ? ? ?                           



                          ? ? ? ? ?| ?Stage                           



                          three ? ? ? ?| ? Stage                           



                          three ? ? ? ? ?                           



                          +---------------------                           



                          --+-------------------                           



                          --+-------------------                           



                          ------+| ?15-29 ? ? ?                           



                          ? ? ? ? ?| ?Stage four                           



                          ? ? ? ? | ? Stage four                           



                          ? ? ? ? ?                              



                          ?+--------------------                           



                          ---+------------------                           



                          ---+------------------                           



                          -------+| ?<15 (or                           



                          dialysis) ? ?| ?Stage                           



                          five ? ? ? ? | ? Stage                           



                          five ? ? ? ? ?                           



                          ?+--------------------                           



                          ---+------------------                           



                          ---+------------------                           



                          -------+ *Each stage                           



                          assumes the associated                           



                          GFR level has been in                           



                          effect for at least                           



                          three months. ?Stages                           



                          1 to 5, with or                           



                          without kidney                           



                          disease, indicate                           



                          chronic kidney                           



                          disease. Notes:                           



                          Determination of                           



                          stages one and two                           



                          (with eGFR                             



                          >59mL/min/1.73 m2)                           



                          requires estimation of                           



                          kidney damage for at                           



                          least three months as                           



                          defined by structural                           



                          or functional                           



                          abnormalities of the                           



                          kidney, manifested by                           



                          either:Pathological                           



                          abnormalities or                           



                          Markers of kidney                           



                          damage (including                           



                          abnormalities in the                           



                          composition of the                           



                          blood or urine or                           



                          abnormalities in                           



                          imaging tests).                           

 

             Lab Interpretation Abnormal                               



             (test code = 28680-0)                                        



[id number]47[facility]





             Test Item    Value        Reference Range Interpretation Comments

 

             LIPASE (test code = 3034213574) 122 U/L      0-220                 

    

 

             Lab Interpretation (test code = Normal                             

    



             13354-9)                                            



[id number] 21:47:00





             Test Item    Value        Reference Range Interpretation Comments

 

             CK (test code = 4609178206) 45 U/L                           

 

             Lab Interpretation (test code = Normal                             

    



             10337-0)                                            



[facility]





             Test Item    Value        Reference Range Interpretation Comments

 

             APPEARANCE (test code = Hazy         Clear        A            



             7695109623)                                         

 

             COLOR (test code = Yellow       Yellow                    



             5047534453)                                         

 

             PH (test code =              4.8-8.0                   



             3499137857)                                         

 

             SP GRAVITY (test code =              1.003-1.030               



             7629872258)                                         

 

             GLU U QUAL (test code = 50 mg/dL     Normal       A            



             9127127069)                                         

 

             BLOOD (test code = 1+           Negative     A            



             8004799383)                                         

 

             KETONES (test code = 5 mg/dL      Negative     A            



             3290019078)                                         

 

             PROTEIN (test code = 100 mg/dL    Negative     A            



             2887-8)                                             

 

             UROBILIN (test code = Normal       Normal                    



             6673156841)                                         

 

             BILIRUBIN (test code = Negative     Negative                  



             4609775641)                                         

 

             NITRITE (test code = Negative     Negative                  



             6584452249)                                         

 

             LEUK BI (test code = 500/uL       Negative     A            



             0173239624)                                         

 

             RBC/HPF (test code =              See_Comment                [Autom

ated message]



             7490985404)                                         The system [employer]



                                                                 generated this



                                                                 result transmit

christin



                                                                 reference range

: 0 -



                                                                 3 HPF. The refe

rence



                                                                 range was not u

sed



                                                                 to interpret th

is



                                                                 result as



                                                                 normal/abnormal

.

 

             WBC/HPF (test code =              See_Comment  H             [Autom

ated message]



             1050111800)                                         The system [employer] [facility]



                                                                 generated this



                                                                 result transmit

christin



                                                                 reference range

: 0 -



                                                                 5 HPF. The refe

rence



                                                                 range was not u

sed



                                                                 to interpret th

is



                                                                 result as



                                                                 normal/abnormal

.

 

             BACTERIA (test code = Few          Negative     A            



             6932890453)                                         

 

             SQ EPITH (test code = <1           HPF                       



             9443449825)                                         

 

             WBC CLUMPS (test code =              See_Comment  H             [Au

tomated message]



             5825726183)                                         The system [employer] [facility]



                                                                 generated this



                                                                 result transmit

christin



                                                                 reference range

: <=1



                                                                 HPF. The refere

nce



                                                                 range was not u

sed



                                                                 to interpret th

is



                                                                 result as



                                                                 normal/abnormal

.

 

             HYAL CAST (test code =              See_Comment                [Aut

omated message]



             9492658503)                                         The system [employer] [facility]



                                                                 generated this



                                                                 result transmit

christin



                                                                 reference range

: <=2



                                                                 LPF. The refere

nce



                                                                 range was not u

sed



                                                                 to interpret th

is



                                                                 result as



                                                                 normal/abnormal

.

 

             Lab Interpretation (test Abnormal                               



             code = 06881-4)                                        



[facility] with [id number] 21:24:00





             Test Item    Value        Reference Range Interpretation Comments

 

             WBC (test code =              See_Comment  H             [Automated



             6690-2)                                             message] The



                                                                 system which



                                                                 generated this



                                                                 result transmit

christin



                                                                 reference range

:



                                                                 4.30 - 11.10



                                                                 10*3/?L. The



                                                                 reference range



                                                                 was not used to



                                                                 interpret this



                                                                 result as



                                                                 normal/abnormal

.

 

             RBC (test code =              See_Comment                [Automated



             789-8)                                              message] The



                                                                 system which



                                                                 generated this



                                                                 result transmit

christin



                                                                 reference range

:



                                                                 3.93 - 5.25



                                                                 10*6/?L. The



                                                                 reference range



                                                                 was not used to



                                                                 interpret this



                                                                 result as



                                                                 normal/abnormal

.

 

             HGB (test code = 14.0 g/dL    11.6-15                   



             718-7)                                              

 

             HCT (test code = 39.9 %       35.7-45.2                 



             4544-3)                                             

 

             MCV (test code = 95.9 fL      80.6-95.5    H            



             787-2)                                              

 

             MCH (test code = 33.7 pg      25.9-32.8    H            



             785-6)                                              

 

             MCHC (test code = 35.1 g/dL    31.6-35.1                 



             786-4)                                              

 

             RDW-SD (test code = 47.7 fL      39-49.9                   



             13801-5)                                            

 

             RDW-CV (test code = 13.5 %       12-15.5                   



             788-0)                                              

 

             PLT (test code =              See_Comment  H             [Automated



             777-3)                                              message] The



                                                                 system which



                                                                 generated this



                                                                 result transmit

christin



                                                                 reference range

:



                                                                 166 - 358 10*3/

?L.



                                                                 The reference



                                                                 range was not u

sed



                                                                 to interpret th

is



                                                                 result as



                                                                 normal/abnormal

.

 

             MPV (test code = 9.3 fL       9.5-12.9     L            



             55454-6)                                            

 

             NRBC/100 WBC (test              See_Comment                [Automat

ed



             code = 4612966455)                                        message] 

The



                                                                 system which



                                                                 generated this



                                                                 result transmit

christin



                                                                 reference range

:



                                                                 0.0 - 10.0 /100



                                                                 WBCs. The



                                                                 reference range



                                                                 was not used to



                                                                 interpret this



                                                                 result as



                                                                 normal/abnormal

.

 

             NRBC x10^3 (test code <0.01        See_Comment                [Auto

mated



             = [id number])                                        message] The



                                                                 system which



                                                                 generated this



                                                                 result transmit

christin



                                                                 reference range

:



                                                                 10*3/?L. The



                                                                 reference range



                                                                 was not used to



                                                                 interpret this



                                                                 result as



                                                                 normal/abnormal

.

 

             GRAN MAT (NEUT) % 68.6 %                                 



             (test code = 770-8)                                        

 

             IMM GRAN % (test code 0.50 %                                 



             = 1045611936)                                        

 

             LYMPH % (test code = 22.1 %                                 



             736-9)                                              

 

             MONO % (test code = 8.0 %                                  



             5905-5)                                             

 

             EOS % (test code = 0.4 %                                  



             713-8)                                              

 

             BASO % (test code = 0.4 %                                  



             706-2)                                              

 

             GRAN MAT x10^3(ANC) 10.30 10*3/uL 1.88-7.09    H            



             (test code =                                        



             8253869856)                                         

 

             IMM GRAN x10^3 (test 0.08 10*3/uL 0-0.06       H            



             code = 6255275535)                                        

 

             LYMPH x10^3 (test code 3.33 10*3/uL 1.32-3.29    H            



             = 731-0)                                            

 

             MONO x10^3 (test code 1.21 10*3/uL 0.33-0.92    H            



             = 742-7)                                            

 

             EOS x10^3 (test code = 0.06 10*3/uL 0.03-0.39                 



             711-2)                                              

 

             BASO x10^3 (test code 0.06 10*3/uL 0.01-0.07                 



             = 704-7)                                            

 

             Lab Interpretation Abnormal                               



             (test code = 53940-0)                                        



[facility] Sbxsy5846-77-51 21:08:00





             Test Item    Value        Reference Range Interpretation Comments

 

             LACTIC ACID (test code = 7.37 mmol/L                            



             [id number])                                         



[facility]2020-[facility]-19 18:08:18
Bilateral nonobstructive subcentimeter 5 mm lower pole calculi. Layering 
sediment/debris within the urinary bladder. Minimal cortical thinning seen 
bilaterally. Preserved corticomedullarydifferentiation and normal renal 
echogenicity.ULTRASOUND RENAL INDICATION: Microscopic hematuria COMPARISON: 
2017. FINDINGS: Right kidney measures 9.4 x 4.0 x 4.4 cm. There is normal 
renal corticalechogenicity and corticomedullary differentiation. Mild cortical 
thinning.No hydronephrosis. 4 mm echogenic focus within the right interpolar 
regionprobably represents a small nonobstructive calculus. There is qualitat
ivelynormal perfusion on color doppler interrogation. Left kidney measures 8.4 x
3.9 x 4.0 cm. Thereis normal renal corticalechogenicity and corticomedullary 
differentiation. 2 mm echogenic lesionwithin the left lower renal pole probably 
represents a small nonobstructivecalculus. No hydronephrosis. There is 
qualitatively normal perfusion oncolor doppler interrogation. Partially 
decompressed bladder contains multiple low-level echoesconcerning for layering 
sediment.  [facility], Radiant Results In[employer] User - 10/19/2020  1:09 PM CDTULTRASOUND 
RENALINDICATION: Microscopic hematuriaCOMPARISON: 2017.FINDINGS:Right kidney
measures 9.4 x 4.0 x 4.4 cm. There is normal renal corticalechogenicity and 
corticomedullary differentiation. Mild cortical thinning.No hydronephrosis. 4 mm
echogenic focus within the right interpolar regionprobably represents a small 
nonobstructive calculus. There is qualitativelynormal perfusion on color doppler
interrogation.Left kidney measures 8.4 x 3.9 x 4.0 cm. There is normal renal 
corticalechogenicity and corticomedullary differentiation. 2 mm echogenic 
lesionwithin the left lower renal pole probably represents a small 
nonobstructivecalculus. No hydronephrosis. There is qualitatively normal 
perfusion oncolor doppler interrogation.Partially decompressed bladder contains 
multiple low-level echoesconcerning for layering sediment. IMPRESSIONBilateral 
nonobstructive subcentimeter 5 mm lower pole calculi.Layering sediment/debris 
within the urinary bladder.Minimal cortical thinning seen bilaterally. Preserved
corticomedullarydifferentiation and normal renal echogenicity.[facility] ZrjgtmWTLUZZZEOL0300-20-14 18:33:0066.0[facility]
2020 18:33:005.6[facility] UffxvbeBIEUBLVYSV6389-90-75 18:33:009.1[facility] 
TsgcunzPPQWCVRWSW0599-71-51 18:33:0066.0[facility] JtskmquGCHMBRWMFG0946-66-74 
18:33:005.6[facility] DhecfkjZNKPSSNBEA7043-58-37 18:33:009.1[facility]
ADWASTYHE9716-06-41 23:52:00





             Test Item    Value        Reference Range Interpretation Comments

 

             PHENYTOIN (test code = 9.2 ug/mL    10-20        L            



             7984379408)                                         

 

             KAYLIN (test code = KAYLIN) Toxic Range: ? ? ?                           



                          ? ? ? ? ? 0-3                           



                          Months ? ? ? ? ? ?                           



                          ? Greater than 14                           



                          ug/mL ? ? 3 Months                           



                          - 150 Years ? ?                           



                          Greater than 20                           



                          ug/mL                                  

 

             Lab Interpretation (test Abnormal                               



             code = 20837-6)                                        



[facility] Metabolic Panel (NA, K, CL, CO2, 
GLUCOSE, BUN, CREATININE, CA)2020 23:26:00





             Test Item    Value        Reference Range Interpretation Comments

 

             NA (test code = 127 mmol/L   135-145      L            



             8744848879)                                         

 

             K (test code = 4.0 mmol/L   3.5-5                     



             2556362097)                                         

 

             CL (test code = 96 mmol/L           L            



             2326608816)                                         

 

             CO2 TOTAL (test code = 18 mmol/L    23-31        L            



             7520347070)                                         

 

             AGAP (test code =              2-16                      



             1095065182)                                         

 

             BUN (test code = 14 mg/dL     7-23                      



             8990536172)                                         

 

             GLUCOSE (test code = 148 mg/dL           H            



             5336012834)                                         

 

             CREATININE (test code = 0.57 mg/dL   0.5-1.04                  



             4609549154)                                         

 

             CALCIUM (test code = 8.9 mg/dL    8.6-10.6                  



             5099505786)                                         

 

             eGFR Calculation              mL/min/1.73m2              



             (Non-)                                        



             (test code =                                        



             7497616433)                                         

 

             eGFR Calculation              mL/min/1.73m2              



             (African American)                                        



             (test code =                                        



             1755217329)                                         

 

             KAYLIN (test code = KAYLIN) Association of                           



                          Glomerular Filtration                           



                          Rate (GFR) and Staging                           



                          of Kidney Disease*                           



                          +---------------------                           



                          --+-------------------                           



                          --+-------------------                           



                          ------+| GFR                           



                          (mL/min/1.73 m2) ?|                           



                          With Kidney Damage ?|                           



                          ?Without Kidney                           



                          Damage+---------------                           



                          --------+-------------                           



                          --------+-------------                           



                          ------------+| ?>90 ?                           



                          ? ? ? ? ? ? ? ?|                           



                          ?Stage one ? ? ? ? ?|                           



                          ? Normal ? ? ? ? ? ? ?                           



                          ?+--------------------                           



                          ---+------------------                           



                          ---+------------------                           



                          -------+| ?60-89 ? ? ?                           



                          ? ? ? ? ?| ?Stage two                           



                          ? ? ? ? ?| ? Decreased                           



                          GFR ? ? ? ?                            



                          +---------------------                           



                          --+-------------------                           



                          --+-------------------                           



                          ------+| ?30-59 ? ? ?                           



                          ? ? ? ? ?| ?Stage                           



                          three ? ? ? ?| ? Stage                           



                          three ? ? ? ? ?                           



                          +---------------------                           



                          --+-------------------                           



                          --+-------------------                           



                          ------+| ?15-29 ? ? ?                           



                          ? ? ? ? ?| ?Stage four                           



                          ? ? ? ? | ? Stage four                           



                          ? ? ? ? ?                              



                          ?+--------------------                           



                          ---+------------------                           



                          ---+------------------                           



                          -------+| ?<15 (or                           



                          dialysis) ? ?| ?Stage                           



                          five ? ? ? ? | ? Stage                           



                          five ? ? ? ? ?                           



                          ?+--------------------                           



                          ---+------------------                           



                          ---+------------------                           



                          -------+ *Each stage                           



                          assumes the associated                           



                          GFR level has been in                           



                          effect for at least                           



                          three months. ?Stages                           



                          1 to 5, with or                           



                          without kidney                           



                          disease, indicate                           



                          chronic kidney                           



                          disease. Notes:                           



                          Determination of                           



                          stages one and two                           



                          (with eGFR                             



                          >59mL/min/1.73 m2)                           



                          requires estimation of                           



                          kidney damage for at                           



                          least three months as                           



                          defined by structural                           



                          or functional                           



                          abnormalities of the                           



                          kidney, manifested by                           



                          either:Pathological                           



                          abnormalities or                           



                          Markers of kidney                           



                          damage (including                           



                          abnormalities in the                           



                          composition of the                           



                          blood or urine or                           



                          abnormalities in                           



                          imaging tests).                           

 

             Lab Interpretation Abnormal                               



             (test code = 46206-1)                                        



[facility] (ALB, T.PRO, BILI T, 
BU/BC, ALT, AST, ALK PHOS)2020 23:26:00





             Test Item    Value        Reference Range Interpretation Comments

 

             TOTAL BILI (test code = 6016603792) 0.3 mg/dL    0.1-1.1           

        

 

             BILI UNCON (test code = 2987080800) 0.4 mg/dL    0.1-1.1           

        

 

             BILI CONJ (test code = 7386773988) 0.0 mg/dL    0-0.3              

       

 

             T PROTEIN (test code = 9063908967) 7.5 g/dL     6.3-8.2            

       

 

             ALBUMIN (test code = 9691415128) 4.4 g/dL     3.5-5                

     

 

             ALK PHOS (test code = 3471076070) 189 U/L             H      

      

 

             ALTv (test code = 1742-6) 22 U/L       5-35                      

 

             AST(SGOT) (test code = 0760221229) 66 U/L       13-40        H     

       

 

             Lab Interpretation (test code = Abnormal                           

    



             30760-4)                                            



[facility] Center[facility] WITH UFLXDWJUBHIX1070-60-33 22:33:00





             Test Item    Value        Reference Range Interpretation Comments

 

             WBC (test code =              See_Comment  H             [Automated



             6690-2)                                             message] The sy

stem



                                                                 which generated



                                                                 this result



                                                                 transmitted



                                                                 reference range

:



                                                                 4.30 - 11.10



                                                                 10*3/?L. The



                                                                 reference range

 was



                                                                 not used to



                                                                 interpret this



                                                                 result as



                                                                 normal/abnormal

.

 

             RBC (test code =              See_Comment                [Automated



             789-8)                                              message] The sy

stem



                                                                 which generated



                                                                 this result



                                                                 transmitted



                                                                 reference range

:



                                                                 3.93 - 5.25



                                                                 10*6/?L. The



                                                                 reference range

 was



                                                                 not used to



                                                                 interpret this



                                                                 result as



                                                                 normal/abnormal

.

 

             HGB (test code = 13.4 g/dL    11.6-15                   



             718-7)                                              

 

             HCT (test code = 38.3 %       35.7-45.2                 



             4544-3)                                             

 

             MCV (test code = 95.5 fL      80.6-95.5                 



             787-2)                                              

 

             MCH (test code = 33.4 pg      25.9-32.8    H            



             785-6)                                              

 

             MCHC (test code = 35.0 g/dL    31.6-35.1                 



             786-4)                                              

 

             RDW-SD (test code = 49.3 fL      39-49.9                   



             63123-6)                                            

 

             RDW-CV (test code = 14.1 %       12-15.5                   



             788-0)                                              

 

             PLT (test code =              See_Comment                [Automated



             777-3)                                              message] The sy

stem



                                                                 which generated



                                                                 this result



                                                                 transmitted



                                                                 reference range

:



                                                                 166 - 358 10*3/

?L.



                                                                 The reference r

xenia



                                                                 was not used to



                                                                 interpret this



                                                                 result as



                                                                 normal/abnormal

.

 

             MPV (test code = 9.4 fL       9.5-12.9     L            



             88817-8)                                            

 

             NRBC/100 WBC (test              See_Comment                [Automat

ed



             code = 7234762446)                                        message] 

The system



                                                                 which generated



                                                                 this result



                                                                 transmitted



                                                                 reference range

:



                                                                 0.0 - 10.0 /100



                                                                 WBCs. The refer

ence



                                                                 range was not u

sed



                                                                 to interpret th

is



                                                                 result as



                                                                 normal/abnormal

.

 

             NRBC x10^3 (test code <0.01        See_Comment                [Auto

mated



             = 5811108862)                                        message] The s

ystem



                                                                 which generated



                                                                 this result



                                                                 transmitted



                                                                 reference range

:



                                                                 10*3/?L. The



                                                                 reference range

 was



                                                                 not used to



                                                                 interpret this



                                                                 result as



                                                                 normal/abnormal

.

 

             GRAN MAT (NEUT) % 70.6 %                                 



             (test code = 770-8)                                        

 

             IMM GRAN % (test code 0.60 %                                 



             = 9311175768)                                        

 

             LYMPH % (test code = 19.2 %                                 



             736-9)                                              

 

             MONO % (test code = 9.0 %                                  



             5905-5)                                             

 

             EOS % (test code = 0.3 %                                  



             713-8)                                              

 

             BASO % (test code = 0.3 %                                  



             706-2)                                              

 

             GRAN MAT x10^3(ANC) 8.16 10*3/uL 1.88-7.09    H            



             (test code =                                        



             1309407770)                                         

 

             IMM GRAN x10^3 (test 0.07 10*3/uL 0-0.06       H            



             code = 9338729439)                                        

 

             LYMPH x10^3 (test code 2.22 10*3/uL 1.32-3.29                 



             = 731-0)                                            

 

             MONO x10^3 (test code 1.04 10*3/uL 0.33-0.92    H            



             = 742-7)                                            

 

             EOS x10^3 (test code = 0.04 10*3/uL 0.03-0.39                 



             711-2)                                              

 

             BASO x10^3 (test code 0.04 10*3/uL 0.01-0.07                 



             = 704-7)                                            

 

             Lab Interpretation Abnormal                               



             (test code = 49005-4)                                        



[facility]





             Test Item    Value        Reference Range Interpretation Comments

 

             POCT GLU (test code = 9019007496) 158 mg/dL           H      

      

 

             Lab Interpretation (test code = Abnormal                           

    



             14215-1)                                            



[facility] 
OfudlpyUMGSZLDSFQ2416-79-17 16:52:0080.0[facility] YrkmxfqYMBBWIWUTR5648-48-54 
16:52:0013.2[facility] TdqzprqGXHDSUGCKH3529-39-75 16:52:0080.0[facility]
XCPAZJCNGX1121-71-62 18:24:00





             Test Item    Value        Reference Range Interpretation Comments

 

             APPEARANCE (test code = Clear        Clear                     



             8031316921)                                         

 

             COLOR (test code = Yellow       Yellow                    



             3158955186)                                         

 

             PH (test code =              4.8-8.0                   



             1768746349)                                         

 

             SP GRAVITY (test code =              1.003-1.030               



             3210104267)                                         

 

             GLU U QUAL (test code = Negative     Negative                  



             5623733353)                                         

 

             BLOOD (test code = Negative     Negative                  



             6526874871)                                         

 

             KETONES (test code = Negative     Negative                  



             7873491355)                                         

 

             PROTEIN (test code = Negative     Negative                  



             2887-8)                                             

 

             UROBILIN (test code = 0.2 mg/dL    See_Comment                [Auto

mated message]



             8695890067)                                         The system [employer]



                                                                 generated this



                                                                 result transmit

christin



                                                                 reference range

:



                                                                 0-1.0 mg/dL. Th

e



                                                                 reference range

 was



                                                                 not used to



                                                                 interpret this



                                                                 result as



                                                                 normal/abnormal

.

 

             BILIRUBIN (test code = Negative     Negative                  



             0666385178)                                         

 

             NITRITE (test code = Negative     Negative                  



             7334639684)                                         

 

             LEUK BI (test code = Negative     Negative                  



             7172799925)                                         

 

             RBC/HPF (test code =              See_Comment                [Autom

ated message]



             4982890531)                                         The system [employer] [facility]



                                                                 generated this



                                                                 result transmit

christin



                                                                 reference range

: 0 -



                                                                 3 HPF. The refe

rence



                                                                 range was not u

sed



                                                                 to interpret th

is



                                                                 result as



                                                                 normal/abnormal

.

 

             WBC/HPF (test code =              See_Comment                [Autom

ated message]



             2065338628)                                         The system [employer]



                                                                 generated this



                                                                 result transmit

christin



                                                                 reference range

: 0 -



                                                                 5 HPF. The refe

rence



                                                                 range was not u

sed



                                                                 to interpret th

is



                                                                 result as



                                                                 normal/abnormal

.

 

             BACTERIA (test code = Negative     Negative                  



             8235136231)                                         

 

             AMORPHOUS (test code = 1+           HPF                       



             4596671216)                                         

 

             SQ EPITH (test code =              HPF                       



             3275511468)                                         

 

             YEAST BUD (test code =              See_Comment  H             [Aut

omated message]



             7553295393)                                         The system [employer] [facility]



                                                                 generated this



                                                                 result transmit

christin



                                                                 reference range

: <=1



                                                                 HPF. The refere

nce



                                                                 range was not u

sed



                                                                 to interpret th

is



                                                                 result as



                                                                 normal/abnormal

.

 

             Lab Interpretation (test Abnormal                               



             code = 62600-2)                                        



[facility] CenterL[facility] Whole Pfphz2541-65-83 17:47:00





             Test Item    Value        Reference Range Interpretation Comments

 

             LACTIC ACID (test code = 1.45 mmol/L  0.5-2.2                   



             2717216860)                                         

 

             Lab Interpretation (test code = Normal                             

    



             12202-6)                                            



[facility] [id number] 17:14:48 No acute 
cardiopulmonary abnormality. IMark MD., have reviewed this study and 
agree with the abovereport.EXAM: XR CHEST 1 VW HISTORY: seizure  COMPARISON: 
Chest x-ray 2017 FINDINGS: The lungs are clear. No focal consolidation, 
pleural effusion orpneumothorax is seen.  The cardiac silhouette is normal in 
size. No acute bony abnormality. Remote fracture/deformation of the 
rightposterior ninth rib. [facility], [facility] [employer] User - 2019 12:14 PM 
CDTEXAM: XR CHEST 1 VWHISTORY: seizure COMPARISON: Chest x-ray 
2017FINDINGS:The lungs are clear. No focal consolidation, pleural effusion 
orpneumothorax is seen. The cardiac silhouette is normal in size.No acute bony 
abnormality. Remote fracture/deformation of the rightposterior ninth 
rib.IMPRESSIONNo acute cardiopulmonary abnormality.ICherry MD., have 
reviewed this study and agree with the abovereport.[facility] CenterCT [facility] [id number] 16:43:42 Agenesis of corpus callosum and 
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 IRobert MD., have reviewed this study and agree with the abovereport.CT HEAD WO CONTRAST
HISTORY:Seizure, new, abn neuro exam, nontraumatic  COMPARISON: None. 
TECHNIQUE:? Noncontrast CT imaging of the head was performed and coronaland 
sagittal reconstructions were obtained and reviewed. FINDINGS: Agenesis of 
corpus callosum and could possibly is manifested by asymmetricdilatation of the 
posteriorbody and temporal horns of the lateralventricles. The cerebral sulci 
are normal in caliber and configuration. Nomidline shift or pathological extra-
axial fluid collection is present. Thebasal cisterns are unremarkable. There is 
no acute intracranial hemorrhage or significant mass effect. Noparenchymal
attenuation abnormality. The gray-white matter differentiationis preserved. 
Partial empty sella configuration. The mastoid air cells and paranasal air 
sinuses are clear. The calvariumand central skull base are unremarkable. [facility], 
[facility] [employer] User - 2019 11:43 AM CDTCT HEAD WO CONTRASTHISTORY: 
Seizure, new, abn neuro exam, nontraumatic COMPARISON: None.TECHNIQUE:     
Noncontrast CT imaging of the head was performed and coronaland sagittal 
reconstructions were obtained and reviewed.FINDINGS:Agenesis of corpus callosum 
and could possibly is manifested by asymmetricdilatation of the posterior body 
and temporal horns of the lateralventricles. The cerebral sulci are normal in 
caliber and configuration. Nomidline shift or pathological extra-axial fluid 
collection is present. Thebasal cisterns are unremarkable.There is no acute 
intracranial hemorrhage or significant mass effect. Noparenchymal attenuation 
abnormality. The gray-white matter differentiationis preserved.Partial empty 
sella configuration.The mastoid air cells and paranasal air sinuses are clear. 
The calvariumand central skull base are unremarkable.IMPRESSIONAgenesis of 
corpus callosum and focal possibility noted.Partial empty sella configuration.IBeth MD., have reviewed this study and agree with the abovereport.
[facility] WITH [id number] 16:22:00





             Test Item    Value        Reference Range Interpretation Comments

 

             WBC (test code =              See_Comment  H            Previous



             6690-2)                                             preliminary



                                                                 verified result



                                                                 was 24.01 10*3/

?L



                                                                 on 2019 at



                                                                 1122 CDT



                                                                 [Automated



                                                                 message] The



                                                                 system which



                                                                 generated this



                                                                 result transmit

christin



                                                                 reference range

:



                                                                 4.30 - 11.10



                                                                 10*3/?L. The



                                                                 reference range



                                                                 was not used to



                                                                 interpret this



                                                                 result as



                                                                 normal/abnormal

.

 

             RBC (test code =              See_Comment  L            Previous



             789-8)                                              preliminary



                                                                 verified result



                                                                 was 3.48 10*6/?

L



                                                                 on 2019 at



                                                                 1122 CDT



                                                                 [Automated



                                                                 message] The



                                                                 system which



                                                                 generated this



                                                                 result transmit

christin



                                                                 reference range

:



                                                                 3.93 - 5.25



                                                                 10*6/?L. The



                                                                 reference range



                                                                 was not used to



                                                                 interpret this



                                                                 result as



                                                                 normal/abnormal

.

 

             HGB (test code = 10.7 g/dL    11.6-15      L            



             718-7)                                              

 

             HCT (test code = 32.7 %       35.7-45.2    L            Previous



             4544-3)                                             preliminary



                                                                 verified result



                                                                 was 32.6 % on



                                                                 2019 at 11

22



                                                                 CDT

 

             MCV (test code = 93.4 fL      80.6-95.5                 Previous



             787-2)                                              preliminary



                                                                 verified result



                                                                 was 93.7 fL on



                                                                 2019 at 11

22



                                                                 CDT

 

             MCH (test code = 30.6 pg      25.9-32.8                 Previous



             785-6)                                              preliminary



                                                                 verified result



                                                                 was 30.7 pg on



                                                                 2019 at 11

22



                                                                 CDT

 

             MCHC (test code = 32.7 g/dL    31.6-35.1                 Previous



             786-4)                                              preliminary



                                                                 verified result



                                                                 was 32.8 g/dL o

n



                                                                 2019 at 11

22



                                                                 CDT

 

             RDW-SD (test code = 69.0 fL      39-49.9      H            Previous



             23950-6)                                            preliminary



                                                                 verified result



                                                                 was 69.6 fL on



                                                                 2019 at 11

22



                                                                 CDT

 

             RDW-CV (test code = 20.2 %       12-15.5      H            Previous



             788-0)                                              preliminary



                                                                 verified result



                                                                 was 20.1 % on



                                                                 2019 at 11

22



                                                                 CDT

 

             PLT (test code =              See_Comment  H            Previous



             777-3)                                              preliminary



                                                                 verified result



                                                                 was 391 10*3/?L

 on



                                                                 2019 at 11

22



                                                                 CDT [Automated



                                                                 message] The



                                                                 system which



                                                                 generated this



                                                                 result transmit

christin



                                                                 reference range

:



                                                                 166 - 358 10*3/

?L.



                                                                 The reference



                                                                 range was not u

sed



                                                                 to interpret th

is



                                                                 result as



                                                                 normal/abnormal

.

 

             MPV (test code = 9.4 fL       9.5-12.9     L            



             58251-3)                                            

 

             NRBC/100 WBC (test              See_Comment                [Automat

ed



             code = 2083016381)                                        message] 

The



                                                                 system which



                                                                 generated this



                                                                 result transmit

christin



                                                                 reference range

:



                                                                 0.0 - 10.0 /100



                                                                 WBCs. The



                                                                 reference range



                                                                 was not used to



                                                                 interpret this



                                                                 result as



                                                                 normal/abnormal

.

 

             NRBC x10^3 (test code <0.01        See_Comment                [Auto

mated



             = 9792246888)                                        message] The



                                                                 system which



                                                                 generated this



                                                                 result transmit

christin



                                                                 reference range

:



                                                                 10*3/?L. The



                                                                 reference range



                                                                 was not used to



                                                                 interpret this



                                                                 result as



                                                                 normal/abnormal

.

 

             GRAN MAT (NEUT) % 63.6 %                                 



             (test code = 770-8)                                        

 

             IMM GRAN % (test code 0.80 %                                 



             = 7654720893)                                        

 

             LYMPH % (test code = 26.3 %                                 



             736-9)                                              

 

             MONO % (test code = 8.5 %                                  



             5905-5)                                             

 

             EOS % (test code = 0.5 %                                  



             713-8)                                              

 

             BASO % (test code = 0.3 %                                  



             706-2)                                              

 

             GRAN MAT x10^3(ANC) 15.24 10*3/uL 1.88-7.09    H            



             (test code =                                        



             2497956927)                                         

 

             IMM GRAN x10^3 (test 0.18 10*3/uL 0-0.06       H            



             code = 2058886811)                                        

 

             LYMPH x10^3 (test code 6.29 10*3/uL 1.32-3.29    H            



             = 731-0)                                            

 

             MONO x10^3 (test code 2.03 10*3/uL 0.33-0.92    H            



             = 742-7)                                            

 

             EOS x10^3 (test code = 0.11 10*3/uL 0.03-0.39                 



             711-2)                                              

 

             BASO x10^3 (test code 0.06 10*3/uL 0.01-0.07                 



             = 704-7)                                            

 

             Lab Interpretation Abnormal                               



             (test code = 19813-6)                                        



[facility]





             Test Item    Value        Reference Range Interpretation Comments

 

             TROPONIN I (test 0.004 ng/mL  See_Comment                [Automated



             code = 4838793619)                                        message] 

The



                                                                 system which



                                                                 generated this



                                                                 result



                                                                 transmitted



                                                                 reference range

:



                                                                 <=0.034. The



                                                                 reference range



                                                                 was not used to



                                                                 interpret this



                                                                 result as



                                                                 normal/abnormal

.

 

             KAYLIN (test code = Equal or Less than                           



             KAYLIN)         0.034                                  



                          ng/ml---Normal?Not                           



                          e: Cardiac                             



                          troponin begins to                           



                          rise 3-4 hours                           



                          after the onset of                           



                          ischemia. Repeat                           



                          in 4-6 hours if                           



                          the sample was                           



                          drawn within 3-4                           



                          hours of the onset                           



                          of the symptom and                           



                          found normal.                           



                          Between 0.035 and                           



                          0.120 ng/mL---                           



                          Borderline.                            



                          Questionable                           



                          myocardial injury                           



                          or necrosis?Note:                           



                          Serial measurement                           



                          may be necessary                           



                          to confirm or                           



                          exclude the                            



                          diagnosis of                           



                          myocardial injury                           



                          or necrosis;                           



                          Clinical                               



                          correlation                            



                          (symptoms, EKGs,                           



                          imaging studies,                           



                          and others)                            



                          required; Repeat                           



                          in 4-6 hours if                           



                          clinically                             



                          indicated.? Equal                           



                          or Higher than                           



                          0.121                                  



                          ng/mL---Abnormal.                           



                          Myocardial Injury                           



                          or Necrosis                            



                          Likely? Biotin has                           



                          been reported to                           



                          cause a negative                           



                          bias, interpret                           



                          results relative                           



                          to patient's use                           



                          of biotin.? ?                           

 

             Lab Interpretation Normal                                 



             (test code =                                        



             75935-2)                                            



[facility]





             Test Item    Value        Reference Range Interpretation Comments

 

             PHENYTOIN (test code = 4.4 ug/mL    10-20        L            



             3185403422)                                         

 

             KAYLIN (test code = KAYLIN) Toxic Range:? 0-3                           



                          Months? Greater                           



                          than 14 ug/mL? 3                           



                          Months - 150 Years?                           



                          Greater than 20                           



                          ug/mL                                  

 

             Lab Interpretation (test Abnormal                               



             code = 68593-7)                                        



[facility] 
16:04:00





             Test Item    Value        Reference Range Interpretation Comments

 

             NA (test code = 134 mmol/L   135-145      L            



             0703597287)                                         

 

             K (test code = 4.4 mmol/L   3.5-5                     



             1747476811)                                         

 

             CL (test code = 102 mmol/L                       



             8630342064)                                         

 

             CO2 TOTAL (test code = 13 mmol/L    23-31        L            



             2500269040)                                         

 

             AGAP (test code =              2-16         H            



             4245677412)                                         

 

             BUN (test code = 13 mg/dL     7-23                      



             4843962108)                                         

 

             GLUCOSE (test code = 144 mg/dL           H            



             7540776709)                                         

 

             CREATININE (test code = 0.50 mg/dL   0.5-1.04                  



             4728764708)                                         

 

             TOTAL BILI (test code = 0.3 mg/dL    0.1-1.1                   



             4397429072)                                         

 

             CALCIUM (test code = 9.0 mg/dL    8.6-10.6                  



             9441156289)                                         

 

             T PROTEIN (test code = 7.6 g/dL     6.3-8.2                   



             9756395195)                                         

 

             ALBUMIN (test code = 4.3 g/dL     3.5-5                     



             4040313235)                                         

 

             ALK PHOS (test code = 254 U/L             H            



             3458342078)                                         

 

             ALT(SGPT) (test code = 23 U/L       9-51                      



             5305159198)                                         

 

             AST(SGOT) (test code = 39 U/L       13-40                     



             3066075610)                                         

 

             eGFR Calculation              mL/min/1.73m2              



             (Non-)                                        



             (test code =                                        



             0563651751)                                         

 

             eGFR Calculation              mL/min/1.73m2              



             (African American)                                        



             (test code =                                        



             6714688056)                                         

 

             KAYLIN (test code = KAYLIN) Association of                           



                          Glomerular Filtration                           



                          Rate (GFR) and Staging                           



                          of Kidney                              



                          Disease*+-------------                           



                          ----------+-----------                           



                          ----------+-----------                           



                          --------------+| GFR                           



                          (mL/min/1.73 m2)?|                           



                          With Kidney                            



                          Damage?|?Without                           



                          Kidney                                 



                          Damage+---------------                           



                          --------+-------------                           



                          --------+-------------                           



                          ------------+|?>90?|?S                           



                          tage one?|?                            



                          Normal?+--------------                           



                          ---------+------------                           



                          ---------+------------                           



                          -------------+|?60-89?                           



                          |?Stage two?|?                           



                          Decreased GFR?                           



                          +---------------------                           



                          --+-------------------                           



                          --+-------------------                           



                          ------+|?30-59?|?Stage                           



                          three?|? Stage three?                           



                          +---------------------                           



                          --+-------------------                           



                          --+-------------------                           



                          ------+|?15-29?|?Stage                           



                          four? |? Stage                           



                          four?+----------------                           



                          -------+--------------                           



                          -------+--------------                           



                          -----------+|?<15 (or                           



                          dialysis)?|?Stage                           



                          five? |? Stage                           



                          five?+----------------                           



                          -------+--------------                           



                          -------+--------------                           



                          -----------+*Each                           



                          stage assumes the                           



                          associated GFR level                           



                          has been in effect for                           



                          at least three                           



                          months.?Stages 1 to 5,                           



                          with or without kidney                           



                          disease, indicate                           



                          chronic kidney                           



                          disease.Notes:                           



                          Determination of                           



                          stages one and two                           



                          (with eGFR                             



                          >59mL/min/1.73 m2)                           



                          requires estimation of                           



                          kidney damage for at                           



                          least three months as                           



                          defined by structural                           



                          or functional                           



                          abnormalities of the                           



                          kidney, manifested by                           



                          either:Pathological                           



                          abnormalities or                           



                          Markers of kidney                           



                          damage (including                           



                          abnormalities in the                           



                          composition of the                           



                          blood or urine or                           



                          abnormalities in                           



                          imaging tests).                           

 

             Lab Interpretation Abnormal                               



             (test code = 13123-9)                                        



[facility]





             Test Item    Value        Reference Range Interpretation Comments

 

             APTT Patient (test              See_Comment                [Automat

ed



             code = 3173-2)                                        message] The



                                                                 system which



                                                                 generated this



                                                                 result



                                                                 transmitted



                                                                 reference range

:



                                                                 23 - 38 Seconds

.



                                                                 The reference



                                                                 range was not



                                                                 used to interpr

et



                                                                 this result as



                                                                 normal/abnormal

.

 

             KAYLIN (test code = KAYLIN) The [facility] patient                           



                          population mean                           



                          normal value for                           



                          aPTT is 30                             



                          seconds.                               

 

             Lab Interpretation Normal                                 



             (test code = 33593-6)                                        



[facility] [id number] 16:01:00





             Test Item    Value        Reference Range Interpretation Comments

 

             PROTIME PATIENT (test              See_Comment                [Auto

mated message]



             code = 5964-2)                                        The system wh

ich



                                                                 generated this 

result



                                                                 transmitted ref

erence



                                                                 range: 12.0 - 1

4.7



                                                                 Seconds. The re

ference



                                                                 range was not u

sed to



                                                                 interpret this 

result



                                                                 as normal/abnor

mal.

 

             INR (test code = 6301-6)                                        Nor

mal INR <1.1;



                                                                 Warfarin Therap

eutic



                                                                 range 2.0 to 3.

0 or



                                                                 2.5 to 3.5, dep

ending



                                                                 upon the indica

tions.

 

             Lab Interpretation (test Normal                                 



             code = 68956-4)                                        



[facility]CHE[facility] KVMYS6477-18-04 05[facility]46[facility]001.8[facility] 
[facility]ermannCHEM FLJXS6338-60-99 05:46:0088[facility] HermannCHEM OLNRB6899-22-99 
05:46:004[facility]annCHEM QSNAZ8638-22-29 05:46:02747Pvexjesj HermannCHEM 
CBHMO1072-52-65 05:46:0013.1[facility] HermannCHEM WJMRU0909-66-35 05:46:0025
[facility]annCHE[facility] MQIZL5209-32-23 05:46:007.2[facility] HermannCHEM PANEL
2019 05:46:004.1[facility] HermannCHEM QCVUE5324-48-54 05:46:0099[facility] 
HermannCHEM ORRHP2807-65-12 05:46:000.52[facility] HermannCHEM OPZMB9267-16-38 
05:46:38144Bjjfwbiw HermannCHEM WKIDU8376-76-23 05:46:002.6[facility]
RCIKDTCWYC4479-44-87 05:46:000.9[facility] FxomptgAUZYJDLWYA8671-30-37 05:46:000.2
[facility] SdrdzmyKLFRWNQZYL8800-09-65 05:46:001.9[facility] HermannHEMATOLOGY
2019 05:46:007.6[facility] ElkflybSLXXVBCMYQ7996-60-08 05:46:0071.4[facility] 
JsrpywqMIAWWHPPSZ1244-51-92 05:46:0017.9[facility] ZjahwwnNREEWBPDIR8926-83-63 
05:46:000.3[facility] XiswkoyHYLVOVPITN8258-24-22 05:46:008.3[facility] [place]
YKROHJRUKY0500-97-10 05:46:002.1[facility] MswbhjxUQDWQLQMIR5173-03-57 05:46:00





             Test Item    Value        Reference Range Interpretation Comments

 

             PT (test code = PT) 12.8 s       12.0-14.7                 



[facility] ZugrpfmUSSQVJKGHE9998-77-62 05:46:00





             Test Item    Value        Reference Range Interpretation Comments

 

             INR (test code = INR) 0.98 1       0.85-1.17                 



[facility] RsszqmtQQMFLUNJRZ0779-32-33 05:46:0031.6[facility]annHEMATOLOGY
2019 05:46:0089.6[facility] KfdggtbJCZIIPOIZE4769-97-64 05:46:0010.7[facility]
NdnbdytLERJZHTBTL2659-19-50 05:46:00





             Test Item    Value        Reference Range Interpretation Comments

 

             MCH (test code = MCH) 30.4 pg      27.0-31.0                 



[facility] GhzmmmoZSOLUDLQYG7642-51-99 05:46:0034.0[facility] HermannHEMATOLOGY
2019 05:46:008.7[facility] JltysjeJNVSUDDPQZ0703-14-81 05:46:0019.4[facility] 
OyxfeykVUHYLKQAGS8914-21-09 05:46:62782Gxybdwuw LktbuoiSLPZOWSQOW3585-68-54 
05:46:0010.6[facility] ShkiusoJAXGQCDVAB3105-38-59 05:46:003.52[facility] [place]
PARATHYROID IQCIVDA6921-43-31 05:46:000.98[facility] HermannPARATHYROID PROFILE
2019 05:46:000.98[facility] HermannCHEM YUCPH8635-26-35 05:46:001.8[facility] 
HermannCHEM EFUBW2128-14-81 05:46:0088[facility]annCHEM JLMPH1399-04-15 
05:46:004[facility]annCHEM BLJFN3990-77-62 05:46:61714Xgkwyvbc HermannCHEM 
MZIPI6033-00-81 05:46:0013.1[facility] HermannCHEM QEBGA5100-86-50 05:46:0025
[facility]annCHEM OOCXX0744-34-39 05:46:007.2[facility]annCHEM PANEL
2019 05:46:004.1[facility] HermannCHEM AEZWH3538-39-36 05:46:0099[facility] 
[facility]ermannCHEM JUSZV4941-99-66 05:46:000.52[facility] HermannCHEM SMOMD9500-48-55 
05:46:93564Pectidfr HermannCHEM ZWGWI1516-93-15 05:46:002.6[facility]
PQAMOVGLKK5289-46-59 05:46:000.9[facility] LebrfnoCTWSREKOHN0954-73-73 05:46:000.2
[facility] CvkxqpnUZNJXCFIEN7554-12-37 05:46:001.9[facility]annHEMATOLOGY
2019 05:46:007.6[facility] TipaspbRVEDDNYXAD2880-77-41 05:46:0071.4[facility] 
UapwcuoNWGDUPXPYY2417-68-86 05:46:0017.9[facility] PyiutaoDJXLIEFIFV8744-19-24 
05:46:000.3[facility] WckanmySTLYLEZUYO6254-95-28 05:46:008.3[facility]
PSUMLVBGUX4206-77-49 05:46:002.1[facility] SsngmgjGMFZJWIMBV5061-01-86 05:46:00





             Test Item    Value        Reference Range Interpretation Comments

 

             PT (test code = PT) 12.8 s       12.0-14.7                 



[facility] YkszdfwFUPNPJLFIA8365-24-65 05:46:00





             Test Item    Value        Reference Range Interpretation Comments

 

             INR (test code = INR) 0.98 1       0.85-1.17                 



[facility] CusrkyzUEHLPOHKQI8858-13-21 05:46:0031.6[facility]annHEMATOLOGY
2019 05:46:0089.6[facility] JcshpjqZFVCBDVLIB5381-14-86 05:46:0010.7[facility]
FmspwboTPYXPEWDWJ2690-16-79 05:46:00





             Test Item    Value        Reference Range Interpretation Comments

 

             MCH (test code = MCH) 30.4 pg      27.0-31.0                 



[facility] VgrozfmBEMPNEHLAZ4613-87-16 05:46:0034.0[facility] HermannHEMATOLOGY
2019 05:46:008.7[facility] MwzntedJTTWFQCCMT9670-44-06 05:46:0019.4[facility] 
RbopscdJYHVEHFQON6996-51-79 05:46:66442Egymwtli OimynrvNPUIDHCBGW7957-83-66 
05:46:0010.6[facility] DmuyeimGBKGPFIVCA8179-72-08 05:46:003.52[facility]
PARATHYROID UFSCVAH1138-65-21 05:46:000.98[facility] HermannPARATHYROID PROFILE
2019 05:46:000.98[facility] HermannBLOOD BANK TTGUZVS7667-87-52 07:57:00
Negative (19 2:57 AM)[facility]annCHEM UVVWI4748-53-49 07:57:001.6
[facility]annCHEM OAMXP5462-74-96 07:57:001.9[facility] HermannELECTROLYTES
2019 07:57:0010.9[facility] PwxtpscOPRNUQPAWZWR6323-01-69 07:57:32171
[facility] OwfsxrzEROTIMNKXHEH0146-82-51 07:57:09524Zhyawmeg HermannELECTROLYTES
2019 07:57:000.61[facility] GcmcqrdVPOOFUVAUZUQ1207-95-91 07:57:003[facility] 
UtizgytEFQZVTWLQAEW0196-39-38 07:57:0026[facility] YvzeyrcOIPKCBKQGGVP9912-90-91 
07:57:0099[facility] CortrbbGLHDHCXMPOVF4033-99-19 07:57:12808Zcoqtlfo Julius
ZMVKRRBKTTWV5827-88-05 07:57:007.2[facility] ZechnvpILESMJIBSCUF6831-65-41 
07:57:003.9[facility] FwyzdyjHADJRRVFAY7972-61-48 07:57:09011Arjdixda Julius
OYWBOAXOPO8082-73-05 07:57:008.7[facility] GqlnardSBNLJEDLEI8989-38-93 07:57:00
3.48[facility] AwmdoihNOSBUAAZFS2484-53-06 07:57:0011.8[facility] HermannHEMATOLOGY
2019 07:57:0010.6[facility] KoyziqjYGRJJILNQU1854-51-72 07:57:0019.3[facility]
FfrthtzIOCECJMGNR0135-69-66 07:57:0089.7[facility] SzzofhgHOQHOADEAX2226-83-38 
07:57:0031.3[facility] FoxdtgoQEUATWXEEC4873-80-43 07:57:00





             Test Item    Value        Reference Range Interpretation Comments

 

             MCH (test code = MCH) 30.4 pg      27.0-31.0                 



[facility] YgavvnrLGZRBCROPW1269-56-64 07:57:0033.8[facility]annHEMATOLOGY
2019 07:57:008.8[facility] QdyohoeWLDBRLOGRA8556-56-31 07:57:001.1[facility] 
MvnoyeaYJNGDCTJYG1573-24-20 07:57:000.4[facility] VptdetiXJPHEYPEBR1966-25-37 
07:57:001.6[facility] VjirxgsLUCQJEQKDL1542-78-22 07:57:003.0[facility] [place]
HRVEQLWFOK9612-24-15 07:57:000.3[facility] YuseczwQYKGHADXLV9959-24-61 07:57:00
74.5[facility] RndhvebKOHBZYHCJF7894-96-15 07:57:0013.2[facility]annHEMATOLOGY
2019 07:57:009.0[facility] HermannPARATHYROID JTTYVBR3538-14-30 07:57:000.96
[facility] HermannPARATHYROID ZKLAZCQ0209-86-46 07:57:000.94[facility] HermannBLOOD 
BANK OYIYHBS4240-80-92 07:57:00Negative (19 2:57 AM)[facility] HermannCHEM 
YWAYV9511-65-32 07:57:001.6[facility] HermannCHEM WNQBE3776-09-81 07:57:001.9
[facility] DghxsqeSKCUHLHZPYPC2242-11-13 07:57:0010.9[facility] HermannELECTROLYTES
2019 07:57:62499Cllztsaw GodiohaATLOJTZTQNHF9789-94-78 07:57:95488Dzkbxzwt
WtssnpxTBPEBESUYVYT9408-25-91 07:57:000.61[facility] HqtdxncRHEXLRMVKDIO7206-84-57
07:57:003[facility] LawivylBIIOWLFHLOQY1683-57-52 07:57:0026[facility] [place]
SIHJFRCRNHNR7003-54-49 07:57:0099[facility] GvdpujaCLDEASJNWXPH6308-84-56 07:57:00
104[facility] FclinqcRTQRAIMJLSSE4698-46-55 07:57:007.2[facility]
BXLDOAGZJMJG3082-21-59 07:57:003.9[facility] PvqisvmOYHYUVHPAM7435-35-52 07:57:00
253[facility] ZvlubaqLJAKGOCNDC9000-98-02 07:57:008.7[facility]annHEMATOLOGY
2019 07:57:003.48[facility] EdxrbwkOCYKSXAYZJ0947-14-81 07:57:0011.8[facility]
ItmstbdAVZCLCIXSY4918-46-39 07:57:0010.6[facility] InxomodXXACTEQNSF9020-17-91 
07:57:0019.3[facility] NrskwmkPKZUEVLCXT5796-08-03 07:57:0089.7[facility] [place]
XHXQLAYXRT5637-29-86 07:57:0031.3[facility] UkrtwxvKRFOSKVPLL9542-37-52 07:57:00





             Test Item    Value        Reference Range Interpretation Comments

 

             MCH (test code = MCH) 30.4 pg      27.0-31.0                 



[facility] OkdjdyxIACUPPWOQJ7300-93-33 07:57:0033.8[facility] HermannHEMATOLOGY
2019 07:57:008.8[facility] IusjekiFFUVGILZJY7671-07-60 07:57:001.1[facility] 
RdlbwekPKVDUEWGIM7670-97-91 07:57:000.4[facility] SbsjqquMSQXAHXADM8976-90-76 
07:57:001.6[facility] AyvlbtyRVMODONRRH9035-59-66 07:57:003.0[facility] [place]
RHSNNLTZOR1813-41-32 07:57:000.3[facility] NrlobhmJOXLVZDUSO8772-70-60 07:57:00
74.5[facility] TxmcrgyACYTSCATYK2637-00-49 07:57:0013.2[facility]annHEMATOLOGY
2019 07:57:009.0[facility] HermannPARATHYROID FEGCSLF4031-39-37 07:57:000.96
[facility] HermannPARATHYROID ZCXTSXE6436-94-97 07:57:000.94[facility] HermannCHEM 
RNMHS6973-84-30 22:03:002.2[facility] HermannCHEM OJRYV0829-48-30 22:03:003.0
[facility] HermannCHEM OCLUQ8347-41-90 22:03:0085[facility]annCHEM PANEL
2019 22:03:95256Gaesheom HermannCHEM RDEUR2298-83-16 22:03:007.4[facility] 
HermannCHEM HRJVS0468-05-47 22:03:0027[facility] HermannCHEM WUGWJ0386-17-08 
22:03:0095[facility] HermannCHEM LCJON2737-19-60 22:03:009.5[facility] HermannCHEM 
HCUEU7521-48-68 22:03:000.77[facility] HermannCHEM NJCUE6080-35-53 22:03:02196
[facility] HermannCHEM GCVAX9279-24-55 22:03:004[facility] HermannCHEM PANEL
2019 22:03:003.5[facility] HermannPARATHYROID QIEILBG2206-46-42 22:03:001.00
[facility] HermannPARATHYROID RUUTOFO0255-04-54 22:03:000.98[facility] HermannCHEM 
TDKWA0638-64-40 22:03:002.2[facility] HermannCHEM QXUOY8604-01-81 22:03:003.0
[facility] HermannCHEM WBTGP4717-87-69 22:03:0085[facility]annCHEM PANEL
2019 22:03:01150Hnjinhtw HermannCHEM WAVGH7560-00-51 22:03:007.4[facility] 
HermannCHEM AUYKU4906-39-81 22:03:0027[facility] HermannCHEM ZRYSI8174-42-50 
22:03:0095[facility] HermannCHEM MHJMA5090-01-23 22:03:009.5[facility] HermannCHEM 
ZMUJN9982-80-00 22:03:000.77[facility] HermannCHEM MNWNT7587-58-30 22:03:21050
[facility] HermannCHEM IYLZH1689-93-16 22:03:004[facility]annCHEM PANEL
2019 22:03:003.5[facility] HermannPARATHYROID MWASMAA0152-25-72 22:03:001.00
[facility] HermannPARATHYROID WPLZKGG6371-70-88 22:03:000.98[facility] [place]
VYHYVBLRNI3918-73-75 14:26:0029.4[facility] CtfkecrHHCDZPVNED3462-86-39 14:26:00
9.7[facility] UmwwszwGRXJCVQZJC1246-33-53 14:26:0029.4[facility]annHEMATOLOGY
2019 14:26:009.7[facility] CfrjvofIOYPBFCTHC4860-58-01 13:15:00





             Test Item    Value        Reference Range Interpretation Comments

 

             POC Activated Clotting Time (test code 271 s                       

           



             = POC Activated Clotting Time)                                     

   



[facility] ZqxqgrkCYPEZXXFCA0214-81-82 13:15:00





             Test Item    Value        Reference Range Interpretation Comments

 

             POC Activated Clotting Time (test code 271 s                       

           



             = POC Activated Clotting Time)                                     

   



[facility] MdhgbsrZFLRNGLXJS2258-59-39 13:05:00





             Test Item    Value        Reference Range Interpretation Comments

 

             POC Activated Clotting Time (test code 199 s                       

           



             = POC Activated Clotting Time)                                     

   



[facility] ZlqpldrMWTESFGWBS9905-17-51 13:05:00





             Test Item    Value        Reference Range Interpretation Comments

 

             POC Activated Clotting Time (test code 199 s                       

           



             = POC Activated Clotting Time)                                     

   



[facility] HermannCHEM WGIEV4558-99-14 06:38:002.8[facility] HermannCHEM PANEL
2019 06:38:00





             Test Item    Value        Reference Range Interpretation Comments

 

             A/G Ratio (test code = A/G Ratio) 0.6 1        0.7-1.6             

      



[facility] HermannCHEM OBORW4321-29-45 06:38:000.2[facility] HermannCHEM PANEL
2019 06:38:000.5[facility] HermannCHEM EMEEK1932-56-01 06:38:000.7[facility] 
HermannCHEM WLLHZ3903-15-50 06:38:001.8[facility] HermannCHEM GTUEV0947-06-37 
06:38:004.6[facility] HermannCHEM KWNTR6463-30-49 06:38:68448Gvuisgrb HermannCHEM 
MOGQV3511-98-65 06:38:91582Nstqijuh HermannCHEM BYTIP2790-69-98 06:38:0094
[facility] EhtzekzJLSFNNKNCW0669-11-68 06:38:000.4[facility] HermannHEMATOLOGY
2019 06:38:000.1[facility] RumdeiaQWKJVABJHI5770-50-24 06:38:000.1[facility] 
OtzholpPDBLSKUXHC3073-81-74 06:38:0089.0[facility] LdbbtjjKQAAVWWWHV0286-61-53 
06:38:004.0[facility] SvcfhmbDVJBQRFSVW9209-85-25 06:38:001.0[facility] [place]
BOYHECLCQY6339-06-24 06:38:002.0[facility] FyieeivBOXYJAWMKL9122-18-11 06:38:001.0
[facility] QojaysoYUTVYQZHED9731-07-61 06:38:001.0[facility]annHEMATOLOGY
2019 06:38:001.0[facility] FjbmzirEEADPZDARG8979-55-94 06:38:001.0[facility] 
VrrqeoqHUVDQPPKZA6050-60-92 06:38:000.0[facility] EurbcpaSPBHPSDIAW5863-96-02 
06:38:001[facility] XxsrtpkLMAGAXUYLG2887-00-74 06:38:00Moderate *ABN*(19 
1:38 AM)[facility] MumiwnrWZTPGFVETI1913-50-51 06:38:009.4[facility]
ZXHMGLSKYT1264-10-81 06:38:000.2[facility] PmwhydsYYLNFCONMQ2604-38-03 06:38:13447
[facility] KjckjyiTRJOGFIOYF0913-18-56 06:38:009.2[facility]annHEMATOLOGY
2019 06:38:0019.6[facility] VngsglyXDUXCHVNEA3899-23-10 06:38:0034.9[facility]
DmbvyanDIQESWYGRD1168-51-88 06:38:00





             Test Item    Value        Reference Range Interpretation Comments

 

             MCH (test code = MCH) 30.9 pg      27.0-31.0                 



[facility] PzotywiUSRKRDGKGM1502-50-83 06:38:0088.5[facility] HermannHEMATOLOGY
2019 06:38:0010.4[facility] MtgfupiTTMCXMUIWG1625-15-68 06:38:003.06[facility]
HermannCHEM EFOYQ0181-65-78 06:38:002.8[facility] HermannCHEM OZFDF9974-67-17 
06:38:00





             Test Item    Value        Reference Range Interpretation Comments

 

             A/G Ratio (test code = A/G Ratio) 0.6 1        0.7-1.6             

      



[facility] HermannCHEM YXKOI8024-89-29 06:38:000.2[facility] HermannCHEM PANEL
2019 06:38:000.5[facility] HermannCHEM VCLHO8518-35-11 06:38:000.7[facility] 
HermannCHEM AANVT5570-29-07 06:38:001.8[facility] HermannCHEM CDYPC4098-80-93 
06:38:004.6[facility] HermannCHEM KGUBV0895-54-64 06:38:90993Llsglkpa HermannCHEM 
DCARG9142-15-15 06:38:84365Unrgzqeq HermannCHEM WOEWM0905-22-68 06:38:0094
[facility] WfcelmlRLTNZEXSSZ9752-07-27 06:38:000.4[facility]annHEMATOLOGY
2019 06:38:000.1[facility] GueyxwjBTIIWNUZGP6802-81-15 06:38:000.1[facility] 
JlrdrihSBOZXDZHLA8626-42-37 06:38:0089.0[facility] CswkrlzUHWAIWDJQQ5093-66-34 
06:38:004.0[facility] IzwcleyNWSBRCSMIY6293-32-78 06:38:001.0[facility]
FNWTHBINFI2140-37-12 06:38:002.0[facility] RyagagcJWDXKZUKZO9515-13-56 06:38:001.0
[facility] BhtftgwDLWMBWATQO8118-70-23 06:38:001.0[facility]annHEMATOLOGY
2019 06:38:001.0[facility] KfpmnvcIJYRENIJII8028-97-73 06:38:001.0[facility] 
ZethhhfDCBXWJCXUN0146-38-97 06:38:000.0[facility] NsqwepeVZLLWJHIUZ4711-40-30 
06:38:001[facility] WncpqjsQDUNUOHPBE0994-32-89 06:38:00Moderate *ABN*(19 
1:38 AM)[facility] CglkznsUGKZGJFXUQ7820-10-76 06:38:009.4[facility]
IGSOQLDSOU6930-43-98 06:38:000.2[facility] NxtpcqnPOHRZRLRVY8142-32-94 06:38:95117
[facility] AzxuudsJKCPFIMGZU3961-79-34 06:38:009.2[facility] HermannHEMATOLOGY
2019 06:38:0019.6[facility] JitkygkRURLEFWRFL0087-81-69 06:38:0034.9[facility]
VgsxxrhAVFXAVWEMP9101-61-38 06:38:00





             Test Item    Value        Reference Range Interpretation Comments

 

             MCH (test code = MCH) 30.9 pg      27.0-31.0                 



[facility] XnmrhdcJKYHUAXINU6638-68-39 06:38:0088.5[facility]annHEMATOLOGY
2019 06:38:0010.4[facility] RqxlomuUBEWRNJHLL8658-81-94 06:38:003.06[facility]
HermannCHEM HDJTW6603-90-84 08:55:00





             Test Item    Value        Reference Range Interpretation Comments

 

             A/G Ratio (test code = A/G Ratio) 0.6 1        0.7-1.6             

      



[facility] HermannCHEM ZTNKO6019-05-30 08:55:002.8[facility] HermannCHEM PANEL
2019 08:55:08447Lsmgyjzi HermannCHEM SZOIO2632-00-82 08:55:47654Ckzzemqt 
HermannCHEM HCLSG1867-94-96 08:55:001.8[facility] HermannCHEM JIJSW7829-42-49 
08:55:004.6[facility] HermannCHEM ZWTST5340-91-26 08:55:000.2[facility] HermannCHEM 
LDHJV9074-87-95 08:55:000.7[facility] HermannCHEM DFZKS5765-34-77 08:55:33426
[facility] HermannCHEM WIXIN1293-86-34 08:55:000.5[facility] HermannCHEM PANEL
2019 08:55:00





             Test Item    Value        Reference Range Interpretation Comments

 

             A/G Ratio (test code = A/G Ratio) 0.6 1        0.7-1.6             

      



[facility] HermannCHEM ZIOOF1508-82-78 08:55:002.8[facility] HermannCHEM PANEL
2019 08:55:35318Egrtjtld HermannCHEM TTBWG7718-22-04 08:55:21010Ucjpruws 
HermannCHEM BCMNP3888-49-83 08:55:001.8[facility] HermannCHEM IKUQU4490-16-64 
08:55:004.6[facility] HermannCHEM HFOLP6311-75-57 08:55:000.2[facility] HermannCHEM 
DWFBG9338-02-21 08:55:000.7[facility] HermannCHEM NYCMM0752-40-95 08:55:13741
[facility] HermannCHEM JYSCC2916-23-89 08:55:000.5[facility]annHEMATOLOGY
2019 08:51:002.0[facility] IcyzsufMDIWNLJEXR9297-79-68 08:51:000.0[facility] 
BmftqamKOPLMYZFRF2329-26-38 08:51:002.0[facility] UhxeymnMUXQPLJBXG5875-90-26 
08:51:000.0[facility] AaviqdkNDZVNOUGYC2020-66-88 02:11:007.0[facility] [place]
KQMBFTOVUT9997-99-78 02:11:001.0[facility] TkmtnxtPCIQCSAQPF8516-95-71 02:11:003
[facility] EqrlxzmVQRXBBEQDQ2806-62-69 02:11:000.0[facility]annHEMATOLOGY
2019 02:11:00Normal (19 9:11 PM)[facility] YzphfizDDLDECHSSV4252-29-05 
02:11:001+ *ABN*(19 9:11 PM)[facility] RsmmbicYOAGAZZBLZ1652-01-73 02:11:00





             Test Item    Value        Reference Range Interpretation Comments

 

             Vanco Tr TND (test code = Vanco Tr 2130 1                          

       



             TND)                                                



[facility] SeogdlxNUOYETQNWR8795-40-55 02:11:003.9[facility]annHEMATOLOGY
2019 02:11:007.0[facility] CnjxipxATZUODIFPJ5971-46-64 02:11:001.0[facility] 
QeluhndISTTPHGXPA4855-25-41 02:11:003[facility] ShxrbjvOHNHDNJYRJ3110-65-35 
02:11:000.0[facility] RgxlbdzAXEFRZXTJT7568-02-44 02:11:00Normal (19 9:11 PM)
[facility] BmahgbzWLRSCZQIXS8664-45-43 02:11:001+ *ABN*(19 9:11 PM)[facility] 
AccyzaoABGEQBNLVS4450-53-81 02:11:00





             Test Item    Value        Reference Range Interpretation Comments

 

             Vanco Tr TND (test code = Vanco Tr 2130 1                          

       



             TND)                                                



[facility] CenterXpigrhoORAHTIIMUH9120-03-73 02:11:003.9[facility]annHEMATOLOGY
2019 21:58:77829Rrmsnrre WrjkcdxDKFQUXUPQM3608-12-79 21:58:62828Mbkynpfz 
MdzobzkMUOWJGDFLQ3348-65-75 21:16:002.0[facility] QyqzfbsJAMJIGZCIF9950-48-03 
21:16:005.0[facility] BfnqhcdQGPEKGVCQA0802-06-33 21:16:002[facility] [place]
DACQGOWQEV7356-09-85 21:16:00Normal (19 4:16 PM)[facility]annHEMATOLOGY
2019 21:16:001+ *ABN*(19 4:16 PM)[facility] EkusrhvQPSEUWQXUR9850-33-16
21:16:002.0[facility] JaygfrtYJSXCTUIZP9345-42-48 21:16:005.0[facility]
PMSHSBLTEG0423-17-59 21:16:002[facility] RljntgjZNNAURTQCH8490-44-65 21:16:00
Normal (19 4:16 PM)[facility] TfwqwmgZTYGVOHJEB4433-87-00 21:16:001+ 
*ABN*(19 4:16 PM)[facility] HermannCHEM TAXEQ7529-53-06 13:13:13615Ewpymgia 
HermannCHEM WXLWI5304-73-39 13:13:0010[facility] TnzxhnaNKOMSZWOMQ8927-72-90 
13:13:00





             Test Item    Value        Reference Range Interpretation Comments

 

             PTT (test code = PTT) 43.0 s       22.9-35.8                 



[facility] XwljsphMFXMAPYRCO7019-23-33 13:13:00





             Test Item    Value        Reference Range Interpretation Comments

 

             INR (test code = INR) 1.62 1       0.85-1.17                 



[facility] CenterQnsutrfKBCAFJCTQA9198-89-94 13:13:00





             Test Item    Value        Reference Range Interpretation Comments

 

             PT (test code = PT) 18.9 s       12.0-14.7                 



[facility] BhwukxvRTCMNMADXM6989-37-93 13:13:55302MiwzsblgW[facility] ParkwayHEMATOLOGY
2019 13:13:00Negative 9(19 8:13 AM)[facility]
2019 13:13:00





             Test Item    Value        Reference Range Interpretation Comments

 

             Pat Od Value (test code = Pat Od 0.109 1                           

     



             Value)                                              



[facility] RegionalCzwromiMMDMINTFHP7362-02-25 13:13:00





             Test Item    Value        Reference Range Interpretation Comments

 

             Pos CO Value (test code = Pos CO 0.400 1                           

     



             Value)                                              



[facility] ParkwayHxijvzfWMMUDLBFGL0228-49-87 13:13:56641Itsrgzds HermannCHEM PANEL
2019 13:13:35680Wawgroej HermannCHEM XZYYC9746-63-75 13:13:0010[facility] ParkwayChbjnnvRKGCHTTGML1884-87-27 13:13:00





             Test Item    Value        Reference Range Interpretation Comments

 

             PTT (test code = PTT) 43.0 s       22.9-35.8                 



[facility] RegionalTtrgqcvVEIZLAQNSJ9198-11-60 13:13:00





             Test Item    Value        Reference Range Interpretation Comments

 

             INR (test code = INR) 1.62 1       0.85-1.17                 



[facility] RegionalNgfaqcmBEXLYTVPUP3789-17-39 13:13:00





             Test Item    Value        Reference Range Interpretation Comments

 

             PT (test code = PT) 18.9 s       12.0-14.7                 



[facility] RegionalUpgeeidHWHOMOHZIP4329-48-41 13:13:76325EbgvvflqCorpus [facility]
2019 13:13:00Negative 9(19 8:13 AM)[facility]
2019 13:13:00





             Test Item    Value        Reference Range Interpretation Comments

 

             Pat Od Value (test code = Pat Od 0.109 1                           

     



             Value)                                              



[facility] RegionalGceudsnKDPYSWCXJP8361-70-39 13:13:00





             Test Item    Value        Reference Range Interpretation Comments

 

             Pos CO Value (test code = Pos CO 0.400 1                           

     



             Value)                                              



[facility] ChildrenOmgybntNRUDVFUVTS1800-68-08 13:13:90521DdknzskbCH[facility] 
UZCLZBG3438-41-09 12:45:00Negative (19 7:45 AM)[facility] 
JEZHDYP3853-17-87 12:45:00Negative (19 7:45 AM)[facility]annBL[facility] 
XCXPJFJ1128-49-75 12:10:00Product available (19 7:10 AM)[facility] KZTAYOU8066-02-76 12:10:00Product available (19 7:10 AM)[facility]
HermannBLOOD BANK UCMMOJE6607-97-45 11:02:00Product available (19 6:02 AM)
[facility] HermannBLOOD BANK BOGQBYB1944-16-27 11:02:00Product available (19 
6:02 AM)[facility] HermannCHEM AMRAT0928-87-97 08:29:001.2[facility]
FWIDMUMLRW4114-47-25 08:29:00





             Test Item    Value        Reference Range Interpretation Comments

 

             PTT (test code = PTT) 45.6 s       22.9-35.8                 



[facility] FtjesvwJDPISCBLCS3928-90-95 08:29:00





             Test Item    Value        Reference Range Interpretation Comments

 

             PT (test code = PT) 20.4 s       12.0-14.7                 



[facility] KhmvcmzGBBPBFXWSG8791-96-87 08:29:00





             Test Item    Value        Reference Range Interpretation Comments

 

             INR (test code = INR) 1.79 1       0.85-1.17                 



[facility]CHE[facility] DQGKL2105-52-99 08:29:001.2[facility] HermannHEMATOLOGY
2019 08:29:00





             Test Item    Value        Reference Range Interpretation Comments

 

             PTT (test code = PTT) 45.6 s       22.9-35.8                 



[facility] BygemxfGNHZLQSZXH2080-61-20 08:29:00





             Test Item    Value        Reference Range Interpretation Comments

 

             PT (test code = PT) 20.4 s       12.0-14.7                 



[facility] LuvlkuaJWRLJLNYBU5514-97-40 08:29:00





             Test Item    Value        Reference Range Interpretation Comments

 

             INR (test code = INR) 1.79 1       0.85-1.17                 



[facility] CenterJmuoazqTNIIJNVVIZ1518-42-44 03:08:006.0[facility] HermannTOXICOLOGY
2019 03:08:006.0[facility] HermannCHEM XWSBW7023-11-36 17:52:053661Ydggsdxx 
HermannCHEM NMRTO7225-39-83 17:52:97616Ulslccsl HermannCHEM GDALK4130-05-12 
17:52:00





             Test Item    Value        Reference Range Interpretation Comments

 

             A/G Ratio (test code = A/G Ratio) 0.8 1        0.7-1.6             

      



[facility] HermannCHEM SYDXO7573-15-98 17:52:0095[facility] HermannCHEM PANEL
2019 17:52:000.5[facility] HermannCHEM IDMOO1269-90-07 17:52:00





             Test Item    Value        Reference Range Interpretation Comments

 

             B/C Ratio (test code = B/C Ratio) 13                 

      



[facility] HermannCHEM QXNXT5873-27-52 17:52:004.3[facility] HermannCHEM PANEL
2019 17:52:001.9[facility] HermannCHEM CPUYQ7351-14-08 17:52:002.4[facility] 
HermannCHEM QEYJU9570-20-88 17:52:001.5[facility] XejobrlHXXTNJQWQA2020-93-47 
17:52:0010.9[facility] HermannCHEM ISDRL1183-52-53 17:52:230438Czbvqyxh Julius
CHEM NMNHU1629-88-33 17:52:48510Aadjaqaz HermannCHEM NSITN8911-07-69 17:52:00





             Test Item    Value        Reference Range Interpretation Comments

 

             A/G Ratio (test code = A/G Ratio) 0.8 1        0.7-1.6             

      



[facility] HermannCHEM CNONO6303-52-24 17:52:0095[facility] HermannCHEM PANEL
2019 17:52:000.5[facility] HermannCHEM [id number] 17:52:00





             Test Item    Value        Reference Range Interpretation Comments

 

             B/C Ratio (test code = B/C Ratio) 13                 

      



[facility] HermannCHEM [id number] 17:52:004.3[facility] HermannCHEM PANEL
2019 17:52:001.9[facility] HermannCHEM [id number] 17:52:002.4[facility] 
HermannCHEM [id number] 17:52:001.5[facility] IwxnexqLRUMVVRHKN3894-72-84 
17:52:0010.9[facility] HermannCARDIAC FLMTLAQ3225-29-36 05:38:001.74[facility] 
HermannCHEM EGQNP3874-78-13 05:38:002.2[facility] HermannCHEM [id number] 
05:38:00





             Test Item    Value        Reference Range Interpretation Comments

 

             B/C Ratio (test code = B/C Ratio) 13                 

      



[facility] NzlmlzzBHXPVKXRAC6790-74-67 05:38:00





             Test Item    Value        Reference Range Interpretation Comments

 

             Vanco Tr TND (test code = Vanco Tr 2030 1                          

       



             TND)                                                



[facility] TbbhkneJWGISMBGWW8135-75-68 05:38:0023.6[facility] HermannCARDIAC ENZYMES
2019 05:38:001.74[facility] HermannCHEM VIYPU8143-74-14 05:38:002.2[facility] 
HermannCHEM [id number] 05:38:00





             Test Item    Value        Reference Range Interpretation Comments

 

             B/C Ratio (test code = B/C Ratio) 13                 

      



[facility] NkriquuRHRITGLPBN2593-46-30 05:38:00





             Test Item    Value        Reference Range Interpretation Comments

 

             Vanco Tr TND (test code = Vanco Tr 2030 1                          

       



             TND)                                                



[facility] EzmmuspKRDZCFZSEG6653-00-11 05:38:0023.6[facility] HermannURINE CHEM
2019 21:36:0034.4[facility] HermannURINE [id number] 21:36:0015[facility] 
HermannURINE [id number] 21:36:0010[facility] HermannURINE [id number] 
21:36:0023.0[facility] HermannURINE GKJS1010-88-44 21:36:0034.4[facility] [place]
URINE [id number] 21:36:0015[facility] HermannURINE [id number] 21:36:0010
[facility] HermannURINE [id number] 21:36:0023.0[facility] HermannHEMATOLOGY
2019 21:32:00





             Test Item    Value        Reference Range Interpretation Comments

 

             PTT (test code = PTT) 47.2 s       22.9-35.8                 



[facility] LbgmyzzOMEYKVXCQV7008-16-18 21:32:0017.5[facility] HermannHEMATOLOGY
2019 21:32:00





             Test Item    Value        Reference Range Interpretation Comments

 

             PTT (test code = PTT) 47.2 s       22.9-35.8                 



[facility] VfbxdseGCMFLKJRDJ1811-48-82 21:32:0017.5[facility] HermannCARDIAC ENZYMES
2019 13:40:000.234[facility] HermannCARDIAC IPLQZJP0566-06-55 13:40:002.87
[facility] HermannCHEM [id number] 13:40:000.44[facility] HermannCARDIAC ENZYMES
2019 13:40:000.234[facility] HermannCARDIAC BOROUQG2837-28-19 13:40:002.87
[facility] HermannCHEM RAVZR1974-19-06 13:40:000.44[facility] HermannCARDIAC ENZYMES
2019 07:02:004.06[facility] HermannCARDIAC [id number] 07:02:004.06
[facility] HermannCARDIAC DMQGRSF0893-35-66 04:31:50725Wjrcatzv HermannCARDIAC 
XEIQKKU9131-26-24 04:31:00





             Test Item    Value        Reference Range Interpretation Comments

 

             CK MB Index (test 2.6 1        See_Comment                [Automate

d message] The



             code = CK MB Index)                                        system [employer] generated this



                                                                 result transmit

christin



                                                                 reference range

: <=2.5. The



                                                                 reference range

 was not



                                                                 used to interpr

et this



                                                                 result as omero

l/abnormal.



[facility] IKGUXSI4083-57-21 04:31:007.5[facility] 
NPDCYHD7779-37-12 04:31:71702Fdxzxmzr HermannCARDIAC PNNLOIS2504-10-75 04:31:00





             Test Item    Value        Reference Range Interpretation Comments

 

             CK MB Index (test 2.6 1        See_Comment                [Automate

d message] The



             code = CK MB Index)                                        system w

[employer] generated this



                                                                 result transmit

christin



                                                                 reference range

: <=2.5. The



                                                                 reference range

 was not



                                                                 used to interpr

et this



                                                                 result as omero

l/abnormal.



[facility] HWWDDVB4908-97-31 04:31:007.5[facility] 
KYDCATT6915-18-87 01:30:00





             Test Item    Value        Reference Range Interpretation Comments

 

             CK MB Index (test 2.3 1        See_Comment                [Automate

d message] The



             code = CK MB Index)                                        system w

[employer] generated this



                                                                 result transmit

christin



                                                                 reference range

: <=2.5. The



                                                                 reference range

 was not



                                                                 used to interpr

et this



                                                                 result as omero

l/abnormal.



[facility] EWXKIQX1508-31-14 01:30:007.2[facility] 
CGVXQTW6577-96-91 01:30:82069Ooatirvm UtksqadNQDHDAUCQV8789-23-19 01:30:001.0
[facility] RfxldnbHPDVVUQUAS2354-38-27 01:30:00Normal (19 8:30 PM)[facility] 
HarcxjyDJYJOOAGQG1958-92-28 01:30:004.7[facility] WMGHYOY2823-71-41
01:30:00





             Test Item    Value        Reference Range Interpretation Comments

 

             CK MB Index (test 2.3 1        See_Comment                [Automate

d message] The



             code = CK MB Index)                                        system w

[employer] generated this



                                                                 result transmit

christin



                                                                 reference range

: <=2.5. The



                                                                 reference range

 was not



                                                                 used to interpr

et this



                                                                 result as omero

l/abnormal.



[facility] BWWEGFE7632-02-18 01:30:007.2[facility] 
ECOMOCO5162-84-07 01:30:54396Zarbjdnj HngwpteWRSGBYCGDK1436-97-81 01:30:001.0
[facility] WxseznbNNITQAZUQF5782-14-94 01:30:00Normal (19 8:30 PM)[facility] 
CcpvsymENEPIUUHPH5510-63-82 01:30:004.7[facility] TZERWNE8944-33-15
19:12:53637Grmbyoad HermannCARDIAC QFKMXDP9590-44-30 19:12:00





             Test Item    Value        Reference Range Interpretation Comments

 

             CK MB Index (test 0.9 1        See_Comment                [Automate

d message] The



             code = CK MB Index)                                        system w

[employer] generated this



                                                                 result transmit

christin



                                                                 reference range

: <=2.5. The



                                                                 reference range

 was not



                                                                 used to interpr

et this



                                                                 result as omero

l/abnormal.



[facility] YRGWTBB5169-49-58 19:12:001.3[facility]annCHEM PANEL
2019 19:12:00





             Test Item    Value        Reference Range Interpretation Comments

 

             B/C Ratio (test code = B/C Ratio) 11                 

      



[facility] GKFQMZK7611-60-61 19:12:93383Rzoajjgw [facility] 
TSJLUNJ3596-80-92 19:12:00





             Test Item    Value        Reference Range Interpretation Comments

 

             CK MB Index (test 0.9 1        See_Comment                [Automate

d message] The



             code = CK MB Index)                                        system [employer] generated this



                                                                 result transmit

christin



                                                                 reference range

: <=2.5. The



                                                                 reference range

 was not



                                                                 used to interpr

et this



                                                                 result as omero

l/abnormal.



[facility] HGRKQLZ2046-35-47 19:12:001.3[facility]annCHEM PANEL
2019 19:12:00





             Test Item    Value        Reference Range Interpretation Comments

 

             B/C Ratio (test code = B/C Ratio) 11                 

      



[facility] KhsqdutRVPXGSLTCL4704-30-95 11:34:002.9[facility] HermannTOXICOLOGY
2019 11:34:002.9[facility]annCHEM YEYPI8936-34-06 11:00:92302Jztjfprv 
HermannCHEM YGXCD3352-30-86 11:00:83942Bykojevq QahwpgiWKWVEXXVTQ6896-99-11 
10:29:000.1[facility] ZreybymEGBBUWSACO6748-80-37 10:29:000.1[facility]
BACTERIAL - SVQIFKSL3090-84-80 05:24:00Negative (19 12:24 AM)[facility] 
[facility]ermannBLOOD BANK KKESXCR3495-73-13 05:24:00Negative (19 12:24 AM)[facility] 
HermannBACTERIAL - LDQBVHAG7718-25-95 05:24:00Negative (19 12:24 AM)
[facility] BJYSWGF1979-50-49 05:24:00Negative (19 12:24 AM)
[facility]annCHEM DAGQM5543-26-57 02:56:22259Tdakpqgo HermannURINE CHEM
2019 02:56:77587Ymlkqmvd HermannCHEM VSRZL9238-07-89 02:56:19832Jnpvpbcl 
[facility]URINE AJZM8549-50-47 02:56:72753Ogermolc LkhyewyQEPTUK0126-42-77 00:26:00





             Test Item    Value        Reference Range Interpretation Comments

 

             VLDL (test code = VLDL) 15 1                                   



[facility] DrsyvrmKTRQBL5009-06-50 00:26:0084[facility] XcnthjfGOUSTH0635-75-07 
00:26:0042[facility] JhzesdyABOHNL4122-52-88 00:26:00





             Test Item    Value        Reference Range Interpretation Comments

 

             CHD Risk (test code = CHD Risk) 3.36 1       3.90-5.80             

    



[facility] PhcxbhsNLMPWJ4946-39-76 00:26:0075[facility] ArzweafVEMJMH1407-10-65 
00:26:44170Xcuntvhg SqyqaltFXMTDNYDSD2001-29-46 00:26:006.2[facility] [place]
SNXBEC4559-07-83 00:26:00





             Test Item    Value        Reference Range Interpretation Comments

 

             VLDL (test code = VLDL) 15 1                                   



[facility] FcjznqkIHUNLO7421-88-33 00:26:0084[facility] JjelrdzNBFHAM8719-09-21 
00:26:0042[facility] YqsoldbHALMVD8916-61-01 00:26:00





             Test Item    Value        Reference Range Interpretation Comments

 

             CHD Risk (test code = CHD Risk) 3.36 1       3.90-5.80             

    



[facility] AwjwkqvUUPQRU0238-88-92 00:26:0075[facility] BxpavycLIKNJQ1688-27-36 
00:26:98413Qvsooxqh EmfboikOLCPYIZIBZ0106-78-58 00:26:006.2[facility]annURINE
AND ISGMT5229-13-28 21:09:00Trace *ABN*(19 4:09 PM)[facility] 
AND WGPSK4618-24-62 21:09:00Negative (19 4:09 PM)[facility] 
DKNBL2642-03-96 21:09:00Negative (19 4:09 PM)[facility] 
ITHQS2506-10-60 21:09:00Trace *ABN*(19 4:09 PM)[facility] 
WSSPM5618-67-64 21:09:000.2[facility] ENEHN6074-37-81 21:09:00
Negative (19 4:09 PM)[facility] MDKUZ2793-19-96 21:09:00
Negative *NA*(19 4:09 PM)[facility] TIODY1604-54-10 21:09:00
&lt;=1.005 *NA*(19 4:09 PM)[facility] DJSFS1871-36-09 
21:09:00Clear (19 4:09 PM)[facility] VGCRN5977-95-36 
21:09:00Negative (19 4:09 PM)[facility] WFXTE0000-06-36 
21:09:00





             Test Item    Value        Reference Range Interpretation Comments

 

             UA pH (test code = UA pH) 7.0 1        5.0-8.0                   



[facility] SBVIT7370-94-27 21:09:00Yellow *NA*(19 4:09 PM)
[facility] OHEJI6482-15-02 21:09:00Trace *ABN*(19 4:09 PM)
[facility] XERYA7461-81-96 21:09:00Negative (19 4:09 PM)
[facility] ESHJX2894-49-35 21:09:00Negative (19 4:09 PM)
[facility] SVRCN5153-35-10 21:09:00Trace *ABN*(19 4:09 PM)
[facility] TTKJE8231-51-25 21:09:000.2[facility] 
QWDZO7739-73-55 21:09:00Negative (19 4:09 PM)[facility] 
NKLZM5148-45-50 21:09:00Negative *NA*(19 4:09 PM)[facility] 
SRPIO4619-91-91 21:09:00&lt;=1.005 *NA*(19 4:09 PM)[facility] 
AND OTPUK7946-92-01 21:09:00Clear (19 4:09 PM)[facility] 
LEFAH6012-57-06 21:09:00Negative (19 4:09 PM)[facility] 
XFRRA9443-15-78 21:09:00





             Test Item    Value        Reference Range Interpretation Comments

 

             UA pH (test code = UA pH) 7.0 1        5.0-8.0                   



[facility] ZBXIE3680-97-81 21:09:00Yellow *NA*(19 4:09 PM)
[facility]annCHEM QPGJC1264-38-53 17:10:000.5[facility]annCHEM PANEL
2019 17:10:000.5[facility] HermannMISCELLANEOUS LAB ORDER2018-10-31 07:41:00





             Test Item    Value        Reference Range Interpretation Comments

 

             SCAN RESULT (test code = 9495083)                                  

      



MISCELLANEOUS LAB ORDER2018-10-30 11:12:00





             Test Item    Value        Reference Range Interpretation Comments

 

             SCAN RESULT (test code = 7508205)                                  

      



CSF CULTURE + GRAM STAIN2018-10-27 17:06:00





             Test Item    Value        Reference Range Interpretation Comments

 

             CULTURE (BEAKER) (test code No growth                              



             = 1095)                                             

 

             GRAM STAIN RESULT (BEAKER) <1+ WBCs                               



             (test code = 1123)                                        

 

             GRAM STAIN RESULT (BEAKER) No organisms seen                       

    



             (test code = 90763)                                        



CBC W/PLT COUNT &amp; AUTO DIFFERENTIAL2018-10-26 10:29:00





             Test Item    Value        Reference Range Interpretation Comments

 

             WHITE BLOOD CELL COUNT (BEAKER) 9.6 K/ L     3.5-10.5              

    



             (test code = 775)                                        

 

             RED BLOOD CELL COUNT (BEAKER) 2.68 M/ L    3.93-5.22    L          

  



             (test code = 761)                                        

 

             HEMOGLOBIN (BEAKER) (test code = 9.3 GM/DL    11.2-15.7    L       

     



             410)                                                

 

             HEMATOCRIT (BEAKER) (test code = 27.5 %       34.1-44.9    L       

     



             411)                                                

 

             MEAN CORPUSCULAR VOLUME (BEAKER) 102.6 fL     79.4-94.8    H       

     



             (test code = 753)                                        

 

             MEAN CORPUSCULAR HEMOGLOBIN 34.7 pg      25.6-32.2    H            



             (BEAKER) (test code = 751)                                        

 

             MEAN CORPUSCULAR HEMOGLOBIN CONC 33.8 GM/DL   32.2-35.5            

     



             (BEAKER) (test code = 752)                                        

 

             RED CELL DISTRIBUTION WIDTH 14.9 %       11.7-14.4    H            



             (BEAKER) (test code = 412)                                        

 

             PLATELET COUNT (BEAKER) (test 382 K/CU MM  150-450                 

  



             code = 756)                                         

 

             MEAN PLATELET VOLUME (BEAKER) 9.6 fL       9.4-12.3                

  



             (test code = 754)                                        

 

             NUCLEATED RED BLOOD CELLS 0 /100 WBC   0-0                       



             (BEAKER) (test code = 413)                                        



(CELLAVISION MANUAL DIFF)2018-10-26 10:29:00





             Test Item    Value        Reference Range Interpretation Comments

 

             NEUTROPHILS - REL 73 %                                   



             (CELLAVISION)(BEAKER) (test code =                                 

       



             2816)                                               

 

             LYMPHOCYTES - REL 15 %                                   



             (CELLAVISION)(BEAKER) (test code =                                 

       



             2817)                                               

 

             MONOCYTES - REL 8 %                                    



             (CELLAVISION)(BEAKER) (test code =                                 

       



             2818)                                               

 

             EOSINOPHILS - REL 2 %                                    



             (CELLAVISION)(BEAKER) (test code =                                 

       



             2819)                                               

 

             BASOPHILS - REL 2 %                                    



             (CELLAVISION)(BEAKER) (test code =                                 

       



             2820)                                               

 

             NEUTROPHILS - ABS 7.01 K/ul    1.56-6.13    H            



             (CELLAVISION)(BEAKER) (test code =                                 

       



             2830)                                               

 

             LYMPHOCYTES - ABS 1.44 K/ul    1.18-3.74                 



             (CELLAVISION)(BEAKER) (test code =                                 

       



             2831)                                               

 

             MONOCYTES - ABS 0.77 K/uL    0.24-0.36    H            



             (CELLAVISION)(BEAKER) (test code =                                 

       



             2832)                                               

 

             EOSINOPHILS - ABS 0.19 K/uL    0.04-0.36                 



             (CELLAVISION)(BEAKER) (test code =                                 

       



             2834)                                               

 

             BASOPHILS - ABS 0.19 K/uL    0.01-0.08    H            



             (CELLAVISION)(BEAKER) (test code =                                 

       



             2835)                                               

 

             TOTAL COUNTED (BEAKER) (test code = 100                            

        



             1351)                                               

 

             WBC MORPHOLOGY (BEAKER) (test code Normal                          

       



             = 487)                                              

 

             PLT MORPHOLOGY (BEAKER) (test code Normal                          

       



             = 486)                                              

 

             ANISOCYTOSIS (BEAKER) (test code = 1+ few                          

       



             961)                                                

 

             POIKILOCYTES (BEAKER) (test code = 1+ few                          

       



             966)                                                

 

             ARTIFACT (CELLAVISION)(BEAKER) Present                             

   



             (test code = 3432)                                        

 

             PLATELET CONCENTRATION Adequate                               



             (CELLAVISION)(BEAKER) (test code =                                 

       



             3438)                                               



Received comment: User comments: Slide comments:FERRITIN2018-10-26 06:29:00





             Test Item    Value        Reference Range Interpretation Comments

 

             FERRITIN (BEAKER) (test code = 361) 357 ng/mL    5-275        H    

        



FOLATE, SERUM2018-10-26 06:29:00





             Test Item    Value        Reference Range Interpretation Comments

 

             FOLATE (BEAKER) (test code = 362) 10.3 ng/mL   >=7.0               

      



MAGNESIUM2018-10-26 06:16:00





             Test Item    Value        Reference Range Interpretation Comments

 

             MAGNESIUM (BEAKER) (test code = 1.4 mg/dL    1.6-2.6      L        

    



             627)                                                



BASIC METABOLIC PANEL2018-10-26 06:16:00





             Test Item    Value        Reference Range Interpretation Comments

 

             SODIUM (BEAKER) 134 meq/L    136-145      L            



             (test code = 381)                                        

 

             POTASSIUM (BEAKER) 3.3 meq/L    3.5-5.1      L            



             (test code = 379)                                        

 

             CHLORIDE (BEAKER) 102 meq/L                        



             (test code = 382)                                        

 

             CO2 (BEAKER) (test 22 meq/L     22-29                     



             code = 355)                                         

 

             BLOOD UREA NITROGEN 4 mg/dL      7-21         L            



             (BEAKER) (test code                                        



             = 354)                                              

 

             CREATININE (BEAKER) 0.64 mg/dL   0.57-1.25                 



             (test code = 358)                                        

 

             GLUCOSE RANDOM 107 mg/dL           H            



             (BEAKER) (test code                                        



             = 652)                                              

 

             CALCIUM (BEAKER) 8.6 mg/dL    8.4-10.2                  



             (test code = 697)                                        

 

             EGFR (BEAKER) (test 95 mL/min/1.73                           ESTIMA

CHRISTIN GFR IS



             code = 1092) sq m                                   NOT AS ACCURATE

 AS



                                                                 CREATININE



                                                                 CLEARANCE IN



                                                                 PREDICTING



                                                                 GLOMERULAR



                                                                 FILTRATION RATE

.



                                                                 ESTIMATED GFR I

S



                                                                 NOT APPLICABLE 

FOR



                                                                 DIALYSIS PATIEN

TS.



CREATINE KINASE (CK)2018-10-26 06:16:00





             Test Item    Value        Reference Range Interpretation Comments

 

             CREATINE KINASE TOTAL (BEAKER) (test 313 U/L             H   

         



             code = 380)                                         



IRON, TIBC, % SAT. (WITHOUT FERRITIN)2018-10-26 06:10:00





             Test Item    Value        Reference Range Interpretation Comments

 

             IRON (BEAKER) (test code = 547) 50 ug/dL                     

    

 

             TOTAL IRON BINDING CAPACITY 243 ug/dL    250-450      L            



             (BEAKER) (test code = 769)                                        

 

             IRON % SATURATION (2) (BEAKER) 21 %         20-55                  

   



             (test code = 2590)                                        



B-TYPE NATRIURETIC FACTOR (BNP)2018-10-26 05:50:00





             Test Item    Value        Reference Range Interpretation Comments

 

             B-TYPE NATRIURETIC PEPTIDE 1941 pg/mL   0-100        H            



             (AKER) (test code = 700)                                        



POCT-GLUCOSE METER2018-10-25 21:20:00





             Test Item    Value        Reference Range Interpretation Comments

 

             POC-GLUCOSE METER 184 mg/dL           H            TESTED AT 

[place] [address]



             ([facility]) (test code =                                        [facility]



             1538)                                               [address]



POCT-GLUCOSE METER2018-10-25 17:25:00





             Test Item    Value        Reference Range Interpretation Comments

 

             POC-GLUCOSE METER 189 mg/dL           H            TESTED AT 

[place] [address]



             ([facility]) (test code =                                        [facility]



             1538)                                               [address]



CBC W/PLT COUNT &amp; AUTO DIFFERENTIAL2018-10-25 10:14:00





             Test Item    Value        Reference Range Interpretation Comments

 

             WHITE BLOOD CELL COUNT (BEAKER) 9.1 K/ L     3.5-10.5              

    



             (test code = 775)                                        

 

             RED BLOOD CELL COUNT (BEAKER) 3.16 M/ L    3.93-5.22    L          

  



             (test code = 761)                                        

 

             HEMOGLOBIN (BEAKER) (test code = 10.9 GM/DL   11.2-15.7    L       

     



             410)                                                

 

             HEMATOCRIT (BEAKER) (test code = 32.0 %       34.1-44.9    L       

     



             411)                                                

 

             MEAN CORPUSCULAR VOLUME (BEAKER) 101.3 fL     79.4-94.8    H       

     



             (test code = 753)                                        

 

             MEAN CORPUSCULAR HEMOGLOBIN 34.5 pg      25.6-32.2    H            



             (BEAKER) (test code = 751)                                        

 

             MEAN CORPUSCULAR HEMOGLOBIN CONC 34.1 GM/DL   32.2-35.5            

     



             (BEAKER) (test code = 752)                                        

 

             RED CELL DISTRIBUTION WIDTH 14.5 %       11.7-14.4    H            



             (BEAKER) (test code = 412)                                        

 

             PLATELET COUNT (BEAKER) (test 354 K/CU MM  150-450                 

  



             code = 756)                                         

 

             MEAN PLATELET VOLUME (BEAKER) 10.0 fL      9.4-12.3                

  



             (test code = 754)                                        

 

             NUCLEATED RED BLOOD CELLS 0 /100 WBC   0-0                       



             (BEAKER) (test code = 413)                                        



(CELLAVISION MANUAL DIFF)2018-10-25 10:14:00





             Test Item    Value        Reference Range Interpretation Comments

 

             NEUTROPHILS - REL 64 %                                   



             (CELLAVISION)(BEAKER) (test code =                                 

       



             2816)                                               

 

             LYMPHOCYTES - REL 20 %                                   



             (CELLAVISION)(BEAKER) (test code =                                 

       



             2817)                                               

 

             MONOCYTES - REL 13 %                                   



             (CELLAVISION)(BEAKER) (test code =                                 

       



             2818)                                               

 

             EOSINOPHILS - REL 3 %                                    



             (CELLAVISION)(BEAKER) (test code =                                 

       



             2819)                                               

 

             NEUTROPHILS - ABS 5.82 K/ul    1.56-6.13                 



             (CELLAVISION)(BEAKER) (test code =                                 

       



             2830)                                               

 

             LYMPHOCYTES - ABS 1.82 K/ul    1.18-3.74                 



             (CELLAVISION)(BEAKER) (test code =                                 

       



             2831)                                               

 

             MONOCYTES - ABS 1.18 K/uL    0.24-0.36    H            



             (CELLAVISION)(BEAKER) (test code =                                 

       



             2832)                                               

 

             EOSINOPHILS - ABS 0.27 K/uL    0.04-0.36                 



             (CELLAVISION)(BEAKER) (test code =                                 

       



             2834)                                               

 

             TOTAL COUNTED (BEAKER) (test code = 100                            

        



             1351)                                               

 

             WBC MORPHOLOGY (BEAKER) (test code Normal                          

       



             = 487)                                              

 

             LARGE PLT(BEAKER) (test code = Present                             

   



             2156)                                               

 

             POLYCHROMATOPHILLIC RBCS(BEAKER) 1+ few                            

     



             (test code = 478)                                        

 

             ARTIFACT (CELLAVISION)(BEAKER) Present                             

   



             (test code = 3432)                                        

 

             PLATELET CONCENTRATION Adequate                               



             (CELLAVISION)(BEAKER) (test code =                                 

       



             3438)                                               



Received comment: User comments: Slide comments:MENINGITIS/ENCEPHALITIS PANEL
2018-10-25 09:52:00





             Test Item    Value        Reference Range Interpretation Comments

 

             ESCHERICHIA COLI K1 (test code = Not detected Not detected         

     



             2016)                                            

 

             HAEMOPHILUS INFLUENZAE (test Not detected Not detected             

 



             code = 2016)                                        

 

             LISTERIA MONOCYTOGENES (test Not detected Not detected             

 



             code = 7946493)                                        

 

             NEISSERIA MENINGITIDIS (test Not detected Not detected             

 



             code = 2016)                                        

 

             STREPTOCOCCUS AGALACTIAE (test Not detected Not detected           

   



             code = 2292218)                                        

 

             STREPTOCOCCUS PNEUMONIAE (test Not detected Not detected           

   



             code = 6684889)                                        

 

             CYTOMEGALOVIRUS (CMV) (test code Not detected Not detected         

     



             = 5420162)                                          

 

             ENTEROVIRUS (test code = Not detected Not detected              



             3964761)                                            

 

             HUMAN HERPESVIRUS 6 (HHV-6) Not detected Not detected              



             (test code = 1909338)                                        

 

             HERPES SIMPLEX VIRUS 1(HSV-1) Not detected Not detected            

  



             (test code = 9651358)                                        

 

             HERPES SIMPLEX VIRUS 2(HSV-2) Not detected Not detected            

  



             (test code = 4520321)                                        

 

             HUMAN PARECHOVIRUS (test code = Not detected Not detected          

    



             6149488)                                            

 

             VARICELLA-ZOSTER VIRUS (VZV) Not detected Not detected             

 



             (test code = 1730189)                                        

 

             CRYPTOCOCCUS NEOFORMANS/GATTII Not detected Not detected           

   



             (test code = 3815575)                                        



Other viruses and bacteria not targeted by this PCR panel cannot be excluded; 
therefore, clinical correlation and follow up of serology, culture results, and 
other molecular studies is required. The results are not intended to be used as 
the sole means for clinical diagnosis or patient management decisions. This 
sample was tested at the [facility] using the 
[employer] FilmArray Meningitis Encephalitis Panel. It is FDA cleared and has been 
verified and approved by the [facility] for clinical
use. This laboratory is CLIA-certified and [employer] 
(CAP)-accredited to perform high complexity testing.MAGNESIUM2018-10-25 08:40:00





             Test Item    Value        Reference Range Interpretation Comments

 

             MAGNESIUM (BEAKER) (test code = 1.6 mg/dL    1.6-2.6               

    



             627)                                                



BASIC METABOLIC PANEL2018-10-25 08:40:00





             Test Item    Value        Reference Range Interpretation Comments

 

             SODIUM (BEAKER) 131 meq/L    136-145      L            



             (test code = 381)                                        

 

             POTASSIUM (BEAKER) 4.1 meq/L    3.5-5.1                   



             (test code = 379)                                        

 

             CHLORIDE (BEAKER) 98 meq/L                         



             (test code = 382)                                        

 

             CO2 (BEAKER) (test 24 meq/L     22-29                     



             code = 355)                                         

 

             BLOOD UREA NITROGEN 4 mg/dL      7-21         L            



             (BEAKER) (test code                                        



             = 354)                                              

 

             CREATININE (BEAKER) 0.61 mg/dL   0.57-1.25                 



             (test code = 358)                                        

 

             GLUCOSE RANDOM 117 mg/dL           H            



             (AKER) (test code                                        



             = 652)                                              

 

             CALCIUM (BEAKER) 9.0 mg/dL    8.4-10.2                  



             (test code = 697)                                        

 

             EGFR (BEAKER) (test 101 mL/min/1.73                           ESTIM

ATED GFR IS



             code = 1092) sq m                                   NOT AS ACCURATE

 AS



                                                                 CREATININE



                                                                 CLEARANCE IN



                                                                 PREDICTING



                                                                 GLOMERULAR



                                                                 FILTRATION RATE

.



                                                                 ESTIMATED GFR I

S



                                                                 NOT APPLICABLE 

FOR



                                                                 DIALYSIS PATIEN

TS.



CREATINE KINASE (CK)2018-10-25 08:40:00





             Test Item    Value        Reference Range Interpretation Comments

 

             CREATINE KINASE TOTAL (AKER) (test 678 U/L             H   

         



             code = 380)                                         



POCT-GLUCOSE METER2018-10-25 07:49:00





             Test Item    Value        Reference Range Interpretation Comments

 

             POC-GLUCOSE METER 144 mg/dL           H            TESTED AT 

[facility] [address]



             ([facility]) (test code =                                        [address] [facility]



             1538)                                               [address]



VDRL, CSF2018-10-25 03:16:00





             Test Item    Value        Reference Range Interpretation Comments

 

             SYPHILIS VDRL QUANTITATION CSF Nonreactive  Nonreactive            

   



             ([facility]) (test code = 747)                                        



POCT-GLUCOSE METER2018-10-24 21:00:00





             Test Item    Value        Reference Range Interpretation Comments

 

             POC-GLUCOSE METER 169 mg/dL           H            TESTED AT 

[facility] [address]



             ([facility]) (test code =                                        [address] [address] [facility]



             [address]38)                                               [address]



POTASSIUM, RANDOM URINE2018-10-24 15:54:00





             Test Item    Value        Reference Range Interpretation Comments

 

             POTASSIUM URINE ([facility]) (test 19.0 meq/L                          

   



             code = 195)                                         



Reference Range: No NormalsFL, LUMBAR PUNCTURE, FLUORO2018-10-24 14:00:00Patient
is intubated in ICUReason for exam:-&gt;seizures, rule out encephalitisFINAL 
REPORT PATIENT ID:   [id number]  Procedure: Lumbar puncture Modality: Fluoroscopy 
Sedation: 0.5 mg Xanax sublingual Anesthesia: 1% lidocaine local Indication: 
Encephalopathy Discussion: Details of the procedure, risks, benefits, 
alternatives, and questions were discussed with the patient after which informed
consent was obtained. The patient was sterilely prepped and draped. 1% lidocaine
was usedfor local anesthesia. Using fluoroscopic guidance, a 20-gauge needle was
introduced into the thecal sac at the L2/3 level. Approximately 17 cc of clear 
spinal fluid was removed and sent to the laboratory. There were no procedure 
related complications. Fluoro time was 0.4 minutes. Images obtained: Zero. 
Disposition: The patient was transferred back to their hospital room in 
unchanged condition. Impression: Successful fluoroscopy guided lumbar puncture. 
Signed: Marcos Jones Verified Date/Time:  10/24/2018 14:00:57 Reading 
Location: [facility] C013V Neuro Reading Room      Electronically signedby: MARCOS JONES M.D. on 10/24/2018 02:00 PMCSF CELL COUNT W/DIFFERENTIAL2018-10-24 
13:58:00





             Test Item    Value        Reference Range Interpretation Comments

 

             APPEARANCE CSF (BEAKER) (test code Clear        Clear              

       



             = 407)                                              

 

             COLOR CSF (BEAKER) (test code = Colorless    Colorless             

    



             408)                                                

 

             RBC CSF (BEAKER) (test code = 409) 0 /cu mm     0-5                

       

 

             WBC CSF (BEAKER) (test code = 0 /cu mm     <=5                     

  



             1020)                                               

 

             RBCS FRESH (BEAKER) (test code = 100% Fresh                        

     



             1444)                                               

 

             NUMBER OF CELLS DIFF'D (BEAKER) 0                                  

    



             (test code = 1591)                                        

 

             TUBE NUMBER CSF (BEAKER) (test EDTA Tube                           

   



             code = 2678)                                        



GLUCOSE, CSF2018-10-24 13:24:00





             Test Item    Value        Reference Range Interpretation Comments

 

             GLUCOSE CSF (BEAKER) (test code = 70 mg/dL     40-70               

      



             406)                                                



PROTEIN, CSF2018-10-24 13:24:00





             Test Item    Value        Reference Range Interpretation Comments

 

             PROTEIN CSF (BEAKER) (test code = 21 mg/dL     15-45               

      



             378)                                                



POCT-GLUCOSE METER2018-10-24 13:17:00





             Test Item    Value        Reference Range Interpretation Comments

 

             POC-GLUCOSE METER 99 mg/dL                         TESTED AT 

[facility] [address]



             (BEAKER) (test code =                                        [place] [address]



             1538)                                               



CBC W/PLT COUNT &amp; AUTO DIFFERENTIAL2018-10-24 08:39:00





             Test Item    Value        Reference Range Interpretation Comments

 

             WHITE BLOOD CELL COUNT (BEAKER) 7.6 K/ L     3.5-10.5              

    



             (test code = 775)                                        

 

             RED BLOOD CELL COUNT (BEAKER) 2.84 M/ L    3.93-5.22    L          

  



             (test code = 761)                                        

 

             HEMOGLOBIN (BEAKER) (test code = 9.6 GM/DL    11.2-15.7    L       

     



             410)                                                

 

             HEMATOCRIT (BEAKER) (test code = 28.0 %       34.1-44.9    L       

     



             411)                                                

 

             MEAN CORPUSCULAR VOLUME (BEAKER) 98.6 fL      79.4-94.8    H       

     



             (test code = 753)                                        

 

             MEAN CORPUSCULAR HEMOGLOBIN 33.8 pg      25.6-32.2    H            



             (BEAKER) (test code = 751)                                        

 

             MEAN CORPUSCULAR HEMOGLOBIN CONC 34.3 GM/DL   32.2-35.5            

     



             (BEAKER) (test code = 752)                                        

 

             RED CELL DISTRIBUTION WIDTH 14.2 %       11.7-14.4                 



             (BEAKER) (test code = 412)                                        

 

             PLATELET COUNT (BEAKER) (test 264 K/CU MM  150-450                 

  



             code = 756)                                         

 

             MEAN PLATELET VOLUME (BEAKER) 9.9 fL       9.4-12.3                

  



             (test code = 754)                                        

 

             NUCLEATED RED BLOOD CELLS 0 /100 WBC   0-0                       



             (BEAKER) (test code = 413)                                        



(CELLAVISION MANUAL DIFF)2018-10-24 08:39:00





             Test Item    Value        Reference Range Interpretation Comments

 

             NEUTROPHILS - REL 78 %                                   



             (CELLAVISION)(BEAKER) (test code =                                 

       



             2816)                                               

 

             LYMPHOCYTES - REL 14 %                                   



             (CELLAVISION)(BEAKER) (test code =                                 

       



             2817)                                               

 

             MONOCYTES - REL 5 %                                    



             (CELLAVISION)(BEAKER) (test code =                                 

       



             2818)                                               

 

             EOSINOPHILS - REL 2 %                                    



             (CELLAVISION)(BEAKER) (test code =                                 

       



             2819)                                               

 

             BASOPHILS - REL 1 %                                    



             (CELLAVISION)(BEAKER) (test code =                                 

       



             2820)                                               

 

             NEUTROPHILS - ABS 5.93 K/ul    1.56-6.13                 



             (CELLAVISION)(BEAKER) (test code =                                 

       



             2830)                                               

 

             LYMPHOCYTES - ABS 1.06 K/ul    1.18-3.74    L            



             (CELLAVISION)(BEAKER) (test code =                                 

       



             2831)                                               

 

             MONOCYTES - ABS 0.38 K/uL    0.24-0.36    H            



             (CELLAVISION)(BEAKER) (test code =                                 

       



             2832)                                               

 

             EOSINOPHILS - ABS 0.15 K/uL    0.04-0.36                 



             (CELLAVISION)(BEAKER) (test code =                                 

       



             2834)                                               

 

             BASOPHILS - ABS 0.08 K/uL    0.01-0.08                 



             (CELLAVISION)(BEAKER) (test code =                                 

       



             2835)                                               

 

             TOTAL COUNTED (BEAKER) (test code = 100                            

        



             1351)                                               

 

             RBC MORPHOLOGY (BEAKER) (test code Normal                          

       



             = 762)                                              

 

             WBC MORPHOLOGY (BEAKER) (test code Normal                          

       



             = 487)                                              

 

             PLT MORPHOLOGY (BEAKER) (test code Normal                          

       



             = 486)                                              

 

             ARTIFACT (CELLAVISION)(BEAKER) Present                             

   



             (test code = 3432)                                        

 

             PLATELET CONCENTRATION Adequate                               



             (CELLAVISION)(BEAKER) (test code =                                 

       



             3438)                                               



Received comment: User comments: Slide comments:POCT-GLUCOSE METER2018-10-24 
07:55:00





             Test Item    Value        Reference Range Interpretation Comments

 

             POC-GLUCOSE METER 121 mg/dL           H            TESTED AT 

[facility] [address]



             (BEAKER) (test code =                                        [place]



             [address])                                               [id number]



MAGNESIUM2018-10-24 07:06:00





             Test Item    Value        Reference Range Interpretation Comments

 

             MAGNESIUM (BEAKER) (test code = 1.6 mg/dL    1.6-2.6               

    



             627)                                                



BASIC METABOLIC PANEL2018-10-24 07:06:00





             Test Item    Value        Reference Range Interpretation Comments

 

             SODIUM (BEAKER) 132 meq/L    136-145      L            



             (test code = 381)                                        

 

             POTASSIUM (BEAKER) 3.2 meq/L    3.5-5.1      L            



             (test code = 379)                                        

 

             CHLORIDE (BEAKER) 99 meq/L                         



             (test code = 382)                                        

 

             CO2 (BEAKER) (test 25 meq/L     22-29                     



             code = 355)                                         

 

             BLOOD UREA NITROGEN 5 mg/dL      7-21         L            



             (BEAKER) (test code                                        



             = 354)                                              

 

             CREATININE (BEAKER) 0.55 mg/dL   0.57-1.25    L            



             (test code = 358)                                        

 

             GLUCOSE RANDOM 109 mg/dL           H            



             (BEAKER) (test code                                        



             = 652)                                              

 

             CALCIUM (BEAKER) 8.4 mg/dL    8.4-10.2                  



             (test code = 697)                                        

 

             EGFR (BEAKER) (test 114 mL/min/1.73                           ESTIM

ATED GFR IS



             code = 1092) sq m                                   NOT AS ACCURATE

 AS



                                                                 CREATININE



                                                                 CLEARANCE IN



                                                                 PREDICTING



                                                                 GLOMERULAR



                                                                 FILTRATION RATE

.



                                                                 ESTIMATED GFR I

S



                                                                 NOT APPLICABLE 

FOR



                                                                 DIALYSIS PATIEN

TS.



CREATINE KINASE (CK)2018-10-24 07:06:00





             Test Item    Value        Reference Range Interpretation Comments

 

             CREATINE KINASE TOTAL (BEAKER) (test 1442 U/L            H   

         



             code = 380)                                         



BLOOD CULTURE2018-10-24 00:00:00





             Test Item    Value        Reference Range Interpretation Comments

 

             CULTURE (BEAKER) (test No growth in 5 days                         

  



             code = 1095)                                        



BLOOD CULTURE2018-10-24 00:00:00





             Test Item    Value        Reference Range Interpretation Comments

 

             CULTURE (BEAKER) (test No growth in 5 days                         

  



             code = 1095)                                        



POCT-GLUCOSE METER2018-10-23 21:29:00





             Test Item    Value        Reference Range Interpretation Comments

 

             POC-GLUCOSE METER 179 mg/dL           H            TESTED AT 

[place] [address]



             ([facility]) (test code =                                        [facility]



             1538)                                               [address]



POCT-GLUCOSE METER2018-10-23 17:41:00





             Test Item    Value        Reference Range Interpretation Comments

 

             POC-GLUCOSE METER 177 mg/dL           H            TESTED AT 

[place] [address]



             ([facility]) (test code =                                        [facility]



             1538)                                               [address]



PROTHROMBIN TIME/INR2018-10-23 12:09:00





             Test Item    Value        Reference Range Interpretation Comments

 

             PROTIME ([facility]) (test code = 13.5 seconds 11.7-14.7               

  



             759)                                                

 

             INR ([facility]) (test code = 370) 1.0          <=5.9                  

   



RECOMMENDED COUMADIN/WARFARIN INR THERAPY RANGESSTANDARD DOSE: 2.0 - 3.0   
Includes: PROPHYLAXIS forvenous thrombosis, systemic embolization; TREATMENT for
venous thrombosis and/or pulmonary embolus.HIGH RISK: Target INR is 2.5-3.5 for 
patients with mechanical heart valves.POCT-GLUCOSE METER2018-10-23 11:23:00





             Test Item    Value        Reference Range Interpretation Comments

 

             POC-GLUCOSE METER 99 mg/dL                         TESTED AT 

[place] [address]



             ([facility]) (test code =                                        [facility] [address]



             1538)                                               



B-TYPE NATRIURETIC FACTOR (BNP)2018-10-23 07:24:00





             Test Item    Value        Reference Range Interpretation Comments

 

             B-TYPE NATRIURETIC PEPTIDE 1445 pg/mL   0-100        H            



             ([facility]) (test code = 700)                                        



MAGNESIUM2018-10-23 07:23:00





             Test Item    Value        Reference Range Interpretation Comments

 

             MAGNESIUM (BEAKER) (test code = 1.5 mg/dL    1.6-2.6      L        

    



             627)                                                



BASIC METABOLIC PANEL2018-10-23 07:23:00





             Test Item    Value        Reference Range Interpretation Comments

 

             SODIUM (BEAKER) 133 meq/L    136-145      L            



             (test code = 381)                                        

 

             POTASSIUM (BEAKER) 3.8 meq/L    3.5-5.1                   



             (test code = 379)                                        

 

             CHLORIDE (BEAKER) 96 meq/L            L            



             (test code = 382)                                        

 

             CO2 (BEAKER) (test 28 meq/L     22-29                     



             code = 355)                                         

 

             BLOOD UREA NITROGEN 4 mg/dL      7-21         L            



             (BEAKER) (test code                                        



             = 354)                                              

 

             CREATININE (BEAKER) 0.60 mg/dL   0.57-1.25                 



             (test code = 358)                                        

 

             GLUCOSE RANDOM 93 mg/dL                         



             (BEAKER) (test code                                        



             = 652)                                              

 

             CALCIUM (BEAKER) 8.6 mg/dL    8.4-10.2                  



             (test code = 697)                                        

 

             EGFR (BEAKER) (test 103 mL/min/1.73                           ESTIM

ATED GFR IS



             code = 1092) sq m                                   NOT AS ACCURATE

 AS



                                                                 CREATININE



                                                                 CLEARANCE IN



                                                                 PREDICTING



                                                                 GLOMERULAR



                                                                 FILTRATION RATE

.



                                                                 ESTIMATED GFR I

S



                                                                 NOT APPLICABLE 

FOR



                                                                 DIALYSIS PATIEN

TS.



CREATINE KINASE (CK)2018-10-23 07:23:00





             Test Item    Value        Reference Range Interpretation Comments

 

             CREATINE KINASE TOTAL (BEAKER) (test 2577 U/L            H   

         



             code = 380)                                         



POCT-GLUCOSE METER2018-10-23 07:19:00





             Test Item    Value        Reference Range Interpretation Comments

 

             POC-GLUCOSE METER 88 mg/dL                         TESTED AT 

[facility] [address]



             (BEAKER) (test code =                                        [place] [facility] [address]



             1538)                                               



CBC W/PLT COUNT &amp; AUTO DIFFERENTIAL2018-10-23 06:56:00





             Test Item    Value        Reference Range Interpretation Comments

 

             WHITE BLOOD CELL COUNT (BEAKER) 7.1 K/ L     3.5-10.5              

    



             (test code = 775)                                        

 

             RED BLOOD CELL COUNT (BEAKER) 2.77 M/ L    3.93-5.22    L          

  



             (test code = 761)                                        

 

             HEMOGLOBIN (BEAKER) (test code = 9.5 GM/DL    11.2-15.7    L       

     



             410)                                                

 

             HEMATOCRIT (BEAKER) (test code = 30.5 %       34.1-44.9    L       

     



             411)                                                

 

             MEAN CORPUSCULAR VOLUME (BEAKER) 110.1 fL     79.4-94.8    H       

     



             (test code = 753)                                        

 

             MEAN CORPUSCULAR HEMOGLOBIN 34.3 pg      25.6-32.2    H            



             (BEAKER) (test code = 751)                                        

 

             MEAN CORPUSCULAR HEMOGLOBIN CONC 31.1 GM/DL   32.2-35.5    L       

     



             (BEAKER) (test code = 752)                                        

 

             RED CELL DISTRIBUTION WIDTH 14.6 %       11.7-14.4    H            



             (BEAKER) (test code = 412)                                        

 

             PLATELET COUNT (BEAKER) (test 247 K/CU MM  150-450                 

  



             code = 756)                                         

 

             MEAN PLATELET VOLUME (BEAKER) 10.0 fL      9.4-12.3                

  



             (test code = 754)                                        

 

             NUCLEATED RED BLOOD CELLS 0 /100 WBC   0-0                       



             (BEAKER) (test code = 413)                                        

 

             NEUTROPHILS RELATIVE PERCENT 69 %                                  

 



             (BEAKER) (test code = 429)                                        

 

             LYMPHOCYTES RELATIVE PERCENT 18 %                                  

 



             (BEAKER) (test code = 430)                                        

 

             MONOCYTES RELATIVE PERCENT 11 %                                   



             (BEAKER) (test code = 431)                                        

 

             EOSINOPHILS RELATIVE PERCENT 1 %                                   

 



             (BEAKER) (test code = 432)                                        

 

             BASOPHILS RELATIVE PERCENT 1 %                                    



             (BEAKER) (test code = 437)                                        

 

             NEUTROPHILS ABSOLUTE COUNT 4.93 K/ L    1.56-6.13                 



             (BEAKER) (test code = 670)                                        

 

             LYMPHOCYTES ABSOLUTE COUNT 1.26 K/ L    1.18-3.74                 



             (BEAKER) (test code = 414)                                        

 

             MONOCYTES ABSOLUTE COUNT (BEAKER) 0.75 K/ L    0.24-0.36    H      

      



             (test code = 415)                                        

 

             EOSINOPHILS ABSOLUTE COUNT 0.10 K/ L    0.04-0.36                 



             (BEAKER) (test code = 416)                                        

 

             BASOPHILS ABSOLUTE COUNT (BEAKER) 0.05 K/ L    0.01-0.08           

      



             (test code = 417)                                        

 

             IMMATURE GRANULOCYTES-RELATIVE 0 %          0-1                    

   



             PERCENT (BEAKER) (test code =                                      

  



             2801)                                               



POCT-GLUCOSE METER2018-10-22 23:30:00





             Test Item    Value        Reference Range Interpretation Comments

 

             POC-GLUCOSE METER 181 mg/dL           H            TESTED AT 

[facility] [address]



             (BEAKER) (test code =                                        [place]



             1538)                                               [address]



VANCOMYCIN LEVEL, TROUGH2018-10-22 20:16:00





             Test Item    Value        Reference Range Interpretation Comments

 

             VANCOMYCIN TROUGH (BEAKER) (test 12.1 ug/mL   10.0-20.0            

     



             code = 522)                                         



POTASSIUM2018-10-22 20:12:00





             Test Item    Value        Reference Range Interpretation Comments

 

             POTASSIUM (BEAKER) (test code = 4.0 meq/L    3.5-5.1               

    



             379)                                                



POCT-GLUCOSE METER2018-10-22 12:18:00





             Test Item    Value        Reference Range Interpretation Comments

 

             POC-GLUCOSE METER 201 mg/dL           H            TESTED AT 

[place] [address]



             (BEAKER) (test code =                                        [place] [address] [facility]



             1538)                                               [id number]



POTASSIUM2018-10-22 10:47:00





             Test Item    Value        Reference Range Interpretation Comments

 

             POTASSIUM (BEAKER) (test code = 4.4 meq/L    3.5-5.1               

    



             379)                                                



MAGNESIUM2018-10-22 10:47:00





             Test Item    Value        Reference Range Interpretation Comments

 

             MAGNESIUM (BEAKER) (test code = 2.1 mg/dL    1.6-2.6               

    



             627)                                                



CREATINE KINASE (CK)2018-10-22 10:47:00





             Test Item    Value        Reference Range Interpretation Comments

 

             CREATINE KINASE TOTAL (BEAKER) (test 2936 U/L            H   

         



             code = 380)                                         



SPUTUM CULTURE + GRAM STAIN2018-10-22 09:31:00





             Test Item    Value        Reference Range Interpretation Comments

 

             CULTURE (BEAKER) See comment                            



             (test code = 1095)                                        

 

             GRAM STAIN RESULT 2+ WBCs                                



             (BEAKER) (test code =                                        



             1123)                                               

 

             GRAM STAIN RESULT 0-5 epithelial cells                           



             (BEAKER) (test code =                                        



             476685)                                             

 

             GRAM STAIN RESULT <1+ gram positive cocci                          

 



             (BEAKER) (test code = in clusters                            



             275057)                                             



&lt;1+ yeastNo Normal respiratory enriqueta presentPHOSPHORUS2018-10-22 07:17:00





             Test Item    Value        Reference Range Interpretation Comments

 

             PHOSPHORUS (BEAKER) (test code = 2.3 mg/dL    2.3-4.7              

     



             604)                                                



POCT-GLUCOSE METER2018-10-22 06:35:00





             Test Item    Value        Reference Range Interpretation Comments

 

             POC-GLUCOSE METER 87 mg/dL                         TESTED AT 

[facility] [address]



             (BEAKER) (test code =                                        [place] [facility] [address]



             1538)                                               



CBC W/PLT COUNT &amp; AUTO DIFFERENTIAL2018-10-22 04:15:00





             Test Item    Value        Reference Range Interpretation Comments

 

             WHITE BLOOD CELL COUNT (BEAKER) 10.4 K/ L    3.5-10.5              

    



             (test code = 775)                                        

 

             RED BLOOD CELL COUNT (BEAKER) 2.43 M/ L    3.93-5.22    L          

  



             (test code = 761)                                        

 

             HEMOGLOBIN (BEAKER) (test code = 8.4 GM/DL    11.2-15.7    L       

     



             410)                                                

 

             HEMATOCRIT (BEAKER) (test code = 24.2 %       34.1-44.9    L       

     



             411)                                                

 

             MEAN CORPUSCULAR VOLUME (BEAKER) 99.6 fL      79.4-94.8    H       

     



             (test code = 753)                                        

 

             MEAN CORPUSCULAR HEMOGLOBIN 34.6 pg      25.6-32.2    H            



             (BEAKER) (test code = 751)                                        

 

             MEAN CORPUSCULAR HEMOGLOBIN CONC 34.7 GM/DL   32.2-35.5            

     



             (BEAKER) (test code = 752)                                        

 

             RED CELL DISTRIBUTION WIDTH 14.5 %       11.7-14.4    H            



             (BEAKER) (test code = 412)                                        

 

             PLATELET COUNT (BEAKER) (test 210 K/CU MM  150-450                 

  



             code = 756)                                         

 

             MEAN PLATELET VOLUME (BEAKER) 9.7 fL       9.4-12.3                

  



             (test code = 754)                                        

 

             NUCLEATED RED BLOOD CELLS 0 /100 WBC   0-0                       



             (BEAKER) (test code = 413)                                        

 

             NEUTROPHILS RELATIVE PERCENT 76 %                                  

 



             (BEAKER) (test code = 429)                                        

 

             LYMPHOCYTES RELATIVE PERCENT 14 %                                  

 



             (BEAKER) (test code = 430)                                        

 

             MONOCYTES RELATIVE PERCENT 9 %                                    



             (BEAKER) (test code = 431)                                        

 

             EOSINOPHILS RELATIVE PERCENT 1 %                                   

 



             (BEAKER) (test code = 432)                                        

 

             BASOPHILS RELATIVE PERCENT 0 %                                    



             (BEAKER) (test code = 437)                                        

 

             NEUTROPHILS ABSOLUTE COUNT 7.96 K/ L    1.56-6.13    H            



             (BEAKER) (test code = 670)                                        

 

             LYMPHOCYTES ABSOLUTE COUNT 1.41 K/ L    1.18-3.74                 



             (BEAKER) (test code = 414)                                        

 

             MONOCYTES ABSOLUTE COUNT (BEAKER) 0.90 K/ L    0.24-0.36    H      

      



             (test code = 415)                                        

 

             EOSINOPHILS ABSOLUTE COUNT 0.09 K/ L    0.04-0.36                 



             (BEAKER) (test code = 416)                                        

 

             BASOPHILS ABSOLUTE COUNT (BEAKER) 0.04 K/ L    0.01-0.08           

      



             (test code = 417)                                        

 

             IMMATURE GRANULOCYTES-RELATIVE 0 %          0-1                    

   



             PERCENT (BEAKER) (test code =                                      

  



             2801)                                               



BASIC METABOLIC PANEL2018-10-22 04:10:00





             Test Item    Value        Reference Range Interpretation Comments

 

             SODIUM (BEAKER) 132 meq/L    136-145      L            



             (test code = 381)                                        

 

             POTASSIUM (BEAKER) 2.7 meq/L    3.5-5.1      L            



             (test code = 379)                                        

 

             CHLORIDE (BEAKER) 94 meq/L            L            



             (test code = 382)                                        

 

             CO2 (BEAKER) (test 25 meq/L     22-29                     



             code = 355)                                         

 

             BLOOD UREA NITROGEN 4 mg/dL      7-21         L            



             (BEAKER) (test code                                        



             = 354)                                              

 

             CREATININE (BEAKER) 0.55 mg/dL   0.57-1.25    L            



             (test code = 358)                                        

 

             GLUCOSE RANDOM 67 mg/dL            L            



             (BEAKER) (test code                                        



             = 652)                                              

 

             CALCIUM (BEAKER) 7.9 mg/dL    8.4-10.2     L            



             (test code = 697)                                        

 

             EGFR (BEAKER) (test 114 mL/min/1.73                           ESTIM

ATED GFR IS



             code = 1092) sq m                                   NOT AS ACCURATE

 AS



                                                                 CREATININE



                                                                 CLEARANCE IN



                                                                 PREDICTING



                                                                 GLOMERULAR



                                                                 FILTRATION RATE

.



                                                                 ESTIMATED GFR I

S



                                                                 NOT APPLICABLE 

FOR



                                                                 DIALYSIS PATIEN

TS.



MAGNESIUM2018-10-22 04:09:00





             Test Item    Value        Reference Range Interpretation Comments

 

             MAGNESIUM (BEAKER) (test code = 1.5 mg/dL    1.6-2.6      L        

    



             627)                                                



CREATINE KINASE (CK)2018-10-22 04:09:00





             Test Item    Value        Reference Range Interpretation Comments

 

             CREATINE KINASE TOTAL (BEAKER) (test 3396 U/L            H   

         



             code = 380)                                         



POCT-GLUCOSE METER2018-10-22 00:16:00





             Test Item    Value        Reference Range Interpretation Comments

 

             POC-GLUCOSE METER 79 mg/dL                         TESTED AT 

[facility] [address]



             (BEAKER) (test code =                                        [place] [address]



             1538)                                               



MAGNESIUM2018-10-21 22:48:00





             Test Item    Value        Reference Range Interpretation Comments

 

             MAGNESIUM (BEAKER) (test code = 1.9 mg/dL    1.6-2.6               

    



             627)                                                



CREATINE KINASE (CK)2018-10-21 22:48:00





             Test Item    Value        Reference Range Interpretation Comments

 

             CREATINE KINASE TOTAL (BEAKER) (test 3450 U/L            H   

         



             code = 380)                                         



MAGNESIUM2018-10-21 18:01:00





             Test Item    Value        Reference Range Interpretation Comments

 

             MAGNESIUM (RODOLFO) (test code = 1.5 mg/dL    1.6-2.6      L        

    



             627)                                                



CREATINE KINASE (CK)2018-10-21 18:01:00





             Test Item    Value        Reference Range Interpretation Comments

 

             CREATINE KINASE TOTAL (RODOLFO) (test 3451 U/L            H   

         



             code = 380)                                         



POCT-GLUCOSE METER2018-10-21 12:41:00





             Test Item    Value        Reference Range Interpretation Comments

 

             POC-GLUCOSE METER 89 mg/dL                         TESTED AT 

[facility] [address]



             (RODOLFO) (test code =                                        [place] [address]



             1538)                                               



EEG MONITORING WITH VIDEO RECORDING EACH 24 HOURS2018-10-21 11:37:00billing for 
10/20/18Neurophysiology Continuous Video Electroencephalogram Report   DATE OF 
REPORT: 10/21/18 Date(s) of Study: 10/20- ACC: [id number] Start time: 0704 
hrs Stop time: 1101 hrs ICD-10:  R56.9 CPT Code: 69674   HISTORY: 57 yo female 
with PMHx of HTN, COPD found down at home with concern for seizures.   ME
DICATIONS THAT COULD AFFECT EEG: Levetiracetam   TECHNICAL SUMMARY: This is a 
digital video EEG recorded with 32 input channels reviewed with bipolar and 
referential montages using the modified combinatorial system nomenclature.   
DESCRIPTION OF RECORD: The posterior dominant rhythm was 6-7 Hz and moderately 
modulated with a frequency-amplitude gradient that was mildly disorganized. The 
early background rhythms consisted of 5-8 Hz and 3-4 Hz activities, with 14-16 
Hz activity seen frontally; as the recording progressed rhythms evolved to alpha
frequencies anteriorly.  There was a background asymmetry with intermittent 
periods of diffuse voltage attenuation seen over the right hemisphere, lasting 
up to 2.5 seconds. There was also 1 Hz lateralized, frontally predominant, 
periodic discharges seen onthe right as well, with a broad field to the left 
frontal region at times. There were no stage-II sleep architecture features 
seen.   HV: Not performed. PHOTIC STIMULATION: Not performed   EVENTS: No el
ectrographic seizures seen.    IMPRESSION: Abnormal cEEG due to: 1.         
Continuous slowing, generalized 2.         Background discontinuity, focal 
(right hemisphere) 3.         Lateralized periodicdischarges (LPDs) with frontal
predominance, right hemisphere   CLINICAL CORRELATION: This study is consistent 
with a nonspecific encephalopathy improving from moderate to mild over the 
recording. There remains a focal right hemispheric dysfunction with evidence of 
cortical irritability. The lateralized periodic discharges are a part of the 
ictal-interictal spectrum and could indicate a propensity for epileptic 
seizures. There were no electrographic seizures seen during this recording, 
which is similar to the prior recording.   Mary Ann Hennessy MD Epilepsy Fellow   
Akosua Rashid MD Clinical Neurophysiology Attending [facility] Neurophysiology    
  Electronically signed by: AKOSUA RASHID MD on 10/21/2018 11:37 AMB-TYPE 
NATRIURETIC FACTOR (BNP)2018-10-21 10:58:00





             Test Item    Value        Reference Range Interpretation Comments

 

             B-TYPE NATRIURETIC PEPTIDE (BEAKER) 689 pg/mL    0-100        H    

        



             (test code = 700)                                        



PROTHROMBIN TIME/INR2018-10-21 10:55:00





             Test Item    Value        Reference Range Interpretation Comments

 

             PROTIME (BEAKER) (test code = 14.2 seconds 11.7-14.7               

  



             759)                                                

 

             INR (BEAKER) (test code = 370) 1.1          <=5.9                  

   



RECOMMENDED COUMADIN/WARFARIN INR THERAPY RANGESSTANDARD DOSE: 2.0 - 3.0   
Includes: PROPHYLAXIS forvenous thrombosis, systemic embolization; TREATMENT for
venous thrombosis and/or pulmonary embolus.HIGH RISK: Target INR is 2.5-3.5 for 
patients with mechanical heart valves.APTT2018-10-21 10:55:00





             Test Item    Value        Reference Range Interpretation Comments

 

             PARTIAL THROMBOPLASTIN TIME 27.7 seconds 22.5-36.0                 



             (BEAKER) (test code = 760)                                        



BASIC METABOLIC PANEL2018-10-21 10:52:00





             Test Item    Value        Reference Range Interpretation Comments

 

             SODIUM (BEAKER) 140 meq/L    136-145                   



             (test code = 381)                                        

 

             POTASSIUM (BEAKER) 3.8 meq/L    3.5-5.1                   



             (test code = 379)                                        

 

             CHLORIDE (BEAKER) 107 meq/L                        



             (test code = 382)                                        

 

             CO2 (BEAKER) (test 22 meq/L     22-29                     



             code = 355)                                         

 

             BLOOD UREA NITROGEN 6 mg/dL      7-21         L            



             (BEAKER) (test code                                        



             = 354)                                              

 

             CREATININE (BEAKER) 0.56 mg/dL   0.57-1.25    L            



             (test code = 358)                                        

 

             GLUCOSE RANDOM 92 mg/dL                         



             (BEAKER) (test code                                        



             = 652)                                              

 

             CALCIUM (BEAKER) 7.9 mg/dL    8.4-10.2     L            



             (test code = 697)                                        

 

             EGFR (BEAKER) (test 111 mL/min/1.73                           ESTIM

ATED GFR IS



             code = 1092) sq m                                   NOT AS ACCURATE

 AS



                                                                 CREATININE



                                                                 CLEARANCE IN



                                                                 PREDICTING



                                                                 GLOMERULAR



                                                                 FILTRATION RATE

.



                                                                 ESTIMATED GFR I

S



                                                                 NOT APPLICABLE 

FOR



                                                                 DIALYSIS PATIEN

TS.



MAGNESIUM2018-10-21 10:51:00





             Test Item    Value        Reference Range Interpretation Comments

 

             MAGNESIUM (BEAKER) (test code = 2.3 mg/dL    1.6-2.6               

    



             627)                                                



CREATINE KINASE (CK)2018-10-21 10:51:00





             Test Item    Value        Reference Range Interpretation Comments

 

             CREATINE KINASE TOTAL (BEAKER) (test 2419 U/L            H   

         



             code = 380)                                         



VANCOMYCIN LEVEL, TROUGH2018-10-21 08:57:00





             Test Item    Value        Reference Range Interpretation Comments

 

             VANCOMYCIN TROUGH (BEAKER) (test 14.4 ug/mL   10.0-20.0            

     



             code = 522)                                         



CBC W/PLT COUNT &amp; AUTO DIFFERENTIAL2018-10-21 07:07:00





             Test Item    Value        Reference Range Interpretation Comments

 

             WHITE BLOOD CELL COUNT (BEAKER) 10.8 K/ L    3.5-10.5     H        

    



             (test code = 775)                                        

 

             RED BLOOD CELL COUNT (BEAKER) 2.29 M/ L    3.93-5.22    L          

  



             (test code = 761)                                        

 

             HEMOGLOBIN (BEAKER) (test code = 7.8 GM/DL    11.2-15.7    L       

     



             410)                                                

 

             HEMATOCRIT (BEAKER) (test code = 23.1 %       34.1-44.9    L       

     



             411)                                                

 

             MEAN CORPUSCULAR VOLUME (BEAKER) 100.9 fL     79.4-94.8    H       

     



             (test code = 753)                                        

 

             MEAN CORPUSCULAR HEMOGLOBIN 34.1 pg      25.6-32.2    H            



             (BEAKER) (test code = 751)                                        

 

             MEAN CORPUSCULAR HEMOGLOBIN CONC 33.8 GM/DL   32.2-35.5            

     



             (BEAKER) (test code = 752)                                        

 

             RED CELL DISTRIBUTION WIDTH 15.1 %       11.7-14.4    H            



             (BEAKER) (test code = 412)                                        

 

             PLATELET COUNT (BEAKER) (test 208 K/CU MM  150-450                 

  



             code = 756)                                         

 

             MEAN PLATELET VOLUME (BEAKER) 10.2 fL      9.4-12.3                

  



             (test code = 754)                                        

 

             NUCLEATED RED BLOOD CELLS 0 /100 WBC   0-0                       



             (BEAKER) (test code = 413)                                        

 

             NEUTROPHILS RELATIVE PERCENT 80 %                                  

 



             (BEAKER) (test code = 429)                                        

 

             LYMPHOCYTES RELATIVE PERCENT 11 %                                  

 



             (BEAKER) (test code = 430)                                        

 

             MONOCYTES RELATIVE PERCENT 9 %                                    



             (BEAKER) (test code = 431)                                        

 

             EOSINOPHILS RELATIVE PERCENT 0 %                                   

 



             (BEAKER) (test code = 432)                                        

 

             BASOPHILS RELATIVE PERCENT 0 %                                    



             (BEAKER) (test code = 437)                                        

 

             NEUTROPHILS ABSOLUTE COUNT 8.61 K/ L    1.56-6.13    H            



             (BEAKER) (test code = 670)                                        

 

             LYMPHOCYTES ABSOLUTE COUNT 1.16 K/ L    1.18-3.74    L            



             (BEAKER) (test code = 414)                                        

 

             MONOCYTES ABSOLUTE COUNT (BEAKER) 0.94 K/ L    0.24-0.36    H      

      



             (test code = 415)                                        

 

             EOSINOPHILS ABSOLUTE COUNT 0.01 K/ L    0.04-0.36    L            



             (BEAKER) (test code = 416)                                        

 

             BASOPHILS ABSOLUTE COUNT (BEAKER) 0.03 K/ L    0.01-0.08           

      



             (test code = 417)                                        

 

             IMMATURE GRANULOCYTES-RELATIVE 1 %          0-1                    

   



             PERCENT (BEAKER) (test code =                                      

  



             2801)                                               



POCT-GLUCOSE METER2018-10-21 06:59:00





             Test Item    Value        Reference Range Interpretation Comments

 

             POC-GLUCOSE METER 88 mg/dL                         TESTED AT 

[facility] [address]



             (BEAKER) (test code =                                        [place] [address]



             1538)                                               



MAGNESIUM2018-10-21 06:01:00





             Test Item    Value        Reference Range Interpretation Comments

 

             MAGNESIUM (BEAKER) (test code = 1.8 mg/dL    1.6-2.6               

    



             627)                                                



BASIC METABOLIC PANEL2018-10-21 06:01:00





             Test Item    Value        Reference Range Interpretation Comments

 

             SODIUM (BEAKER) 141 meq/L    136-145                   



             (test code = 381)                                        

 

             POTASSIUM (BEAKER) 3.4 meq/L    3.5-5.1      L            



             (test code = 379)                                        

 

             CHLORIDE (BEAKER) 112 meq/L           H            



             (test code = 382)                                        

 

             CO2 (BEAKER) (test 23 meq/L     22-29                     



             code = 355)                                         

 

             BLOOD UREA NITROGEN 7 mg/dL      7-21                      



             (BEAKER) (test code                                        



             = 354)                                              

 

             CREATININE (BEAKER) 0.59 mg/dL   0.57-1.25                 



             (test code = 358)                                        

 

             GLUCOSE RANDOM 85 mg/dL                         



             (BEAKER) (test code                                        



             = 652)                                              

 

             CALCIUM (BEAKER) 8.0 mg/dL    8.4-10.2     L            



             (test code = 697)                                        

 

             EGFR (BEAKER) (test 105 mL/min/1.73                           ESTIM

ATED GFR IS



             code = 1092) sq m                                   NOT AS ACCURATE

 AS



                                                                 CREATININE



                                                                 CLEARANCE IN



                                                                 PREDICTING



                                                                 GLOMERULAR



                                                                 FILTRATION RATE

.



                                                                 ESTIMATED GFR I

S



                                                                 NOT APPLICABLE 

FOR



                                                                 DIALYSIS PATIEN

TS.



CREATINE KINASE (CK)2018-10-21 06:01:00





             Test Item    Value        Reference Range Interpretation Comments

 

             CREATINE KINASE TOTAL (BEAKER) (test 1530 U/L            H   

         



             code = 380)                                         



POCT-GLUCOSE METER2018-10-21 00:30:00





             Test Item    Value        Reference Range Interpretation Comments

 

             POC-GLUCOSE METER 83 mg/dL                         TESTED AT 

[facility] [address]



             (BEAKER) (test code =                                        [place] [facility] [address]



             1538)                                               



BASIC METABOLIC PANEL2018-10-20 22:03:00





             Test Item    Value        Reference Range Interpretation Comments

 

             SODIUM (BEAKER) 140 meq/L    136-145                   



             (test code = 381)                                        

 

             POTASSIUM (BEAKER) 3.7 meq/L    3.5-5.1                   



             (test code = 379)                                        

 

             CHLORIDE (BEAKER) 111 meq/L           H            



             (test code = 382)                                        

 

             CO2 (BEAKER) (test 21 meq/L     22-29        L            



             code = 355)                                         

 

             BLOOD UREA NITROGEN 8 mg/dL      7-21                      



             (BEAKER) (test code                                        



             = 354)                                              

 

             CREATININE (BEAKER) 0.61 mg/dL   0.57-1.25                 



             (test code = 358)                                        

 

             GLUCOSE RANDOM 101 mg/dL                        



             (BEAKER) (test code                                        



             = 652)                                              

 

             CALCIUM (BEAKER) 7.7 mg/dL    8.4-10.2     L            



             (test code = 697)                                        

 

             EGFR (BEAKER) (test 101 mL/min/1.73                           ESTIM

ATED GFR IS



             code = 1092) sq m                                   NOT AS ACCURATE

 AS



                                                                 CREATININE



                                                                 CLEARANCE IN



                                                                 PREDICTING



                                                                 GLOMERULAR



                                                                 FILTRATION RATE

.



                                                                 ESTIMATED GFR I

S



                                                                 NOT APPLICABLE 

FOR



                                                                 DIALYSIS PATIEN

TS.



MAGNESIUM2018-10-20 22:02:00





             Test Item    Value        Reference Range Interpretation Comments

 

             MAGNESIUM (BEAKER) (test code = 2.1 mg/dL    1.6-2.6               

    



             627)                                                



CREATINE KINASE (CK)2018-10-20 22:02:00





             Test Item    Value        Reference Range Interpretation Comments

 

             CREATINE KINASE TOTAL (BEAKER) (test 1124 U/L            H   

         



             code = 380)                                         



CBC W/PLT COUNT &amp; AUTO DIFFERENTIAL2018-10-20 22:00:00





             Test Item    Value        Reference Range Interpretation Comments

 

             WHITE BLOOD CELL COUNT (BEAKER) 11.9 K/ L    3.5-10.5     H        

    



             (test code = 775)                                        

 

             RED BLOOD CELL COUNT (BEAKER) 2.24 M/ L    3.93-5.22    L          

  



             (test code = 761)                                        

 

             HEMOGLOBIN (BEAKER) (test code = 7.8 GM/DL    11.2-15.7    L       

     



             410)                                                

 

             HEMATOCRIT (BEAKER) (test code = 22.6 %       34.1-44.9    L       

     



             411)                                                

 

             MEAN CORPUSCULAR VOLUME (BEAKER) 100.9 fL     79.4-94.8    H       

     



             (test code = 753)                                        

 

             MEAN CORPUSCULAR HEMOGLOBIN 34.8 pg      25.6-32.2    H            



             (BEAKER) (test code = 751)                                        

 

             MEAN CORPUSCULAR HEMOGLOBIN CONC 34.5 GM/DL   32.2-35.5            

     



             (BEAKER) (test code = 752)                                        

 

             RED CELL DISTRIBUTION WIDTH 15.2 %       11.7-14.4    H            



             (BEAKER) (test code = 412)                                        

 

             PLATELET COUNT (BEAKER) (test 204 K/CU MM  150-450                 

  



             code = 756)                                         

 

             MEAN PLATELET VOLUME (BEAKER) 9.6 fL       9.4-12.3                

  



             (test code = 754)                                        

 

             NUCLEATED RED BLOOD CELLS 0 /100 WBC   0-0                       



             (BEAKER) (test code = 413)                                        

 

             NEUTROPHILS RELATIVE PERCENT 80 %                                  

 



             (BEAKER) (test code = 429)                                        

 

             LYMPHOCYTES RELATIVE PERCENT 10 %                                  

 



             (BEAKER) (test code = 430)                                        

 

             MONOCYTES RELATIVE PERCENT 9 %                                    



             (BEAKER) (test code = 431)                                        

 

             EOSINOPHILS RELATIVE PERCENT 0 %                                   

 



             (BEAKER) (test code = 432)                                        

 

             BASOPHILS RELATIVE PERCENT 0 %                                    



             (BEAKER) (test code = 437)                                        

 

             NEUTROPHILS ABSOLUTE COUNT 9.52 K/ L    1.56-6.13    H            



             (BEAKER) (test code = 670)                                        

 

             LYMPHOCYTES ABSOLUTE COUNT 1.19 K/ L    1.18-3.74                 



             (BEAKER) (test code = 414)                                        

 

             MONOCYTES ABSOLUTE COUNT (BEAKER) 1.12 K/ L    0.24-0.36    H      

      



             (test code = 415)                                        

 

             EOSINOPHILS ABSOLUTE COUNT 0.00 K/ L    0.04-0.36    L            



             (BEAKER) (test code = 416)                                        

 

             BASOPHILS ABSOLUTE COUNT (BEAKER) 0.03 K/ L    0.01-0.08           

      



             (test code = 417)                                        

 

             IMMATURE GRANULOCYTES-RELATIVE 1 %          0-1                    

   



             PERCENT (BEAKER) (test code =                                      

  



             2801)                                               



HEMOGLOBIN2018-10-20 20:09:00





             Test Item    Value        Reference Range Interpretation Comments

 

             HEMOGLOBIN (BEAKER) (test code = 7.8 GM/DL    11.2-15.7    L       

     



             410)                                                



POCT-GLUCOSE METER2018-10-20 18:45:00





             Test Item    Value        Reference Range Interpretation Comments

 

             POC-GLUCOSE METER 120 mg/dL           H            TESTED AT 

[facility] [address]



             (BEAKER) (test code =                                        [place]



             [address]38)                                               [id number]



MAGNESIUM2018-10-20 16:19:00





             Test Item    Value        Reference Range Interpretation Comments

 

             MAGNESIUM (BEAKER) (test code = 2.4 mg/dL    1.6-2.6               

    



             627)                                                



BASIC METABOLIC PANEL2018-10-20 16:19:00





             Test Item    Value        Reference Range Interpretation Comments

 

             SODIUM (BEAKER) 138 meq/L    136-145                   



             (test code = 381)                                        

 

             POTASSIUM (BEAKER) 3.9 meq/L    3.5-5.1                   



             (test code = 379)                                        

 

             CHLORIDE (BEAKER) 111 meq/L           H            



             (test code = 382)                                        

 

             CO2 (BEAKER) (test 21 meq/L     22-29        L            



             code = 355)                                         

 

             BLOOD UREA NITROGEN 10 mg/dL     7-21                      



             (BEAKER) (test code                                        



             = 354)                                              

 

             CREATININE (BEAKER) 0.64 mg/dL   0.57-1.25                 



             (test code = 358)                                        

 

             GLUCOSE RANDOM 123 mg/dL           H            



             (BEAKER) (test code                                        



             = 652)                                              

 

             CALCIUM (BEAKER) 8.0 mg/dL    8.4-10.2     L            



             (test code = 697)                                        

 

             EGFR (BEAKER) (test 95 mL/min/1.73                           ESTIMA

CHRISTIN GFR IS



             code = 1092) sq m                                   NOT AS ACCURATE

 AS



                                                                 CREATININE



                                                                 CLEARANCE IN



                                                                 PREDICTING



                                                                 GLOMERULAR



                                                                 FILTRATION RATE

.



                                                                 ESTIMATED GFR I

S



                                                                 NOT APPLICABLE 

FOR



                                                                 DIALYSIS PATIRICH

TS.



CREATINE KINASE (CK)2018-10-20 16:19:00





             Test Item    Value        Reference Range Interpretation Comments

 

             CREATINE KINASE TOTAL (BEAKER) (test 1032 U/L            H   

         



             code = 380)                                         



CBC W/PLT COUNT &amp; AUTO DIFFERENTIAL2018-10-20 13:20:00





             Test Item    Value        Reference Range Interpretation Comments

 

             WHITE BLOOD CELL 15.8 K/ L    3.5-10.5     H            



             COUNT (BEAKER) (test                                        



             code = 775)                                         

 

             RED BLOOD CELL COUNT 2.45 M/ L    3.93-5.22    L            



             (BEAKER) (test code =                                        



             761)                                                

 

             HEMOGLOBIN (BEAKER) 8.5 GM/DL    11.2-15.7    L            Signific

antly



             (test code = 410)                                        different 

than



                                                                 previous result

s

 

             HEMATOCRIT (BEAKER) 24.2 %       34.1-44.9    L            



             (test code = 411)                                        

 

             MEAN CORPUSCULAR 98.8 fL      79.4-94.8    H            



             VOLUME (BEAKER) (test                                        



             code = 753)                                         

 

             MEAN CORPUSCULAR 34.7 pg      25.6-32.2    H            



             HEMOGLOBIN (BEAKER)                                        



             (test code = 751)                                        

 

             MEAN CORPUSCULAR 35.1 GM/DL   32.2-35.5                 



             HEMOGLOBIN CONC                                        



             (BEAKER) (test code =                                        



             752)                                                

 

             RED CELL DISTRIBUTION 14.7 %       11.7-14.4    H            



             WIDTH (BEAKER) (test                                        



             code = 412)                                         

 

             PLATELET COUNT 229 K/CU MM  150-450                   



             (BEAKER) (test code =                                        



             756)                                                

 

             MEAN PLATELET VOLUME 9.3 fL       9.4-12.3     L            



             (BEAKER) (test code =                                        



             754)                                                

 

             NUCLEATED RED BLOOD 0 /100 WBC   0-0                       



             CELLS (BEAKER) (test                                        



             code = 413)                                         



(CELLAVISION MANUAL DIFF)2018-10-20 13:20:00





             Test Item    Value        Reference Range Interpretation Comments

 

             NEUTROPHILS - REL 87 %                                   



             (CELLAVISION)(BEAKER) (test code =                                 

       



             2816)                                               

 

             LYMPHOCYTES - REL 7 %                                    



             (CELLAVISION)(BEAKER) (test code =                                 

       



             2817)                                               

 

             MONOCYTES - REL 5 %                                    



             (CELLAVISION)(BEAKER) (test code =                                 

       



             2818)                                               

 

             BANDS - REL (CELLAVISION)(BEAKER) 1 %          0-10                

      



             (test code = 2826)                                        

 

             NEUTROPHILS - ABS 13.75 K/ul   1.56-6.13    H            



             (CELLAVISION)(BEAKER) (test code =                                 

       



             2830)                                               

 

             LYMPHOCYTES - ABS 1.11 K/ul    1.18-3.74    L            



             (CELLAVISION)(BEAKER) (test code =                                 

       



             2831)                                               

 

             MONOCYTES - ABS 0.79 K/uL    0.24-0.36    H            



             (CELLAVISION)(BEAKER) (test code =                                 

       



             2832)                                               

 

             BANDS - ABS (CELLAVISION)(BEAKER) 0.16 K/uL    0.00-0.80           

      



             (test code = 2840)                                        

 

             TOTAL COUNTED (BEAKER) (test code 100                              

      



             = 1351)                                             

 

             MANUAL NRBC  CELLS (BEAKER) 1 /100 WBC   0-0          H     

       



             (test code = 1353)                                        

 

             WBC MORPHOLOGY (BEAKER) (test code Normal                          

       



             = 487)                                              

 

             GIANT PLATELETS (BEAKER) (test Present                             

   



             code = 313)                                         

 

             HYPOCHROMIA (BEAKER) (test code = 1+ few                           

      



             963)                                                

 

             ANISOCYTOSIS (BEAKER) (test code = 1+ few                          

       



             961)                                                

 

             MACROCYTES (BEAKER) (test code = 1+ few                            

     



             964)                                                

 

             POIKILOCYTES (BEAKER) (test code = 1+ few                          

       



             966)                                                

 

             OVALOCYTES (BEAKER) (test code = 1+ few                            

     



             477)                                                

 

             ARTIFACT (CELLAVISION)(BEAKER) Present                             

   



             (test code = 3432)                                        

 

             PLATELET CONCENTRATION Adequate                               



             (CELLAVISION)(BEAKER) (test code =                                 

       



             3438)                                               



Received comment: User comments: Slide comments:HEMOGLOBIN AND HEMATOCRIT
2018-10-20 11:53:00





             Test Item    Value        Reference Range Interpretation Comments

 

             HEMOGLOBIN (BEAKER) (test code = 8.1 GM/DL    11.2-15.7    L       

     



             410)                                                

 

             HEMATOCRIT (BEAKER) (test code = 22.8 %       34.1-44.9    L       

     



             411)                                                



POCT-GLUCOSE METER2018-10-20 11:43:00





             Test Item    Value        Reference Range Interpretation Comments

 

             POC-GLUCOSE METER 126 mg/dL           H            TESTED AT 

[facility] [address]



             (BEAKER) (test code =                                        [address]



             [address])                                               [id number]



MAGNESIUM2018-10-20 10:49:00





             Test Item    Value        Reference Range Interpretation Comments

 

             MAGNESIUM (BEAKER) (test code = 1.7 mg/dL    1.6-2.6               

    



             627)                                                



BASIC METABOLIC PANEL2018-10-20 10:49:00





             Test Item    Value        Reference Range Interpretation Comments

 

             SODIUM (BEAKER) 137 meq/L    136-145                   



             (test code = 381)                                        

 

             POTASSIUM (BEAKER) 3.8 meq/L    3.5-5.1                   



             (test code = 379)                                        

 

             CHLORIDE (BEAKER) 110 meq/L           H            



             (test code = 382)                                        

 

             CO2 (BEAKER) (test 20 meq/L     22-29        L            



             code = 355)                                         

 

             BLOOD UREA NITROGEN 11 mg/dL     7-21                      



             (BEAKER) (test code                                        



             = 354)                                              

 

             CREATININE (BEAKER) 0.64 mg/dL   0.57-1.25                 



             (test code = 358)                                        

 

             GLUCOSE RANDOM 119 mg/dL           H            



             (BEAKER) (test code                                        



             = 652)                                              

 

             CALCIUM (BEAKER) 8.0 mg/dL    8.4-10.2     L            



             (test code = 697)                                        

 

             EGFR (BEAKER) (test 95 mL/min/1.73                           ESTIMA

CHRISTIN GFR IS



             code = 1092) sq m                                   NOT AS ACCURATE

 AS



                                                                 CREATININE



                                                                 CLEARANCE IN



                                                                 PREDICTING



                                                                 GLOMERULAR



                                                                 FILTRATION RATE

.



                                                                 ESTIMATED GFR I

S



                                                                 NOT APPLICABLE 

FOR



                                                                 DIALYSIS PATIEN

TS.



CREATINE KINASE (CK)2018-10-20 10:49:00





             Test Item    Value        Reference Range Interpretation Comments

 

             CREATINE KINASE TOTAL (BEAKER) (test 983 U/L             H   

         



             code = 380)                                         



EEG MONITORING WITH VIDEO RECORDING EACH 24 HOURS2018-10-20 10:35:00status 
epilepticusNeurophysiology Continuous Video Electroencephalogram Report   DATE 
OF REPORT: 10/20/18 Date(s) of Study: 10/20/2018 ACC: [id number] Start time: 0304 
hrs Stop time: 0704 hrs ICD-10:  R56.9 Choose an item. CPT Code: 28026 Choose an
item.   HISTORY: 57 yo female with PMHx of HTN, COPD found down at home with 
concern for seizures.   MEDICATIONS THAT COULD AFFECT EEG: Levetiracetam   
TECHNICAL SUMMARY: This is a digital video EEG recorded with 32 input channels 
reviewed with bipolar and referential montages using the modified combinatorial 
system nomenclature.   DESCRIPTION OF RECORD: There was no posterior dominant 
rhythm and the frequency-amplitude gradient was moderately disorganized. The 
background rhythms consisted of 5-8 Hz and 3-4 Hz activities, with 14-16 Hz 
activity seen frontally. There was abackground asymmetry with intermittent 
periods of diffuse voltage attenuation seen over the right hemisphere, lasting 
up to 2.5 seconds. There was also 1 Hz lateralized, frontally predominant, 
periodicdischarges seen on the right as well, with a broad field to the left 
frontal region at times. There were no stage-II sleep architecture features 
seen.   HV: Not performed. PHOTIC STIMULATION: Not performed   EVENTS: No 
electrographic seizures seen.    IMPRESSION: Abnormal 4-hour EEG due to: 1.
Continuous slowing, generalized 2.         Background discontinuity, focal 
(right hemisphere) 3.    Lateralized periodic discharges (LPDs) with frontal 
predominance,  right hemisphere   CLINICAL CORRELATION: This study is consistent
with a nonspecific moderate encephalopathy, compounded by a focal right 
hemispheric dysfunction with evidence of cortical irritability. The lateralized 
periodic discharges are a part of the ictal-interictal spectrum and could 
indicate a propensity for epileptic seizures. There were no electrographic 
seizures seen during this recording, which is an improvement from the baseline 
record the day prior.   Mary Ann Hennessy MD Epilepsy Fellow   Akosua Rashid MD 
ClinicalNeurophysiology Attending [facility] Neurophysiology      Electronically 
signed by: AKOSUA RASHID MD on 10/20/2018 10:35 AMRAD, CHEST, 1 VIEW, NON 
DEPT2018-10-20 09:40:00Reason for exam:-&gt;Resp FailureShould this be performed
at the bedside?-&gt;YesFINAL REPORT PATIENT ID:   [id number] CHEST ONE VIEW 
HISTORY: Respiratory failure, status post intubation COMPARISON: 10/19/2018 at 
1127 hours FINDINGS: Single portable AP examination of the chest was performed. 
Since the prior study, the endotracheal tube has been advanced. Its tip is now 2
cm proximal to the jacquie. Right jugular catheter tip is in the SVC region. 
There is subsegmental atelectasis versus scar at the medial right lung base, 
similar to the prior study. Lungs otherwise clear. No pleural effusions or 
pneumothorax are identified. Cardiac shadow normal in size. Signed: Jacinda Amado
MDReport Verified Date/Time:  10/20/2018 09:40:13 Reading Location: [facility] C0[facility]
Transitional Reading Room       Electronically signed by: JACINDA AMADO MD on 
10/20/2018 09:40 AMOSMOLALITY, SERUM2018-10-20 09:04:00





             Test Item    Value        Reference Range Interpretation Comments

 

             OSMOLALITY, SERUM (BEAKER) (test 287 mOsm/kg  275-295              

     



             code = 615)                                         



VANCOMYCIN LEVEL, TROUGH2018-10-20 08:16:00





             Test Item    Value        Reference Range Interpretation Comments

 

             VANCOMYCIN TROUGH (BEAKER) (test 13.8 ug/mL   10.0-20.0            

     



             code = 522)                                         



APTT2018-10-20 07:03:00





             Test Item    Value        Reference Range Interpretation Comments

 

             PARTIAL THROMBOPLASTIN TIME 52.6 seconds 22.5-36.0    H            



             (BEAKER) (test code = 760)                                        



TROPONIN -10-20 06:45:00





             Test Item    Value        Reference Range Interpretation Comments

 

             TROPONIN I (BEAKER) (test code = 0.80 ng/mL   0.00-0.03    HH      

     



             397)                                                



Troponin I (TnI) levels must be interpreted in the context of the presenting 
symptoms and the clinical findings. Elevated TnI levels indicate myocardial 
damage, but are not specific for ischemic heart disease. Elevated TnI levels are
seen in patients with other cardiac conditions (including myocarditis and 
congestive heart failure), and slight TnI elevations occur in patients with 
other conditions, including sepsis, renal failure, acidosis, acute neurological 
disease, and persistent tachyarrhythmia.BASIC METABOLIC PANEL2018-10-20 06:20:00





             Test Item    Value        Reference Range Interpretation Comments

 

             SODIUM (BEAKER) 139 meq/L    136-145                   



             (test code = 381)                                        

 

             POTASSIUM (BEAKER) 3.4 meq/L    3.5-5.1      L            



             (test code = 379)                                        

 

             CHLORIDE (BEAKER) 110 meq/L           H            



             (test code = 382)                                        

 

             CO2 (BEAKER) (test 20 meq/L     22-29        L            



             code = 355)                                         

 

             BLOOD UREA NITROGEN 13 mg/dL     7-21                      



             (BEAKER) (test code                                        



             = 354)                                              

 

             CREATININE (BEAKER) 0.60 mg/dL   0.57-1.25                 



             (test code = 358)                                        

 

             GLUCOSE RANDOM 138 mg/dL           H            



             (BEAKER) (test code                                        



             = 652)                                              

 

             CALCIUM (BEAKER) 7.1 mg/dL    8.4-10.2     L            



             (test code = 697)                                        

 

             EGFR (BEAKER) (test 103 mL/min/1.73                           ESTIM

ATED GFR IS



             code = 1092) sq m                                   NOT AS ACCURATE

 AS



                                                                 CREATININE



                                                                 CLEARANCE IN



                                                                 PREDICTING



                                                                 GLOMERULAR



                                                                 FILTRATION RATE

.



                                                                 ESTIMATED GFR I

S



                                                                 NOT APPLICABLE 

FOR



                                                                 DIALYSIS PATIEN

TS.



POCT-GLUCOSE METER2018-10-20 06:19:00





             Test Item    Value        Reference Range Interpretation Comments

 

             POC-GLUCOSE METER 151 mg/dL           H            TESTED AT 

[facility] [address]



             (BEAKER) (test code =                                        [address]



             [address])                                               [id number]



MAGNESIUM2018-10-20 05:57:00





             Test Item    Value        Reference Range Interpretation Comments

 

             MAGNESIUM (BEAKER) (test code = 1.9 mg/dL    1.6-2.6               

    



             627)                                                



CREATINE KINASE (CK)2018-10-20 05:57:00





             Test Item    Value        Reference Range Interpretation Comments

 

             CREATINE KINASE TOTAL (BEAKER) (test 599 U/L             H   

         



             code = 380)                                         



CALCIUM, IONIZED2018-10-20 02:36:00





             Test Item    Value        Reference Range Interpretation Comments

 

             CALCIUM IONIZED (BEAKER) (test 1.04 mmol/L  1.12-1.27    L         

   



             code = 698)                                         

 

             PH, BLOOD (BEAKER) (test code = 7.40                               

    



             1810)                                               



BASIC METABOLIC PANEL2018-10-20 01:55:00





             Test Item    Value        Reference Range Interpretation Comments

 

             SODIUM (BEAKER) 139 meq/L    136-145                   



             (test code = 381)                                        

 

             POTASSIUM (BEAKER) 2.4 meq/L    3.5-5.1      LL           



             (test code = 379)                                        

 

             CHLORIDE (BEAKER) 115 meq/L           H            



             (test code = 382)                                        

 

             CO2 (BEAKER) (test 17 meq/L     22-29        L            



             code = 355)                                         

 

             BLOOD UREA NITROGEN 13 mg/dL     7-21                      



             (BEAKER) (test code                                        



             = 354)                                              

 

             CREATININE (BEAKER) 0.54 mg/dL   0.57-1.25    L            



             (test code = 358)                                        

 

             GLUCOSE RANDOM 125 mg/dL           H            



             (BEAKER) (test code                                        



             = 652)                                              

 

             CALCIUM (BEAKER) 5.7 mg/dL    8.4-10.2     LL           



             (test code = 697)                                        

 

             EGFR (BEAKER) (test 116 mL/min/1.73                           ESTIM

ATED GFR IS



             code = 1092) sq m                                   NOT AS ACCURATE

 AS



                                                                 CREATININE



                                                                 CLEARANCE IN



                                                                 PREDICTING



                                                                 GLOMERULAR



                                                                 FILTRATION RATE

.



                                                                 ESTIMATED GFR I

S



                                                                 NOT APPLICABLE 

FOR



                                                                 DIALYSIS PATIEN

TS.



TROPONIN -10-20 01:54:00





             Test Item    Value        Reference Range Interpretation Comments

 

             TROPONIN I (BEAKER) (test code = 0.90 ng/mL   0.00-0.03    HH      

     



             397)                                                








             Test Item    Value        Reference Range Interpretation Comments

 

             MAGNESIUM (BEAKER) (test code = 1.6 mg/dL    1.6-2.6               

    



             627)                                                



POCT-GLUCOSE METER2018-10-20 00:34:00





             Test Item    Value        Reference Range Interpretation Comments

 

             POC-GLUCOSE METER 156 mg/dL           H            TESTED AT 

[facility] [address]



             (RODOLFO) (test code =                                        [address]



             [address])                                               [address]



CREATINE KINASE (CK)2018-10-19 23:34:00





             Test Item    Value        Reference Range Interpretation Comments

 

             CREATINE KINASE TOTAL (BEAKER) (test 630 U/L             H   

         



             code = 380)                                         



MR, BRAIN, WITHOUT CONTRAST2018-10-19 20:50:00FINAL REPORT PATIENT ID:   
[id number] MRI brain without contrast 10/19/2018 8:45 PM CLINICAL INDICATION: Eval
for CVA TECHNIQUE: Multiplanar, multisequence MR imaging of the brain was 
performed utilizingthe following imaging sequences: Axial T1, T2, FLAIR, GRE, 
and DWI; sagittal and coronal T1-weightedimages. COMPARISON: None available 
FINDINGS: There is an acute nonhemorrhagic infarct in the adjacent mesial right 
temporal lobe and thalamus.  There is no hematoma, mass, hydrocephalus, or 
extra-axialcollection. 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 There is 
bilateral colpocephaly. There are rare chronic microvascular changes in the 
supratentorial white matter. There is generalized parenchymal volume loss. 
Normal appearing flow-voids are present in the major intracranial vascular 
structures.The sellar and pineal regions, craniocervical junction, orbits, face,
and skull base are without worrisome finding. IMPRESSION: 1. Acute 
nonhemorrhagic mesial right temporal lobe/thalamic infarct. 2. Chronic findings 
as discussed. Signed: Seipel, Timothy MDReport Verified Date/Time:  10/19/2018 
20:50:25 Reading Location: [facility] Radiology Reading Room    
Electronically signed by: TIMOTHY J SEIPEL, M.D. on 10/19/2018 08:50 PMPHENYTOIN
LEVEL, TOTAL2018-10-19 20:45:00





             Test Item    Value        Reference Range Interpretation Comments

 

             PHENYTOIN (DILANTIN) (BEAKER) 11.8 ug/mL   10.0-20.0               

  



             (test code = 605)                                        



Please obtain 2 hours after getting phenytoinPOCT-GLUCOSE METER2018-10-19 
19:01:00





             Test Item    Value        Reference Range Interpretation Comments

 

             POC-GLUCOSE METER 139 mg/dL           H            TESTED AT 

[facility] [address]



             [facility] (test code =                                        [address] [place]



             [address])                                               [id number]



APTT2018-10-19 18:50:00





             Test Item    Value        Reference Range Interpretation Comments

 

             PARTIAL THROMBOPLASTIN TIME 92.6 seconds 22.5-36.0    H            



             (BEAKER) (test code = 760)                                        



TROPONIN -10-19 18:01:00





             Test Item    Value        Reference Range Interpretation Comments

 

             TROPONIN I (BEAKER) (test code = 1.72 ng/mL   0.00-0.03    HH      

     



             397)                                                








             Test Item    Value        Reference Range Interpretation Comments

 

             MAGNESIUM (BEAKER) (test code = 1.4 mg/dL    1.6-2.6      L        

    



             627)                                                



BASIC METABOLIC PANEL2018-10-19 17:38:00





             Test Item    Value        Reference Range Interpretation Comments

 

             SODIUM (BEAKER) 135 meq/L    136-145      L            



             (test code = 381)                                        

 

             POTASSIUM (BEAKER) 3.0 meq/L    3.5-5.1      L            



             (test code = 379)                                        

 

             CHLORIDE (BEAKER) 101 meq/L                        



             (test code = 382)                                        

 

             CO2 (BEAKER) (test 19 meq/L     22-29        L            



             code = 355)                                         

 

             BLOOD UREA NITROGEN 19 mg/dL     7-21                      



             (BEAKER) (test code                                        



             = 354)                                              

 

             CREATININE (BEAKER) 0.76 mg/dL   0.57-1.25                 



             (test code = 358)                                        

 

             GLUCOSE RANDOM 126 mg/dL           H            



             (BEAKER) (test code                                        



             = 652)                                              

 

             CALCIUM (BEAKER) 8.2 mg/dL    8.4-10.2     L            



             (test code = 697)                                        

 

             EGFR (BEAKER) (test 78 mL/min/1.73                           ESTIMA

CHRISTIN GFR IS



             code = 1092) sq m                                   NOT AS ACCURATE

 AS



                                                                 CREATININE



                                                                 CLEARANCE IN



                                                                 PREDICTING



                                                                 GLOMERULAR



                                                                 FILTRATION RATE

.



                                                                 ESTIMATED GFR I

S



                                                                 NOT APPLICABLE 

FOR



                                                                 DIALYSIS PATIEN

TS.



CREATINE KINASE (CK)2018-10-19 17:38:00





             Test Item    Value        Reference Range Interpretation Comments

 

             CREATINE KINASE TOTAL (BEAKER) (test 844 U/L             H   

         



             code = 380)                                         



APTT2018-10-19 15:49:00





             Test Item    Value        Reference Range Interpretation Comments

 

             PARTIAL THROMBOPLASTIN TIME > seconds    22.5-36.0    HH           



             (BEAKER) (test code = 760)                                        



EEG AWAKE AND DROWSY2018-10-19 14:47:00Reason for exam:-&gt;Seizures Should this
be performed at the bedside?-&gt;[facility]    DATE OF 
TEST: 10-19-18  START TIME: 10/19 at 08:33   END TIME: 10/19 at 08:56 DATE OF 
REPORT: 10-   EEICD-10: R56.9  CPT Code: 53631    HISTORY: 58 y
o female with PMHx of HTN, COPD found down at home with concern for seizures.   
MEDICATIONS THAT COULD AFFECT EEG: Levetiracetam  TECHNICAL SUMMARY:     This is
a digital video EEG recorded with 32 input channels reviewed with bipolar and 
referential montages using the modified combinatorial system nomenclature.      
DESCRIPTION OF RECORD:  During the maximally awake state there is a poorly 
sustained 6-7 Hz posterior dominant and anterior to posterior voltage/frequency 
gradient noted over the left hemisphere.  The background is composed primarily 
of 5-7 Hz with admixed 8-13 Hz.  Over the right hemisphere there are slower 3-4 
Hz frequencies as well as 1-3 Hz  frontal predominant spike and wave discharges 
lateralized to right hemisphere (Fp2/F4; LPDs lateralized right hemisphere) at 
times with a field to the left frontal region.  LPDs are time locked with clonic
movements of the left lower extremity, at times evolving into discrete events as
described below, consistent with electroclinical seizures, lasting up to 30 
seconds in duration.   HV: Not performed.     PHOTIC STIMULATION: Not performed 
 Events: at 08:38:32, 08:41:32, 08:45:49, 08:47:44, the patient has more 
frequent left-sided clonic leg movements associated with an evolution of R-sided
PLEDs into 5-6 Hz rhythmic discharges, maximal at F4, slowing over 15-30 seconds
to ~1 Hz discharges before returning to baseline LPDs.IMPRESSION:Abnormal awake 
&amp; drowsy EEG   1.      Lateralized periodic discharges over the right 
hemisphere (maximal Fp2/F4) with clinical correlate of left lower extremity 
clonic movements, occasionally evolving into brief focal seizures.2.     
Generalized theta range slowing3.     Focal right hemispheric delta slowing  
CLINICAL CORRELATION: Diffuse slowing as seen in this record supports an 
underlying mild-moderate encephalopathy of nonspecific etiology (hypoxia, 
infectious, metabolic/toxic).  Focal righthemispheric slowing is a nonspecific 
finding which can be associated with underlying structural abnormality of the 
involved region. Laterlized periodic discharges over the right hemisphere with 
clinical correlate of left lower extremity clonic movement, evolving in 
frequency over time, are consistent with intermittent electroclinical seizures. 
These findings were discussed with the NeuroICU team andthe patient was hooked 
up to continuous bedside monitoring.   Yomaira Toro MD Neurophysiology Fellow &
amp;#[id number]; Akosua Rashid MDClinical Neurophysiology Attending [facility]      Electronically signed by: AKOSUA RASHID MD on 10/19/2018 
02:47 PMAPTT2018-10-19 14:03:00





             Test Item    Value        Reference Range Interpretation Comments

 

             PARTIAL THROMBOPLASTIN TIME > seconds    22.5-36.0    HH           



             ([facility]) (test code = 760)                                        



HEMOGLOBIN A1C2018-10-19 12:34:00





             Test Item    Value        Reference Range Interpretation Comments

 

             HEMOGLOBIN A1C ([facility]) (test code = 5.6 %        4.3-6.1          

         



             368)                                                



POCT-GLUCOSE METER2018-10-19 12:14:00





             Test Item    Value        Reference Range Interpretation Comments

 

             POC-GLUCOSE METER 139 mg/dL           H            TESTED AT 

[facility] [address]



             ([facility]) (test code =                                        [place]



             1538)                                               [id number]



RAD, CHEST, 1 VIEW, NON DEPT2018-10-19 12:10:00Reason for exam:-&gt;CVC 
placementShould this be performed at the bedside?-&gt;YesFINAL REPORT PATIENT 
ID:   [id number] CLINICAL HISTORY: CVC placement TECHNIQUE: 1 view of the chest. C
OMPARISON: 10/18/2018 IMPRESSION: There is a right jugular line in the SVC. The 
ETT projects 4 cm above the jacquie. There are no infiltrates or effusions. The 
heart is not enlarged. A chronic right ribfracture deformity is again seen. 
Signed: Julian Hull MDReport Verified Date/Time:  10/19/2018 12:10:39 Reading 
Location: [facility] Radiology Reading Room      Electronically signed by: 
JULIAN HULL M.D. on 10/19/2018 12:10 PMLACTIC ACID, ARTERIAL, WHOLE BLOOD
2018-10-19 10:33:00





             Test Item    Value        Reference Range Interpretation Comments

 

             LACTATE BLOOD 0.9 mmol/L   0.5-2.2                   Specimen sligh

tly



             ARTERIAL (2) (BEAKER)                                        hemoly

zed



             (test code = 2874)                                        



Effective 2016: Units/Reference Range ChangeNew: 0.5-2.2 mmol/L  Previous: 
5-20 mg/dLBLOOD GAS, ARTERIAL2018-10-19 10:18:00





             Test Item    Value        Reference Range Interpretation Comments

 

             PH ARTERIAL (BEAKER) (test code = 7.41         7.35-7.45           

      



             383)                                                

 

             PCO2 ARTERIAL (BEAKER) (test code 29 mmHg      35-45        L      

      



             = 384)                                              

 

             PO2 ARTERIAL (BEAKER) (test code 214 mmHg     80-90        H       

     



             = 385)                                              

 

             O2 SATURATION ARTERIAL (BEAKER) 99.5 %       96.0-97.0    H        

    



             (test code = 386)                                        

 

             HCO3 ARTERIAL (BEAKER) (test code 18 mmol/L    21-29        L      

      



             = 388)                                              

 

             BASE EXCESS ARTERIAL (BEAKER) -5.5 mmol/L  -2.0-3.0     L          

  



             (test code = 387)                                        

 

             PATIENT TEMPERATURE (BEAKER) 38.0 C                                

 



             (test code = 1818)                                        

 

             FIO2 (BEAKER) (test code = 1819) 50.0 %                            

     



TROPONIN -10-19 09:22:00





             Test Item    Value        Reference Range Interpretation Comments

 

             TROPONIN I (BEAKER) (test code = 2.47 ng/mL   0.00-0.03    HH      

     



             397)                                                








             Test Item    Value        Reference Range Interpretation Comments

 

             POC-GLUCOSE METER 141 mg/dL           H            TESTED AT 

[facility] [address]



             [facility] (test code =                                        [place]



             1538)                                               [id number]



APTT2018-10-19 04:30:00





             Test Item    Value        Reference Range Interpretation Comments

 

             PARTIAL THROMBOPLASTIN TIME 34.7 seconds 22.5-36.0                 



             (BEAKER) (test code = 760)                                        



Prior to initiating heparinVITAMIN B122018-10-19 03:15:00





             Test Item    Value        Reference Range Interpretation Comments

 

             VITAMIN B12 (BEAKER) (test code = 283 pg/mL    213-816             

      



             774)                                                



TROPONIN -10-19 02:29:00





             Test Item    Value        Reference Range Interpretation Comments

 

             TROPONIN I (BEAKER) (test code = 2.03 ng/mL   0.00-0.03          

     



             397)                                                



Troponin I (TnI) levels must be interpreted in the context of the presenting 
symptoms and the clinical findings. Elevated TnI levels indicate myocardial 
damage, but are not specific for ischemic heart disease. Elevated TnI levels are
seen in patients with other cardiac conditions (including myocarditis and 
congestive heart failure), and slight TnI elevations occur in patients with 
other conditions, including sepsis, renal failure, acidosis, acute neurological 
disease, and persistent tachyarrhythmia.BASIC METABOLIC PANEL2018-10-19 02:07:00





             Test Item    Value        Reference Range Interpretation Comments

 

             SODIUM (BEAKER) 132 meq/L    136-145      L            



             (test code = 381)                                        

 

             POTASSIUM (BEAKER) 3.8 meq/L    3.5-5.1                   



             (test code = 379)                                        

 

             CHLORIDE (BEAKER) 98 meq/L                         



             (test code = 382)                                        

 

             CO2 (BEAKER) (test 18 meq/L     22-29        L            



             code = 355)                                         

 

             BLOOD UREA NITROGEN 16 mg/dL     7-21                      



             (BEAKER) (test code                                        



             = 354)                                              

 

             CREATININE (BEAKER) 0.74 mg/dL   0.57-1.25                 



             (test code = 358)                                        

 

             GLUCOSE RANDOM 139 mg/dL           H            



             (BEAKER) (test code                                        



             = 652)                                              

 

             CALCIUM (BEAKER) 8.8 mg/dL    8.4-10.2                  



             (test code = 697)                                        

 

             EGFR (BEAKER) (test 81 mL/min/1.73                           ESTIMA

CHRISTIN GFR IS



             code = 1092) sq m                                   NOT AS ACCURATE

 AS



                                                                 CREATININE



                                                                 CLEARANCE IN



                                                                 PREDICTING



                                                                 GLOMERULAR



                                                                 FILTRATION RATE

.



                                                                 ESTIMATED GFR I

S



                                                                 NOT APPLICABLE 

FOR



                                                                 DIALYSIS PATIEN

TS.



CBC W/PLT COUNT &amp; AUTO DIFFERENTIAL2018-10-19 01:35:00





             Test Item    Value        Reference Range Interpretation Comments

 

             WHITE BLOOD CELL COUNT (BEAKER) 26.7 K/ L    3.5-10.5     H        

    



             (test code = 775)                                        

 

             RED BLOOD CELL COUNT (BEAKER) 3.59 M/ L    3.93-5.22    L          

  



             (test code = 761)                                        

 

             HEMOGLOBIN (BEAKER) (test code = 12.3 GM/DL   11.2-15.7            

     



             410)                                                

 

             HEMATOCRIT (BEAKER) (test code = 35.6 %       34.1-44.9            

     



             411)                                                

 

             MEAN CORPUSCULAR VOLUME (BEAKER) 99.2 fL      79.4-94.8    H       

     



             (test code = 753)                                        

 

             MEAN CORPUSCULAR HEMOGLOBIN 34.3 pg      25.6-32.2    H            



             (BEAKER) (test code = 751)                                        

 

             MEAN CORPUSCULAR HEMOGLOBIN CONC 34.6 GM/DL   32.2-35.5            

     



             (BEAKER) (test code = 752)                                        

 

             RED CELL DISTRIBUTION WIDTH 14.4 %       11.7-14.4                 



             (BEAKER) (test code = 412)                                        

 

             PLATELET COUNT (BEAKER) (test 282 K/CU MM  150-450                 

  



             code = 756)                                         

 

             MEAN PLATELET VOLUME (BEAKER) 9.8 fL       9.4-12.3                

  



             (test code = 754)                                        

 

             NUCLEATED RED BLOOD CELLS 0 /100 WBC   0-0                       



             (BEAKER) (test code = 413)                                        

 

             NEUTROPHILS RELATIVE PERCENT 92 %                                  

 



             (BEAKER) (test code = 429)                                        

 

             LYMPHOCYTES RELATIVE PERCENT 3 %                                   

 



             (BEAKER) (test code = 430)                                        

 

             MONOCYTES RELATIVE PERCENT 3 %                                    



             (BEAKER) (test code = 431)                                        

 

             EOSINOPHILS RELATIVE PERCENT 0 %                                   

 



             (BEAKER) (test code = 432)                                        

 

             BASOPHILS RELATIVE PERCENT 0 %                                    



             (BEAKER) (test code = 437)                                        

 

             NEUTROPHILS ABSOLUTE COUNT 24.65 K/ L   1.56-6.13    H            



             (BEAKER) (test code = 670)                                        

 

             LYMPHOCYTES ABSOLUTE COUNT 0.81 K/ L    1.18-3.74    L            



             (BEAKER) (test code = 414)                                        

 

             MONOCYTES ABSOLUTE COUNT (BEAKER) 0.92 K/ L    0.24-0.36    H      

      



             (test code = 415)                                        

 

             EOSINOPHILS ABSOLUTE COUNT 0.02 K/ L    0.04-0.36    L            



             (BEAKER) (test code = 416)                                        

 

             BASOPHILS ABSOLUTE COUNT (BEAKER) 0.03 K/ L    0.01-0.08           

      



             (test code = 417)                                        

 

             IMMATURE GRANULOCYTES-RELATIVE 1 %          0-1                    

   



             PERCENT (BEAKER) (test code =                                      

  



             2801)                                               



LIPID PANEL2018-10-18 23:55:00





             Test Item    Value        Reference Range Interpretation Comments

 

             TRIGLYCERIDES (BEAKER) (test code = 102 mg/dL                      

        



             540)                                                

 

             CHOLESTEROL (BEAKER) (test code = 182 mg/dL                        

      



             631)                                                

 

             HDL CHOLESTEROL (BEAKER) (test code 48 mg/dL                       

        



             = 976)                                              

 

             LDL CHOLESTEROL CALCULATED (BEAKER) 114 mg/dL                      

        



             (test code = 633)                                        



Triglyceride Reference Range:   Low Risk         &lt;150   Borderline    150-199
  High Risk     200-499   Very High Risk  &gt;=500Cholesterol Reference Range:  
Low Risk         &lt;200   Borderline 200-239    High Risk        &gt;240HDL 
Cholesterol Reference Range:   Low Risk         &gt;=60   High Risk         
&lt;40LDL Cholesterol Reference Range:   Optimal          &lt;100   Near Optimal
 100-129   Borderline    130-159   High          160-189   Very High       
&gt;=190TSH/FREE T4 IF INDICATED2018-10-18 23:16:00





             Test Item    Value        Reference Range Interpretation Comments

 

             THYROID STIMULATING HORMONE 1.73 uIU/mL  0.35-4.94                 



             (BEAKER) (test code = 772)                                        



HEPATIC FUNCTION PANEL2018-10-18 22:57:00





             Test Item    Value        Reference Range Interpretation Comments

 

             TOTAL PROTEIN (BEAKER) (test code = 5.9 gm/dL    6.0-8.3      L    

        



             770)                                                

 

             ALBUMIN (BEAKER) (test code = 1145) 3.3 g/dL     3.5-5.0      L    

        

 

             BILIRUBIN TOTAL (BEAKER) (test code 0.5 mg/dL    0.2-1.2           

        



             = 377)                                              

 

             BILIRUBIN DIRECT (BEAKER) (test 0.3 mg/dL    0.1-0.5               

    



             code = 706)                                         

 

             ALKALINE PHOSPHATASE (BEAKER) (test 68 U/L                   

        



             code = 346)                                         

 

             AST (SGOT) (BEAKER) (test code = 36 U/L       5-34         H       

     



             353)                                                

 

             ALT (SGPT) (BEAKER) (test code = 16 U/L       6-55                 

     



             347)                                                



CREATINE KINASE (CK)2018-10-18 22:57:00





             Test Item    Value        Reference Range Interpretation Comments

 

             CREATINE KINASE TOTAL (BEAKER) (test 356 U/L             H   

         



             code = 380)                                         



ETHANOL2018-10-18 22:53:00





             Test Item    Value        Reference Range Interpretation Comments

 

             ETHANOL (BEAKER) (test code = 400) < mg/dL      <=10               

       



AMMONIA2018-10-18 22:48:00





             Test Item    Value        Reference Range Interpretation Comments

 

             AMMONIA (BEAKER) (test code = 348) 38  mol/L    18-72              

       



POCT-LACTIC ACID, VENOUS2018-10-18 22:36:00





             Test Item    Value        Reference Range Interpretation Comments

 

             POC-LACTIC ACID, 1.7 mmol/L   0.9-1.7                   TESTED AT [facility] [address]



             VENOUS (BEAKER) (test                                        [facility] [place]



             code = 2805)                                        [address]



PROCALCITONIN2018-10-18 21:24:00





             Test Item    Value        Reference Range Interpretation Comments

 

             PROCALCITONIN (BEAKER) (test code 2.11 ng/mL   <0.05        H      

      



             = 3036)                                             



SEPSIS RISK (ng/mL)Low:          0.05-0.50Intermediate: 0.51-2.00High:         
&gt;=2.01POCT-LACTIC ACID, VENOUS2018-10-18 20:31:00





             Test Item    Value        Reference Range Interpretation Comments

 

             POC-LACTIC ACID, 1.3 mmol/L   0.9-1.7                   TESTED AT [facility] [address]



             VENOUS (BEAKER) (test                                        [facility]

[place]



             code = 2805)                                        [address]



URINALYSIS W/ MICROSCOPIC2018-10-18 20:15:00





             Test Item    Value        Reference Range Interpretation Comments

 

             COLOR (BEAKER) (test code = 470) Light Yellow                      

     

 

             CLARITY (BEAKER) (test code = Clear                                

  



             469)                                                

 

             SPECIFIC GRAVITY UA (BEAKER) 1.009        1.001-1.035              

 



             (test code = 468)                                        

 

             PH UA (BEAKER) (test code = 467) 6.5          5.0-8.0              

     

 

             PROTEIN UA (BEAKER) (test code = 30 mg/dL     Negative     A       

     



             464)                                                

 

             GLUCOSE UA (BEAKER) (test code = Negative     Negative             

     



             365)                                                

 

             KETONES UA (BEAKER) (test code = Negative     Negative             

     



             371)                                                

 

             BILIRUBIN UA (BEAKER) (test code Negative     Negative             

     



             = 462)                                              

 

             BLOOD UA (BEAKER) (test code = Small        Negative     A         

   



             461)                                                

 

             NITRITE UA (BEAKER) (test code = Negative     Negative             

     



             465)                                                

 

             LEUKOCYTE ESTERASE UA (BEAKER) Negative     Negative               

   



             (test code = 466)                                        

 

             UROBILINOGEN UA (BEAKER) (test 0.2 mg/dL    0.2-1.0                

   



             code = 463)                                         

 

             RBC UA (BEAKER) (test code = 0 /HPF                                

 



             519)                                                

 

             WBC UA (BEAKER) (test code = < /HPF                                

 



             520)                                                

 

             MUCUS (BEAKER) (test code = Rare                                   



             1574)                                               

 

             SQUAMOUS EPITHELIAL (BEAKER) < /HPF                                

 



             (test code = 516)                                        

 

             YEAST (BEAKER) (test code = Occasional                             



             1585)                                               

 

             SOURCE(BEAKER) (test code =                                        



             0775)                                               



RAD, CHEST, 1 VIEW, NON DEPT2018-10-18 20:12:00Reason for exam:-&gt;iontubatedIs
the patient pregnant?-&gt;N/AFINAL REPORT PATIENT ID:   [id number]  History: 
Intubation. Comparison: None. Findings: A single view of the chest is submitted.
The tip of an endotracheal tube is between the clavicles and jacquie. The ca
rdiomediastinal contours are unremarkable.  There is no focal consolidation, 
pneumothorax, large pleural effusion or evidence of overt pulmonary edema.   
There are deformities of the posterior right ninth and 10th ribs, age 
indeterminate. Please correlate to exclude the possibility of acute fracture. S
igned: Louis Luis MDReport Verified Date/Time:  10/18/2018 20:12:16 Reading 
Location: [address] Reading Room      Electronically signed by: 
LOUIS LUIS M.D. on 10/18/2018 08:12 PMPOCT-GLUCOSE METER2018-10-18 20:00:00





             Test Item    Value        Reference Range Interpretation Comments

 

             POC-GLUCOSE METER 124 mg/dL           H            TESTED AT 

[facility] [address]



             (BEAKER) (test code =                                        [place]

[place]



             1538)                                               [address]



TROPONIN -10-18 19:59:00





             Test Item    Value        Reference Range Interpretation Comments

 

             TROPONIN I (BEAKER) (test code = 1.70 ng/mL   0.00-0.03          

     



             397)                                                



Troponin I (TnI) levels must be interpreted in the context of the presenting 
symptoms and the clinical findings. Elevated TnI levels indicate myocardial 
damage, but are not specific for ischemic heart disease. Elevated TnI levels are
seen in patients with other cardiac conditions (including myocarditis and 
congestive heart failure), and slight TnI elevations occur in patients with 
other conditions, including sepsis, renal failure, acidosis, acute neurological 
disease, and persistent tachyarrhythmia.CBC W/PLT COUNT &amp; AUTO DIFFERENTIAL
2018-10-18 19:55:00





             Test Item    Value        Reference Range Interpretation Comments

 

             WHITE BLOOD CELL COUNT (BEAKER) 30.1 K/ L    3.5-10.5     H        

    



             (test code = 775)                                        

 

             RED BLOOD CELL COUNT (BEAKER) 3.55 M/ L    3.93-5.22    L          

  



             (test code = 761)                                        

 

             HEMOGLOBIN (BEAKER) (test code = 12.2 GM/DL   11.2-15.7            

     



             410)                                                

 

             HEMATOCRIT (BEAKER) (test code = 36.3 %       34.1-44.9            

     



             411)                                                

 

             MEAN CORPUSCULAR VOLUME (BEAKER) 102.3 fL     79.4-94.8    H       

     



             (test code = 753)                                        

 

             MEAN CORPUSCULAR HEMOGLOBIN 34.4 pg      25.6-32.2    H            



             (BEAKER) (test code = 751)                                        

 

             MEAN CORPUSCULAR HEMOGLOBIN CONC 33.6 GM/DL   32.2-35.5            

     



             (BEAKER) (test code = 752)                                        

 

             RED CELL DISTRIBUTION WIDTH 14.4 %       11.7-14.4                 



             (BEAKER) (test code = 412)                                        

 

             PLATELET COUNT (BEAKER) (test 314 K/CU MM  150-450                 

  



             code = 756)                                         

 

             MEAN PLATELET VOLUME (BEAKER) 9.4 fL       9.4-12.3                

  



             (test code = 754)                                        

 

             NUCLEATED RED BLOOD CELLS 0 /100 WBC   0-0                       



             (BEAKER) (test code = 413)                                        



(CELLAVISION MANUAL DIFF)2018-10-18 19:55:00





             Test Item    Value        Reference Range Interpretation Comments

 

             NEUTROPHILS - REL 91 %                                   



             (CELLAVISION)(BEAKER) (test code =                                 

       



             2816)                                               

 

             MONOCYTES - REL 2 %                                    



             (CELLAVISION)(BEAKER) (test code =                                 

       



             2818)                                               

 

             BASOPHILS - REL 1 %                                    



             (CELLAVISION)(BEAKER) (test code =                                 

       



             2820)                                               

 

             BANDS - REL (CELLAVISION)(BEAKER) 6 %          0-10                

      



             (test code = 2826)                                        

 

             NEUTROPHILS - ABS 27.39 K/ul   1.56-6.13    H            



             (CELLAVISION)(BEAKER) (test code =                                 

       



             2830)                                               

 

             MONOCYTES - ABS 0.60 K/uL    0.24-0.36    H            



             (CELLAVISION)(BEAKER) (test code =                                 

       



             2832)                                               

 

             BASOPHILS - ABS 0.30 K/uL    0.01-0.08    H            



             (CELLAVISION)(BEAKER) (test code =                                 

       



             2835)                                               

 

             BANDS - ABS (CELLAVISION)(BEAKER) 1.81 K/uL    0.00-0.80    H      

      



             (test code = 2840)                                        

 

             TOTAL COUNTED (BEAKER) (test code 100                              

      



             = 1351)                                             

 

             WBC MORPHOLOGY (BEAKER) (test code Normal                          

       



             = 487)                                              

 

             PLT MORPHOLOGY (BEAKER) (test code Normal                          

       



             = 486)                                              

 

             ANISOCYTOSIS (BEAKER) (test code = 1+ few                          

       



             961)                                                

 

             MACROCYTES (BEAKER) (test code = 1+ few                            

     



             964)                                                

 

             ARTIFACT (CELLAVISION)(BEAKER) Present                             

   



             (test code = 3432)                                        

 

             PLATELET CONCENTRATION Adequate                               



             (CELLAVISION)(BEAKER) (test code =                                 

       



             3438)                                               



Received comment: User comments: Slide comments:BASIC METABOLIC PANEL8-10-18 
19:46:00





             Test Item    Value        Reference Range Interpretation Comments

 

             SODIUM (BEAKER) 130 meq/L    136-145      L            



             (test code = 381)                                        

 

             POTASSIUM (BEAKER) 4.4 meq/L    3.5-5.1                   



             (test code = 379)                                        

 

             CHLORIDE (BEAKER) 98 meq/L                         



             (test code = 382)                                        

 

             CO2 (BEAKER) (test 20 meq/L     22-29        L            



             code = 355)                                         

 

             BLOOD UREA NITROGEN 18 mg/dL     7-21                      



             (BEAKER) (test code                                        



             = 354)                                              

 

             CREATININE (BEAKER) 0.81 mg/dL   0.57-1.25                 



             (test code = 358)                                        

 

             GLUCOSE RANDOM 115 mg/dL           H            



             (BEAKER) (test code                                        



             = 652)                                              

 

             CALCIUM (BEAKER) 9.0 mg/dL    8.4-10.2                  



             (test code = 697)                                        

 

             EGFR (BEAKER) (test  mL/min/1.73                           INSUFFIC

IENT CLINICAL



             code = 1092) sq m                                   DATA TO CALCULA

TE



                                                                 ESTIMATED GFR.



MAGNESIUM2018-10-18 19:43:00





             Test Item    Value        Reference Range Interpretation Comments

 

             MAGNESIUM (BEAKER) (test code = 1.5 mg/dL    1.6-2.6      L        

    



             627)                                                



PT/APTT2018-10-18 19:38:00





             Test Item    Value        Reference Range Interpretation Comments

 

             PROTIME (BEAKER) (test code = 14.1 seconds 11.7-14.7               

  



             759)                                                

 

             INR (BEAKER) (test code = 370) 1.1          <=5.9                  

   

 

             PARTIAL THROMBOPLASTIN TIME 26.2 seconds 22.5-36.0                 



             (BEAKER) (test code = 760)                                        



RECOMMENDED COUMADIN/WARFARIN INR THERAPY RANGESSTANDARD DOSE: 2.0 - 3.0   
Includes: PROPHYLAXIS forvenous thrombosis, systemic embolization; TREATMENT for
venous thrombosis and/or pulmonary embolus.HIGH RISK: Target INR is 2.5-3.5 for 
patients with mechanical heart valves.BLOOD GAS, ARTERIAL2018-10-18 19:27:00





             Test Item    Value        Reference Range Interpretation Comments

 

             PH ARTERIAL (BEAKER) (test code = 7.27         7.35-7.45    L      

      



             383)                                                

 

             PCO2 ARTERIAL (BEAKER) (test code 42 mmHg      35-45               

      



             = 384)                                              

 

             PO2 ARTERIAL (BEAKER) (test code 452 mmHg     80-90        H       

     



             = 385)                                              

 

             O2 SATURATION ARTERIAL (BEAKER) 99.8 %       96.0-97.0    H        

    



             (test code = 386)                                        

 

             HCO3 ARTERIAL (BEAKER) (test code 19 mmol/L    21-29        L      

      



             = 388)                                              

 

             BASE EXCESS ARTERIAL (BEAKER) -7.5 mmol/L  -2.0-3.0     L          

  



             (test code = 387)                                        

 

             PATIENT TEMPERATURE (BEAKER) 36.0 C                                

 



             (test code = 1818)                                        

 

             FIO2 (BEAKER) (test code = 1819) 100.0 %                           

     



CARBOXYHEMOGLOBIN2018-10-18 19:27:00





             Test Item    Value        Reference Range Interpretation Comments

 

             CARBOXYHEMOGLOBIN (BEAKER) (test code = 2.5 %        0.0-5.0       

            



             695)                                                



CT, CTANGIO  BRAIN2018-10-18 19:02:00Reason for exam:-&gt;Symptoms onset less 
than 6 hours and NIHSS 6 or greaterFINAL REPORT PATIENT ID:   [id number] CTA 
carotids and brain 10/18/2018 6:57 PM CLINICAL INDICATION: Symptoms onset less 
than 6 hours and NIHSS 6 or greaterStroke evaluation COMPARISON: None available 
TECHNIQUE: Axial CT angiographic images of the upper chest, neck, and head were 
obtained, from which three-dimensional reconstructed images were created. 
Additional imaging series were created on an independent workstation using 
maximum intensity projection and volume rendered technique. This examinationwas 
performed according to our departmental dose optimization program, which 
includes automated exposure control, adjustment of the mA and/or kV according to
patient size, and/or use of iterated reconstruction technique. FINDINGS: There 
is no vessel occlusion in the intracranial or extracranial arterial vasculature.
There is high-grade stenosis at the origins of both vertebral arteries. The 
remainderof the intracranial and extracranial vertebrobasilar circulation is 
unremarkable. There is atherosclerotic plaque deposition in both carotid 
bifurcations and carotid siphons, without NASCET quantifiable cervical internal 
carotid artery stenosis or unremarkable intracranial ICA stenosis. There is no 
stenosis in either anterior, middle, or posterior cerebral artery. There is a 
left parietotemporal scalp hematoma. 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 The 
visualized skeleton is without worrisome finding. IMPRESSION: 1. No evidence for
acute vascular compromise. 2. Severe bilateral vertebral artery origin stenosis.
 3. Additional findings as discussed. IMPRESSION:     Signed: Seipel, Timothy MDReport Verified Date/Time:  10/18/2018 19:02:43 Reading Location: [facility] Radiology Reading Room    Electronically signed by: TIMOTHY J SEIPEL, M.D. 
on 10/18/2018 07:02 [facility], CAROTID, ANGIO2018-10-18 19:02:00Reason for exam:-
&gt;Symptoms onset less than 6 hours and NIHSS 6 or greaterFINAL REPORT PATIENT 
ID:   [id number] CTA carotids and brain 10/18/2018 6:57 PM CLINICAL INDICATION: Sy
mptoms onset less than 6 hours and NIHSS 6 or greaterStroke evaluation 
COMPARISON: None available TECHNIQUE: Axial CT angiographic images of the upper 
chest, neck, and head were obtained, from which three-dimensional reconstructed 
images were created. Additional imaging series were created on an independent 
workstation using maximum intensity projection and volume rendered technique. 
This examinationwas performed according to our departmental dose optimization 
program, which includes automated exposure control, adjustment of the mA and/or 
kV according to patient size, and/or use of iterated reconstruction technique. 
FINDINGS: There is no vessel occlusion in the intracranial or extracranial 
arterial vasculature. There is high-grade stenosis at the origins of both 
vertebral arteries. The remainderof the intracranial and extracranial 
vertebrobasilar circulation is unremarkable. There is atherosclerotic plaque 
deposition in both carotid bifurcations and carotid siphons, without NASCET 
quantifiable cervical internal carotid artery stenosis or unremarkable 
intracranial ICA stenosis. There is no stenosis in either anterior, middle, or 
posterior cerebral artery. There is a left parietotemporal scalp hematoma. There
is corpus callosum dysgenesis. The visualized skeleton is without worrisome 
finding. IMPRESSION: 1. No evidence for acute vascular compromise. 2. Severe 
bilateral vertebral artery origin stenosis.  3. Additional findings as 
discussed. IMPRESSION:     Signed: Seipel, Timothy MDReport Verified Date/Time: 
10/18/2018 19:02:43 Reading Location: [facility] Radiology Reading Room    
Electronically signed by: TIMOTHY J SEIPEL, M.D. on 10/18/2018 07:02 [facility] 
BRAIN/STROKE PROTOCOL2018-10-18 18:30:00Reason for exam:-&gt;strokeFINAL REPORT 
PATIENT ID:   [id number] CT head without contrast 10/18/2018 6:26 PM CLINICAL 
HISTORY: stroke TECHNIQUE: Axial noncontrast CT images through the head were 
obtained. This examination was performed according to our departmental dose 
optimization program, which includes automated exposure control, adjustment of 
the mA and/or kV according to patient size, and/or use of iterated 
reconstruction technique. COMPARISON: None available FINDINGS: There is no 
hemorrhage, extra-axial collection, mass,hydrocephalus, or midline shift. There 
is corpus callosum dysgenesis. There are rare microvascular changes in the 
supratentorial white matter. There is atherosclerotic calcification of the 
intracranialarterial vasculature. There is generalized parenchymal volume loss. 
The visualized paranasal sinuses and mastoid air cells are well aerated.  There 
is a left parietotemporal scalp hematoma. The skull is intact. IMPRESSION: No 
intracranial hemorrhage or mass effect. Chronic appearing findings as discussed.
If concern for acute pathology persists, further evaluation with MRI is 
recommended. Findings were discussed with Dr. Cueto on 10/18/2018 at 1825. 
Signed: Seipel, Timothy MDReport Verified Date/Time:  10/18/2018 18:30:59 
Reading Location: [facility] Radiology Reading Room    Electronically signed 
by: TIMOTHY J SEIPEL, M.D. on 10/18/2018 06:30 PM

## 2022-01-05 NOTE — RAD REPORT
EXAM DESCRIPTION:  CTHead angio1/5/2022 8:36 pm

 

CLINICAL HISTORY:  Left arm weakness/numbness

 

COMPARISON:  None

 

TECHNIQUE:  CT angiogram of the head was obtained. 3D MIPS reconstruction performed.

 

All CT scans are performed using dose optimization technique as appropriate and may include automated
 exposure control or mA/KV adjustment according to patient size.

 

FINDINGS:  Mild calcified plaque in the distal internal carotid arteries.

 

The basilar, anterior cerebral, middle cerebral and posterior cerebral arteries are normal caliber.

 

An aneurysm is not seen.

 

A significant stenosis is not noted.

 

IMPRESSION:  No significant abnormality is displayed

## 2022-01-06 VITALS — SYSTOLIC BLOOD PRESSURE: 142 MMHG | OXYGEN SATURATION: 93 % | DIASTOLIC BLOOD PRESSURE: 102 MMHG

## 2022-01-06 VITALS — TEMPERATURE: 97.3 F

## 2022-01-06 NOTE — RAD REPORT
EXAM DESCRIPTION:  CT - Head Brain Wo Cont - 1/6/2022 4:37 am

 

CLINICAL HISTORY:  61 years   Female   headache, post tpa

 

TECHNIQUE:  Axial noncontrast CT head with coronal and sagittal reformats. All CT scans at this [facility]
ity use dose modulation, iterative reconstruction, and/or weight based dosing when appropriate to red
uce radiation dose to as low as reasonably achievable.

 

COMPARISON:  1/5/2022.

 

FINDINGS:  Brain: Parenchymal volume loss. 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 No ob
vious large acute territorial infarction. No intracranial hemorrhage, midline shift, mass or mass eff
ect.

Ventricles: No hydrocephalus.

Orbits: Unremarkable.

Sinuses: Visualized portions are clear.

Mastoid: Clear.

Osseous: Unremarkable.

Soft tissues: Unremarkable.

 

IMPRESSION:  No acute findings.

 

Electronically signed by:   Damien Sales MD   1/6/2022 1:05 AM CST Workstation: [id number]

 

 

 

Due to temporary technical issues with the PACS/Fluency reporting system, reports are being signed by
 the in house radiologists without review as a courtesy to insure prompt reporting. The interpreting 
radiologist is fully responsible for the content of the report.

## 2022-04-14 ENCOUNTER — HOSPITAL ENCOUNTER (EMERGENCY)
Dept: HOSPITAL 97 - ER | Age: 62
Discharge: HOME | End: 2022-04-14
Payer: COMMERCIAL

## 2022-04-14 DIAGNOSIS — Z76.0: Primary | ICD-10-CM

## 2022-04-14 PROCEDURE — 99282 EMERGENCY DEPT VISIT SF MDM: CPT

## 2022-04-14 NOTE — XMS REPORT
Continuity of Care Document

                            Created on:2022



Patient:RONA NELSON

Sex:Female

:1960

External Reference #:[id number]





Demographics







                          Address                   [address] [place] [address]

 

                          Home Phone                [phone] [facility]

 

                          Work Phone                [phone]

 

                          Mobile Phone              [phone]

 

                          Email Address             DECLINE 10/15/21

 

                          Preferred Language        ENGL

 

                          Marital Status            Unknown

 

                          Yazidism Affiliation     Unknown

 

                          Race                      Unknown

 

                          Additional Race(s)        Unavailable



                                                    Unavailable



                                                    White

 

                          Ethnic Group              Unknown









Author







                          Organization              [facility]

t

 

                          Address                   [address]



                                                    [place] [address]

 

                          Phone                     [phone]









Support







                Name            Relationship    Address         Phone

 

                SMITA          SPOU            [address] [phone]



                                                [place] [address] 

 

                HAYDEN NELSON  SP              [address] [phone]



                                                [place] [address] 

 

                SUGEY,  R       Unavailable     [address] [phone]



                                                [place] [address] 

 

                SMITA          SPOU            [address] [phone]



                                                [place] [address] 

 

                SUGEY           SPOU            [address] [phone]



                                                [place] [address] 

 

                HAYDEN NELSON  X               [address]   Unavailable



                                                [place] [id number] 

 

                Hayden Nelson    Spouse          [address]   [phone]



                                                [place] [address] 









Care Team Providers







                    Name                Role                Phone

 

                    PATEL           Primary Care Physician Unavailable

 

                    Iris                Attending Clinician Unavailable

 

                    [id number]              Attending Clinician Unavailable

 

                    Freda,  P            Attending Clinician Unavailable

 

                    Era Hawley Attending Clinician Unavailable

 

                    BRITTNI               Attending Clinician Unavailable

 

                    MARINO              Attending Clinician Unavailable

 

                    Marino ECHOLS           Attending Clinician [phone]

 

                    WING WILCOX      Attending Clinician Unavailable

 

                    Grisel Bennett  Attending Clinician [phone]

 

                    Radiology           Attending Clinician Unavailable

 

                    RADIOLOGY           Attending Clinician Unavailable

 

                    HARRISON Carter MD           Attending Clinician [phone]

 

                    HARRISON Ngo DO     Attending Clinician [phone]

 

                    Amanda           Attending Clinician [phone]

 

                    AMANDA           Attending Clinician Unavailable

 

                    Doctor Unassigned,  Name Attending Clinician Unavailable

 

                    GRISEL SIBLEY      Attending Clinician Unavailable

 

                    Vera EMNP,  R     Attending Clinician [phone]

 

                    Pob,  Lab Main      Attending Clinician Unavailable

 

                    1,  Lab             Attending Clinician Unavailable

 

                    García PAC,  S       Attending Clinician [phone]

 

                    WALDEMAR                Attending Clinician Unavailable

 

                    Lucio Patel   Admitting Clinician Unavailable

 

                    [id number]              Admitting Clinician Unavailable

 

                    Manpreet Hawley Admitting Clinician Unavailable

 

                    SMART               Admitting Clinician Unavailable

 

                    MARINO              Admitting Clinician Unavailable

 

                    WILLAM BARNEY      Admitting Clinician Unavailable

 

                    HARRISON Carter MD           Admitting Clinician [phone]

 

                    GRISEL SIBLEY      Admitting Clinician Unavailable

 

                    HARRISON CARTER              Admitting Clinician Unavailable

 

                    AMANDA           Admitting Clinician Unavailable

 

                    LETI SYKES     Admitting Clinician Unavailable









Payers







           Payer Name Policy Type Policy Number Effective Date Expiration Date S

ource

 

           [employer] [place]            [id number] 2020            



                                            00:00:00              

 

           AET        AET        [id number]                       

 

           [employer]            [id number]            



           OUT OF NETWORK                       00:00:00              

 

           [employer] HMO/POS/OPEN            [id number]  2018            



           ACCESS                           00:00:00              

 

           [employer] II              [id number]  [id number] 



                                            00:00:00   00:00:00   







Problems







       Condition Condition Condition Status Onset  Resolution Last   Treating Co

mments 

Source



       Name   Details Category        Date   Date   Treatment Clinician        



                                                 Date                 

 

       Acute  Acute  Disease Active 2020                             [facility]



       cystitis cystitis                                              ity of



       without without               00:00:                             [place]



       hematuria hematuria               00                                 [facility]

 

       Seizures Seizures Disease Active 2020                             [facility]

rs



                                                                  ity of



                                   00:00:                             [place]



                                   00                                 [facility]



                                                                      [facility]

 

       CVA           Diagnosis Active 2019               Mem

oria



                                             14:48:00               l



                CVA                00:00:                             [place]



                                   00                                 



                                                                      



              Active                                                  



                                                                      



                                                                      



              2019                                                  



                                                                      



                                                                      



                                                                      



                                                                      



                                                                      



                                                                      



                                                                      



                                                                      



                     [facility]                                                  



                                                                      



                                                                      

 

       NSTEMI,        Diagnosis Active 2019               Me

moria



       SEIZURES                                22:12:00               l



                NSTEMI,               00:00:                             Julius



              SEIZURES               00                                 



                                                                      



                                                                      



              Active                                                  



                                                                      



                                                                      



              2019                                                  



                                                                      



                                                                      



                                                                      



                                                                      



                                                                      



                                                                      



                                                                      



                                                                      



                     [facility]                                                  



                                                                      



                                                                      

 

       SONG        Diagnosis Active 2019               

[facility]



       BILLING                                08:14:00               l



                                   00:00:                             Julius



              SONG               00                                 



              BILLING                                                  



                                                                      



                                                                      



              Active                                                  



                                                                      



                                                                      



              2019                                                  



                                                                      



                                                                      



                                                                      



                                                                      



                                                                      



                                                                      



                                                                      



                                                                      



                     [facility]                                                  



                                                                      



                                                                      

 

       Cerebral        Problem Active               2021               Mem

oria



       infarction                                    00:56:47               l



       (disorder)   Cerebral                                                  He

rmann



              infarction                                                  



              (disorder)                                                  



                                                                      



                                                                      



               Active                                                  



                                                                      



                                                                      



                                                                      



                                                                      



              Problem                                                  



                                                                      



                                                                      



              2021                                                  



                                                                      



                                                                      



                                                                      



                                                                      



                 [facility]                                                  



              Neuro                                                   



                                                                      



                                                                      

 

       Epilepsy        Problem Active               2021               Mem

oria



       characteri                                    00:56:47               l



       zed by   Epilepsy                                                  Krishan

n



       intractabl characteri                                                  



       e complex zed by                                                  



       partial intractabl                                                  



       seizures e complex                                                  



       (disorder) partial                                                  



              seizures                                                  



              (disorder)                                                  



                                                                      



                                                                      



               Active                                                  



                                                                      



                                                                      



                                                                      



                                                                      



              Problem                                                  



                                                                      



                                                                      



              2021                                                  



                                                                      



                                                                      



                                                                      



                                                                      



                 [facility]                                                  



              Neuro                                                   



                                                                      



                                                                      

 

       Hypertensi        Problem Active               2021               M

emoria



       ve                                        00:56:47               l



       disorder,                                                         [place]



       systemic Hypertensi                                                  



       arterial ve                                                      



       (disorder) disorder,                                                  



              systemic                                                  



              arterial                                                  



              (disorder)                                                  



                                                                      



                                                                      



               Active                                                  



                                                                      



                                                                      



                                                                      



                                                                      



              Problem                                                  



                                                                      



                                                                      



              2021                                                  



                                                                      



                                                                      



                                                                      



                                                                      



                 [facility]                                                  



              Neuro                                                   



                                                                      



                                                                      

 

       Hyperlipid        Problem Active               2021               M

emoria



       emia                                      00:56:47               l



       (disorder)                                                         Krishan

n



              Hyperlipid                                                  



              emia                                                    



              (disorder)                                                  



                                                                      



                                                                      



               Active                                                  



                                                                      



                                                                      



                                                                      



                                                                      



              Problem                                                  



                                                                      



                                                                      



              2021                                                  



                                                                      



                                                                      



                                                                      



                                                                      



                 [facility]                                                  



              Neuro                                                   



                                                                      



                                                                      

 

       Hyponatrem        Problem Active               2021               M

emoria



       ia                                        00:56:47               l



       (disorder)                                                         Krishan

n



              Hyponatrem                                                  



              ia                                                      



              (disorder)                                                  



                                                                      



                                                                      



               Active                                                  



                                                                      



                                                                      



                                                                      



                                                                      



              Problem                                                  



                                                                      



                                                                      



              2021                                                  



                                                                      



                                                                      



                                                                      



                                                                      



                 [facility]                                                  



              Neuro                                                   



                                                                      



                                                                      

 

       NON-ST        Diagnosis Active               2019               Mem

oria



       ELEVATION                                    22:12:00               l



       (NSTEMI)   NON-ST                                                  Krishan

n



       MYOCARDIAL ELEVATION                                                  



       INF    (NSTEMI)                                                  



              MYOCARDIAL                                                  



              INF                                                     



                                                                      



                                                                      



              Active                                                  



                                                                      



                                                                      



                                                                      



                                                                      



                                                                      



                                                                      



                                                                      



                                                                      



                                                                      



                                                                      



                   [facility]                                                  



                                                                      



                                                                      

 

       UNSPECIFIE        Diagnosis Active               2019              

 [facility]



       D                                         22:12:00               l



       CONVULSION                                                         Krishan

n



       S      UNSPECIFIE                                                  



              D                                                       



              CONVULSION                                                  



              S                                                       



                                                                      



                 Active                                                  



                                                                      



                                                                      



                                                                      



                                                                      



                                                                      



                                                                      



                                                                      



                                                                      



                                                                      



                                                                      



                      [facility]                                                  



                                                                      



                                                                      

 

       No known No known Disease                                           Unive

rs



       active active                                                  ity of



       problems problems                                                  [facility]







Allergies, Adverse Reactions, Alerts







       Allergy Allergy Status Severity Reaction(s) Onset  Inactive Treating Comm

ents 

Source



       Name   Type                        Date   Date   Clinician        

 

       Hydrocod Propensi Active        Nausea                       Univer

s



       one    ty to                and/or 1-10                        ity of



              adverse               Vomiting 00:00:                      [place]



              reaction                      00                          Medical



              s                                                       Branch

 

       HYDROCOD DRUG   Active        N/V                          Univers



       ONE    INGREDI                      1-10                        ity of



                                          00:00:                      [place]



                                          00                          Medical



                                                                      Branch

 

       codeine DA     Active SV            2020                      [facility]



                                          2-30                        [place]



                                          00:00:                      d



                                          00                          [facility]

 

       hydrocod DA     Active MO            2020                      [facility]



       one                                2-30                        [place]



                                          00:00:                      d



                                          00                          [facility]

 

       codeine DA     Active SV     NAUSEA 2020                      [facility]



                                          2-30                        [place]



                                          00:00:                      d



                                          00                          [facility]

 

       hydrocod DA     Active MO     NAUSEA 2020-1                      [facility]



       one                                2-30                        [place]



                                          00:00:                      d



                                          00                          Medical



                                                                      [place]

 

       Codeine Drug   Active        Nausea -                      Univers



              Allergy               and/or 0-23                        ity of



                                   Vomiting 00:00:                      [place]



                                          00                          Medical



                                                                      Branch

 

       CODEINE DRUG   Active        N/V    2019-                      Univers



              INGREDI                      0-23                        ity of



                                          00:00:                      [place]



                                          00                          Medical



                                                                      Branch

 

       [place]    Propensi Active        Other - See 2018-               Coughing, 

Univers



       Derivati ty to                comments 0-18                 sneezingC ity

 of



       ves    adverse                      00:00:               oughing, [place]



              reaction                      00                   sneezing Medica

l



              s                                                       Branch

 

       Rag[place] Propensi Active        Other - See -               Coughing,

 Univers



              ty to                comments 0-18                 sneezingC ity o

f



              adverse                      00:00:               oughing, [place]



              reaction                      00                   sneezing Medica

l



              s                                                       Branch

 

       OAK    Drug   Active High   Other-Cmnt -                      Univer

s



       DERIVATI Class                       0-18                        ity of



       VES                                00:00:                      [place]



                                          00                          Medical



                                                                      Branch

 

       RAGWEED Drug   Active High   Other-Cmnt -                      Unive

rs



              Class                       0-18                        ity of



                                          00:00:                      [place]



                                          00                          Medical



                                                                      Branch

 

       [place]    Allergy Active High   Other  -                      [facility]



       DERIVATI                             0-18                        [facility]



       VES                                00:00:                      Medical



                                                                    Center

 

       RAGWEED Allergy Active High   Other  -                      [facility]



                                          0-18                        [facility]



                                          00:00:                      Medical



                                          [address] [place] [address]

 

       No Known DA     Active U             -                      [facility]



       Allergie                             0-08                        [place]



       s                                  00:00:                      d



                                          00                          [facility]

 

       No Known DA     Active U             2018                      [facility]



       Allergie                             0-08                        [place]



       s                                  00:00:                      d



                                          00                          [facility]

 

       codeine codeine Active                                           [facility]



                                                                      l



                                                                      [place]







Social History







           Social Habit Start Date Stop Date  Quantity   Comments   Source

 

           History [facility] o

f



           Alcohol Std Drinks                                             [place] 

Medical



                                                                  Branch

 

           History [facility] o

f



           Alcohol Binge                                             [place] [facility]

al



                                                                  Branch

 

           Exposure to                       Not sure              University of



           SARS-CoV-2 (event)                                             [facility]



                                                                  Branch

 

           History [facility] o

f



           Alcohol Comment                                             [place] [facility]

ical



                                                                  Branch

 

           Alcohol intake 2020 .71 /d                University

 of



                      00:00:00   00:00:00                         [facility]



                                                                  Branch

 

           Education  2020 17                    [facility]



                      00:00:00   00:00:00                         [place] [facility]



                                                                  Branch

 

           History SDOH 2020 5                     University o

f



           Financial  00:00:00   00:00:00                         [facility]

 

           History [facility] Food 2020 1                     Univers

ity of



           Worry      00:00:00   00:00:00                         [place] [facility]



                                                                  Branch

 

           History [facility] Food 2020 1                     Univers

ity of



           Scarcity   00:00:00   00:00:00                         [place] [facility]



                                                                  Branch

 

           History [facility] 2020 2                     University o

f



           Transport Med 00:00:00   00:00:00                         [place] [facility]

al



                                                                  Branch

 

           History [facility] 2020 2                     University o

f



           Transport Non-Med 00:00:00   00:00:00                         [facility]

 

           Social History 2020                       [facility]



                      19:21:28   19:21:28                         

 

           History [facility] 2019-10-23 2019-10-23 5                     University o

f



           Alcohol Frequency 00:00:00   00:00:00                         [facility]

 

           Tobacco Comment 2019-10-23 2019-10-23 prn 1  a week            Univer

sity of



                      00:00:00   00:00:00                         [facility]

 

           Tobacco use and 2019-10-23 2019-10-23 Never used            Universit

y of



           exposure   00:00:00   00:00:00                         [facility]

 

           Sex Assigned At 1960                       Universit

y of



           Birth      00:00:00   00:00:00                         [facility]









                Smoking Status  Start Date      Stop Date       Source

 

                Current some day 2019-10-23 00:00:00                 [facility] 

[facility] [place]



                smoker                                          [facility]

 

                [facility]

y [facility]

 

                Social History  2019 04:21:27 2019 04:21:27 [facility]







Medications







       Ordered Filled Start  Stop   Current Ordering Indication Dosage Frequency

 Signature

                    Comments            Components          Source



     Medication Medication Date Date Medication? Clinician                (SIG) 

          



     Name Name                                                   

 

     NaCl 0.9%      2022- No             500mL      at 999           Univ

ers



     (NS) bolus      -                          mL/hr, 500           it

y of



     infusion      09:15: 08:30                          mL, IV           [place]



     500 mL      00   :00                           Infusion,           Medical



                                                  ONCE, 1           Branch



                                                  dose, On           



                                                  22 at           



                                                  0315, STAT           

 

     dexmedeTOMI      2022- No             1ug/kg      49.9 mcg          

 Univers



     Dine                                (1 mcg/kg           ity of



     (PRECEDEX)      07:15: 06:45                          ?49.9 kg),           

[place]



     49.9 mcg in      00   :00                           IV             Medical



     NaCl 0.9%                                         Piggyback,           [facility]

ch



     (NS)                                         ONCE, 1           



     piggyback                                         dose, On           



                                                  22 at           



                                                  0115,           



                                                  Administer           



                                                  over 10           



                                                  Minutes,           



                                                  50 mL           

 

     ceFEPIme      2022- No             1000mg      1,000 mg,           U

nivers



     (MAXIPIME)                                IV             ity of



     injection      07:15: 06:17                          Piggyback,           T

exas



     1,000 mg      00   :00                           ONCE, 1           Medical



                                                  dose, On           Branch



                                                  22 at           



                                                  0115,           



                                                  STAT<br>Re           



                                                  ason for           



                                                  Anti-Infec           



                                                  tive:           



                                                  Empiric           



                                                  Therapy           



                                                  for            



                                                  Suspected           



                                                  Infection<           



                                                  br>Empiric           



                                                  Therapy           



                                                  Site:           



                                                  Blood<br>D           



                                                  uration of           



                                                  therapy:           



                                                  72 hours           

 

     Vancomycin      2022- No             15mg/kg      750 mg           U

nivers



     750 mg in                                (rounded           ity o

f



     NaCl 0.9%      07:15: 08:00                          from 748.5           T

exas



     (NS) 250 mL      00   :00                           mg = 15           Medic

al



     VIAL-MATE                                         mg/kg           Branch



                                                  ?49.9 kg),           



                                                  IV             



                                                  Piggyback,           



                                                  ONCE, 1           



                                                  dose, On           



                                                  22 at           



                                                  0115,           



                                                  Administer           



                                                  over 60           



                                                  Minutes,           



                                                  250            



                                                  mL<br>Reas           



                                                  on for           



                                                  Anti-Infec           



                                                  tive:           



                                                  Empiric           



                                                  Therapy           



                                                  for            



                                                  Suspected           



                                                  Infection<           



                                                  br>Empiric           



                                                  Therapy           



                                                  Site:           



                                                  Blood<br>D           



                                                  uration of           



                                                  therapy:           



                                                  72 hours           

 

     dexMEDEtomi            Yes            .2ug/kg      0.2-1.5           

Univers



     dine 400                           /h        mcg/kg/hr           ity of



     mcg in 0.9      07:08:                               ?49.9 kg           [place]

as



     % NaCl 100      06                                 (2.495-18.           Med

ical



     mL                                           7125           Branch



     (PRECEDEX)                                         mL/hr,           



     RTU IV                                         rounded to           



     infusion                                         2.5-18.71           



                                                  mL/hr), IV           



                                                  Infusion,           



                                                  TITRATE,           



                                                  Sedation-R           



                                                  ASS score           



                                                  (0 to -1),           



                                                  Starting           



                                                  on 22 at           



                                                  0108<br>In           



                                                  itiate           



                                                  infusion           



                                                  at 0.2           



                                                  mcg/kg/hr           



                                                  and            



                                                  titrate by           



                                                  0.1            



                                                  mcg/kg/hr           



                                                  every 30           



                                                  minutes to           



                                                  goal           



                                                  sedation           



                                                  score.           



                                                  Maximum           



                                                  dose = 1.5           



                                                  mcg/kg/hr.           



                                                  If goal           



                                                  not            



                                                  maintained           



                                                  at maximum           



                                                  allowed           



                                                  dose,           



                                                  contact           



                                                  prescriber           



                                                  .<br>           

 

     glycopyrrol      0 - No             .2mg      0.2 mg,           Un

neela



     ate                                 Slow IV           ity of



     (ROBINUL)      07:00: 06:14                          Push,           [place]



     injection      00   :00                           ONCE, 1           Medical



     0.2 mg                                         dose, On           Branch



                                                  Mon            



                                                  22 at           



                                                  0100, ASAP           

 

     propofoL IV            Yes            5ug/kg/      5-50           Uni

vers



     infusion      2-28                     min       mcg/kg/min           ity o

f



               04:25:                               ?49.9 kg           [place]



               00                                 (1.497-14.           Medical



                                                  97 mL/hr,           Branch



                                                  rounded to           



                                                  1.5-14.97           



                                                  mL/hr), IV           



                                                  Infusion,           



                                                  TITRATE,           



                                                  Sedation-R           



                                                  ASS score           



                                                  (0 to -1),           



                                                  Starting           



                                                  on Sun           



                                                  22 at           



                                                  2225<br>In           



                                                  itiate           



                                                  infusion           



                                                  at 5           



                                                  mcg/kg/min           



                                                  and            



                                                  titrate by           



                                                  5              



                                                  mcg/kg/min           



                                                  every 30           



                                                  seconds to           



                                                  10 minutes           



                                                  to goal           



                                                  sedation           



                                                  score.           



                                                  Maximum           



                                                  dose = 50           



                                                  mcg/kg/min           



                                                  . If goal           



                                                  not            



                                                  maintained           



                                                  at maximum           



                                                  allowed           



                                                  dose,           



                                                  contact           



                                                  prescriber           



                                                  .              



                                                  &nbsp;&nbs           



                                                  p;Tubing           



                                                  and unused           



                                                  portions           



                                                  of vials           



                                                  should be           



                                                  discarded           



                                                  after 12           



                                                  hours.<br>           

 

     methocarbam            Yes       [id number] 500mg      Take 1         

  [facility]



     oL 500 mg      5-21                               tablet by           ity o

f



     tablet      00:00:                               mouth           [place]



               00                                 every 6           Medical



                                                  (six)           [facility]



                                                  hours as           



                                                  needed for           



                                                  Pain           



                                                  (scale           



                                                  4-6) for           



                                                  up to 15           



                                                  doses.           

 

     traMADoL 50            Yes       4647 50mg      Take 1           [facility]

ers



     mg tablet      5-21                               tablet by           ity o

f



               00:00:                               mouth           [place]



                                                every 8           Medical



                                                  (eight)           [facility]



                                                  hours as           



                                                  needed for           



                                                  Pain           



                                                  (scale           



                                                  7-10) for           



                                                  up to 15           



                                                  doses.           



                                                  Indication           



                                                  s: acute           



                                                  pain           

 

     Levetiracet            Yes                      1,500 mg =           

[facility]



     am 750 MG      4-29                               2 tab, PO,           l



     Oral Tablet      19:26:                               BID, # 360           

[facility]



     [Keppra]      00                                 tab, 3           



                                                  Refill(s),           



                                                  Pharmacy:           



                                                  [facility]           



                                                  [facility]           



                                                  #[id number],           



                                                  154.94,           



                                                  cm,            



                                                  10/23/20           



                                                  10:48:00           



                                                  CDT,           



                                                  Height,           



                                                  39.545,           



                                                  kg,            



                                                  21           



                                                  14:07:00           



                                                  CDT,           



                                                  Weight           

 

     phenytoin      -0      Yes                      See            [facility]



     100 mg oral      4-29                               Instructio           l



     capsule,      19:26:                               ns, 300mg           [facility]

biju



     extended      00                                 alternatin           



     release                                         g with           



                                                  400mg, #           



                                                  120 cap, 6           



                                                  Refill(s),           



                                                  Pharmacy:           



                                                  [facility]           



                                                  DRUG STORE           



                                                  #[id number],           



                                                  154.94,           



                                                  cm,            



                                                  10/23/20           



                                                  10:48:00           



                                                  CDT,           



                                                  Height,           



                                                  39.545,           



                                                  kg,            



                                                  21           



                                                  14:07:00           



                                                  CDT,           



                                                  Weight           

 

     Levetiracet      -0      Yes                      1,500 mg =           

[facility]



     am 750 MG      429                               2 tab, PO,           l



     Oral Tablet      19:26:                               BID, # 360           

[place]



     [Keppra]      00                                 tab, 3           



                                                  Refill(s),           



                                                  Pharmacy:           



                                                  [facility]           



                                                  [facility]           



                                                  #[id number],           



                                                  154.94,           



                                                  cm,            



                                                  10/23/20           



                                                  10:48:00           



                                                  CDT,           



                                                  Height,           



                                                  39.545,           



                                                  kg,            



                                                  21           



                                                  14:07:00           



                                                  CDT,           



                                                  Weight           

 

     phenytoin      -0      Yes                      See            [facility]



     100 mg oral      4-29                               Instructio           l



     capsule,      19:26:                               ns, 300mg           [facility]

biju



     extended      00                                 alternatin           



     release                                         g with           



                                                  400mg, #           



                                                  120 cap, 6           



                                                  Refill(s),           



                                                  Pharmacy:           



                                                  [facility]           



                                                  #[id number],           



                                                  154.94,           



                                                  cm,            



                                                  10/23/20           



                                                  10:48:00           



                                                  CDT,           



                                                  Height,           



                                                  39.545,           



                                                  kg,            



                                                  21           



                                                  14:07:00           



                                                  CDT,           



                                                  Weight           

 

     naproxen      -0 - No             500mg      500 mg,           Univ

ers



     (NAPROSYN)      1-10 01-10                          Oral,           ity of



     tablet 500      23:00: 22:19                          ONCE, 1           [place]

as



     mg        00   :00                           dose, [place]           [facility]



                                                  1/10/21 at           Br[facility]



                                                  1700,           



                                                  Routine           

 

     naproxen      -0      Yes       [id number] 500mg      Take 1           U

nivers



     (NAPROSYN)      1-10                               tablet by           ity 

of



     500 mg      00:00:                               mouth 2           [place]



     tablet      00                                 (two)           Medical



                                                  times           [facility]



                                                  daily with           



                                                  meals.           

 

     naproxen      -0      Yes       [id number] 500mg      Take 1           U

nivers



     (NAPROSYN)      1-10                               tablet by           ity 

of



     500 mg      00:00:                               mouth 2           [place]



     tablet      00                                 (two)           Medical



                                                  times           [facility]



                                                  daily with           



                                                  meals.           

 

     iohexol      2020 No             100mL      100 mL,           Unive

rs



     (OMNIPAQUE      2                          Intravenou           it

y of



     350       00:15: 00:11                          s, ONCE, 1           [place]



     BULK-100      00   :00                           dose, Mon           Medica

l



     mL)                                          20           [facility]



     injection                                         at 1815,           



     100 mL                                         Routine           

 

     atorvastati      2020      Yes            40mg      40 mg,           Univ

ers



     n (LIPITOR)      03                               Oral, QHS,           it

y of



     tablet 40      03:00:                               First dose           [place]

[place]



     mg        00                                 on [facility]



                                                  20 at           [facility]



                                                  2100,           



                                                  Until           



                                                  Discontinu           



                                                  ed,            



                                                  Routine           

 

     cefTRIAXone      2020- No             1000mg      1,000 mg,         

  [facility]



     (ROCEPHIN)                                IV             ity of



     1,000 mg in      00:00: 23:59                          Piggyback,          

 [place]



     NaCl 0.9%      00   :00                           Q24H ABX,           Medic

al



     (NS) 50 mL                                         6 doses,           [facility]

h



     MINI-BAG                                         First dose           



                                                  (after           



                                                  last           



                                                  modificati           



                                                  on) on Mon           



                                                  20 at           



                                                  1800, Last           



                                                  dose on           



                                                  Sat            



                                                  20 at           



                                                  1800, 50           



                                                  mL<br>Reas           



                                                  on for           



                                                  Anti-Infec           



                                                  tive:           



                                                  Empiric           



                                                  Therapy           



                                                  for            



                                                  Suspected           



                                                  Infection<           



                                                  br>Empiric           



                                                  Therapy           



                                                  Site:           



                                                  Urine<br>D           



                                                  uration of           



                                                  therapy:           



                                                  72 hours           

 

     aspirin 81      2020      Yes            81mg      Take 81 mg           U

nivers



     mg EC      02                               by mouth.           ity of



     tablet      23:07:                                              [address]

 

     azelastine      2020      Yes            1{spray      Use 1           Uni

vers



     137 mcg      1-02                     }         Spray in           ity of



     (0.1 %)      23:07:                               each           [place]



     nasal spray      05                                 nostril.           [facility]

 

     budesonide-      2020      Yes            2{puff}      Inhale 2          

 [facility]



     formoterol      1-02                               Puffs.           ity of



     (SYMBICORT)      23:07:                                              [place]



     160-4.5      05                                                Medical



     mcg/actuati                                                        [place]



     on inhaler                                                        

 

     carvedilol      2020      Yes            3.125mg      Take 3.125         

  [facility]



     3.125 mg      1-02                               mg by           ity of



     tablet      23:07:                               mouth.           [address]

 

     melatonin      2020      Yes                      Take  by           Univ

ers



     10 mg Cap      1-02                               mouth.           ity of



               23:07:                                              [address]

 

     ticagrelor      2020      Yes            90mg      Take 90 mg           U

nivers



     (BRILINTA)      02                               by mouth.           ity 

of



     90 mg      23:07:                                              [place]



     tablet      05                                                Medical



                                                                 Br[facility]

 

     cyanocobala      2020      Yes            2500ug      Place           Uni

vers



     min,      -02                               2,500 mcg           ity of



     vitamin      23:07:                               under the           [place]



     B-12, 5,000      05                                 tongue           Medica

l



     mcg Subl                                         daily.           Branch

 

     ipratropium      2020      Yes            2{spray      Use 2           Un

neela



     0.03 %      02                     }         Sprays in           ity of



     nasal spray      23:07:                               each           [place]



               05                                 nostril 2           Medical



                                                  (two)           [facility]



                                                  times           



                                                  daily.           

 

     losartan 50      2020      Yes            50mg      Take 50 mg           

[facility]



     mg tablet      02                               by mouth           ity of



               23:07:                               daily.           [address]

 

     Lacosamide      2020      Yes            1{tbl}      Take 1           Uni

vers



     (VIMPAT)                                     tablet by           ity of



     200 mg      23:07:                               mouth 2           [place]



     tablet                                       (two)           Medical



                                                  times           [facility]



                                                  daily.           

 

     montelukast      2020      Yes            10mg      Take 10 mg           

[facility]



     (SINGULAIR)      02                               by mouth           ity 

of



     10 mg      23:07:                               daily.           [address]

 

     phenytoin      2020      Yes            100mg      Take 100           Uni

vers



     sodium      1-02                               mg by           ity of



     extended      23:07:                               mouth 4           [place]



     (DILANTIN      05                                 (four)           Medical



     ORAL)                                         times           Branch



                                                  daily.           

 

     aspirin 81      2020      Yes            81mg      Take 81 mg           U

nivers



     mg EC      02                               by mouth.           ity of



     tablet      23:07:                                              [address]

 

     azelastine      2020      Yes            1{spray      Use 1           Uni

vers



     137 mcg                           }         Spray in           ity of



     (0.1 %)      23:07:                               each           [place]



     nasal spray      05                                 nostril.           Medi

alfonzo



                                                                 Branch

 

     budesonide-      2020      Yes            2{puff}      Inhale 2          

 [facility]



     formoterol      1-02                               Puffs.           ity of



     (SYMBICORT)      23:07:                                              [place]



     160-4.5                                                      Medical



     mcg/actuati                                                        [facility]



     on inhaler                                                        

 

     carvedilol      2020      Yes            3.125mg      Take 3.125         

  [facility]



     3.125 mg      1-02                               mg by           ity of



     tablet      23:07:                               mouth.           [address]

 

     melatonin      2020      Yes                      Take  by           Univ

ers



     10 mg Cap      1-02                               mouth.           ity of



               23:07:                                              [address]

 

     ticagrelor      2020      Yes            90mg      Take 90 mg           U

nivers



     (BRILINTA)      02                               by mouth.           ity 

of



     90 mg      23:07:                                              [place]



     tablet      05                                                Medical



                                                                 Branch

 

     cyanocobala      2020      Yes            2500ug      Place           Uni

vers



     min,      -02                               2,500 mcg           ity of



     vitamin      23:07:                               under the           [place]



     B-12, 5,000      05                                 tongue           Medica

l



     mcg Subl                                         daily.           Branch

 

     ipratropium      2020      Yes            2{spray      Use 2           Un

neela



     0.03 %                           }         Sprays in           ity of



     nasal spray      23:07:                               each           [place]



               05                                 nostril 2           Medical



                                                  (two)           [facility]



                                                  times           



                                                  daily.           

 

     losartan 50      2020      Yes            50mg      Take 50 mg           

[facility]



     mg tablet      02                               by mouth           ity of



               23:07:                               daily.           [address]

 

     Lacosamide      2020      Yes            1{tbl}      Take 1           Uni

vers



     (VIMPAT)                                     tablet by           ity of



     200 mg      23:07:                               mouth 2           [place]



     tablet                                       (two)           Medical



                                                  times           [facility]anch



                                                  daily.           

 

     montelukast      2020      Yes            10mg      Take 10 mg           

[facility]



     (SINGULAIR)      02                               by mouth           ity 

of



     10 mg      23:07:                               daily.           [place]



     tablet      [address] [place] [address]

 

     phenytoin      2020      Yes            100mg      Take 100           Uni

vers



     sodium      1-02                               mg by           ity of



     extended      23:07:                               mouth 4           [place]



     (DILANTIN      05                                 (four)           Medical



     ORAL)                                         times           Branch



                                                  daily.           

 

     aspirin 81      2020      Yes            81mg      Take 81 mg           U

nivers



     mg EC      02                               by mouth.           ity of



     tablet      23:07:                                              [address]

 

     azelastine      2020      Yes            1{spray      Use 1           [facility]

vers



     137 mcg      02                     }         Spray in           ity of



     (0.1 %)      23:07:                               each           [place]



     nasal spray      05                                 nostril.           Medi

alfonzo



                                                                 Branch

 

     budesonide-      2020      Yes            2{puff}      Inhale 2          

 [facility]



     formoterol      -02                               Puffs.           ity of



     (SYMBICORT)      23:07:                                              [place]



     160-4.5                                                      Medical



     mcg/actuati                                                        Branch



     on inhaler                                                        

 

     carvedilol      2020      Yes            3.125mg      Take 3.125         

  [facility]



     3.125 mg      1-02                               mg by           ity of



     tablet      23:07:                               mouth.           [address]

 

     melatonin      2020      Yes                      Take  by           Univ

ers



     10 mg Cap      1-02                               mouth.           ity of



               23:07:                                              [address]

 

     ticagrelor      2020      Yes            90mg      Take 90 mg           U

nivers



     (BRILINTA)      -02                               by mouth.           ity 

of



     90 mg      23:07:                                              [place]



     tablet      [address] [place] [address]

 

     cyanocobala      2020      Yes            2500ug      Place           Uni

vers



     min,      -02                               2,500 mcg           ity of



     vitamin      23:07:                               under the           [place]



     B-12, 5,000      05                                 tongue           Medica

l



     mcg Subl                                         daily.           Branch

 

     ipratropium      2020      Yes            2{spray      Use 2           Un

neela



     0.03 %      02                     }         Sprays in           ity of



     nasal spray      23:07:                               each           [place]



               05                                 nostril 2           Medical



                                                  (two)           Branch



                                                  times           



                                                  daily.           

 

     losartan 50      2020      Yes            50mg      Take 50 mg           

[facility]



     mg tablet      02                               by mouth           ity of



               23:07:                               daily.           [address]

 

     Lacosamide      2020      Yes            1{tbl}      Take 1           Uni

vers



     (VIMPAT)      1-02                               tablet by           ity of



     200 mg      23:07:                               mouth 2           [place]



     tablet                                       (two)           Medical



                                                  times           Branch



                                                  daily.           

 

     montelukast      2020      Yes            10mg      Take 10 mg           

[facility]



     (SINGULAIR)      02                               by mouth           ity 

of



     10 mg      23:07:                               daily.           [address]

 

     phenytoin      2020      Yes            100mg      Take 100           Uni

vers



     sodium      1-02                               mg by           ity of



     extended      23:07:                               mouth 4           [place]



     (DILANTIN      05                                 (four)           Medical



     ORAL)                                         times           Branch



                                                  daily.           

 

     aspirin 81      2020      Yes            81mg      Take 81 mg           U

nivers



     mg EC      02                               by mouth.           ity of



     tablet      23:07:                                              [address]

 

     azelastine      2020      Yes            1{spray      Use 1           Uni

vers



     137 mcg      -02                     }         Spray in           ity of



     (0.1 %)      23:07:                               each           [place]



     nasal spray      05                                 nostril.           Medi

alfonzo



                                                                 Branch

 

     budesonide-      2020      Yes            2{puff}      Inhale 2          

 [facility]



     formoterol      1-02                               Puffs.           ity of



     (SYMBICORT)      23:07:                                              [place]



     160-4.5      05                                                Medical



     mcg/actuati                                                        Branch



     on inhaler                                                        

 

     carvedilol      2020      Yes            3.125mg      Take 3.125         

  [facility]



     3.125 mg      1-02                               mg by           ity of



     tablet      23:07:                               mouth.           [address]

 

     melatonin      2020      Yes                      Take  by           Univ

ers



     10 mg Cap      02                               mouth.           ity of



               23:07:                                              [address]



                                                                 Branch

 

     ticagrelor      2020      Yes            90mg      Take 90 mg           U

nivers



     (BRILINTA)      02                               by mouth.           ity 

of



     90 mg      23:07:                                              [place]



     tablet                                                      Medical



                                                                 Branch

 

     cyanocobala      2020      Yes            2500ug      Place           Uni

vers



     min,      -02                               2,500 mcg           ity of



     vitamin      23:07:                               under the           [place]



     B-12, 5,000      05                                 tongue           Medica

l



     mcg Subl                                         daily.           Branch

 

     ipratropium      2020      Yes            2{spray      Use 2           Un

neela



     0.03 %                           }         Sprays in           ity of



     nasal spray      23:07:                               each           [place]



               05                                 nostril 2           Medical



                                                  (two)           Branch



                                                  times           



                                                  daily.           

 

     losartan 50      2020      Yes            50mg      Take 50 mg           

[facility]



     mg tablet      02                               by mouth           ity of



               23:07:                               daily.           [address]



                                                                 Branch

 

     Lacosamide      2020      Yes            1{tbl}      Take 1           Uni

vers



     (VIMPAT)                                     tablet by           ity of



     200 mg      23:07:                               mouth 2           [place]



     tablet      05                                 (two)           Medical



                                                  times           Branch



                                                  daily.           

 

     montelukast      2020      Yes            10mg      Take 10 mg           

[facility]



     (SINGULAIR)      02                               by mouth           ity 

of



     10 mg      23:07:                               daily.           [place]



     tablet      05                                                Medical



                                                                 Branch

 

     phenytoin      2020      Yes            100mg      Take 100           Uni

vers



     sodium      1-02                               mg by           ity of



     extended      23:07:                               mouth 4           [place]



     (DILANTIN      05                                 (four)           Medical



     ORAL)                                         times           Branch



                                                  daily.           

 

     ibuprofen      2020 2020- No             200mg      Take 200           Un

neela



     200 mg       11-02                          mg by           ity of



     tablet      19:13: 00:00                          mouth.           [place]



               22   :00                                          Medical



                                                                 Branch

 

     aspirin 81      2020      Yes            81mg      Take 81 mg           U

nivers



     mg EC                                     by mouth.           ity of



     tablet      17:07:                                              [address]

 

     azelastine      2020      Yes            1{spray      Use 1           Uni

vers



     137 mcg      02                     }         Spray in           ity of



     (0.1 %)      17:07:                               each           [place]



     nasal spray      05                                 nostril.           Medi

alfonzo



                                                                 Branch

 

     budesonide-      2020      Yes            2{puff}      Inhale 2          

 [facility]



     formoterol      -02                               Puffs.           ity of



     (SYMBICORT)      17:07:                                              [place]



     160-4.5      05                                                Medical



     mcg/actuati                                                        Branch



     on inhaler                                                        

 

     carvedilol      2020      Yes            3.125mg      Take 3.125         

  [facility]



     3.125 mg      1-02                               mg by           ity of



     tablet      17:07:                               mouth.           [address]

 

     melatonin      2020      Yes                      Take  by           Univ

ers



     10 mg Cap      02                               mouth.           ity of



               17:07:                                              [address]

 

     ticagrelor      2020      Yes            90mg      Take 90 mg           U

nivers



     (BRILINTA)                                     by mouth.           ity 

of



     90 mg      17:07:                                              [place]



     tablet      [address] [place] [address]

 

     cyanocobala      2020      Yes            2500ug      Place           Uni

vers



     min,                                     2,500 mcg           ity of



     vitamin      17:07:                               under the           [place]



     B-12, 5,000      05                                 tongue           Medica

l



     mcg Subl                                         daily.           Branch

 

     ipratropium      2020      Yes            2{spray      Use 2           Un

neela



     0.03 %                           }         Sprays in           ity of



     nasal spray      17:07:                               each           [place]



               05                                 nostril 2           Medical



                                                  (two)           Branch



                                                  times           



                                                  daily.           

 

     losartan 50      2020      Yes            50mg      Take 50 mg           

[facility]



     mg tablet                                     by mouth           ity of



               17:07:                               daily.           [address]

 

     Lacosamide      2020      Yes            1{tbl}      Take 1           Uni

vers



     (VIMPAT)                                     tablet by           ity of



     200 mg      17:07:                               mouth 2           [place]



     tablet                                       (two)           Medical



                                                  times           Branch



                                                  daily.           

 

     montelukast      2020      Yes            10mg      Take 10 mg           

[facility]



     (SINGULAIR)      02                               by mouth           ity 

of



     10 mg      17:07:                               daily.           [address]

 

     phenytoin      2020      Yes            100mg      Take 100           Uni

vers



     sodium      1-02                               mg by           ity of



     extended      17:07:                               mouth 4           [place]



     (DILANTIN      05                                 (four)           Medical



     ORAL)                                         times           Branch



                                                  daily.           

 

     ticagrelor      2020      Yes            90mg      90 mg,           Unive

rs



     (BRILINTA)                                     Oral,           ity of



     tablet 90      15:00:                               DAILY,           [place]



     mg        00                                 First dose           Medical



                                                  on Mon           Branch



                                                  20 at           



                                                  0900,           



                                                  Until           



                                                  Discontinu           



                                                  ed,            



                                                  Routine           

 

     losartan      2020      Yes            50mg      50 mg,           [facility]



     (COZAAR)                                     Oral,           ity of



     tablet 50      15:00:                               DAILY,           [place]



     mg        00                                 First dose           Medical



                                                  on Mon           [place]



                                                  20 at           



                                                  0900,           



                                                  Until           



                                                  Discontinu           



                                                  ed,            



                                                  Routine           

 

     montelukast      2020      Yes            10mg      10 mg,           Univ

ers



     (SINGULAIR)      02                               Oral,           ity of



     tablet 10      15:00:                               DAILY,           [place]



     mg        00                                 First dose           Medical



                                                  on 20 at           



                                                  0900,           



                                                  Until           



                                                  Discontinu           



                                                  ed,            



                                                  Routine           

 

     aspirin EC      2020      Yes            81mg      81 mg,           Unive

rs



     tablet 81      02                               Oral,           ity of



     mg        15:00:                               DAILY,           [place]



               00                                 First dose           Medical



                                                  on 20 at           



                                                  0900,           



                                                  Until           



                                                  Discontinu           



                                                  ed,            



                                                  Routine           

 

     thiamine      2020      Yes            100mg      100 mg,           Unive

rs



     (VITAMIN      02                               Oral,           ity of



     B1) tablet      15:00:                               DAILY,           [place]



     100 mg      00                                 First dose           Medical



                                                  on 20 at           



                                                  0900,           



                                                  Until           



                                                  Discontinu           



                                                  ed,            



                                                  Routine           

 

     foLIC acid      2020      Yes            1mg       1 mg,           Univer

s



     (FOLATE)                                     Oral,           ity of



     tablet 1 mg      15:00:                               [place],           [place]

[place]



               00                                 First dose           Medical



                                                  on Mon           20 at           



                                                  0900,           



                                                  Until           



                                                  Discontinu           



                                                  ed,            



                                                  Routine           

 

     pantoprazol      2020      Yes            40mg      40 mg,           Univ

ers



     e                                        Oral,           ity of



     (PROTONIX)      15:00:                               DAILY,           [place]



     EC tablet      00                                 First dose           Medi

alfonzo



     40 mg                                         on 20 at           



                                                  0900,           



                                                  Until           



                                                  Discontinu           



                                                  ed,            



                                                  Routine           

 

     levETIRAcet      2020      Yes            2000mg      2,000 mg,          

 [facility]



     am (KEPPRA)                                     Oral, BID,           it

y of



     tablet      14:00:                               First dose           [place]



     2,000 mg      00                                 (after           Medical



                                                  last           Branch



                                                  modificati           



                                                  on) on 20 at           



                                                  0800,           



                                                  Until           



                                                  Discontinu           



                                                  ed,            



                                                  Routine           

 

     phenytoin      2020      Yes            100mg      100 mg,           Univ

ers



     Extended      02                               Oral, QID,           ity o

f



     (DILANTIN      14:00:                               First dose           [place]

[place]



     KAPSEAL)      00                                 on Mon           Medical



     capsule 100                                         20 at           Br

anch



     mg                                           0800,           



                                                  Until           



                                                  Discontinu           



                                                  ed             

 

     carvediloL      2020      Yes            3.125mg      3.125 mg,          

 [facility]



     (COREG)                                     Oral, BID           ity of



     tablet      14:00:                               MEALS,           [place]



     3.125 mg      00                                 First dose           Medic

al



                                                  on 20 at           



                                                  0800,           



                                                  Until           



                                                  Discontinu           



                                                  ed,            



                                                  Routine           

 

     enoxaparin      2020      Yes            30mg      30 mg,           Unive

rs



     (LOVENOX)                                     Subcutaneo           ity 

of



     injection      04:30:                               us, Q24H,           [place]

as



     30 mg      00                                 First dose           Medical



                                                  on [facility]



                                                  20 at           



                                                  2230,           



                                                  Until           



                                                  Discontinu           



                                                  ed,            



                                                  Routine           

 

     budesonide-      2020      Yes            2{puff}      2 Puff,           

[facility]



     formoteroL                                     Inhalation           ity

 of



     (SYMBICORT)      04:15:                               , BID,           [place]

[place]



     160-4.5      00                                 First dose           Medica

l



     mcg/actuati                                         on [facility]



     on inhaler                                         20 at           



     2 Puff                                         2215,           



                                                  Until           



                                                  Discontinu           



                                                  ed,            



                                                  Routine           

 

     clonazePAM      2020- No             .5mg      Take 0.5           Un

neela



     0.5 mg                                mg by           ity of



     tablet      04:01: 00:00                          mouth.           [place]



               50   :00                                          [facility]

 

     lisinopril      2020- No             5mg       Take 5 mg           U

nivers



     5 mg tablet                                by mouth.           it

y of



               04:01: 00:00                                         [place]



               50   :00                                          Medical



                                                                 Branch

 

     furosemide      2020- No             20mg      Take 20 mg           

[facility]



     20 mg                                by mouth           ity of



     tablet      04:01: 00:00                          daily.           [place]



               50   :00                                          [facility] [facility]

 

     oxazepam      2020      Yes            15mg      15 mg,           [facility]



     (SERAX)                                     Oral,           ity of



     capsule 15      03:35:                               Q4HPRN,           [place]

s



     mg        47                                 Starting           [facility]



                                                  20 at           



                                                  2135,           



                                                  Until           



                                                  Discontinu           



                                                  ed,            



                                                  Routine,           



                                                  Only while           



                                                  awake for           



                                                  DBP equal           



                                                  to or           



                                                  greater           



                                                  than 100,           



                                                  HR equal           



                                                  to or           



                                                  greater           



                                                  than 100.           

 

     acetaminoph      2020      Yes            650mg      650 mg,           Un

neela



     en                                       Oral,           ity of



     (TYLENOL)      03:07:                               Q6HPRN,           [place]



     tablet 650      17                                 Starting           Medic

al



     mg                                           [facility]



                                                  20 at           



                                                  2107,           



                                                  Until           



                                                  Discontinu           



                                                  ed,            



                                                  Routine,           



                                                  Pain           



                                                  (scale           



                                                  4-6)           

 

     docusate      2020      Yes            100mg      100 mg,           [facility]

rs



     (COLACE)                                     Oral,           ity of



     capsule 100      03:07:                               QDAILYPRN,           

[place]



     mg        17                                 Starting           Medical



                                                  [facility]



                                                  20 at           



                                                  2107,           



                                                  Until           



                                                  Discontinu           



                                                  ed,            



                                                  Routine,           



                                                  Constipati           



                                                  on             

 

     levETIRAcet      2020- No        [id number] 1500mg      Take 2        

   [facility]



     am 750 mg                                tablets by           ity

 of



     tablet      00:00: 05:59                          mouth           [place]



               00   :00                           every           Medical



                                                  morning           [facility]



                                                  for 90           



                                                  days.           

 

     levETIRAcet      2020- No        [id number] 2000mg      Take 2        

   [facility]



     am 1,000 mg                                tablets by           i

ty of



     tablet      00:00: 05:59                          mouth at           [place]



               00   :00                           bedtime           Medical



                                                  for 90           [facility]



                                                  days.           

 

     levETIRAcet      2020- No        [id number] 1500mg      Take 2        

   [facility]



     am 750 mg                                tablets by           ity

 of



     tablet      00:00: 05:59                          mouth           [place]



               00   :00                           every           Medical



                                                  morning           [facility]



                                                  for 90           



                                                  days.           

 

     levETIRAcet      2020- No        [id number] 2000mg      Take 2        

   [facility]



     am 1,000 mg                                tablets by           i

ty of



     tablet      00:00: 05:59                          mouth at           [place]



               00   :00                           bedtime           Medical



                                                  for 90           [facility]



                                                  days.           

 

     levETIRAcet      2020- No        [id number] 1500mg      Take 2        

   [facility]



     am 750 mg                                tablets by           ity

 of



     tablet      00:00: 05:59                          mouth           [place]



               00   :00                           every           Medical



                                                  morning           [facility]



                                                  for 90           



                                                  days.           

 

     levETIRAcet      2020- No        [id number] 2000mg      Take 2        

   [facility]



     am 1,000 mg                                tablets by           i

ty of



     tablet      00:00: 05:59                          mouth at           [place]



               00   :00                           bedtime           Medical



                                                  for 90           [facility]



                                                  days.           

 

     levETIRAcet      2020- No        [id number] 1500mg      Take 2        

   [facility]



     am 750 mg                                tablets by           ity

 of



     tablet      00:00: 05:59                          mouth           [place]



               00   :00                           every           Medical



                                                  morning           [facility]



                                                  for 90           



                                                  days.           

 

     levETIRAcet      2020- No        [id number] 2000mg      Take 2        

   [facility]



     am 1,000 mg                                tablets by           i

ty of



     tablet      00:00: 05:59                          mouth at           [place]



               00   :00                           bedtime           Medical



                                                  for 90           [facility]



                                                  days.           

 

     cefdinir      2020 No        [id number] 300mg      Take 1           U

[facility]



     300 mg                                capsule by           ity of



     capsule      00:00: 05:59                          mouth 2           [place]



               00   :00                           (two)           Medical



                                                  times           [facility]



                                                  daily for           



                                                  5 days.           

 

     cefTRIAXone      2020- No             1000mg      1,000 mg,         

  [facility]



     (ROCEPHIN)                                IV             ity of



     1,000 mg in      00:00: 23:32                          [place], [place]



     NaCl 0.9%      00   :00                           ONCE, 1           Medical



     (NS) 50 mL                                         dose, Sun           [facility]

ch



     MINI-BAG                                         20 at           



                                                  1800, 50           



                                                  mL<br>Reas           



                                                  on for           



                                                  Anti-Infec           



                                                  tive:           



                                                  Empiric           



                                                  Therapy           



                                                  for            



                                                  Suspected           



                                                  Infection<           



                                                  br>Empiric           



                                                  Therapy           



                                                  Site:           



                                                  Urine<br>D           



                                                  uration of           



                                                  therapy:           



                                                  72 hours           

 

     levETIRAcet      2020- No             1000mg      1,000 mg,         

  [facility]



     am (KEPPRA)                                IV             ity of



     in NACL      22:15: 21:32                          Infusion,           [place]

s



     (ISO-OS)      00   :00                           ONCE, 1           Medical



     1,000                                         dose, Sun           [facility]



     mg/100 mL                                         20 at           



     RTU                                          1615, 100           



                                                  mL             

 

     NaCl 0.9%      2020- No             1000mL      at 999           [facility]

vers



     (NS) bolus                                mL/hr,           ity of



     infusion      21:15: 22:21                          1,000 mL,           [place]

as



     1,000 mL      00   :00                           IV             Medical



                                                  Infusion,           [facility]



                                                  ONCE, 1           



                                                  dose, Sun           



                                                  20 at           



                                                  1515, ASAP           

 

     phenytoin      2020      Yes                      See            [facility]



     100 mg oral      0-23                               Instructio           l



     capsule,      16:05:                               ns, 300mg           [facility]

biju



     extended      00                                 alternatin           



     release                                         g with           



                                                  400mg, #           



                                                  120 cap, 6           



                                                  Refill(s),           



                                                  Pharmacy:           



                                                  [facility]           



                                                  [facility]           



                                                  #[id number],           



                                                  154.94,           



                                                  cm,            



                                                  10/23/20           



                                                  10:48:00           



                                                  CDT,           



                                                  Height,           



                                                  44.091,           



                                                  kg,            



                                                  10/23/20           



                                                  10:48:00           



                                                  CDT,           



                                                  Weight           

 

     lacosamide      2020      Yes                      200 mg = 1           M

[facility]



     200 MG Oral      0-23                               tab, PO,           l



     Tablet      16:05:                               BID, # 60           [facility]

n



     [Vimpat]      00                                 tab, 4           



                                                  Refill(s),           



                                                  Pharmacy:           



                                                  [facility]           



                                                  [facility]           



                                                  #[id number],           



                                                  154.94,           



                                                  cm,            



                                                  10/23/20           



                                                  10:48:00           



                                                  CDT,           



                                                  Height,           



                                                  44.091,           



                                                  kg,            



                                                  10/23/20           



                                                  10:48:00           



                                                  CDT,           



                                                  Weight           

 

     phenytoin      -1      Yes                      See            [facility]



     100 mg oral      0-23                               Instructio           l



     capsule,      16:05:                               ns, 300mg           [facility]

biju



     extended      00                                 alternatin           



     release                                         g with           



                                                  400mg, #           



                                                  120 cap, 6           



                                                  Refill(s),           



                                                  Pharmacy:           



                                                  [facility]           



                                                  DR[facility] STORE           



                                                  #[id number],           



                                                  154.94,           



                                                  cm,            



                                                  10/23/20           



                                                  10:48:00           



                                                  CDT,           



                                                  Height,           



                                                  44.091,           



                                                  kg,            



                                                  10/23/20           



                                                  10:48:00           



                                                  CDT,           



                                                  Weight           

 

     lacosamide      -1      Yes                      200 mg = 1           M

[facility]



     200 MG Oral      0-23                               tab, PO,           l



     Tablet      16:05:                               BID, # 60           [facility]

n



     [Vimpat]      00                                 tab, 4           



                                                  Refill(s),           



                                                  Pharmacy:           



                                                  [facility]           



                                                  [facility]ORE           



                                                  #[id number],           



                                                  154.94,           



                                                  cm,            



                                                  10/23/20           



                                                  10:48:00           



                                                  CDT,           



                                                  Height,           



                                                  44.091,           



                                                  kg,            



                                                  10/23/20           



                                                  10:48:00           



                                                  CDT,           



                                                  Weight           

 

     lacosamide      2020-0      Yes                      100 mg = 1           M

[facility]



     100 MG Oral      5-26                               tab, PO,           l



     Tablet      21:08:                               BID, # 60           [facility]

n



     [Vimpat]      00                                 tab, 2           



                                                  Refill(s),           



                                                  Pharmacy:           



                                                  [facility]           



                                                  [facility]ORE           



                                                  #[id number]           

 

     lacosamide      2020-0      Yes                      100 mg = 1           M

[facility]



     100 MG Oral      5-26                               tab, PO,           l



     Tablet      21:08:                               BID, # 60           [facility]

n



     [Vimpat]      00                                 tab, 2           



                                                  Refill(s),           



                                                  Pharmacy:           



                                                  [facility]           



                                                  [facility]ORE           



                                                  #[id number]           

 

     lacosamide      2020-0      Yes                      50 mg = 1           Me

moria



     50 MG Oral      4-20                               tab, PO,           l



     Tablet      22:01:                               BID, # 60           [facility]

n



     [Vimpat]      00                                 tab, 2           



                                                  Refill(s),           



                                                  Pharmacy:           



                                                  [facility]           



                                                  [facility]ORE           



                                                  #[id number]           

 

     lacosamide      2020-0      Yes                      50 mg = 1           Me

moria



     50 MG Oral      4-20                               tab, PO,           l



     Tablet      22:01:                               BID, # 60           [facility]

n



     [Vimpat]      00                                 tab, 2           



                                                  Refill(s),           



                                                  Pharmacy:           



                                                  [facility]           



                                                  [facility]ORE           



                                                  #[id number]           

 

     phenytoin      2020-0 2020- No             18mg/kg      898 mg           Un

[facility]ela



     (DILANTIN)      4-20 04-20                          (rounded           ity 

of



     injection      01:00: 01:17                          from 898.2           [facility]

[place]



     898 mg      00   :00                           mg = 18           Medical



                                                  mg/kg           [facility]



                                                  ?49.9 kg),           



                                                  IV             



                                                  Piggyback,           



                                                  Administer           



                                                  over 20           



                                                  Minutes,           



                                                  ONCE, 1           



                                                  dose, Sun           



                                                  20 at           



                                                  2000, STAT           

 

     Levetiracet      2020-0      Yes                      1,500 mg =           

[facility]



     am 750 MG      4-03                               2 tab, PO,           l



     Oral Tablet      20:20:                               BID, # 360           

[place]



     [Keppra]      00                                 tab, 3           



                                                  Refill(s),           



                                                  Pharmacy:           



                                                  [facility]           



                                                  [facility]           



                                                  #[id number]           

 

     Levetiracet      2020-0      Yes                      1,500 mg =           

[facility]



     am 750 MG      4-03                               2 tab, PO,           l



     Oral Tablet      20:20:                               BID, # 360           

[place]



     [Keppra]      00                                 tab, 3           



                                                  Refill(s),           



                                                  Pharmacy:           



                                                  [facility]           



                                                  #[id number]           

 

     lamotrigine      2020-0      Yes                      25 mg = 1           M

[facility]



     25 MG Oral      3-13                               tab, PO,           l



     Tablet      19:16:                               BID, # 60           [facility]

n



     [Lamictal]      00                                 tab, 3           



                                                  Refill(s),           



                                                  Pharmacy:           



                                                  [facility]           



                                                  #[id number]           

 

     lamotrigine      2020-0      Yes                      25 mg = 1           M

[facility]



     25 MG Oral      3-13                               tab, PO,           l



     Tablet      19:16:                               BID, # 60           [facility]

n



     [Lamictal]      00                                 tab, 3           



                                                  Refill(s),           



                                                  Pharmacy:           



                                                  [facility]           



                                                  #[id number]           

 

     losartan 50      2020-0      Yes                      50 mg = 1           [facility]

[facility]



     mg oral      3-13                               tab, PO,           l



     tablet      18:54:                               Daily, 0           [facility]



               00                                 Refill(s)           

 

     Potassium      2020-0      Yes                      10 mEq,           [facility]

ia



     Chloride      3-13                               PO, ONCE,           l



               18:54:                               0              [place]



               00                                 Refill(s)           

 

     montelukast      2020-0      Yes                      10 mg = 1           [facility]

[facility]



     10 mg oral      3-13                               tab, PO,           l



     tablet      18:54:                               Daily, 0           [place]



               00                                 Refill(s)           

 

     Ipratropium      2020-0      Yes                      500            [facility]

a



               3-13                               microgram,           l



               18:54:                               NEB, BID,           [facility]



               00                                 0              



                                                  Refill(s)           

 

     losartan 50      2020-0      Yes                      50 mg = 1           [facility]

[facility]



     mg oral      3-13                               tab, PO,           l



     tablet      18:54:                               Daily, 0           [place]



               00                                 Refill(s)           

 

     Potassium      -0      Yes                      10 mEq,           [facility]

ia



     Chloride      3-13                               PO, ONCE,           l



               18:54:                               0              [facility]



               00                                 Refill(s)           

 

     montelukast      -0      Yes                      10 mg = 1           [facility]

[facility]



     10 mg oral      3-13                               tab, PO,           l



     tablet      18:54:                               Daily, 0           [facility]



               00                                 Refill(s)           

 

     Ipratropium      0      Yes                      500            [facility]

a



               3-13                               microgram,           l



               18:54:                               NEB, BID,           [place]



               00                                 0              



                                                  Refill(s)           

 

     aspirin 81      2019      Yes            81mg      Take 81 mg           U

nivers



     mg EC      1-14                               by mouth.           ity of



     tablet      16:50:                                              [address]

 

     azelastine      2019      Yes            1{spray      Use 1           Uni

vers



     137 mcg      1-14                     }         Spray in           ity of



     (0.1 %)      16:50:                               each           [place]



     nasal spray      54                                 nostril.           [facility]

 

     budesonide-      2019      Yes            2{puff}      Inhale 2          

 [facility]



     formoterol      1-14                               Puffs.           ity of



     (SYMBICORT)      16:50:                                              [place]



     160-4.5      54                                                Medical



     mcg/actuati                                                        Branch



     on inhaler                                                        

 

     carvedilol      2019      Yes            3.125mg      Take 3.125         

  [facility]



     3.125 mg      1-14                               mg by           ity of



     tablet      16:50:                               mouth.           [address]

 

     clonazePAM      2019      Yes            .5mg      Take 0.5           Uni

vers



     0.5 mg      1-14                               mg by           ity of



     tablet      16:50:                               mouth.           [address]

 

     ibuprofen      2019      Yes            200mg      Take 200           Uni

vers



     200 mg      1-14                               mg by           ity of



     tablet      16:50:                               mouth.           [address]

 

     lisinopril      2019      Yes            5mg       Take 5 mg           Un

neela



     5 mg tablet      1-14                               by mouth.           ity

 of



               16:50:                                              [address]

 

     melatonin      2019      Yes                      Take  by           Univ

ers



     10 mg Cap      1-14                               mouth.           ity of



               16:50:                                              [address]

 

     ticagrelor      2019      Yes            90mg      Take 90 mg           U

nivers



     (BRILINTA)      1-14                               by mouth.           ity 

of



     90 mg      16:50:                                              [address]

 

     cyanocobala      2019      Yes            2500ug      Place           Uni

vers



     min,      1-14                               2,500 mcg           ity of



     vitamin      16:50:                               under the           [place]



     B-12, 5,000      54                                 tongue           Medica

l



     mcg Subl                                         daily.           Branch

 

     furosemide      2019      Yes            20mg      Take 20 mg           U

nivers



     20 mg      1-14                               by mouth           ity of



     tablet      16:50:                               daily.           [address]

 

     ipratropium      2019      Yes            2{spray      Use 2           Un

neela



     0.03 %      1-14                     }         Sprays in           ity of



     nasal spray      16:50:                               each           [place]



               54                                 nostril 2           Medical



                                                  (two)           Branch



                                                  times           



                                                  daily.           

 

     losartan      2019      Yes            100mg      Take 100           Univ

ers



     100 mg      1-14                               mg by           ity of



     tablet      16:50:                               mouth           [place]



               54                                 daily.           Medical



                                                                 Branch

 

     aspirin 81      2019      Yes            81mg      Take 81 mg           U

nivers



     mg EC      1-14                               by mouth.           ity of



     tablet      16:50:                                              [address]

 

     azelastine      2019      Yes            1{spray      Use 1           Uni

vers



     137 mcg      1-14                     }         Spray in           ity of



     (0.1 %)      16:50:                               each           [place]



     nasal spray      54                                 nostril.           Medi

alfonzo



                                                                 Branch

 

     budesonide-      2019      Yes            2{puff}      Inhale 2          

 [facility]



     formoterol      1-14                               Puffs.           ity of



     (SYMBICORT)      16:50:                                              [place]



     160-4.5                                                      Medical



     mcg/actuati                                                        Branch



     on inhaler                                                        

 

     carvedilol      2019      Yes            3.125mg      Take 3.125         

  [facility]



     3.125 mg      1-14                               mg by           ity of



     tablet      16:50:                               mouth.           [address]

 

     clonazePAM      2019      Yes            .5mg      Take 0.5           Uni

vers



     0.5 mg      1-14                               mg by           ity of



     tablet      16:50:                               mouth.           [address]

 

     ibuprofen      -      Yes            200mg      Take 200           Uni

vers



     200 mg      1-14                               mg by           ity of



     tablet      16:50:                               mouth.           [address]

 

     lisinopril      2019      Yes            5mg       Take 5 mg           Un

neela



     5 mg tablet      1-14                               by mouth.           ity

 of



               16:50:                                              [address]

 

     melatonin      2019      Yes                      Take  by           Univ

ers



     10 mg Cap      1-14                               mouth.           ity of



               16:50:                                              [address]

 

     ticagrelor      -      Yes            90mg      Take 90 mg           U

nivers



     (BRILINTA)      1-14                               by mouth.           ity 

of



     90 mg      16:50:                                              [place]



     tablet      [address] [place] [address]



                                                                 Branch

 

     cyanocobala      2019      Yes            2500ug      Place           Uni

vers



     min,      1-14                               2,500 mcg           ity of



     vitamin      16:50:                               under the           [place]



     B-12, 5,000      54                                 tongue           Medica

l



     mcg Subl                                         daily.           Branch

 

     furosemide      2019      Yes            20mg      Take 20 mg           U

nivers



     20 mg      1-14                               by mouth           ity of



     tablet      16:50:                               daily.           [address]



                                                                 Branch

 

     ipratropium      2019      Yes            2{spray      Use 2           Un

neela



     0.03 %      1-14                     }         Sprays in           ity of



     nasal spray      16:50:                               each           [place]



               54                                 nostril 2           Medical



                                                  (two)           Branch



                                                  times           



                                                  daily.           

 

     losartan      2019      Yes            100mg      Take 100           Univ

ers



     100 mg      1-14                               mg by           ity of



     tablet      16:50:                               mouth           [place]



               54                                 daily.           Medical



                                                                 Branch

 

     aspirin 81      2019      Yes            81mg      Take 81 mg           U

nivers



     mg EC      1-14                               by mouth.           ity of



     tablet      16:50:                                              [address]

 

     azelastine      2019      Yes            1{spray      Use 1           Uni

vers



     137 mcg      1-14                     }         Spray in           ity of



     (0.1 %)      16:50:                               each           [place]



     nasal spray      54                                 nostril.           Medi

alfon[facility]



                                                                 Branch

 

     budesonide-      2019      Yes            2{puff}      Inhale 2          

 [facility]



     formoterol      1-14                               Puffs.           ity of



     (SYMBICORT)      16:50:                                              [place]



     160-4.5                                                      Medical



     mcg/actuati                                                        Branch



     on inhaler                                                        

 

     carvedilol      2019      Yes            3.125mg      Take 3.125         

  [facility]



     3.125 mg      1-14                               mg by           ity of



     tablet      16:50:                               mouth.           [address]



                                                                 Branch

 

     clonazePAM      -      Yes            .5mg      Take 0.5           Uni

vers



     0.5 mg      1-14                               mg by           ity of



     tablet      16:50:                               mouth.           [address]



                                                                 Branch

 

     ibuprofen      -      Yes            200mg      Take 200           Uni

vers



     200 mg      1-14                               mg by           ity of



     tablet      16:50:                               mouth.           [address]



                                                                 Branch

 

     lisinopril      -      Yes            5mg       Take 5 mg           Un

neela



     5 mg tablet      1-14                               by mouth.           ity

 of



               16:50:                                              [address]

 

     melatonin      -      Yes                      Take  by           Univ

ers



     10 mg Cap      1-14                               mouth.           ity of



               16:50:                                              [address]

 

     ticagrelor      -      Yes            90mg      Take 90 mg           U

nivers



     (BRILINTA)      1-14                               by mouth.           ity 

of



     90 mg      16:50:                                              [place]



     tablet      [address] [place] [address]



                                                                 Branch

 

     cyanocobala      2019      Yes            2500ug      Place           Uni

vers



     min,      1-14                               2,500 mcg           ity of



     vitamin      16:50:                               under the           [place]



     B-12, 5,000      54                                 tongue           Medica

l



     mcg Subl                                         daily.           Branch

 

     furosemide      2019      Yes            20mg      Take 20 mg           U

nivers



     20 mg      1-14                               by mouth           ity of



     tablet      16:50:                               daily.           [address]



                                                                 Branch

 

     ipratropium      -      Yes            2{spray      Use 2           Un

neela



     0.03 %      1-14                     }         Sprays in           ity of



     nasal spray      16:50:                               each           [place]



               54                                 nostril 2           Medical



                                                  (two)           Branch



                                                  times           



                                                  daily.           

 

     losartan      2019      Yes            100mg      Take 100           Univ

ers



     100 mg      1-14                               mg by           ity of



     tablet      16:50:                               mouth           [place]



               54                                 daily.           Medical



                                                                 Branch

 

     aspirin 81      2019      Yes            81mg      Take 81 mg           U

nivers



     mg EC      1-14                               by mouth.           ity of



     tablet      16:50:                                              [address]



                                                                 Branch

 

     azelastine      2019      Yes            1{spray      Use 1           Uni

vers



     137 mcg      1-14                     }         Spray in           ity of



     (0.1 %)      16:50:                               each           [place]



     nasal spray      54                                 nostril.           Medi

alfon[facility]



                                                                 Branch

 

     budesonide-      2019      Yes            2{puff}      Inhale 2          

 [facility]



     formoterol      1-14                               Puffs.           ity of



     (SYMBICORT)      16:50:                                              [place]



     160-4.5      54                                                Medical



     mcg/actuati                                                        Branch



     on inhaler                                                        

 

     carvedilol      2019      Yes            3.125mg      Take 3.125         

  [facility]



     3.125 mg      1-14                               mg by           ity of



     tablet      16:50:                               mouth.           [address]



                                                                 Branch

 

     clonazePAM      -      Yes            .5mg      Take 0.5           Uni

vers



     0.5 mg      1-14                               mg by           ity of



     tablet      16:50:                               mouth.           [address]

 

     ibuprofen      -      Yes            200mg      Take 200           Uni

vers



     200 mg      1-14                               mg by           ity of



     tablet      16:50:                               mouth.           [address]



                                                                 Branch

 

     lisinopril      -      Yes            5mg       Take 5 mg           Un

neela



     5 mg tablet      1-14                               by mouth.           ity

 of



               16:50:                                              [address]

 

     melatonin      -      Yes                      Take  by           Univ

ers



     10 mg Cap      1-14                               mouth.           ity of



               16:50:                                              [address]

 

     ticagrelor      2019      Yes            90mg      Take 90 mg           U

nivers



     (BRILINTA)      1-14                               by mouth.           ity 

of



     90 mg      16:50:                                              [address]



                                                                 Branch

 

     cyanocobala      2019      Yes            2500ug      Place           Uni

vers



     min,      1-14                               2,500 mcg           ity of



     vitamin      16:50:                               under the           [place]



     B-12, 5,000      54                                 tongue           Medica

l



     mcg Subl                                         daily.           Branch

 

     furosemide      2019      Yes            20mg      Take 20 mg           U

nivers



     20 mg      1-14                               by mouth           ity of



     tablet      16:50:                               daily.           [address]



                                                                 Branch

 

     ipratropium      2019      Yes            2{spray      Use 2           Un

neela



     0.03 %      1-14                     }         Sprays in           ity of



     nasal spray      16:50:                               each           [place]



               54                                 nostril 2           Medical



                                                  (two)           Branch



                                                  times           



                                                  daily.           

 

     losartan      2019      Yes            100mg      Take 100           Univ

ers



     100 mg      1-14                               mg by           ity of



     tablet      16:50:                               mouth           [place]



               54                                 daily.           Medical



                                                                 Branch

 

     aspirin 81      2019      Yes            81mg      Take 81 mg           U

nivers



     mg EC      1-14                               by mouth.           ity of



     tablet      16:50:                                              [address]

 

     azelastine      2019      Yes            1{spray      Use 1           Uni

vers



     137 mcg      1-14                     }         Spray in           ity of



     (0.1 %)      16:50:                               each           [place]



     nasal spray      54                                 nostril.           Medi

[facility]on[facility]



                                                                 Branch

 

     budesonide-      2019      Yes            2{puff}      Inhale 2          

 [facility]



     formoterol      1-14                               Puffs.           ity of



     (SYMBICORT)      16:50:                                              [place]



     160-4.5      54                                                Medical



     mcg/actuati                                                        Branch



     on inhaler                                                        

 

     carvedilol      2019      Yes            3.125mg      Take 3.125         

  [facility]



     3.125 mg      1-14                               mg by           ity of



     tablet      16:50:                               mouth.           [address]



                                                                 Branch

 

     clonazePAM      -      Yes            .5mg      Take 0.5           Uni

vers



     0.5 mg      1-14                               mg by           ity of



     tablet      16:50:                               mouth.           [address]

 

     ibuprofen      -      Yes            200mg      Take 200           Uni

vers



     200 mg      1-14                               mg by           ity of



     tablet      16:50:                               mouth.           [address]

 

     lisinopril      -      Yes            5mg       Take 5 mg           Un

neela



     5 mg tablet      1-14                               by mouth.           ity

 of



               16:50:                                              [address]

 

     melatonin      2019      Yes                      Take  by           Univ

ers



     10 mg Cap      1-14                               mouth.           ity of



               16:50:                                              [address]



                                                                 Branch

 

     ticagrelor      2019      Yes            90mg      Take 90 mg           U

nivers



     (BRILINTA)      1-14                               by mouth.           ity 

of



     90 mg      16:50:                                              [place]



     tablet      [address] [place] [address]



                                                                 Branch

 

     cyanocobala      2019      Yes            2500ug      Place           Uni

vers



     min,      1-14                               2,500 mcg           ity of



     vitamin      16:50:                               under the           [place]



     B-12, 5,000      54                                 tongue           Medica

l



     mcg Subl                                         daily.           Branch

 

     furosemide      2019      Yes            20mg      Take 20 mg           U

nivers



     20 mg      1-14                               by mouth           ity of



     tablet      16:50:                               daily.           [address]



                                                                 Branch

 

     ipratropium      2019      Yes            2{spray      Use 2           Un

neela



     0.03 %      1-14                     }         Sprays in           ity of



     nasal spray      16:50:                               each           [place]



               54                                 nostril 2           Medical



                                                  (two)           Branch



                                                  times           



                                                  daily.           

 

     losartan      2019      Yes            100mg      Take 100           Univ

ers



     100 mg      1-14                               mg by           ity of



     tablet      16:50:                               mouth           [place]



               54                                 daily.           Medical



                                                                 Branch

 

     aspirin 81      2019      Yes            81mg      Take 81 mg           U

nivers



     mg EC      1-14                               by mouth.           ity of



     tablet      16:50:                                              [address]



                                                                 Branch

 

     azelastine      2019      Yes            1{spray      Use 1           Uni

vers



     137 mcg      1-14                     }         Spray in           ity of



     (0.1 %)      16:50:                               each           [place]



     nasal spray      54                                 nostril.           Medi

alfonzo



                                                                 Branch

 

     budesonide-      2019      Yes            2{puff}      Inhale 2          

 [facility]



     formoterol      1-14                               Puffs.           ity of



     (SYMBICORT)      16:50:                                              [place]



     160-4.5                                                      Medical



     mcg/actuati                                                        Branch



     on inhaler                                                        

 

     carvedilol      2019      Yes            3.125mg      Take 3.125         

  [facility]



     3.125 mg      1-14                               mg by           ity of



     tablet      16:50:                               mouth.           [address]



                                                                 Branch

 

     clonazePAM      2019      Yes            .5mg      Take 0.5           Uni

vers



     0.5 mg      1-14                               mg by           ity of



     tablet      16:50:                               mouth.           [address]

 

     ibuprofen      2019      Yes            200mg      Take 200           Uni

vers



     200 mg      1-14                               mg by           ity of



     tablet      16:50:                               mouth.           [address]



                                                                 Branch

 

     lisinopril      2019      Yes            5mg       Take 5 mg           Un

neela



     5 mg tablet      1-14                               by mouth.           ity

 of



               16:50:                                              [place]



               54                                                Medical



                                                                 Branch

 

     melatonin      2019      Yes                      Take  by           Univ

ers



     10 mg Cap      1-14                               mouth.           ity of



               16:50:                                              [address]



                                                                 Branch

 

     ticagrelor      2019      Yes            90mg      Take 90 mg           U

nivers



     (BRILINTA)      1-14                               by mouth.           ity 

of



     90 mg      16:50:                                              [place]



     tablet                                                      Medical



                                                                 Branch

 

     cyanocobala      2019      Yes            2500ug      Place           Uni

vers



     min,      1-14                               2,500 mcg           ity of



     vitamin      16:50:                               under the           [place]



     B-12, 5,000                                       tongue           Medica

l



     mcg Subl                                         daily.           Branch

 

     furosemide      2019      Yes            20mg      Take 20 mg           U

nivers



     20 mg      1-14                               by mouth           ity of



     tablet      16:50:                               daily.           [address]



                                                                 Branch

 

     ipratropium      2019      Yes            2{spray      Use 2           Un

neela



     0.03 %      1-14                     }         Sprays in           ity of



     nasal spray      16:50:                               each           [place]



               54                                 nostril 2           Medical



                                                  (two)           Branch



                                                  times           



                                                  daily.           

 

     losartan      2019      Yes            100mg      Take 100           Univ

ers



     100 mg      1-14                               mg by           ity of



     tablet      16:50:                               mouth           [place]



               54                                 daily.           Medical



                                                                 Branch

 

     melatonin            Yes                      Take  by           Univ

ers



     10 mg Cap      7-27                               mouth.           ity of



               21:03:                                              [address]

 

     ticagrelor            Yes            90mg      Take 90 mg           U

nivers



     (BRILINTA)      7-27                               by mouth.           ity 

of



     90 mg      21:03:                                              [place]



     tablet      [address] [place] [address]



                                                                 Branch

 

     albuterol            Yes            2{puff}      Inhale 2           U

nivers



     (PROVENTIL      7-27                               Puffs.           ity of



     HFA) 90      21:03:                                              [place]



     mcg/actuati                                                      Medical



     on inhaler                                                        Branch

 

     aspirin 81            Yes            81mg      Take 81 mg           U

nivers



     mg EC      7-27                               by mouth.           ity of



     tablet      21:03:                                              [address]

 

     azelastine            Yes            1{spray      Use 1           Uni

vers



     137 mcg      7-27                     }         Spray in           ity of



     (0.1 %)      21:03:                               each           [place]



     nasal spray      54                                 nostril.           Medi

alfonzo



                                                                 Branch

 

     budesonide-            Yes            2{puff}      Inhale 2          

 [facility]



     formoterol      7-27                               Puffs.           ity of



     (SYMBICORT)      21:03:                                              [place]



     160-4.5      54                                                Medical



     mcg/actuati                                                        Branch



     on inhaler                                                        

 

     carvedilol            Yes            3.125mg      Take 3.125         

  [facility]



     3.125 mg      7-27                               mg by           ity of



     tablet      21:03:                               mouth.           [address]

 

     clonazePAM            Yes            .5mg      Take 0.5           Uni

vers



     0.5 mg      7-27                               mg by           ity of



     tablet      21:03:                               mouth.           [address]

 

     ibuprofen            Yes            200mg      Take 200           Uni

vers



     200 mg      7-27                               mg by           ity of



     tablet      21:03:                               mouth.           [address]

 

     lisinopril            Yes            5mg       Take 5 mg           Un

neela



     5 mg tablet      7-27                               by mouth.           ity

 of



               21:03:                                              [address]

 

     melatonin            Yes                      Take  by           Univ

ers



     10 mg Cap      7-27                               mouth.           ity of



               21:03:                                              [address]

 

     ticagrelor            Yes            90mg      Take 90 mg           U

nivers



     (BRILINTA)      7-27                               by mouth.           ity 

of



     90 mg      21:03:                                              [address]

 

     albuterol            Yes            2{puff}      Inhale 2           U

nivers



     (PROVENTIL      7-27                               Puffs.           ity of



     HFA) 90      21:03:                                              [place] [address]                                                Medical



     on inhaler                                                        Branch

 

     aspirin 81            Yes            81mg      Take 81 mg           U

nivers



     mg EC      7-27                               by mouth.           ity of



     tablet      21:03:                                              [address]

 

     azelastine            Yes            1{spray      Use 1           Uni

vers



     137 mcg      7-27                     }         Spray in           ity of



     (0.1 %)      21:03:                               each           [place]



     nasal spray      54                                 nostril.           [facility]

 

     budesonide-            Yes            2{puff}      Inhale 2          

 [facility]



     formoterol      7-27                               Puffs.           ity of



     (SYMBICORT)      21:03:                                              [place]



     160-4.5      54                                                Medical



     mcg/actuati                                                        [place]



     on inhaler                                                        

 

     carvedilol            Yes            3.125mg      Take 3.125         

  [facility]



     3.125 mg      7-27                               mg by           ity of



     tablet      21:03:                               mouth.           [address]

 

     clonazePAM            Yes            .5mg      Take 0.5           Uni

vers



     0.5 mg      7-27                               mg by           ity of



     tablet      21:03:                               mouth.           [address]

 

     ibuprofen            Yes            200mg      Take 200           Uni

vers



     200 mg      7-27                               mg by           ity of



     tablet      21:03:                               mouth.           [address]

 

     lisinopril            Yes            5mg       Take 5 mg           Un

neela



     5 mg tablet      7-27                               by mouth.           ity

 of



               21:03:                                              [address]

 

     melatonin            Yes                      Take  by           Univ

ers



     10 mg Cap      7-27                               mouth.           ity of



               21:03:                                              [address]

 

     ticagrelor            Yes            90mg      Take 90 mg           U

nivers



     (BRILINTA)      7-27                               by mouth.           ity 

of



     90 mg      21:03:                                              [address]

 

     albuterol            Yes            2{puff}      Inhale 2           U

nivers



     (PROVENTIL      7-27                               Puffs.           ity of



     HFA) 90      21:03:                                              [facility]                                                Medical



     on inhaler                                                        Branch

 

     aspirin 81            Yes            81mg      Take 81 mg           U

nivers



     mg EC      7-27                               by mouth.           ity of



     tablet      21:03:                                              [address]

 

     azelastine            Yes            1{spray      Use 1           Uni

vers



     137 mcg      7-27                     }         Spray in           ity of



     (0.1 %)      21:03:                               each           [place]



     nasal spray                                       nostril.           Medi

alfonzo



                                                                 Branch

 

     budesonide-            Yes            2{puff}      Inhale 2          

 [facility]



     formoterol      7-27                               Puffs.           ity of



     (SYMBICORT)      21:03:                                              [place]



     160-4.5      54                                                Medical



     mcg/[facility]



     on inhaler                                                        

 

     carvedilol            Yes            3.125mg      Take 3.125         

  [facility]



     3.125 mg      7-27                               mg by           ity of



     tablet      21:03:                               mouth.           [address]

 

     clonazePAM            Yes            .5mg      Take 0.5           Uni

vers



     0.5 mg      7-27                               mg by           ity of



     tablet      21:03:                               mouth.           [address]

 

     ibuprofen            Yes            200mg      Take 200           Uni

vers



     200 mg      7-27                               mg by           ity of



     tablet      21:03:                               mouth.           [address]

 

     lisinopril            Yes            5mg       Take 5 mg           Un

neela



     5 mg tablet      7-27                               by mouth.           ity

 of



               21:03:                                              [address]

 

     melatonin            Yes                      Take  by           Univ

ers



     10 mg Cap      7-27                               mouth.           ity of



               21:03:                                              [address]

 

     ticagrelor            Yes            90mg      Take 90 mg           U

nivers



     (BRILINTA)      7-27                               by mouth.           ity 

of



     90 mg      21:03:                                              [address]

 

     albuterol      -      Yes            2{puff}      Inhale 2           U

nivers



     (PROVENTIL      7-27                               Puffs.           ity of



     HFA) 90      21:03:                                              [facility]/actuati                                                      Medical



     on inhaler                                                        Branch

 

     aspirin 81            Yes            81mg      Take 81 mg           U

nivers



     mg EC      7-27                               by mouth.           ity of



     tablet      21:03:                                              [address]

 

     azelastine            Yes            1{spray      Use 1           Uni

vers



     137 mcg      7-27                     }         Spray in           ity of



     (0.1 %)      21:03:                               each           [place]



     nasal spray                                       nostril.           [facility]

 

     budesonide-            Yes            2{puff}      Inhale 2          

 [facility]



     formoterol      7-27                               Puffs.           ity of



     (SYMBICORT)      21:03:                                              [place]



     160-4.5      54                                                Medical



     mcg/actuati                                                        [place]



     on inhaler                                                        

 

     carvedilol            Yes            3.125mg      Take 3.125         

  [facility]



     3.125 mg      7-27                               mg by           ity of



     tablet      21:03:                               mouth.           [address]

 

     clonazePAM            Yes            .5mg      Take 0.5           Uni

vers



     0.5 mg      7-27                               mg by           ity of



     tablet      21:03:                               mouth.           [address]

 

     ibuprofen            Yes            200mg      Take 200           Uni

vers



     200 mg      7-27                               mg by           ity of



     tablet      21:03:                               mouth.           [address]

 

     lisinopril            Yes            5mg       Take 5 mg           Un

neela



     5 mg tablet      7-27                               by mouth.           ity

 of



               21:03:                                              [address]

 

     melatonin            Yes                      Take  by           Univ

ers



     10 mg Cap      7-27                               mouth.           ity of



               21:03:                                              [address]

 

     ticagrelor            Yes            90mg      Take 90 mg           U

nivers



     (BRILINTA)      7-27                               by mouth.           ity 

of



     90 mg      21:03:                                              [address]

 

     albuterol      -      Yes            2{puff}      Inhale 2           U

nivers



     (PROVENTIL      7-27                               Puffs.           ity of



     HFA) 90      21:03:                                              [facility]                                                      [facility]



     on inhaler                                                        [facility]anch

 

     aspirin 81            Yes            81mg      Take 81 mg           U

nivers



     mg EC      7-27                               by mouth.           ity of



     tablet      21:03:                                              [address]

 

     azelastine            Yes            1{spray      Use 1           Uni

vers



     137 mcg      7-27                     }         Spray in           ity of



     (0.1 %)      21:03:                               each           [place]



     nasal spray      54                                 nostril.           [facility]

 

     budesonide-            Yes            2{puff}      Inhale 2          

 [facility]



     formoterol      7-27                               Puffs.           ity of



     (SYMBICORT)      21:03:                                              [place]



     160-4.5                                                      Medical



     mcg/actuati                                                        [place]



     on inhaler                                                        

 

     carvedilol            Yes            3.125mg      Take 3.125         

  [facility]



     3.125 mg      7-27                               mg by           ity of



     tablet      21:03:                               mouth.           [address]

 

     clonazePAM            Yes            .5mg      Take 0.5           Uni

vers



     0.5 mg      7-27                               mg by           ity of



     tablet      21:03:                               mouth.           [address]

 

     ibuprofen            Yes            200mg      Take 200           Uni

vers



     200 mg      7-27                               mg by           ity of



     tablet      21:03:                               mouth.           [address]

 

     lisinopril            Yes            5mg       Take 5 mg           Un

neela



     5 mg tablet      7-27                               by mouth.           ity

 of



               21:03:                                              [address]

 

     melatonin            Yes                      Take  by           Univ

ers



     10 mg Cap      7-27                               mouth.           ity of



               21:03:                                              [address]

 

     ticagrelor            Yes            90mg      Take 90 mg           U

nivers



     (BRILINTA)      7-27                               by mouth.           ity 

of



     90 mg      21:03:                                              [address]

 

     albuterol            Yes            2{puff}      Inhale 2           U

nivers



     (PROVENTIL      7-27                               Puffs.           ity of



     HFA) 90      21:03:                                              [facility]/actuati                                                      Medical



     on inhaler                                                        Branch

 

     aspirin 81            Yes            81mg      Take 81 mg           U

nivers



     mg EC      7-27                               by mouth.           ity of



     tablet      21:03:                                              [address]

 

     azelastine            Yes            1{spray      Use 1           Uni

vers



     137 mcg      7-27                     }         Spray in           ity of



     (0.1 %)      21:03:                               each           [place]



     nasal spray      54                                 nostril.           [facility]

 

     budesonide-            Yes            2{puff}      Inhale 2          

 [facility]



     formoterol      7-27                               Puffs.           ity of



     (SYMBICORT)      21:03:                                              [place]



     [address]-4.[address] [place] [address]/actuati                                                        Branch



     on inhaler                                                        

 

     carvedilol      -      Yes            3.125mg      Take 3.125         

  [facility]



     3.125 mg      7-27                               mg by           ity of



     tablet      21:03:                               mouth.           [address]

 

     clonazePAM      -      Yes            .5mg      Take 0.5           [facility]

vers



     0.5 mg      7-27                               mg by           ity of



     tablet      21:03:                               mouth.           [address]

 

     ibuprofen      2019-      Yes            200mg      Take 200           [facility]

vers



     200 mg      7-27                               mg by           ity of



     tablet      21:03:                               mouth.           [address]

 

     lisinopril            Yes            5mg       Take 5 mg           Un

neela



     5 mg tablet                                     by mouth.           ity

 of



               21:03:                                              [address]

 

     levETIRAcet       2019- No             1000mg      1,000 mg,         

  [facility]



     am (KEPPRA)                                IV             ity of



     in NACL      18:15: 17:55                          Piggyback,           [place]

as



     (ISO-OS)      00   :00                           ONCE, 1           Medical



     1,000                                         dose, Sat           [facility]



     mg/100 mL                                         19 at           



     RTU                                          1315, 100           



                                                  mL             

 

     fosphenytoi       2019- No             1000mg{      1,000 mg         

  [facility]



     n (CEREBYX)                      phenyto      PE, Slow           

ity of



     injection      18:15: 17:59                in'equi      IV Push,           

[place]



     1,000 mg PE      00   :00                 valent}      ONCE, 1           Me

dical



                                                  dose, [facility]



                                                  19 at           



                                                  1315, ASAP           

 

     NaCl 0.9%       2019- No             1000mL      at 999           Uni

vers



     (NS) bolus                                mL/hr,           ity of



     infusion      18:00: 20:38                          1,000 mL,           [place]

as



     1,000 mL      00   :00                           IV             Medical



                                                  Infusion,           [place]



                                                  ONCE, 1           



                                                  dose, Sat           



                                                  19 at           



                                                  1300, STAT           

 

     LORazepam       2019- No             2mg       2 mg, Slow           U

nivers



     (ATIVAN)                                IV Push,           ity of



     injection 2      16:45: 15:32                          ONCE, 1           [facility]

[place]



     mg        00   :00                           dose, Sat           Medical



                                                  19 at           [facility]



                                                  1145, STAT           

 

     fluconazole       2019- No        [id number] 150mg      Take 1         

  [facility]



     150 mg                                tablet by           ity of



     tablet      00:00: 04:59                          mouth once           [place]

s



               00   :00                           now for 1           Medical



                                                  dose.           [facility]

 

     Ticagrelor      2019-0      Yes                      90 mg = 1           Me

moria



     90 MG Oral      5-31                               tab, PO,           l



     Tablet      17:48:                               BID, # 60           [facility]

n



     [Brilinta]      00                                 tab, 2           



                                                  Refill(s),           



                                                  Pharmacy:           



                                                  [facility]           



                                                  [facility]           



                                                  [facility]           

 

     Levetiracet      2019-0      Yes                      1,500 mg =           

[facility]



     am 750 MG      5-31                               2 tab, PO,           l



     Oral Tablet      17:48:                               BID, # 120           

[facility]



     [Keppra]      00                                 tab, 2           



                                                  Refill(s),           



                                                  Pharmacy:           



                                                  [facility]           



                                                  [facility]           



                                                  [facility]           

 

     Aspirin 81      -0      Yes                      81 mg = 1           Me

moria



     MG Chewable      5-31                               tab, CHEW,           l



     Tablet      17:48:                               Daily, # 1           [facility]

nn



               00                                 tab, 3           



                                                  Refill(s),           



                                                  Pharmacy:           



                                                  [facility]           



                                                  [facility] [address] [place] [id number]           

 

     lisinopril      2019-0      Yes                      5 mg = 1           Mem

oria



     5 mg oral      5-31                               tab, PO,           l



     tablet      17:48:                               Daily, #           [place]



               00                                 30 tab, 2           



                                                  Refill(s),           



                                                  Pharmacy:           



                                                  [facility]           



                                                  [facility] [address] [place] [id number]           

 

     carvedilol      2019-0      Yes                      3.125 mg =           M

[facility]



     3.125 mg      5-31                               1 tab, PO,           l



     oral tablet      17:48:                               Q12H, # 60           

[place]



               00                                 tab, 2           



                                                  Refill(s),           



                                                  Pharmacy:           



                                                  [facility]           



                                                  [facility] [place] [id number]           

 

     Ticagrelor      2019-0      Yes                      90 mg = 1           Me

moria



     90 MG Oral      5-31                               tab, PO,           l



     Tablet      17:48:                               BID, # 60           [facility]

n



     [Brilinta]      00                                 tab, 2           



                                                  Refill(s),           



                                                  Pharmacy:           



                                                  [facility]           



                                                  [facility]           



                                                  [id number]           

 

     Levetiracet      2019-0      Yes                      1,500 mg =           

[facility]



     am 750 MG      5-31                               2 tab, PO,           l



     Oral Tablet      17:48:                               BID, # 120           

[place]



     [Keppra]      00                                 tab, 2           



                                                  Refill(s),           



                                                  [facility]           



                                                  [facility]           



                                                  [id number]           

 

     Aspirin 81      2019-0      Yes                      81 mg = 1           Me

moria



     MG Chewable      5-31                               tab, CHEW,           l



     Tablet      17:48:                               Daily, # 1           [facility]

nn



               00                                 tab, 3           



                                                  Refill(s),           



                                                  Pharmacy:           



                                                  [facility]           



                                                  [facility]           



                                                  [id number]           

 

     lisinopril            Yes                      5 mg = 1           Mem

oria



     5 mg oral      5-31                               tab, PO,           l



     tablet      17:48:                               Daily, #           [facility]



               00                                 30 tab, 2           



                                                  Refill(s),           



                                                  Pharmacy:           



                                                  [facility]           



                                                  [facility]           



                                                  [id number]           

 

     carvedilol            Yes                      3.125 mg =           M

[facility]



     3.125 mg      5-31                               1 tab, PO,           l



     oral tablet      17:48:                               Q12H, # 60           

[place]



               00                                 tab, 2           



                                                  Refill(s),           



                                                  Pharmacy:           



                                                  [facility]           



                                                  [facility]           



                                                  [id number]           

 

     potassium            No                       Notes:           [facility]

a



     phosphate      5-31                               (Same as:           l



               10:36:                               K              [facility]



               00                                 Phosphate.           



                                                  )  Do not           



                                                  infuse           



                                                  phosphorou           



                                                  s              



                                                  concurrent           



                                                  ly in the           



                                                  same line           



                                                  as TPN or           



                                                  IVF that           



                                                  contains           



                                                  calcium.           



                                                  For double           



                                                  lumen           



                                                  central           



                                                  lines,           



                                                  phosphorou           



                                                  s may be           



                                                  infused in           



                                                  a separate           



                                                  lumen from           



                                                  TPN.  1           



                                                  mMol           



                                                  phoshate           



                                                  has 1.47           



                                                  mEq            



                                                  potassium           



                                                  Infuse           



                                                  over 4           



                                                  hours           

 

     sodium            No                       Notes:           [facility]



     phosphate      5-31                               Infuse           l



               10:36:                               over 4           [place]



               00                                 hour. Do           



                                                  not infuse           



                                                  phosphorou           



                                                  s              



                                                  concurrent           



                                                  ly in the           



                                                  same line           



                                                  as TPN or           



                                                  IVF that           



                                                  contains           



                                                  calcium.           



                                                  For double           



                                                  lumen           



                                                  central           



                                                  lines,           



                                                  phosphorou           



                                                  s may be           



                                                  infused in           



                                                  a separate           



                                                  lumen from           



                                                  TPN.           

 

     Potassium            No                       Notes:           [facility]

a



     Chloride      5-31                               (Same as:           l



               10:36:                               KCL)           



               00                                 Infuse           



                                                  over 2           



                                                  hours.           

 

     potassium            No                       Notes:           [facility]

a



     phosphate-s      -31                               (Same as:           l



     odium      10:36:                               Phos-NaK)           [place]



     phosphate      00                                 Each 1.5           



     250 mg-280                                         gm pkt has           



     mg-160 mg                                         250mg           



     oral powder                                         phosphorou           



     for                                          s. Mix           



     reconstitut                                         w/2.5oz           



     ion                                          water and           



                                                  stir.           

 

     Calcium            No                       Notes:           [facility]



     Gluconate      5-31                               WASTE: F/P           l



               10:36:                               - Sink; E           [place]



                                                -              



                                                  [facility]           



                                                  Trash Bin           

 

     Magnesium            No                       Notes:           [facility]

a



     Sulfate      5-31                               WASTE: F/P           l



               10:36:                               - Sink; E           [place]



                                                -              



                                                  Municipal           



                                                  Trash Bin           

 

     Magnesium            No                       Notes:           [facility]

a



     Oxide      5-31                               (Same as:           l



               10:36:                               Mag-Ox           [facility]



               00                                 400)           



                                                  Magnesium           



                                                  oxide           



                                                  [id number]           



                                                  g              



                                                  elemental           



                                                  magnesium           



                                                  Dose=____m           



                                                  g              



                                                  magnesium           



                                                  oxide           



                                                  (___mg           



                                                  elemental           



                                                  magnesium)           

 

     potassium            No                       Notes:           [facility]

a



     phosphate      -31                               (Same as:           l



               10:36:                               K              [place]                                 Phosphate.           



                                                  )  Do not           



                                                  infuse           



                                                  phosphorou           



                                                  s              



                                                  concurrent           



                                                  ly in the           



                                                  same line           



                                                  as TPN or           



                                                  IVF that           



                                                  contains           



                                                  calcium.           



                                                  For double           



                                                  lumen           



                                                  central           



                                                  lines,           



                                                  phosphorou           



                                                  s may be           



                                                  infused in           



                                                  a separate           



                                                  lumen from           



                                                  TPN.  1           



                                                  mMol           



                                                  phoshate           



                                                  has 1.47           



                                                  mEq            



                                                  potassium           



                                                  Infuse           



                                                  over 4           



                                                  hours           

 

     sodium            No                       Notes:           [facility]



     phosphate      5-31                               Infuse           l



               10:36:                               over 4           [facility]



               00                                 hour. Do           



                                                  not infuse           



                                                  phosphorou           



                                                  s              



                                                  concurrent           



                                                  ly in the           



                                                  same line           



                                                  as TPN or           



                                                  IVF that           



                                                  contains           



                                                  calcium.           



                                                  For double           



                                                  lumen           



                                                  central           



                                                  lines,           



                                                  phosphorou           



                                                  s may be           



                                                  infused in           



                                                  a separate           



                                                  lumen from           



                                                  TPN.           

 

     Potassium            No                       Notes:           [facility]

a



     Chloride      -                               (Same as:           l



               10:36:                               KCL)                                            Infuse           



                                                  over 2           



                                                  hours.           

 

     potassium            No                       Notes:           [facility]

a



     phosphate-s                                     (Same as:           l



     odium      10:36:                               Phos-NaK)           



     phosphate      00                                 Each 1.5           



     250 mg-280                                         gm pkt has           



     mg-160 mg                                         250mg           



     oral powder                                         phosphorou           



     for                                          s. Mix           



     reconstitut                                         w/2.5oz           



     ion                                          water and           



                                                  stir.           

 

     Calcium            No                       Notes:           [facility]



     Gluconate                                     WASTE: F/P           l



               10:36:                               - Sink; E           [place]                                 -              



                                                  Municipal           



                                                  Trash Bin           

 

     Magnesium            No                       Notes:           [facility]

a



     Sulfate                                     WASTE: F/P           l



               10:36:                               - Sink; E                                            -              



                                                  [facility]           



                                                  Trash Bin           

 

     Magnesium            No                       Notes:           [facility]

a



     Oxide      -                               (Same as:           l



               10:36:                               Mag-Ox                                            400)           



                                                  Magnesium           



                                                  oxide           



                                                  [id number]           



                                                  g              



                                                  elemental           



                                                  magnesium           



                                                  Dose=____m           



                                                  g              



                                                  magnesium           



                                                  oxide           



                                                  (___mg           



                                                  elemental           



                                                  magnesium)           

 

     Lisinopril            No                       Notes:           [facility]

ia



               -29                               (Same as:           l



               19:03:                               Prinivil,           [facility]



                                                Zestril)           

 

     Lisinopril            No                       Notes:           [facility]

ia



               -                               (Same as:           l



               19:03:                               Prinivil,           [place]



                                                Zestril)           

 

     sodium            No                       Notes:           [facility]



     phosphate      -28                               Infuse           l



               14:20:                               over 4           [facility]



               00                                 hour. Do           



                                                  not infuse           



                                                  phosphorou           



                                                  s              



                                                  concurrent           



                                                  ly in the           



                                                  same line           



                                                  as TPN or           



                                                  IVF that           



                                                  contains           



                                                  calcium.           



                                                  For double           



                                                  lumen           



                                                  central           



                                                  lines,           



                                                  phosphorou           



                                                  s may be           



                                                  infused in           



                                                  a separate           



                                                  lumen from           



                                                  TPN.           

 

     potassium            No                       Notes:           [facility]

a



     phosphate      -28                               (Same as:           l



               14:20:                               K              [place]                                 Phosphate.           



                                                  )  Do not           



                                                  infuse           



                                                  phosphorou           



                                                  s              



                                                  concurrent           



                                                  ly in the           



                                                  same line           



                                                  as TPN or           



                                                  IVF that           



                                                  contains           



                                                  calcium.           



                                                  For double           



                                                  lumen           



                                                  central           



                                                  lines,           



                                                  phosphorou           



                                                  s may be           



                                                  infused in           



                                                  a separate           



                                                  lumen from           



                                                  TPN.  1           



                                                  mMol           



                                                  phoshate           



                                                  has 1.47           



                                                  mEq            



                                                  potassium           



                                                  Infuse           



                                                  over 4           



                                                  hours           

 

     sodium            No                       Notes:           [facility]



     phosphate      -                               Infuse           l



               14:20:                               over 4           [facility]



               00                                 hour. Do           



                                                  not infuse           



                                                  phosphorou           



                                                  s              



                                                  concurrent           



                                                  ly in the           



                                                  same line           



                                                  as TPN or           



                                                  IVF that           



                                                  contains           



                                                  calcium.           



                                                  For double           



                                                  lumen           



                                                  central           



                                                  lines,           



                                                  phosphorou           



                                                  s may be           



                                                  infused in           



                                                  a separate           



                                                  lumen from           



                                                  TPN.           

 

     potassium            No                       Notes:           [facility]

a



     phosphate      -                               (Same as:           l



               14:20:                               K              [place]                                 Phosphate.           



                                                  )  Do not           



                                                  infuse           



                                                  phosphorou           



                                                  s              



                                                  concurrent           



                                                  ly in the           



                                                  same line           



                                                  as TPN or           



                                                  IVF that           



                                                  contains           



                                                  calcium.           



                                                  For double           



                                                  lumen           



                                                  central           



                                                  lines,           



                                                  phosphorou           



                                                  s may be           



                                                  infused in           



                                                  a separate           



                                                  lumen from           



                                                  TPN.  1           



                                                  mMol           



                                                  phoshate           



                                                  has 1.47           



                                                  mEq            



                                                  potassium           



                                                  Infuse           



                                                  over 4           



                                                  hours           

 

     potassium            No                       Notes:           [facility]

a



     phosphate-s                                     (Same as:           l



     odium      14:20:                               Phos-NaK)           [facility]



     phosphate      00                                 Each 1.5           



     250 mg-280                                         gm pkt has           



     mg-160 mg                                         250mg           



     oral powder                                         phosphorou           



     for                                          s. Mix           



     reconstitut                                         w/2.5oz           



     ion                                          water and           



                                                  stir.           

 

     Magnesium            No                       Notes:           [facility]

a



     Sulfate                                     WASTE: F/P           l



               14:20:                               - Sink; E                                            -              



                                                  [facility]           



                                                  Trash Bin           

 

     Magnesium            No                       Notes:           [facility]

a



     Oxide                                     (Same as:           l



               14:20:                               Mag-Ox           [facility]                                 400)           



                                                  Magnesium           



                                                  oxide           



                                                  [id number]           



                                                  g              



                                                  elemental           



                                                  magnesium           



                                                  Dose=____m           



                                                  g              



                                                  magnesium           



                                                  oxide           



                                                  (___mg           



                                                  elemental           



                                                  magnesium)           

 

     Calcium            No                       Notes:           [facility]



     Gluconate                                     WASTE: F/P           l



               14:20:                               - Sink; E           [place]



                                                -              



                                                  [facility]           



                                                  Trash Bin           

 

     Calcium            No                       Notes:           [facility]



     Carbonate                                     (Same As:           l



     500 MG      14:20:                               Tums)           [facility]



     Chewable      00                                 Calcium           



     Tablet                                         Carbonate           



                                                  500 mg =           



                                                  200 mg           



                                                  elemental           



                                                  calcium           



                                                  Dose =           



                                                  ______ mg           



                                                  calcium           



                                                  carbonate           



                                                  (______ mg           



                                                  elemental           



                                                  calcium)           

 

     Potassium            No                       Notes:           [facility]

a



     Chloride                                     (Same as:           l



               14:20:                               KCL)           [place]



                                                Infuse           



                                                  over 2           



                                                  hours.           

 

     potassium            No                       Notes:           [facility]

a



     phosphate-s                                     (Same as:           l



     odium      14:20:                               Phos-NaK)           [facility]



     phosphate      00                                 Each 1.5           



     250 mg-280                                         gm pkt has           



     mg-160 mg                                         250mg           



     oral powder                                         phosphorou           



     for                                          s. Mix           



     reconstitut                                         w/2.5oz           



     ion                                          water and           



                                                  stir.           

 

     Magnesium            No                       Notes:           [facility]

a



     Sulfate                                     WASTE: F/P           l



               14:20:                               - Sink; E           [place]



                                                -              



                                                  [facility]           



                                                  Trash Bin           

 

     Magnesium            No                       Notes:           [facility]

a



     Oxide                                     (Same as:           l



               14:20:                               Mag-Ox           [facility]



               00                                 400)           



                                                  Magnesium           



                                                  oxide           



                                                  [id number]           



                                                  g              



                                                  elemental           



                                                  magnesium           



                                                  Dose=____m           



                                                  g              



                                                  magnesium           



                                                  oxide           



                                                  (___mg           



                                                  elemental           



                                                  magnesium)           

 

     Calcium            No                       Notes:           [facility]



     Gluconate                                     WASTE: F/P           l



               14:20:                               - Sink; E                                            -              



                                                  [facility]           



                                                  Trash Bin           

 

     Calcium            No                       Notes:           [facility]



     Carbonate                                     (Same As:           l



     500 MG      14:20:                               Tums)           [place]



     Chewable      00                                 Calcium           



     Tablet                                         Carbonate           



                                                  500 mg =           



                                                  200 mg           



                                                  elemental           



                                                  calcium           



                                                  Dose =           



                                                  ______ mg           



                                                  calcium           



                                                  carbonate           



                                                  (______ mg           



                                                  elemental           



                                                  calcium)           

 

     Potassium            No                       Notes:           [facility]

a



     Chloride                                     (Same as:           l



               14:20:                               KCL)           [place]



               00                                 Infuse           



                                                  over 2           



                                                  hours.           

 

     Lasix            No                       Notes:           [facility]



                                              (Same as:           l



               14:00:                               Lasix)           [place]



               00                                 ***            



                                                  MEDICATION           



                                                  WASTE ***           



                                                  Product           



                                                  Size:  40           



                                                  mg Product           



                                                  Wasted:           



                                                  ___ mg           

 

     carvedilol            No                       Notes:           [facility]

ia



                                              Give with           l



               14:00:                               food.           [facility]



               00                                 (Same As:           



                                                  Coreg)           

 

     Lasix            No                       Notes:           [facility]



               -28                               (Same as:           l



               14:00:                               Lasix)                                            ***            



                                                  MEDICATION           



                                                  WASTE ***           



                                                  Product           



                                                  Size:  40           



                                                  mg Product           



                                                  Wasted:           



                                                  ___ mg           

 

     carvedilol            No                       Notes:           [facility]

ia



               5-28                               Give with           l



               14:00:                               food.                                            (Same As:           



                                                  Coreg)           

 

     Budesonide            No                       Notes:           [facility]

ia



               5-28                               (Same As:           l



               01:00:                               Pulmicort)                                                           

 

     Budesonide            No                       Notes:           [facility]

ia



               5-28                               (Same As:           l



               01:00:                               Pulmicort)                                                           

 

     Calcium      -      No                       1,000 mg,           [facility]

ia



     Gluconate                                     Route:           l



               21:45:                               IVPB, Drug           [facility]                                 form: INJ,           



                                                  ONCE,           



                                                  Dosing           



                                                  Weight           



                                                  50.5, kg,           



                                                  Start           



                                                  date:           



                                                  19           



                                                  16:45:00           



                                                  CDT, Stop           



                                                  date:           



                                                  19           



                                                  16:45:00           



                                                  CDT            

 

     Calcium            No                       1,000 mg,           [facility]

ia



     Gluconate                                     Route:           l



               21:45:                               IVPB, Drug                                            form: INJ,           



                                                  ONCE,           



                                                  Dosing           



                                                  Weight           



                                                  50.5, kg,           



                                                  Start           



                                                  date:           



                                                  19           



                                                  16:45:00           



                                                  CDT, Stop           



                                                  date:           



                                                  19           



                                                  16:45:00           



                                                  CDT            

 

     iodixanol            No                       70 mL,           [facility]

a



                                              Route:           l



               17:50:                               IVP, Drug                                            Form:           



                                                  SOLN,           



                                                  Dosing           



                                                  Weight           



                                                  50.5, kg,           



                                                  ONCALL,           



                                                  STAT,           



                                                  Start           



                                                  date:           



                                                  19           



                                                  12:50:00           



                                                  CDT,           



                                                  Duration:           



                                                  1 doses or           



                                                  times,           



                                                  Dose =           



                                                  2.2ml/kg,           



                                                  Max dose =           



                                                  100ml --           



                                                  "To be           



                                                  infused by           



                                                  Radiology           



                                                  Staff           



                                                  ONLY"           

 

     iodixanol            No                       70 mL,           [facility]

a



                                              Route:           l



               17:50:                               IVP, Drug           [facility]                                 Form:           



                                                  SOLN,           



                                                  Dosing           



                                                  Weight           



                                                  50.5, kg,           



                                                  ONCALL,           



                                                  STAT,           



                                                  Start           



                                                  date:           



                                                  19           



                                                  12:50:00           



                                                  CDT,           



                                                  Duration:           



                                                  1 doses or           



                                                  times,           



                                                  Dose =           



                                                  2.2ml/kg,           



                                                  Max dose =           



                                                  100ml --           



                                                  "To be           



                                                  infused by           



                                                  Radiology           



                                                  Staff           



                                                  ONLY"           

 

     Levofloxaci            No                       Notes: Do           [facility]

[facility]



     n         5-27                               not give           l



               14:00:                               w/antacids           [place]



               00                                 , dairy           



                                                  pdt &           



                                                  minerals           



                                                  Take 1 hr           



                                                  before or           



                                                  2 hr after           



                                                  dairy           



                                                  products           

 

     Levofloxaci      2019-0      No                       Notes: Do           [facility]



     n         5-27                               not give           l



               14:00:                               w/antacids           [place]



               00                                 , dairy           



                                                  pdt &           



                                                  minerals           



                                                  Take 1 hr           



                                                  before or           



                                                  2 hr after           



                                                  dairy           



                                                  products           

 

     Omeprazole      2019-0      No                       20 mg,           [facility]

ia



               5-27                               Route: PO,           l



               12:30:                               Drug form:           [facility]



               00                                 ECCAP,           



                                                  Before           



                                                  Breakfast,           



                                                  Dosing           



                                                  Weight           



                                                  50.5, kg,           



                                                  Start           



                                                  date:           



                                                  19           



                                                  7:30:00           



                                                  CDT,           



                                                  Duration:           



                                                  30 day,           



                                                  Stop date:           



                                                  19           



                                                  7:30:00           



                                                  CDT            

 

     Protonix      -0      No                       Notes:           [facility]



               5-27                               Tablet           l



               12:30:                               should not           [place]



               00                                 be chewed           



                                                  or             



                                                  crushed.           



                                                  (Same as:           



                                                  Protonix)           

 

     Omeprazole      2019-0      No                       20 mg,           [facility]

[facility]



               5-27                               Route: PO,           l



               12:30:                               Drug form:           [facility]



               00                                 ECCAP,           



                                                  Before           



                                                  Breakfast,           



                                                  Dosing           



                                                  Weight           



                                                  50.5, kg,           



                                                  Start           



                                                  date:           



                                                  19           



                                                  7:30:00           



                                                  CDT,           



                                                  Duration:           



                                                  30 day,           



                                                  Stop date:           



                                                  19           



                                                  7:30:00           



                                                  CDT            

 

     Protonix      -0      No                       Notes:           [facility]



               5-27                               Tablet           l



               12:30:                               should not           [place]



               00                                 be chewed           



                                                  or             



                                                  crushed.           



                                                  (Same as:           



                                                  Protonix)           

 

     Sodium      2019-0      No                       250 mL,           [facility]



     Chloride      5-27                               Rate: To           l



     0.9%      12:10:                               prime line           [place]



     (titrate)      00                                 and flush           



     250 mL                                         remaining           



                                                  blood           



                                                  products.,           



                                                  Dosing           



                                                  Weight           



                                                  50.5, kg,           



                                                  Route: IV,           



                                                  Total           



                                                  Volume:           



                                                  250, Start           



                                                  Date:           



                                                  19           



                                                  7:10:00           



                                                  CDT,           



                                                  Duration:           



                                                  1 day,           



                                                  Stop date:           



                                                  19           



                                                  7:09:00           



                                                  CDT,           



                                                  Replace           



                                                  Every: 24           



                                                  hr             

 

     Sodium      2019-0      No                       250 mL,           [facility]



     Chloride      5-27                               Rate: To           l



     0.9%      12:10:                               prime line           [place]



     (titrate)      00                                 and flush           



     250 mL                                         remaining           



                                                  blood           



                                                  products.,           



                                                  Dosing           



                                                  Weight           



                                                  50.5, kg,           



                                                  Route: IV,           



                                                  Total           



                                                  Volume:           



                                                  250, Start           



                                                  Date:           



                                                  19           



                                                  7:10:00           



                                                  CDT,           



                                                  Duration:           



                                                  1 day,           



                                                  Stop date:           



                                                  19           



                                                  7:09:00           



                                                  CDT,           



                                                  Replace           



                                                  Every: 24           



                                                  hr             

 

     Sodium      2019-0      No                       250 mL,           [facility]



     Chloride      5-27                               Rate: To           l



     0.9%      11:02:                               prime line           [facility]



     (titrate)      00                                 and flush           



     250 mL                                         remaining           



                                                  blood           



                                                  products.,           



                                                  Dosing           



                                                  Weight           



                                                  50.5, kg,           



                                                  Route: IV,           



                                                  Total           



                                                  Volume:           



                                                  250,           



                                                  Priority:           



                                                  Routine,           



                                                  Start           



                                                  Date:           



                                                  19           



                                                  6:02:00           



                                                  CDT,           



                                                  Duration:           



                                                  30 day,           



                                                  Stop date:           



                                                  19           



                                                  6:01:00           



                                                  CDT,           



                                                  Replace           



                                                  Every: 24           



                                                  hr             

 

     Sodium      2019-0      No                       250 mL,           [facility]



     Chloride      5-27                               Rate: To           l



     0.9%      11:02:                               prime line           [place]



     (titrate)      00                                 and flush           



     250 mL                                         remaining           



                                                  blood           



                                                  products.,           



                                                  Dosing           



                                                  Weight           



                                                  50.5, kg,           



                                                  Route: IV,           



                                                  Total           



                                                  Volume:           



                                                  250,           



                                                  Priority:           



                                                  Routine,           



                                                  Start           



                                                  Date:           



                                                  19           



                                                  6:02:00           



                                                  CDT,           



                                                  Duration:           



                                                  30 day,           



                                                  Stop date:           



                                                  19           



                                                  6:01:00           



                                                  CDT,           



                                                  Replace           



                                                  Every: 24           



                                                  hr             

 

     NS (Bolus)      0      No                       250 mL,           [facility]

[facility]



     IV        5-27                               1000           l



               04:55:                               ml/hr,           [facility]



               00                                 Infuse           



                                                  Over: 15           



                                                  minutes,           



                                                  Route: IV,           



                                                  250, Drug           



                                                  form: INJ,           



                                                  ONCE,           



                                                  Priority:           



                                                  STAT,           



                                                  Dosing           



                                                  Weight           



                                                  50.5 kg,           



                                                  Start           



                                                  date:           



                                                  19           



                                                  23:55:00           



                                                  CDT, Stop           



                                                  date:           



                                                  19           



                                                  23:55:00           



                                                  CDT            

 

     NS (Bolus)      2019-0      No                       250 mL,           [facility]

[facility]



     IV        5-27                               1000           l



               04:55:                               ml/hr,           [place]



               00                                 Infuse           



                                                  Over: 15           



                                                  minutes,           



                                                  Route: IV,           



                                                  250, Drug           



                                                  form: INJ,           



                                                  ONCE,           



                                                  Priority:           



                                                  STAT,           



                                                  Dosing           



                                                  Weight           



                                                  50.5 kg,           



                                                  Start           



                                                  date:           



                                                  19           



                                                  23:55:00           



                                                  CDT, Stop           



                                                  date:           



                                                  19           



                                                  23:55:00           



                                                  CDT            

 

     nxstage      2019-0      No                       Notes:           [facility]



     pureflow      5-27                               NxStage           l



     rfp-401      04:19:                               RFP-401 =           [facility]

nn



     5000ml SOLN      00                                 K4/Ca3           



     5,000 mL                                         Total           



                                                  ingredient           



                                                  s in bag           



                                                  Na             



                                                  140meq/L;           



                                                  K 4meq/L;           



                                                  HCO            



                                                  35meq/L;           



                                                  Ca 3meq/L;           



                                                  Magnesium           



                                                  1meq/L; CL           



                                                  113meq/L;           



                                                  Glucose           



                                                  100mg/dL;           



                                                  "Break           



                                                  seal           



                                                  Between           



                                                  compartmen           



                                                  ts and mix           



                                                  before           



                                                  hanging"           

 

     nxstage            No                       Notes:           [facility]



     pureflow      5-                               NxStage           l



     rfp-[id number]      04:19:                               RFP-401 =           [facility]

nn



     5000ml SOLN      00                                 K4/Ca3           



     5,000 mL                                         Total           



                                                  ingredient           



                                                  s in bag           



                                                  Na             



                                                  140meq/L;           



                                                  K 4meq/L;           



                                                  HCO            



                                                  35meq/L;           



                                                  Ca 3meq/L;           



                                                  Magnesium           



                                                  1meq/L; CL           



                                                  113meq/L;           



                                                  Glucose           



                                                  100mg/dL;           



                                                  "Break           



                                                  seal           



                                                  Between           



                                                  compartmen           



                                                  ts and mix           



                                                  before           



                                                  hanging"           

 

     Calcium            No                       250 mL,           [facility]



     Chloride      5-27                               250 ml/hr,           l



     0.0014      03:51:                               Infuse           [facility]



     MEQ/ML /      00                                 Over: 1           



     Potassium                                         hr, Route:           



     Chloride                                         IV, 250,           



     0.004                                         Drug form:           



     MEQ/ML /                                         INJ, ONCE,           



     Sodium                                         Priority:           



     Chloride                                         STAT,           



     0.103                                         Dosing           



     MEQ/ML /                                         Weight           



     Sodium                                         50.5 kg,           



     Lactate                                         Start           



     0.028                                         date:           



     MEQ/ML                                         19           



     Injectable                                         22:51:00           



     Solution                                         CDT, Stop           



                                                  date:           



                                                  19           



                                                  22:51:00           



                                                  CDT            

 

     Calcium            No                       250 mL,           [facility]



     Chloride      5-27                               250 ml/hr,           l



     0.0014      03:51:                               Infuse           [facility]



     MEQ/ML /      00                                 Over: 1           



     Potassium                                         hr, Route:           



     Chloride                                         IV, 250,           



     0.004                                         Drug form:           



     MEQ/ML /                                         INJ, ONCE,           



     Sodium                                         Priority:           



     Chloride                                         STAT,           



     0.103                                         Dosing           



     MEQ/ML /                                         Weight           



     Sodium                                         50.5 kg,           



     Lactate                                         Start           



     0.028                                         date:           



     MEQ/ML                                         19           



     Injectable                                         22:51:00           



     Solution                                         CDT, Stop           



                                                  date:           



                                                  19           



                                                  22:51:00           



                                                  CDT            

 

     Flagyl            No                       Notes:           [facility]



               5-26                               (Same as:           l



               16:00:                               Flagyl)           [facility]



               00                                 Take with           



                                                  food/           



                                                  avoid           



                                                  alcohol           

 

     Flagyl            No                       Notes:           [facility]



               5-26                               (Same as:           l



               16:00:                               Flagyl)           [facility]



               00                                 Take with           



                                                  food/           



                                                  avoid           



                                                  alcohol           

 

     Melatonin            No                       Notes:           [facility]

a



               -                               (Same as:           l



               15:34:                               Melatonin)           [facility]



               00                                                

 

     Melatonin            No                       Notes:           [facility]

a



               5-26                               (Same as:           l



               15:34:                               Melatonin)                                                           

 

     Levofloxaci            No                       Notes: Do           [facility]

[facility]



     n         5-                               not give           l



               15:24:                               w/antacids           



               00                                 , dairy           



                                                  pdt &           



                                                  minerals           



                                                  Take 1 hr           



                                                  before or           



                                                  2 hr after           



                                                  dairy           



                                                  products           

 

     Levofloxaci            No                       Notes: Do           [facility]

[facility]



     n         5-26                               not give           l



               15:24:                               w/antacids           



               00                                 , dairy           



                                                  pdt &           



                                                  minerals           



                                                  Take 1 hr           



                                                  before or           



                                                  2 hr after           



                                                  dairy           



                                                  products           

 

     Vancomycin            No                         mg:           [facility]

[facility]



               5-26                               infuse           l



               15:00:                               over 2.5           [facility]



               00                                 hours  For           



                                                  adult           



                                                  patients           



                                                  only:           



                                                  Round to           



                                                  nearest           



                                                  250 mg per           



                                                  Medical           



                                                  Staff           



                                                  approval           



                                                  ***            



                                                  MEDICATION           



                                                  WASTE ***           



                                                  Product           



                                                  Size:           



                                                  1000 mg           



                                                  Product           



                                                  Wasted:           



                                                  ___ mg           

 

     Vancomycin            No                         mg:           [facility]

[facility]



               5-26                               infuse           l



               15:00:                               over 2.5           [facility]



               00                                 hours  For           



                                                  adult           



                                                  patients           



                                                  only:           



                                                  Round to           



                                                  nearest           



                                                  250 mg per           



                                                  Medical           



                                                  Staff           



                                                  approval           



                                                  ***            



                                                  MEDICATION           



                                                  WASTE ***           



                                                  Product           



                                                  Size:           



                                                  1000 mg           



                                                  Product           



                                                  Wasted:           



                                                  ___ mg           

 

     potassium            No                       Notes:           [facility]

a



     phosphate-s      -                               (Same as:           l



     odium      14:54:                               Phos-NaK)           [facility]



     phosphate      00                                 Each 1.5           



     250 mg-280                                         gm pkt has           



     mg-160 mg                                         250mg           



     oral powder                                         phosphorou           



     for                                          s. Mix           



     reconstitut                                         w/2.5oz           



     ion                                          water and           



                                                  stir.           

 

     potassium            No                       Notes:           [facility]

a



     phosphate      -26                               (Same as:           l



               14:54:                               K                                               Phosphate.           



                                                  )  Do not           



                                                  infuse           



                                                  phosphorou           



                                                  s              



                                                  concurrent           



                                                  ly in the           



                                                  same line           



                                                  as TPN or           



                                                  IVF that           



                                                  contains           



                                                  calcium.           



                                                  For double           



                                                  lumen           



                                                  central           



                                                  lines,           



                                                  phosphorou           



                                                  s may be           



                                                  infused in           



                                                  a separate           



                                                  lumen from           



                                                  TPN.  1           



                                                  mMol           



                                                  phoshate           



                                                  has 1.47           



                                                  mEq            



                                                  potassium           



                                                  Infuse           



                                                  over 4           



                                                  hours           

 

     Potassium            No                       Notes:           [facility]

a



     Chloride      -26                               (Same as:           l



               14:54:                               K-Dur 20)                                            "Do Not           



                                                  Crush"           



                                                  Give with           



                                                  food and           



                                                  full glass           



                                                  of water           



                                                  For            



                                                  patients           



                                                  unable to           



                                                  swallow           



                                                  tablet,           



                                                  dissolve           



                                                  in one           



                                                  half glass           



                                                  of water.           



                                                  Allow           



                                                  about 2           



                                                  minutes           



                                                  for the           



                                                  tablets to           



                                                  disintegra           



                                                  te. Stir           



                                                  before           



                                                  giving to           



                                                  prepare           



                                                  slurry and           



                                                  administer           



                                                  . Please           



                                                  exclude           



                                                                      Patient



                                                            



                                                  s with           



                                                  feeding           



                                                  tube less           



                                                  than 14           



                                                  [place]           



                                                  ([place],           



                                                  J-tube           



                                                  etc) and           



                                                  pediatric           



                                                  and            



                                                             



                                                  patients.           

 

     sodium            No                       Notes:           [facility]



     phosphate      5-26                               Infuse           l



               14:54:                               over 4           [facility]



               00                                 hour. Do           



                                                  not infuse           



                                                  phosphorou           



                                                  s              



                                                  concurrent           



                                                  ly in the           



                                                  same line           



                                                  as TPN or           



                                                  IVF that           



                                                  contains           



                                                  calcium.           



                                                  For double           



                                                  lumen           



                                                  central           



                                                  lines,           



                                                  phosphorou           



                                                  s may be           



                                                  infused in           



                                                  a separate           



                                                  lumen from           



                                                  TPN.           

 

     Magnesium            No                       Notes:           [facility]

a



     Sulfate      5-                               WASTE: F/P           l



               14:54:                               - Sink; E           [facility]



               00                                 -              



                                                  Municipal           



                                                  Trash Bin           

 

     Magnesium            No                       Notes:           [facility]

a



     Oxide      5-26                               (Same as:           l



               14:54:                               Mag-Ox           [facility]



               00                                 400)           



                                                  Magnesium           



                                                  oxide           



                                                  [id number]           



                                                  g              



                                                  elemental           



                                                  magnesium           



                                                  Dose=____m           



                                                  g              



                                                  magnesium           



                                                  oxide           



                                                  (___mg           



                                                  elemental           



                                                  magnesium)           

 

     Calcium            No                       Notes:           [facility]



     Chloride      -                               WASTE: F/P           l



               14:54:                               - Sink; E           [facility]                                 -              



                                                  [email or web address]           



                                                  Trash Bin           

 

     potassium            No                       Notes:           [facility]

a



     phosphate-s      -                               (Same as:           l



     odium      14:54:                               Phos-NaK)           [facility]



     phosphate      00                                 Each 1.5           



     250 mg-280                                         gm pkt has           



     mg-160 mg                                         250mg           



     oral powder                                         phosphorou           



     for                                          s. Mix           



     reconstitut                                         w/2.5oz           



     ion                                          water and           



                                                  stir.           

 

     potassium            No                       Notes:           [facility]

a



     phosphate      5-26                               (Same as:           l



               14:54:                               K              [facility]



               00                                 Phosphate.           



                                                  )  Do not           



                                                  infuse           



                                                  phosphorou           



                                                  s              



                                                  concurrent           



                                                  ly in the           



                                                  same line           



                                                  as TPN or           



                                                  IVF that           



                                                  contains           



                                                  calcium.           



                                                  For double           



                                                  lumen           



                                                  central           



                                                  lines,           



                                                  phosphorou           



                                                  s may be           



                                                  infused in           



                                                  a separate           



                                                  lumen from           



                                                  TPN.  1           



                                                  mMol           



                                                  phoshate           



                                                  has 1.47           



                                                  mEq            



                                                  potassium           



                                                  Infuse           



                                                  over 4           



                                                  hours           

 

     Potassium            No                       Notes:           [facility]

a



     Chloride      5-26                               (Same as:           l



               14:54:                               K-Dur 20)           [facility]



               00                                 "Do Not           



                                                  Crush"           



                                                  Give with           



                                                  food and           



                                                  full glass           



                                                  of water           



                                                  For            



                                                  patients           



                                                  unable to           



                                                  swallow           



                                                  tablet,           



                                                  dissolve           



                                                  in one           



                                                  half glass           



                                                  of water.           



                                                  Allow           



                                                  about 2           



                                                  minutes           



                                                  for the           



                                                  tablets to           



                                                  disintegra           



                                                  te. Stir           



                                                  before           



                                                  giving to           



                                                  prepare           



                                                  slurry and           



                                                  administer           



                                                  . Please           



                                                  exclude           



                                                                      Patient



                                                            



                                                  s with           



                                                  feeding           



                                                  tube less           



                                                  than 14           



                                                  French           



                                                  ([place],           



                                                  J-tube           



                                                  etc) and           



                                                  pediatric           



                                                  and            



                                                             



                                                  patients.           

 

     sodium            No                       Notes:           [facility]



     phosphate      5-26                               Infuse           l



               14:54:                               over 4           [facility]



               00                                 hour. Do           



                                                  not infuse           



                                                  phosphorou           



                                                  s              



                                                  concurrent           



                                                  ly in the           



                                                  same line           



                                                  as TPN or           



                                                  IVF that           



                                                  contains           



                                                  calcium.           



                                                  For double           



                                                  lumen           



                                                  central           



                                                  lines,           



                                                  phosphorou           



                                                  s may be           



                                                  infused in           



                                                  a separate           



                                                  lumen from           



                                                  TPN.           

 

     Magnesium            No                       Notes:           [facility]

a



     Sulfate                                     WASTE: F/P           l



               14:54:                               - Sink; E           [facility]



               00                                 -              



                                                  Municipal           



                                                  Trash Bin           

 

     Magnesium            No                       Notes:           [facility]

a



     Oxide                                     (Same as:           l



               14:54:                               Mag-Ox           [facility]



               00                                 400)           



                                                  Magnesium           



                                                  oxide           



                                                  [id number]           



                                                  g              



                                                  elemental           



                                                  magnesium           



                                                  Dose=____m           



                                                  g              



                                                  magnesium           



                                                  oxide           



                                                  (___mg           



                                                  elemental           



                                                  magnesium)           

 

     Calcium            No                       Notes:           [facility]



     Chloride                                     WASTE: F/P           l



               14:54:                               - Sink; E           [facility]



               00                                 -              



                                                  Municipal           



                                                  Trash Bin           

 

     nxstage            No                       Notes:           [facility]



     pureflow      -26                               NxStage           l



     rfp-400      14:53:                               RFP-400 =           [facility]

nn



     5000ml SOLN      00                                 K2/Ca3           



     5,000 mL                                         Total           



                                                  ingredient           



                                                  s in bag           



                                                  Na             



                                                  140meq/L;           



                                                  K 2meq/L;           



                                                  HCO            



                                                  35meq/L;           



                                                  Ca 3meq/L;           



                                                  Magnesium           



                                                  1meq/L; CL           



                                                  111meq/L;           



                                                  Glucose           



                                                  100mg/dL;           



                                                  "Break           



                                                  seal           



                                                  Between           



                                                  compartmen           



                                                  ts and mix           



                                                  before           



                                                  hanging"           

 

     nxstage            No                       Notes:           [facility]



     pureflow      -26                               NxStage           l



     rfp-400      14:53:                               RFP-400 =           [facility]

nn



     5000ml SOLN      00                                 K2/Ca3           



     5,000 mL                                         Total           



                                                  ingredient           



                                                  s in bag           



                                                  Na             



                                                  140meq/L;           



                                                  K 2meq/L;           



                                                  HCO            



                                                  35meq/L;           



                                                  Ca 3meq/L;           



                                                  Magnesium           



                                                  1meq/L; CL           



                                                  111meq/L;           



                                                  Glucose           



                                                  100mg/dL;           



                                                  "Break           



                                                  seal           



                                                  Between           



                                                  compartmen           



                                                  ts and mix           



                                                  before           



                                                  hanging"           

 

     Fentanyl            No                       Notes:           [facility]



               5-26                               (Same as:           l



               14:44:                               Sublimaze)           [facility]



               00                                  Preservat           



                                                  fly free.           

 

     Fentanyl            No                       Notes:           [facility]



               5-26                               (Same as:           l



               14:44:                               Sublimaze)           [facility]



               00                                  Preservat           



                                                  fly free.           

 

     heparin            No                       Notes:           [facility]



               5-26                               porcine           l



               13:00:                               heparin           [facility]



               00                                                

 

     heparin            No                       Notes:           [facility]



               5-26                               porcine           l



               13:00:                               heparin                                                           

 

     Lasix            No                       Notes:           [facility]



               5-26                               (Same as:           l



               12:13:                               Lasix)                                            ***            



                                                  MEDICATION           



                                                  WASTE ***           



                                                  Product           



                                                  Size:  40           



                                                  mg Product           



                                                  Wasted:           



                                                  ___ mg           

 

     Lasix            No                       Notes:           [facility]



               5-26                               (Same as:           l



               12:13:                               Lasix)                                            ***            



                                                  MEDICATION           



                                                  WASTE ***           



                                                  Product           



                                                  Size:  40           



                                                  mg Product           



                                                  Wasted:           



                                                  ___ mg           

 

     Keppra            No                       Notes:           [facility]



               5-26                               Same as           l



               03:00:                               Keppra                                                           

 

     Keppra            No                       Notes:           [facility]



               5-26                               Same as           l



               03:00:                               Keppra                                                           

 

     Seroquel            No                       Notes:           [facility]



               5-26                               (Same as:           l



               02:00:                               SEROquel)                                                           

 

     Seroquel            No                       Notes:           [facility]



               5-26                               (Same as:           l



               02:00:                               SEROquel)                                                           

 

     Vancomycin            No                        2001 mg:           [facility]

[facility]



               5-26                               infuse           l



               01:24:                               over 2.5           [facility]



               00                                 hours  For           



                                                  adult           



                                                  patients           



                                                  only:           



                                                  Round to           



                                                  nearest           



                                                  250 mg per           



                                                  Medical           



                                                  Staff           



                                                  approval           



                                                  ***            



                                                  MEDICATION           



                                                  WASTE ***           



                                                  Product           



                                                  Size:           



                                                  1000 mg           



                                                  Product           



                                                  Wasted:           



                                                  ___ mg           

 

     Vancomycin            No                        2001 mg:           [facility]

[facility]



               5-26                               infuse           l



               01:24:                               over 2.5           [facility]



               00                                 hours  For           



                                                  adult           



                                                  patients           



                                                  only:           



                                                  Round to           



                                                  nearest           



                                                  250 mg per           



                                                  Medical           



                                                  Staff           



                                                  approval           



                                                  ***            



                                                  MEDICATION           



                                                  WASTE ***           



                                                  Product           



                                                  Size:           



                                                  1000 mg           



                                                  Product           



                                                  Wasted:           



                                                  ___ mg           

 

     Ativan            Yes                      Notes:           [facility]



               5-25                               (Same as:           l



               23:43:                               Ativan)                                                           

 

     Ativan            Yes                      Notes:           [facility]



               5-25                               (Same as:           l



               23:43:                               Ativan)                                                           

 

     Calcium            No                       1,000 mL,           [facility]

[facility]



     Chloride      5-25                               1,000           l



     0.0014      22:06:                               ml/hr,           [facility]



     MEQ/ML /      00                                 Infuse           



     Potassium                                         Over: 1           



     Chloride                                         hr, Route:           



     0.004                                         IV, 1,000,           



     MEQ/ML /                                         Drug form:           



     Sodium                                         INJ, ONCE,           



     Chloride                                         Priority:           



     0.103                                         STAT,           



     MEQ/ML /                                         Dosing           



     Sodium                                         Weight           



     Lactate                                         50.5 kg,           



     0.028                                         Start           



     MEQ/ML                                         date:           



     Injectable                                         19           



     Solution                                         17:06:00           



                                                  CDT, Stop           



                                                  date:           



                                                  19           



                                                  17:06:00           



                                                  CDT            

 

     Calcium      2019-0      No                       1,000 mL,           [facility]

ia



     Chloride      5-25                               1,000           l



     0.0014      22:06:                               ml/hr,           [facility]



     MEQ/ML /      00                                 Infuse           



     Potassium                                         Over: 1           



     Chloride                                         hr, Route:           



     0.004                                         IV, 1,000,           



     MEQ/ML /                                         Drug form:           



     Sodium                                         INJ, ONCE,           



     Chloride                                         Priority:           



     0.103                                         STAT,           



     MEQ/ML /                                         Dosing           



     Sodium                                         Weight           



     Lactate                                         50.5 kg,           



     0.028                                         Start           



     MEQ/ML                                         date:           



     Injectable                                         19           



     Solution                                         17:06:00           



                                                  CDT, Stop           



                                                  date:           



                                                  19           



                                                  17:06:00           



                                                  CDT            

 

     Phenytoin            No                       Notes:           [facility]

a



               5-25                               (Same as:           l



               22:00:                               Dilantin)           [facility]



               00                                 Do not           



                                                  open,           



                                                  crush, or           



                                                  chew.           

 

     Phenytoin            No                       Notes:           [facility]

a



               5-25                               (Same as:           l



               22:00:                               Dilantin)           [facility]



               00                                 Do not           



                                                  open,           



                                                  crush, or           



                                                  chew.           

 

     NS (Bolus)            No                       1,000 mL,           [facility]

[facility]



     IV        5-25                               1,000           l



               16:15:                               ml/hr,           [facility]



               00                                 Infuse           



                                                  Over: 1           



                                                  hr, Route:           



                                                  IV, 1,000,           



                                                  Drug form:           



                                                  INJ, ONCE,           



                                                  Priority:           



                                                  STAT,           



                                                  Dosing           



                                                  Weight           



                                                  50.5 kg,           



                                                  Start           



                                                  date:           



                                                  19           



                                                  11:15:00           



                                                  CDT, Stop           



                                                  date:           



                                                  19           



                                                  11:15:00           



                                                  CDT            

 

     NS ([facility])            No                       1,000 mL,           [facility]

[facility]



     IV        5-25                               1,000           l



               16:15:                               ml/hr,           [facility]



               00                                 Infuse           



                                                  Over: 1           



                                                  hr, Route:           



                                                  IV, 1,000,           



                                                  Drug form:           



                                                  INJ, ONCE,           



                                                  Priority:           



                                                  STAT,           



                                                  Dosing           



                                                  Weight           



                                                  50.5 kg,           



                                                  Start           



                                                  date:           



                                                  19           



                                                  11:15:00           



                                                  CDT, Stop           



                                                  date:           



                                                  19           



                                                  11:15:00           



                                                  CDT            

 

     Ipratropium            No                       Notes: SEE           

[facility]



     Bromide 0.2      5-25                               RT             l



     MG/ML      16:10:                               DOCUMENTAT           [facility]

n



     Inhalant      00                                 ION (Same           



     Solution                                         as:Atroven           



                                                  t)             

 

     Xopenex            No                       Notes: SEE           [facility]

[facility]



               5-25                               RT             l



               16:10:                               DOCUMENTAT           [facility]



               00                                 ION (Same           



                                                  as:Xopenex           



                                                  )              



                                                  Non-Formul           



                                                  sasha            

 

     Ipratropium            No                       Notes: SEE           

[facility]



     Bromide 0.2      5-25                               RT             l



     MG/ML      16:10:                               DOCUMENTAT           [facility]

n



     Inhalant      00                                 ION (Same           



     Solution                                         as:Atroven           



                                                  t)             

 

     Xopenex            No                       Notes: SEE           [facility]

[facility]



               5-25                               RT             l



               16:10:                               DOCUMENTAT           [facility]



               00                                 ION (Same           



                                                  as:Xopenex           



                                                  )              



                                                  Non-Formul           



                                                  sasha            

 

     Sodium            No                       Notes:           [facility]



     Chloride      5-25                               Same as:           l



     1.2 MEQ/ML      16:09:                               HYPER-SAL           [facility]

[facility]



     Inhalant      00                                                



     Solution                                                        

 

     Sodium            No                       Notes:           [facility]



     Chloride      5-25                               Same as:           l



     1.2 MEQ/ML      16:09:                               HYPER-SAL           [facility]

[facility]



     Inhalant      00                                                



     Solution                                                        

 

     Losartan            No                       Notes:           [facility]



               5-25                               (Same as:           l



               14:00:                               Cozaar)           [facility]



               00                                                

 

     Losartan            No                       Notes:           [facility]



               5-25                               (Same as:           l



               14:00:                               Cozaar)           [facility]



               00                                                

 

     Protonix            No                       Notes:           [facility]



               5-25                               Tablet           l



               12:30:                               should not           [place]



               00                                 be chewed           



                                                  or             



                                                  crushed.           



                                                  (Same as:           



                                                  Protonix)           

 

     Omeprazole            No                       20 mg,           [facility]

[facility]



               5-25                               Route: PO,           l



               12:30:                               Drug form:           [facility]



               00                                 ECCAP,           



                                                  Before           



                                                  Breakfast,           



                                                  Dosing           



                                                  Weight           



                                                  50.5, kg,           



                                                  Start           



                                                  date:           



                                                  19           



                                                  7:30:00           



                                                  CDT,           



                                                  Duration:           



                                                  30 day,           



                                                  Stop date:           



                                                  19           



                                                  7:30:00           



                                                  CDT            

 

     Protonix            No                       Notes:           [facility]



               5-25                               Tablet           l



               12:30:                               should not           [place]



               00                                 be chewed           



                                                  or             



                                                  crushed.           



                                                  (Same as:           



                                                  Protonix)           

 

     Omeprazole            No                       20 mg,           [facility]

[facility]



               5-25                               Route: PO,           l



               12:30:                               Drug form:           [facility]



               00                                 ECCAP,           



                                                  Before           



                                                  Breakfast,           



                                                  Dosing           



                                                  Weight           



                                                  50.5, kg,           



                                                  Start           



                                                  date:           



                                                  19           



                                                  7:30:00           



                                                  CDT,           



                                                  Duration:           



                                                  30 day,           



                                                  Stop date:           



                                                  19           



                                                  7:30:00           



                                                  CDT            

 

     vancomycin      2019-0      No                         mg:           [facility]

[facility]



     + Sodium      5-25                               infuse           l



     Chloride      10:00:                               over 2.5           [facility]

nn



     0.9%       00                                 hours  For           



     mL                                           adult           



                                                  patients           



                                                  only:           



                                                  Round to           



                                                  nearest           



                                                  250 mg per           



                                                  Medical           



                                                  Staff           



                                                  approval           



                                                  ***            



                                                  MEDICATION           



                                                  WASTE ***           



                                                  Product           



                                                  Size:           



                                                  1000 mg           



                                                  Product           



                                                  Wasted:           



                                                  ___ mg           

 

     vancomycin      2019-0      No                         mg:           [facility]

[facility]



     + Sodium      5-25                               infuse           l



     Chloride      10:00:                               over 2.5           [facility]

nn



     0.9%       00                                 hours  For           



     mL                                           adult           



                                                  patients           



                                                  only:           



                                                  Round to           



                                                  nearest           



                                                  250 mg per           



                                                  Medical           



                                                  Staff           



                                                  approval           



                                                  ***            



                                                  MEDICATION           



                                                  WASTE ***           



                                                  Product           



                                                  Size:           



                                                  1000 mg           



                                                  Product           



                                                  Wasted:           



                                                  ___ mg           

 

     Calcium      -0      No                       1,000 mL,           [facility]

[facility]



     Chloride      5-25                               1,000           l



     0.0014      09:37:                               ml/hr,           [facility]



     MEQ/ML /      00                                 Infuse           



     Potassium                                         Over: 1           



     Chloride                                         hr, Route:           



     0.004                                         IV, 1,000,           



     MEQ/ML /                                         Drug form:           



     Sodium                                         INJ, ONCE,           



     Chloride                                         Priority:           



     0.103                                         STAT,           



     MEQ/ML /                                         Dosing           



     Sodium                                         Weight           



     Lactate                                         50.5 kg,           



     0.028                                         Start           



     MEQ/ML                                         date:           



     Injectable                                         19           



     Solution                                         4:37:00           



                                                  CDT, Stop           



                                                  date:           



                                                  19           



                                                  4:37:00           



                                                  CDT            

 

     Calcium      -0      No                       1,000 mL,           [facility]

[facility]



     Chloride      5-25                               1,000           l



     0.0014      09:37:                               ml/hr,           [facility]



     MEQ/ML /      00                                 Infuse           



     Potassium                                         Over: 1           



     Chloride                                         hr, Route:           



     0.004                                         IV, 1,000,           



     MEQ/ML /                                         Drug form:           



     Sodium                                         INJ, ONCE,           



     Chloride                                         Priority:           



     0.103                                         STAT,           



     MEQ/ML /                                         Dosing           



     Sodium                                         Weight           



     Lactate                                         50.5 kg,           



     0.028                                         Start           



     MEQ/ML                                         date:           



     Injectable                                         19           



     Solution                                         4:37:00           



                                                  CDT, Stop           



                                                  date:           



                                                  19           



                                                  4:37:00           



                                                  CDT            

 

     NS ([facility])      -0      No                       1,000 mL,           [facility]

[facility]



     IV        5-25                               1,000           l



               09:35:                               ml/hr,           [facility]



               00                                 Infuse           



                                                  Over: 1           



                                                  hr, Route:           



                                                  IV, 1,000,           



                                                  Drug form:           



                                                  INJ, ONCE,           



                                                  Priority:           



                                                  STAT,           



                                                  Dosing           



                                                  Weight           



                                                  50.5 kg,           



                                                  Start           



                                                  date:           



                                                  19           



                                                  4:35:00           



                                                  CDT, Stop           



                                                  date:           



                                                  19           



                                                  4:35:00           



                                                  CDT            

 

     NS ([facility])            No                       1,000 mL,           [facility]

[facility]



     IV        5-25                               1,000           l



               09:35:                               ml/hr,           [facility]



               00                                 Infuse           



                                                  Over: 1           



                                                  hr, Route:           



                                                  IV, 1,000,           



                                                  Drug form:           



                                                  INJ, ONCE,           



                                                  Priority:           



                                                  STAT,           



                                                  Dosing           



                                                  Weight           



                                                  50.5 kg,           



                                                  Start           



                                                  date:           



                                                  19           



                                                  4:35:00           



                                                  CDT, Stop           



                                                  date:           



                                                  19           



                                                  4:35:00           



                                                  CDT            

 

     NS ([facility])            No                       500 mL,           [facility]

[facility]



     IV        5-25                               2000           l



               09:32:                               ml/hr,           [facility]



               00                                 Infuse           



                                                  Over: 15           



                                                  minutes,           



                                                  Route: IV,           



                                                  500, Drug           



                                                  form: INJ,           



                                                  ONCE,           



                                                  Dosing           



                                                  Weight           



                                                  50.5 kg,           



                                                  Start           



                                                  date:           



                                                  19           



                                                  4:32:00           



                                                  CDT, Stop           



                                                  date:           



                                                  19           



                                                  4:32:00           



                                                  CDT            

 

     NS ([facility])            No                       500 mL,           [facility]

[facility]



     IV        5-25                               2000           l



               09:32:                               ml/hr,           [facility]



               00                                 Infuse           



                                                  Over: 15           



                                                  minutes,           



                                                  Route: IV,           



                                                  500, Drug           



                                                  form: INJ,           



                                                  ONCE,           



                                                  Dosing           



                                                  Weight           



                                                  50.5 kg,           



                                                  Start           



                                                  date:           



                                                  19           



                                                  4:32:00           



                                                  CDT, Stop           



                                                  date:           



                                                  19           



                                                  4:32:00           



                                                  CDT            

 

     heparin            No                       Notes:           [facility]



     additive      5-25                               Total           l



     25,000 unit      03:35:                               Concentrat           

[place]



     [12       00                                 ion = 50           



     unit/kg/hr]                                         unit/ ml           



     + Premix                                         Total           



     Diluent                                         volume =           



     Sodium                                         500 ml           



     Chloride                                         Send Med           



     0.45% 500                                         Request 2           



     mL                                           hours           



                                                  prior to           



                                                  next bag           

 

     Heparin 30            No                       Route:           [facility]

[facility]



     unit/kg      5-25                               IVP, PRN,           l



     [facility]      03:35:                               1,500           [place]



     (Heparin      00                                 unit, 1.5           



     Dosing                                         mL, Drug           



     Weight)                                         form: INJ,           



                                                  PRN,           



                                                  Heparin           



                                                  Protocol,           



                                                  Start           



                                                  date:           



                                                  19           



                                                  22:35:00           



                                                  CDT Stop           



                                                  date:           



                                                  19           



                                                  22:34:00           



                                                  CDT, 30           



                                                  day            

 

     Heparin 60            No                       Route:           [facility]

[facility]



     unit/kg      5-25                               IVP, PRN,           l



     Bolus      03:35:                               3,000           [facility]



     (Heparin      00                                 unit, 3           



     Dosing                                         mL, Drug           



     Weight)                                         form: INJ,           



                                                  PRN,           



                                                  Heparin           



                                                  Protocol,           



                                                  Start           



                                                  date:           



                                                  19           



                                                  22:35:00           



                                                  CDT Stop           



                                                  date:           



                                                  19           



                                                  22:34:00           



                                                  CDT, 30           



                                                  day            

 

     Heparin -      -      No                       3,000           [facility]



     one time      5-25                               unit, 3           l



     bolus for      03:35:                               mL, Route:           [facility]

[facility]



     ACS       00                                 IVP, Drug           



                                                  form: INJ,           



                                                  ONCE,           



                                                  Dosing           



                                                  Weight           



                                                  50.5, kg,           



                                                  Priority:           



                                                  STAT,           



                                                  Start           



                                                  date:           



                                                  19           



                                                  22:35:00           



                                                  CDT, Stop           



                                                  date:           



                                                  19           



                                                  22:35:00           



                                                  CDT            

 

     heparin      -      No                       Notes:           [facility]



     additive      5-25                               Total           l



     25,000 unit      03:35:                               Concentrat           

[facility]



     [12       00                                 ion = 50           



     unit/kg/hr]                                         unit/ ml           



     + Premix                                         Total           



     Diluent                                         volume =           



     Sodium                                         500 ml           



     Chloride                                         Send Med           



     0.45% 500                                         Request 2           



     mL                                           hours           



                                                  prior to           



                                                  next bag           

 

     Heparin 30            No                       Route:           [facility]

[facility]



     unit/kg      5-25                               IVP, PRN,           l



     Bolus      03:35:                               1,500           [facility]



     (Heparin      00                                 unit, 1.5           



     Dosing                                         mL, Drug           



     Weight)                                         form: INJ,           



                                                  PRN,           



                                                  Heparin           



                                                  Protocol,           



                                                  Start           



                                                  date:           



                                                  19           



                                                  22:35:00           



                                                  CDT Stop           



                                                  date:           



                                                  19           



                                                  22:34:00           



                                                  CDT, 30           



                                                  day            

 

     Heparin 60            No                       Route:           [facility]

[facility]



     unit/kg      5-25                               IVP, PRN,           l



     Bolus      03:35:                               3,000           [facility]



     (Heparin      00                                 unit, 3           



     Dosing                                         mL, Drug           



     Weight)                                         form: INJ,           



                                                  PRN,           



                                                  Heparin           



                                                  Protocol,           



                                                  Start           



                                                  date:           



                                                  19           



                                                  22:35:00           



                                                  CDT Stop           



                                                  date:           



                                                  19           



                                                  22:34:00           



                                                  CDT, 30           



                                                  day            

 

     Heparin -      -0      No                       3,000           [facility]



     one time      5-25                               unit, 3           l



     bolus for      03:35:                               mL, Route:           [facility]

[facility]



     ACS       00                                 IVP, Drug           



                                                  form: INJ,           



                                                  ONCE,           



                                                  Dosing           



                                                  Weight           



                                                  50.5, kg,           



                                                  Priority:           



                                                  STAT,           



                                                  Start           



                                                  date:           



                                                  19           



                                                  22:35:00           



                                                  CDT, Stop           



                                                  date:           



                                                  19           



                                                  22:35:00           



                                                  CDT            

 

     NS (Bolus)            No                       500 mL,           [facility]

[facility]



     IV        5-25                               500 ml/hr,           l



               02:25:                               Infuse           [facility]



               00                                 Over: 1           



                                                  hr, Route:           



                                                  IV, 500,           



                                                  Drug form:           



                                                  INJ, ONCE,           



                                                  Priority:           



                                                  STAT,           



                                                  Dosing           



                                                  Weight           



                                                  50.5 kg,           



                                                  Start           



                                                  date:           



                                                  19           



                                                  21:25:00           



                                                  CDT, Stop           



                                                  date:           



                                                  19           



                                                  21:25:00           



                                                  CDT            

 

     NS (Bolus)            No                       500 mL,           [facility]

[facility]



     IV        5-25                               500 ml/hr,           l



               02:25:                               Infuse                                            Over: 1           



                                                  hr, Route:           



                                                  IV, 500,           



                                                  Drug form:           



                                                  INJ, ONCE,           



                                                  Priority:           



                                                  STAT,           



                                                  Dosing           



                                                  Weight           



                                                  50.5 kg,           



                                                  Start           



                                                  date:           



                                                  19           



                                                  21:25:00           



                                                  CDT, Stop           



                                                  date:           



                                                  19           



                                                  21:25:00           



                                                  CDT            

 

     Flagyl            No                       Notes:           [facility]



               5-25                               (Same as:           l



               02:00:                               Flagyl)                                            Avoid           



                                                  alcohol.           

 

     cefepime            No                       Notes:           [facility]



               5-25                               (Same As:           l



               02:00:                               Maxipime)                                             ***           



                                                  MEDICATION           



                                                  WASTE ***           



                                                  Product           



                                                  Size:           



                                                  1000 mg           



                                                  Product           



                                                  Wasted:           



                                                  ___ mg           

 

     Flagyl            No                       Notes:           [facility]



               5-25                               (Same as:           l



               02:00:                               Flagyl)                                            Avoid           



                                                  alcohol.           

 

     cefepime            No                       Notes:           [facility]



               5-25                               (Same As:           l



               02:00:                               Maxipime)                                             ***           



                                                  MEDICATION           



                                                  WASTE ***           



                                                  Product           



                                                  Size:           



                                                  1000 mg           



                                                  Product           



                                                  Wasted:           



                                                  ___ mg           

 

     vancomycin            No                        2001 mg:           [facility]

[facility]



               5-25                               infuse           l



               01:42:                               over 2.5           [facility]



               00                                 hours           

 

     vancomycin            No                        2001 mg:           [facility]

[facility]



               5-25                               infuse           l



               01:42:                               over 2.5           [facility]



               00                                 hours           

 

     pantoprazol            No                       Notes: For           

[facility]



     e         5-25                               IV push           l



               01:11:                               reconstitu           [facility]



               00                                 te with 10           



                                                  ml 0.9%           



                                                  sodium           



                                                  chloride           



                                                  and push           



                                                  over 2           



                                                  minutes.           



                                                  (Same as:           



                                                  Protonix)           

 

     pantoprazol            No                       Notes: For           

[facility]



     e         5-25                               IV push           l



               01:11:                               reconstitu           [facility]



               00                                 te with 10           



                                                  ml 0.9%           



                                                  sodium           



                                                  chloride           



                                                  and push           



                                                  over 2           



                                                  minutes.           



                                                  (Same as:           



                                                  Protonix)           

 

     NS (Bolus)            No                       500 mL,           [facility]

[facility]



     IV        5-24                               250 ml/hr,           l



               23:39:                               Infuse           [facility]



                                                Over: 2           



                                                  hr, Route:           



                                                  IV, 500,           



                                                  Drug form:           



                                                  INJ, ONCE,           



                                                  Priority:           



                                                  STAT,           



                                                  Dosing           



                                                  Weight           



                                                  50.5 kg,           



                                                  Start           



                                                  date:           



                                                  19           



                                                  18:39:00           



                                                  CDT, Stop           



                                                  date:           



                                                  19           



                                                  18:39:00           



                                                  CDT            

 

     NS (Bolus)            No                       500 mL,           [facility]

[facility]



     IV        5-24                               250 ml/hr,           l



               23:39:                               Infuse           [facility]



               00                                 Over: 2           



                                                  hr, Route:           



                                                  IV, 500,           



                                                  Drug form:           



                                                  INJ, ONCE,           



                                                  Priority:           



                                                  STAT,           



                                                  Dosing           



                                                  Weight           



                                                  50.5 kg,           



                                                  Start           



                                                  date:           



                                                  19           



                                                  18:39:00           



                                                  CDT, Stop           



                                                  date:           



                                                  19           



                                                  18:39:00           



                                                  CDT            

 

     Ondansetron            No                       Notes:           [facility]

[facility]



     2 MG/ML      5-24                               (Same as:           l



     Injectable      18:55:                               Zofran)           [facility]

[facility]



     Solution      00                                 ***            



     [Zofran]                                         MEDICATION           



                                                  WASTE ***           



                                                  Product           



                                                  Size:  4           



                                                  mg Product           



                                                  Wasted:           



                                                  ___ mg           

 

     Ondansetron            No                       Notes:           [facility]

[facility]



     2 MG/ML      5-24                               (Same as:           l



     Injectable      18:55:                               Zofran)           [facility]

[facility]



     Solution      00                                 ***            



     [Zofran]                                         MEDICATION           



                                                  WASTE ***           



                                                  Product           



                                                  Size:  4           



                                                  mg Product           



                                                  Wasted:           



                                                  ___ mg           

 

     heparin            No                       Notes:           [facility]



               5-23                               porcine           l



               21:00:                               heparin           [facility]                                                

 

     heparin            No                       Notes:           [facility]



               5-23                               porcine           l



               21:00:                               heparin           [facility]                                                

 

     Losartan            No                       Notes:           [facility]



               5-23                               (Same as:           l



               17:57:                               Cozaar)           [facility]



                                                               

 

     Losartan            No                       Notes:           [facility]



               5-23                               (Same as:           l



               17:57:                               Cozaar)           [facility]                                                

 

     Brilinta            No                       Notes:           [facility]



               5-23                               (Same as:           l



               14:00:                               Brilinta)           [facility]                                                

 

     Plavix            No                       Notes:           [facility]



               5-23                               (Same As:           l



               14:00:                               Plavix)           [facility]                                                

 

     Flagyl            No                       Notes:           [facility]



               5-23                               (Same as:           l



               14:00:                               Flagyl)                                            Avoid           



                                                  alcohol.           

 

     Brilinta            No                       Notes:           [facility]



               5-23                               (Same as:           l



               14:00:                               Brilinta)                                                           

 

     Plavix            No                       Notes:           [facility]



               5-23                               (Same As:           l



               14:00:                               Plavix)                                                           

 

     Flagyl            No                       Notes:           [facility]



               5-23                               (Same as:           l



               14:00:                               Flagyl)                                            Avoid           



                                                  alcohol.           

 

     Ondansetron            No                       Notes:           [facility]

[facility]



               5-23                               (Same as:           l



               13:56:                               Zofran)                                            ***            



                                                  MEDICATION           



                                                  WASTE ***           



                                                  Product           



                                                  Size:  4           



                                                  mg Product           



                                                  Wasted:           



                                                  ___ mg           

 

     Ondansetron            No                       Notes:           [facility]

[facility]



               5-23                               (Same as:           l



               13:56:                               Zofran)                                            ***            



                                                  MEDICATION           



                                                  WASTE ***           



                                                  Product           



                                                  Size:  4           



                                                  mg Product           



                                                  Wasted:           



                                                  ___ mg           

 

     Lactated            No                       1,000 mL,           [facility]

[facility]



     Ringers IV      5-23                               Rate: 75           l



     1,000 mL      06:10:                               ml/hr,                                            Infuse           



                                                  over: 13.3           



                                                  hr, Route:           



                                                  IV, Dosing           



                                                  Weight           



                                                  50.5 kg,           



                                                  Total           



                                                  Volume:           



                                                  1,000,           



                                                  Start           



                                                  date:           



                                                  19           



                                                  1:10:00           



                                                  CDT,           



                                                  Duration:           



                                                  30 day,           



                                                  Stop date:           



                                                  19           



                                                  1:09:00           



                                                  CDT, 1.51,           



                                                  m2             

 

     Lactated            No                       1,000 mL,           [facility]

[facility]



     Ringers IV      5-23                               Rate: 75           l



     1,000 mL      06:10:                               ml/hr,                                            Infuse           



                                                  over: 13.3           



                                                  hr, Route:           



                                                  IV, Dosing           



                                                  Weight           



                                                  50.5 kg,           



                                                  Total           



                                                  Volume:           



                                                  1,000,           



                                                  Start           



                                                  date:           



                                                  19           



                                                  1:10:00           



                                                  CDT,           



                                                  Duration:           



                                                  30 day,           



                                                  Stop date:           



                                                  19           



                                                  1:09:00           



                                                  CDT, 1.51,           



                                                  m2             

 

     normal            No                       1,000 mL,           [facility]

a



     saline 0.9%      5-23                               Rate: 75           l



     IV 1,000 mL      03:37:                               ml/hr,                                            Infuse           



                                                  over: 13.3           



                                                  hr, Route:           



                                                  IVPB,           



                                                  Dosing           



                                                  Weight           



                                                  50.5 kg,           



                                                  Total           



                                                  Volume:           



                                                  1,000,           



                                                  Start           



                                                  date:           



                                                  19           



                                                  22:37:00           



                                                  CDT,           



                                                  Duration:           



                                                  30 day,           



                                                  Stop date:           



                                                  19           



                                                  22:36:00           



                                                  CDT, 1.51,           



                                                  m2             

 

     normal      2019      No                       1,000 mL,           [facility]

a



     saline 0.9%      -                               Rate: 75           l



     IV 1,000 mL      03:37:                               ml/hr,                                            Infuse           



                                                  over: 13.3           



                                                  hr, Route:           



                                                  IVPB,           



                                                  Dosing           



                                                  Weight           



                                                  50.5 kg,           



                                                  Total           



                                                  Volume:           



                                                  1,000,           



                                                  Start           



                                                  date:           



                                                  19           



                                                  22:37:00           



                                                  CDT,           



                                                  Duration:           



                                                  30 day,           



                                                  Stop date:           



                                                  19           



                                                  22:36:00           



                                                  CDT, 1.51,           



                                                  m2             

 

     NS (Bolus)            No                       500 mL,           [facility]

[facility]



     IV        5-23                               500 ml/hr,           l



               03:13:                               Infuse                                            Over: 1           



                                                  hr, Route:           



                                                  IV, 500,           



                                                  Drug form:           



                                                  INJ, ONCE,           



                                                  Priority:           



                                                  STAT,           



                                                  Dosing           



                                                  Weight           



                                                  50.5 kg,           



                                                  Start           



                                                  date:           



                                                  19           



                                                  22:13:00           



                                                  CDT, Stop           



                                                  date:           



                                                  19           



                                                  22:13:00           



                                                  CDT            

 

     NS (Bolus)            No                       500 mL,           [facility]

[facility]



     IV        5-23                               500 ml/hr,           l



               03:13:                               Infuse                                            Over: 1           



                                                  hr, Route:           



                                                  IV, 500,           



                                                  Drug form:           



                                                  INJ, ONCE,           



                                                  Priority:           



                                                  STAT,           



                                                  Dosing           



                                                  Weight           



                                                  50.5 kg,           



                                                  Start           



                                                  date:           



                                                  19           



                                                  22:13:00           



                                                  CDT, Stop           



                                                  date:           



                                                  19           



                                                  22:13:00           



                                                  CDT            

 

     Lipitor            No                       Notes:           [facility]



               5-23                               (Same as:           l



               02:00:                               Lipitor)                                                           

 

     Lipitor            No                       Notes:           [facility]



               5-23                               (Same as:           l



               02:00:                               Lipitor)                                                           

 

     Versed            No                       Notes:           [facility]



               5-23                               (Same as:           l



               01:16:                               Versed)                                            ***            



                                                  MEDICATION           



                                                  WASTE ***           



                                                  Product           



                                                  Size:  2           



                                                  mg Product           



                                                  Wasted:           



                                                  ___ mg           

 

     Versed            No                       Notes:           [facility]



               5-23                               (Same as:           l



               01:16:                               Versed)                                            ***            



                                                  MEDICATION           



                                                  WASTE ***           



                                                  Product           



                                                  Size:  2           



                                                  mg Product           



                                                  Wasted:           



                                                  ___ mg           

 

     Brilinta            No                       Notes:           [facility]



               5-23                               (Same as:           l



               00:26:                               Brilinta)                                                           

 

     Brilinta            No                       Notes:           [facility]



               5-23                               (Same as:           l



               00:26:                               Brilinta)                                                           

 

     Brilinta            No                       Notes:           [facility]



                                              (Same as:           l



               22:29:                               Brilinta)                                                           

 

     Brilinta            No                       Notes:           [facility]



                                              (Same as:           l



               22:29:                               Brilinta)                                                           

 

     Nitroglycer            No                       Notes:           [facility]

[facility]



     in                                       (Same           l



               22:15:                               as:Nitroqu           [facility]



                                                ick,           



                                                  Nitrostat)           



                                                  "Do Not           



                                                  Crush"           



                                                  Sublingual           



                                                  tablet           

 

     Sodium            No                       500 mL,           [facility]



     Chloride                                     Rate: 20           l



     0.9%       22:15:                               ml/hr,           [facility]

[facility]



     mL        00                                 Infuse           



                                                  over: 25           



                                                  hr, Route:           



                                                  IV, Dosing           



                                                  Weight           



                                                  50.5 kg,           



                                                  Total           



                                                  Volume:           



                                                  500, Start           



                                                  date:           



                                                  19           



                                                  17:15:00           



                                                  CDT,           



                                                  Duration:           



                                                  24 hr,           



                                                  Stop date:           



                                                  19           



                                                  17:14:00           



                                                  CDT, 1.51,           



                                                  m2             

 

     Nitroglycer            No                       Notes:           [facility]

[facility]



     in                                       (Same           l



               22:15:                               as:Nitroqu           [facility]



                                                ick,           



                                                  Nitrostat)           



                                                  "Do Not           



                                                  Crush"           



                                                  Sublingual           



                                                  tablet           

 

     Sodium            No                       500 mL,           [facility]



     Chloride                                     Rate: 20           l



     0.9%       22:15:                               ml/hr,           [facility]

[facility]



     mL        00                                 Infuse           



                                                  over: 25           



                                                  hr, Route:           



                                                  IV, Dosing           



                                                  Weight           



                                                  50.5 kg,           



                                                  Total           



                                                  Volume:           



                                                  500, Start           



                                                  date:           



                                                  19           



                                                  17:15:00           



                                                  CDT,           



                                                  Duration:           



                                                  24 hr,           



                                                  Stop date:           



                                                  19           



                                                  17:14:00           



                                                  CDT, 1.51,           



                                                  m2             

 

     propofol 10      2019-0      No                       Notes: If           [facility]

[facility]



     mg/mL                                     Diprivan -           l



     (Titrate.)      21:26:                               change           [facility]

nn



     IV 1,000 mg      00                                 bottle &           



                                                  tubing           



                                                  every 12           



                                                  hr Per           



                                                  state           



                                                  nursing           



                                                  law            



                                                  propofol           



                                                  can only           



                                                  be given           



                                                  by a nurse           



                                                  if patient           



                                                  is             



                                                  intubated           



                                                  or being           



                                                  intubated           



                                                  (unless           



                                                  the nurse           



                                                  is a           



                                                  CRNA).           



                                                  Same as:           



                                                  Diprivan           

 

     Vecuronium            Yes                      Notes:           [facility]

[facility]



                                              (Same As:           l



               21:26:                               Norcuron)                                                           

 

     propofol 10      2019-0      No                       Notes: If           [facility]

[facility]



     mg/mL                                     Diprivan -           l



     (Titrate.)      21:26:                               change           [facility]

nn



     IV 1,000 mg      00                                 bottle &           



                                                  tubing           



                                                  every 12           



                                                  hr Per           



                                                  state           



                                                  nursing           



                                                  law            



                                                  propofol           



                                                  can only           



                                                  be given           



                                                  by a nurse           



                                                  if patient           



                                                  is             



                                                  intubated           



                                                  or being           



                                                  intubated           



                                                  (unless           



                                                  the nurse           



                                                  is a           



                                                  CRNA).           



                                                  Same as:           



                                                  Diprivan           

 

     Vecuronium            Yes                      Notes:           [facility]

[facility]



               -22                               (Same As:           l



               21:26:                               Norcuron)           [place]



                                                               

 

     Plavix            No                       Notes:           [facility]



               5-22                               (Same As:           l



               20:00:                               Plavix)           [place]



                                                               

 

     Plavix            No                       Notes:           [facility]



               5-22                               (Same As:           l



               20:00:                               Plavix)           [place]



                                                               

 

     Dexmedetomi            No                       Notes: Use           

[facility]



     dine      5-22                               the            l



               19:05:                               following           [facility]



               00                                 cdm for           



                                                  [id number].           

 

     Dexmedetomi            No                       Notes: Use           

[facility]



     dine      5-22                               the            l



               19:05:                               following           [facility]



               00                                 cdm for           



                                                  [id number].           

 

     Aspirin            No                       Notes: Do           [facility]

[facility]



               -22                               not crush           l



               14:00:                               or chew.           [facility]



               00                                 (Same As:           



                                                  Ecotrin)           

 

     pantoprazol            No                       Notes: For           

[facility]



     e         5-22                               IV push           l



               14:00:                               reconstitu           [place]



               00                                 te with 10           



                                                  ml 0.9%           



                                                  sodium           



                                                  chloride           



                                                  and push           



                                                  over 2           



                                                  minutes.           



                                                  (Same as:           



                                                  Protonix)           

 

     Aspirin            No                       Notes: Do           [facility]

[facility]



               5-22                               not crush           l



               14:00:                               or chew.           [place]



               00                                 (Same As:           



                                                  Ecotrin)           

 

     pantoprazol            No                       Notes: For           

[facility]



     e         5-22                               IV push           l



               14:00:                               reconstitu           [facility]



               00                                 te with 10           



                                                  ml 0.9%           



                                                  sodium           



                                                  chloride           



                                                  and push           



                                                  over 2           



                                                  minutes.           



                                                  (Same as:           



                                                  Protonix)           

 

     Heparin 30            No                       Route:           [facility]

[facility]



     unit/kg      -                               IVP, PRN,           l



     Bolus      05:58:                               1,500           [place]



     (Heparin      00                                 unit, 1.5           



     Dosing                                         mL, Drug           



     Weight)                                         form: INJ,           



                                                  PRN,           



                                                  Heparin           



                                                  Protocol,           



                                                  Start           



                                                  date:           



                                                  19           



                                                  0:58:00           



                                                  CDT Stop           



                                                  date:           



                                                  19           



                                                  0:57:00           



                                                  CDT, 30           



                                                  day            

 

     heparin            No                       Notes:           [facility]



     additive      5-22                               Total           l



     25,000 unit      05:58:                               Concentrat           

[facility]



     [12       00                                 ion = 50           



     unit/kg/hr]                                         unit/ ml           



     + Premix                                         Total           



     Diluent                                         volume =           



     Sodium                                         500 ml           



     Chloride                                         Send Med           



     0.45% 500                                         Request 2           



     mL                                           hours           



                                                  prior to           



                                                  next bag           

 

     Heparin -            No                       2,500           [facility]



     one time      5-22                               unit, 2.5           l



     bolus for      05:58:                               mL, Route:           [facility]

[facility]



     ACS       00                                 IVP, Drug           



                                                  form: INJ,           



                                                  ONCE,           



                                                  Dosing           



                                                  Weight           



                                                  50.5, kg,           



                                                  Priority:           



                                                  STAT,           



                                                  Start           



                                                  date:           



                                                  19           



                                                  0:58:00           



                                                  CDT, Stop           



                                                  date:           



                                                  19           



                                                  0:58:00           



                                                  CDT            

 

     Heparin 60            No                       Route:           [facility]

[facility]



     unit/kg      5-22                               IVP, PRN,           l



     Bolus      05:58:                               3,000           [facility]



     (Heparin      00                                 unit, 3           



     Dosing                                         mL, Drug           



     Weight)                                         form: INJ,           



                                                  PRN,           



                                                  Heparin           



                                                  Protocol,           



                                                  Start           



                                                  date:           



                                                  19           



                                                  0:58:00           



                                                  CDT Stop           



                                                  date:           



                                                  19           



                                                  0:57:00           



                                                  CDT, 30           



                                                  day            

 

     Heparin 30            No                       Route:           [facility]

[facility]



     unit/kg      5-22                               IVP, PRN,           l



     Bolus      05:58:                               1,500           [place]



     (Heparin      00                                 unit, 1.5           



     Dosing                                         mL, Drug           



     Weight)                                         form: INJ,           



                                                  PRN,           



                                                  Heparin           



                                                  Protocol,           



                                                  Start           



                                                  date:           



                                                  19           



                                                  0:58:00           



                                                  CDT Stop           



                                                  date:           



                                                  19           



                                                  0:57:00           



                                                  CDT, 30           



                                                  day            

 

     heparin            No                       Notes:           [facility]



     additive      5-22                               Total           l



     25,000 unit      05:58:                               Concentrat           

[place]



     [12       00                                 ion = 50           



     unit/kg/hr]                                         unit/ ml           



     + Premix                                         Total           



     Diluent                                         volume =           



     Sodium                                         500 ml           



     Chloride                                         Send Med           



     0.45% 500                                         Request 2           



     mL                                           hours           



                                                  prior to           



                                                  next bag           

 

     Heparin -            No                       2,500           [facility]



     one time      5-22                               unit, 2.5           l



     bolus for      05:58:                               mL, Route:           [facility]

[facility]



     ACS       00                                 IVP, Drug           



                                                  form: INJ,           



                                                  ONCE,           



                                                  Dosing           



                                                  Weight           



                                                  50.5, kg,           



                                                  Priority:           



                                                  STAT,           



                                                  Start           



                                                  date:           



                                                  19           



                                                  0:58:00           



                                                  CDT, Stop           



                                                  date:           



                                                  19           



                                                  0:58:00           



                                                  CDT            

 

     Heparin 60            No                       Route:           [facility]

[facility]



     unit/kg      5-22                               IVP, PRN,           l



     Bolus      05:58:                               3,000           [place]



     (Heparin      00                                 unit, 3           



     Dosing                                         mL, Drug           



     Weight)                                         form: INJ,           



                                                  PRN,           



                                                  Heparin           



                                                  Protocol,           



                                                  Start           



                                                  date:           



                                                  19           



                                                  0:58:00           



                                                  CDT Stop           



                                                  date:           



                                                  19           



                                                  0:57:00           



                                                  CDT, 30           



                                                  day            

 

     ocular            No                       Notes:           [facility]



     lubricant      -22                               (Same as:           l



               05:00:                               Lacri-Lube           [place]



               00                                 ,              



                                                  Puralube,           



                                                  Duratears           



                                                  Naturale,           



                                                  Artificial           



                                                  Tears, and           



                                                  Tears           



                                                  Again )           

 

     heparin            No                       Notes:           [facility]



     sodium,      5-22                               porcine           l



     porcine      05:00:                               heparin           [facility]



     2500 UNT/ML      00                                                



     Injectable                                                        



     Solution                                                        

 

     ocular            No                       Notes:           [facility]



     lubricant      -22                               (Same as:           l



               05:00:                               Lacri-Lube           [facility]



               00                                 ,              



                                                  Puralube,           



                                                  Duratears           



                                                  Naturale,           



                                                  Artificial           



                                                  Tears, and           



                                                  Tears           



                                                  Again )           

 

     heparin            No                       Notes:           [facility]



     sodium,      5-22                               porcine           l



     porcine      05:00:                               heparin           [facility]



     2500 UNT/ML      00                                                



     Injectable                                                        



     Solution                                                        

 

     Albuterol            No                       Notes:           [facility]

a



     0.833 MG/ML      -22                               (Same as:           l



     /         04:47:                               Duoneb)           [facility]



     Ipratropium      00                                                



     Bromide                                                        



     0.167 MG/ML                                                        



     Inhalant                                                        



     Solution                                                        



     [DuoNeb]                                                        

 

     Albuterol            No                       Notes:           [facility]

a



     0.833 MG/ML      5-22                               (Same as:           l



     /         04:47:                               Duoneb)           [facility]



     Ipratropium      00                                                



     Bromide                                                        



     0.167 MG/ML                                                        



     Inhalant                                                        



     Solution                                                        



     [DuoNeb]                                                        

 

     Budesonide            No                       Notes:           [facility]

[facility]



     0.25 MG/ML      5-22                               (Same As:           l



     Inhalant      04:21:                               Pulmicort)           [facility]

[facility]



     Solution      00                                                



     [Pulmicort]                                                        

 

     Budesonide            No                       Notes:           [facility]

[facility]



     0.25 MG/ML      5-22                               (Same As:           l



     Inhalant      04:21:                               Pulmicort)           [facility]

[facility]



     Solution      00                                                



     [Pulmicort]                                                        

 

     Potassium            No                       Notes:           [facility]

a



     Chloride      5-22                               (Same as:           l



               03:01:                               Potassium           [place]



               00                                 Chloride)           

 

     Potassium            No                       Notes:           [facility]

a



     Chloride      5-22                               (Same as:           l



               03:01:                               Potassium           [facility]



               00                                 Chloride)           

 

     Keppra            No                       Notes:           [facility]



               5-22                               Same as           l



               02:00:                               Keppra                                            Mix with           



                                                  100 mL NS,           



                                                  LR or D5W           



                                                   ***           



                                                  MEDICATION           



                                                  WASTE ***           



                                                  Product           



                                                  Size:  500           



                                                  mg Product           



                                                  Wasted:           



                                                  ___ mg           

 

     chlorhexidi            No                       Notes:           [facility]

[facility]



     ne        5-22                               (Same As:           l



     gluconate      02:00:                               Peridex)           [facility]

[facility]



     1.2 MG/ML      00                                                



     Mouthwash                                                        

 

     Saline            No                       Notes:           [facility]



     Flush 0.9%      5-22                               (Same as:           l



               02:00:                               BD             [facility]                                 Posiflush)           

 

     Keppra            No                       Notes:           [facility]



               5-22                               Same as           l



               02:00:                               Keppra                                            Mix with           



                                                  100 mL NS,           



                                                  LR or D5W           



                                                   ***           



                                                  MEDICATION           



                                                  WASTE ***           



                                                  Product           



                                                  Size:  500           



                                                  mg Product           



                                                  Wasted:           



                                                  ___ mg           

 

     chlorhexidi            No                       Notes:           [facility]

[facility]



     ne        5-22                               (Same As:           l



     gluconate      02:00:                               Peridex)           [facility]

[facility]



     1.2 MG/ML                                                      



     Mouthwash                                                        

 

     Saline            No                       Notes:           [facility]



     Flush 0.9%      5-22                               (Same as:           l



               02:00:                               BD             [place]                                 Posiflush)           

 

     Vitamin B12            Yes                      100            [facility]

a



     100 mcg      5-22                               microgram           l



     oral tablet      00:35:                               = 1 tab,           [facility]

[facility]



               00                                 PO, Daily,           



                                                  # 30 tab,           



                                                  0              



                                                  Refill(s)           

 

     Symbicort            Yes                      2 puff,           [facility]

[facility]



     160/4.5      5-22                               INHALER,           l



     inhalation      00:35:                               BID, # 1           [facility]

[facility]



     aerosol      00                                 ea, 3           



     with                                         Refill(s)           



     adapter                                                        

 

     phenytoin            Yes                      300 mg = 3           [facility]

[facility]



     100 mg oral      5-22                               cap, PO,           l



     capsule,      00:35:                               Bedtime, 0           [facility]

[facility]



     extended      00                                 Refill(s)           



     release                                                        

 

     losartan            No                       100 mg = 1           [facility]

[facility]



     100 mg oral      5-22                               tab, PO,           l



     tablet      00:35:                               Daily, #           [facility]



               00                                 30 tab, 0           



                                                  Refill(s)           

 

     Furosemide            No                       20 mg = 1           [facility]

[facility]



     20 MG Oral      5-22                               tab, PO,           l



     Tablet      00:35:                               Daily, #           [facility]



     [Lasix]      00                                 30 tab, 0           



                                                  Refill(s)           

 

     clonazePAM            Yes                      0.5 mg = 1           [facility]

[facility]



     0.5 mg oral      5-22                               tab, PO,           l



     tablet      00:35:                               BID, PRN           [place]



               00                                 anxiety, #           



                                                  60 tab, 0           



                                                  Refill(s)           

 

     bisoprolol            No                       5 mg = 1           [facility]

[facility]



     5 mg oral      5-22                               tab, PO,           l



     tablet      00:35:                               Daily, #           [facility]



               00                                 30 tab, 0           



                                                  Refill(s)           

 

     atorvastati            Yes                      40 mg = 1           [facility]

[facility]



     n 40 MG      5-22                               tab, PO,           l



     Oral Tablet      00:35:                               Bedtime, #           

[facility]



     [Lipitor]      00                                 30 tab, 0           



                                                  Refill(s)           

 

     Albuterol            Yes                      2.49 mg =           [facility]

[facility]



     0.83 MG/ML      5-22                               3 mL, NEB,           l



     Inhalant      00:35:                               Q6H, PRN           [facility]

nn



     Solution      00                                 as needed           



                                                  for            



                                                  shortness           



                                                  of breath,           



                                                  # 120 ea,           



                                                  3              



                                                  Refill(s)           

 

     thiamine            Yes                      100 mg = 1           [facility]

[facility]



     100 mg oral      5-22                               tab, PO,           l



     tablet      00:35:                               Daily, 0           [facility]



               00                                 Refill(s)           

 

     Vitamin B12            Yes                      100            [facility]

a



     100 mcg      5-22                               microgram           l



     oral tablet      00:35:                               = 1 tab,           [facility]

[facility]



               00                                 PO, Daily,           



                                                  # 30 tab,           



                                                  0              



                                                  Refill(s)           

 

     Symbicort            Yes                      2 puff,           [facility]

[facility]



     160/4.5      5-22                               INHALER,           l



     inhalation      00:35:                               BID, # 1           [facility]

[facility]



     aerosol      00                                 ea, 3           



     with                                         Refill(s)           



     adapter                                                        

 

     phenytoin            Yes                      300 mg = 3           [facility]

[facility]



     100 mg oral      5-22                               cap, PO,           l



     capsule,      00:35:                               Bedtime, 0           [facility]

[facility]



     extended      00                                 Refill(s)           



     release                                                        

 

     losartan            No                       100 mg = 1           [facility]

[facility]



     100 mg oral      5-22                               tab, PO,           l



     tablet      00:35:                               Daily, #           [facility]



               00                                 30 tab, 0           



                                                  Refill(s)           

 

     Furosemide            No                       20 mg = 1           [facility]

[facility]



     20 MG Oral      5-22                               tab, PO,           l



     Tablet      00:35:                               Daily, #           [place]



     [Lasix]      00                                 30 tab, 0           



                                                  Refill(s)           

 

     clonazePAM            Yes                      0.5 mg = 1           [facility]

[facility]



     0.5 mg oral      5-22                               tab, PO,           l



     tablet      00:35:                               BID, PRN           [place]



               00                                 anxiety, #           



                                                  60 tab, 0           



                                                  Refill(s)           

 

     bisoprolol            No                       5 mg = 1           [facility]

[facility]



     5 mg oral      5-22                               tab, PO,           l



     tablet      00:35:                               Daily, #           [facility]



               00                                 30 tab, 0           



                                                  Refill(s)           

 

     atorvastati            Yes                      40 mg = 1           [facility]

[facility]



     n 40 MG      5-22                               tab, PO,           l



     Oral Tablet      00:35:                               Bedtime, #           

[facility]



     [Lipitor]      00                                 30 tab, 0           



                                                  Refill(s)           

 

     Albuterol            Yes                      2.49 mg =           [facility]

[facility]



     0.83 MG/ML      5-22                               3 mL, NEB,           l



     Inhalant      00:35:                               Q6H, PRN           [facility]

nn



     Solution      00                                 as needed           



                                                  for            



                                                  shortness           



                                                  of breath,           



                                                  # 120 ea,           



                                                  3              



                                                  Refill(s)           

 

     thiamine            Yes                      100 mg = 1           [facility]

[facility]



     100 mg oral      5-22                               tab, PO,           l



     tablet      00:35:                               Daily, 0           [facility]



               00                                 Refill(s)           

 

     Ceftriaxone            No                       Notes:           [facility]

[facility]



               5-22                               (Same As:           l



               00:34:                               Rocephin).           [facility]



               00                                    ***           



                                                  MEDICATION           



                                                  WASTE ***           



                                                  Product           



                                                  Size:           



                                                  1000 mg           



                                                  Product           



                                                  Wasted:           



                                                  ___ mg           

 

     Ceftriaxone            No                       Notes:           [facility]

[facility]



               5-22                               (Same As:           l



               00:34:                               Rocephin).           [facility]



               00                                    ***           



                                                  MEDICATION           



                                                  WASTE ***           



                                                  Product           



                                                  Size:           



                                                  1000 mg           



                                                  Product           



                                                  Wasted:           



                                                  ___ mg           

 

     chlorhexidi            No                       Notes:           [facility]

[facility]



     ne        5-22                               (Same As:           l



     gluconate      00:19:                               Peridex)           [facility]

[facility]



     1.2 MG/ML      00                                                



     Mouthwash                                                        

 

     potassium            No                       Notes:           [facility]

a



     phosphate      5-22                               (Same as:           l



               00:19:                               K              [place]



               00                                 Phosphate.           



                                                  )  Do not           



                                                  infuse           



                                                  phosphorou           



                                                  s              



                                                  concurrent           



                                                  ly in the           



                                                  same line           



                                                  as TPN or           



                                                  IVF that           



                                                  contains           



                                                  calcium.           



                                                  For double           



                                                  lumen           



                                                  central           



                                                  lines,           



                                                  phosphorou           



                                                  s may be           



                                                  infused in           



                                                  a separate           



                                                  lumen from           



                                                  TPN.  1           



                                                  mMol           



                                                  phoshate           



                                                  has 1.47           



                                                  mEq            



                                                  potassium           



                                                  Infuse           



                                                  over 4           



                                                  hours           

 

     Magnesium            No                       Notes:           [facility]

a



     Oxide      5-22                               (Same as:           l



               00:19:                               Mag-Ox           [facility]



               00                                 400)           



                                                  Magnesium           



                                                  oxide           



                                                  [id number]           



                                                  g              



                                                  elemental           



                                                  magnesium           



                                                  Dose=____m           



                                                  g              



                                                  magnesium           



                                                  oxide           



                                                  (___mg           



                                                  elemental           



                                                  magnesium)           

 

     potassium            No                       Notes:           [facility]

a



     phosphate-s      -22                               (Same as:           l



     odium      00:19:                               Phos-NaK)           [facility]



     phosphate      00                                 Each 1.5           



     250 mg-280                                         gm pkt has           



     mg-160 mg                                         250mg           



     oral powder                                         phosphorou           



     for                                          s. Mix           



     reconstitut                                         w/2.5oz           



     ion                                          water and           



                                                  stir.           

 

     Magnesium            No                       Notes:           [facility]

a



     Sulfate      -                               WASTE: F/P           l



               00:19:                               - Sink; E           [place]



               00                                 -              



                                                  Municipal           



                                                  Trash Bin           

 

     Calcium            No                       Notes:           [facility]



     Carbonate      -                               (Same As:           l



     500 MG      00:19:                               Tums)           [facility]



     Chewable      00                                 Calcium           



     Tablet                                         Carbonate           



                                                  500 mg =           



                                                  200 mg           



                                                  elemental           



                                                  calcium           



                                                  Dose =           



                                                  ______ mg           



                                                  calcium           



                                                  carbonate           



                                                  (______ mg           



                                                  elemental           



                                                  calcium)           

 

     Calcium            No                       Notes:           [facility]



     Gluconate                                     WASTE: F/P           l



               00:19:                               - Sink; E           [facility]



               00                                 -              



                                                  Municipal           



                                                  Trash Bin           

 

     Potassium            No                       Notes:           [facility]

a



     Chloride      -22                               (Same as:           l



               00:19:                               KCL)           [facility]



               00                                 Infuse           



                                                  over 2           



                                                  hours.           

 

     sodium            No                       Notes:           [facility]



     phosphate      -22                               Infuse           l



               00:19:                               over 4           [facility]



               00                                 hour. Do           



                                                  not infuse           



                                                  phosphorou           



                                                  s              



                                                  concurrent           



                                                  ly in the           



                                                  same line           



                                                  as TPN or           



                                                  IVF that           



                                                  contains           



                                                  calcium.           



                                                  For double           



                                                  lumen           



                                                  central           



                                                  lines,           



                                                  phosphorou           



                                                  s may be           



                                                  infused in           



                                                  a separate           



                                                  lumen from           



                                                  TPN.           

 

     Saline            No                       Notes:           [facility]



     Flush 0.9%      -22                               (Same as:           l



               00:19:                               BD             [place]



               00                                 Posiflush)           

 

     Nystatin            No                       Notes:           [facility]



     100 UNT/MG      -22                               (Same           l



     Topical      00:19:                               as:Mycosta           [facility]

[facility]



     Powder      00                                 tin,           



                                                  Nilstat)           



                                                  for            



                                                  external           



                                                  use only.           

 

     chlorhexidi            No                       Notes:           [facility]

[facility]



     ne        -22                               (Same As:           l



     gluconate      00:19:                               Peridex)           [facility]

[facility]



     1.2 MG/ML      00                                                



     Mouthwash                                                        

 

     potassium            No                       Notes:           [facility]

a



     phosphate      5-22                               (Same as:           l



               00:19:                               K              [facility]



               00                                 Phosphate.           



                                                  )  Do not           



                                                  infuse           



                                                  phosphorou           



                                                  s              



                                                  concurrent           



                                                  ly in the           



                                                  same line           



                                                  as TPN or           



                                                  IVF that           



                                                  contains           



                                                  calcium.           



                                                  For double           



                                                  lumen           



                                                  central           



                                                  lines,           



                                                  phosphorou           



                                                  s may be           



                                                  infused in           



                                                  a separate           



                                                  lumen from           



                                                  TPN.  1           



                                                  mMol           



                                                  phoshate           



                                                  has 1.47           



                                                  mEq            



                                                  potassium           



                                                  Infuse           



                                                  over 4           



                                                  hours           

 

     Magnesium            No                       Notes:           [facility]

a



     Oxide      5-22                               (Same as:           l



               00:19:                               Mag-Ox           [place]



               00                                 400)           



                                                  Magnesium           



                                                  oxide           



                                                  [id number]           



                                                  g              



                                                  elemental           



                                                  magnesium           



                                                  Dose=____m           



                                                  g              



                                                  magnesium           



                                                  oxide           



                                                  (___mg           



                                                  elemental           



                                                  magnesium)           

 

     potassium            No                       Notes:           [facility]

a



     phosphate-s      -22                               (Same as:           l



     odium      00:19:                               Phos-NaK)           [place]



     phosphate      00                                 Each 1.5           



     250 mg-280                                         gm pkt has           



     mg-160 mg                                         250mg           



     oral powder                                         phosphorou           



     for                                          s. Mix           



     reconstitut                                         w/2.5oz           



     ion                                          water and           



                                                  stir.           

 

     Magnesium            No                       Notes:           [facility]

a



     Sulfate                                     WASTE: F/P           l



               00:19:                               - Sink; E           [facility]



               00                                 -              



                                                  Municipal           



                                                  Trash Bin           

 

     Calcium            No                       Notes:           [facility]



     Carbonate      -                               (Same As:           l



     500 MG      00:19:                               Tums)           [place]



     Chewable      00                                 Calcium           



     Tablet                                         Carbonate           



                                                  500 mg =           



                                                  200 mg           



                                                  elemental           



                                                  calcium           



                                                  Dose =           



                                                  ______ mg           



                                                  calcium           



                                                  carbonate           



                                                  (______ mg           



                                                  elemental           



                                                  calcium)           

 

     Calcium            No                       Notes:           [facility]



     Gluconate                                     WASTE: F/P           l



               00:19:                               - Sink; E           [facility]



               00                                 -              



                                                  Municipal           



                                                  Trash Bin           

 

     Potassium            No                       Notes:           [facility]

a



     Chloride      -22                               (Same as:           l



               00:19:                               KCL)           [place]



               00                                 Infuse           



                                                  over 2           



                                                  hours.           

 

     sodium            No                       Notes:           [facility]



     phosphate      -22                               Infuse           l



               00:19:                               over 4           [place]



               00                                 hour. Do           



                                                  not infuse           



                                                  phosphorou           



                                                  s              



                                                  concurrent           



                                                  ly in the           



                                                  same line           



                                                  as TPN or           



                                                  IVF that           



                                                  contains           



                                                  calcium.           



                                                  For double           



                                                  lumen           



                                                  central           



                                                  lines,           



                                                  phosphorou           



                                                  s may be           



                                                  infused in           



                                                  a separate           



                                                  lumen from           



                                                  TPN.           

 

     Saline            No                       Notes:           [facility]



     Flush 0.9%      -22                               (Same as:           l



               00:19:                               BD             [facility]



               00                                 Posiflush)           

 

     Nystatin            No                       Notes:           [facility]



     100 UNT/MG      -22                               (Same           l



     Topical      00:19:                               as:Mycosta           [facility]

[facility]



     Powder      00                                 tin,           



                                                  Nilstat)           



                                                  for            



                                                  external           



                                                  use only.           

 

     Dilantin            No                       Notes:           [facility]



               5-21                               (Same as:           l



               22:23:                               Dilantin)                                             Rinse           



                                                  packet/syr           



                                                  jennifer with           



                                                  water           



                                                  "Caution           



                                                  of             



                                                  interactio           



                                                  n with           



                                                  continuous           



                                                  NG             



                                                  feedings:           



                                                  Withhold           



                                                  administra           



                                                  tion of           



                                                  nutritiona           



                                                  l              



                                                  supplement           



                                                  s for 1-2           



                                                  hours           



                                                  before and           



                                                  after           



                                                  phenytoin           



                                                  dose".           

 

     Dilantin            No                       Notes:           [facility]



               5-21                               (Same as:           l



               22:23:                               Dilantin)                                             Rinse           



                                                  packet/syr           



                                                  jennifer with           



                                                  water           



                                                  "Caution           



                                                  of             



                                                  interactio           



                                                  n with           



                                                  continuous           



                                                  NG             



                                                  feedings:           



                                                  Withhold           



                                                  administra           



                                                  tion of           



                                                  nutritiona           



                                                  l              



                                                  supplement           



                                                  s for 1-2           



                                                  hours           



                                                  before and           



                                                  after           



                                                  phenytoin           



                                                  dose".           

 

     atorvastati            No                       Notes:           [facility]

[facility]



     n         5-21                               (Same as:           l



               21:56:                               Lipitor)                                                           

 

     atorvastati            No                       Notes:           [facility]

[facility]



     n         5-21                               (Same as:           l



               21:56:                               Lipitor)                                                           

 

     Aspirin            No                       Notes:           [facility]



               5-21                               Refrigerat           l



               18:34:                               e.                                                             

 

     Aspirin            No                       Notes:           [facility]



               5-21                               Refrigerat           l



               18:34:                               e.                                                             

 

     Midazolam            No                       Notes:           [facility]

a



               5-21                               (Same as:           l



               18:07:                               Versed)                                                           

 

     Midazolam            No                       Notes:           [facility]

a



               5-21                               (Same as:           l



               18:07:                               Versed)                                                           

 

     Fentanyl            No                       50             [facility]



               5-21                               microgram,           l



               18:04:                               Route:                                            IVP, ONCE,           



                                                  Dosing           



                                                  Weight           



                                                  50.5, kg,           



                                                  Priority:           



                                                  STAT,           



                                                  Start           



                                                  date:           



                                                  19           



                                                  13:04:00           



                                                  CDT, Stop           



                                                  date:           



                                                  19           



                                                  13:04:00           



                                                  CDT            

 

     Midazolam            No                       2 mg,           [facility]



               5-21                               Route:           l



               18:04:                               IVP, ONCE,                                            Dosing           



                                                  Weight           



                                                  50.5, kg,           



                                                  Priority:           



                                                  STAT,           



                                                  Start           



                                                  date:           



                                                  19           



                                                  13:04:00           



                                                  CDT, Stop           



                                                  date:           



                                                  19           



                                                  13:04:00           



                                                  CDT            

 

     Fentanyl            No                       50             [facility]



               5-21                               microgram,           l



               18:04:                               Route:                                            IVP, ONCE,           



                                                  Dosing           



                                                  Weight           



                                                  50.5, kg,           



                                                  Priority:           



                                                  STAT,           



                                                  Start           



                                                  date:           



                                                  19           



                                                  13:04:00           



                                                  CDT, Stop           



                                                  date:           



                                                  19           



                                                  13:04:00           



                                                  CDT            

 

     Midazolam      2019-0      No                       2 mg,           [facility]



               5-21                               Route:           l



               18:04:                               IVP, ONCE,                                            Dosing           



                                                  Weight           



                                                  50.5, kg,           



                                                  Priority:           



                                                  STAT,           



                                                  Start           



                                                  date:           



                                                  19           



                                                  13:04:00           



                                                  CDT, Stop           



                                                  date:           



                                                  19           



                                                  13:04:00           



                                                  CDT            

 

     Isolyte S      2019-0      No                       Notes:           [facility]

a



     PH 7.4      -21                               (Same as:           l



     1,000 mL      17:50:                               Isolyte S           [facility]

[facility]



               00                                 PH 7.4)           

 

     Isolyte S      2019-0      No                       Notes:           [facility]

a



     PH 7.4      5-21                               (Same as:           l



     1,000 mL      17:50:                               Isolyte S           [facility]

[facility]



               00                                 PH 7.4)           

 

     Fentanyl      2019-0      No                       1,000           [facility]



               5-21                               microgram,           l



               17:49:                               20 mL,                                            Rate:           



                                                  Titrate,           



                                                  Start           



                                                  Dose: 50           



                                                  microgram/           



                                                  hr,            



                                                  Titration:           



                                                  25             



                                                  microgram/           



                                                  hour every           



                                                  15             



                                                  minutes,           



                                                  Goal(s):           



                                                  BPS 3, Max           



                                                  Dose: 300           



                                                  microgram/           



                                                  hr, Route:           



                                                  IV, Dosing           



                                                  Weight           



                                                  50.5 kg,           



                                                  Total           



                                                  Volume:           



                                                  20, Start           



                                                  date:           



                                                  19           



                                                  12:49:00           



                                                  CDT,           



                                                  Durat...           

 

     Fentanyl      2019-0      No                       1,000           [facility]



               5-21                               microgram,           l



               17:49:                               20 mL,                                            Rate:           



                                                  Titrate,           



                                                  Start           



                                                  Dose: 50           



                                                  microgram/           



                                                  hr,            



                                                  Titration:           



                                                  25             



                                                  microgram/           



                                                  hour every           



                                                  15             



                                                  minutes,           



                                                  Goal(s):           



                                                  BPS 3, Max           



                                                  Dose: 300           



                                                  microgram/           



                                                  hr, Route:           



                                                  IV, Dosing           



                                                  Weight           



                                                  50.5 kg,           



                                                  Total           



                                                  Volume:           



                                                  20, Start           



                                                  date:           



                                                  19           



                                                  12:49:00           



                                                  CDT,           



                                                  Durat...           

 

     Iohexol      2019-0      No                       60 mL,           [facility]



               5-21                               Route:           l



               17:31:                               IVP, Drug                                            Form:           



                                                  SOLN,           



                                                  Dosing           



                                                  Weight           



                                                  50.5, kg,           



                                                  ONCALL,           



                                                  STAT,           



                                                  Start           



                                                  date:           



                                                  19           



                                                  12:31:00           



                                                  CDT,           



                                                  Duration:           



                                                  1 doses or           



                                                  times,           



                                                  Dose =           



                                                  2.2ml/kg,           



                                                  Max dose =           



                                                  100ml --           



                                                  "To be           



                                                  infused by           



                                                  Radiology           



                                                  Staff           



                                                  ONLY"           

 

     Iohexol      2019-0      No                       60 mL,           [facility]



               5-21                               Route:           l



               17:31:                               IVP, Drug                                            Form:           



                                                  SOLN,           



                                                  Dosing           



                                                  Weight           



                                                  50.5, kg,           



                                                  ONCALL,           



                                                  STAT,           



                                                  Start           



                                                  date:           



                                                  19           



                                                  12:31:00           



                                                  CDT,           



                                                  Duration:           



                                                  1 doses or           



                                                  times,           



                                                  Dose =           



                                                  2.2ml/kg,           



                                                  Max dose =           



                                                  100ml --           



                                                  "To be           



                                                  infused by           



                                                  Radiology           



                                                  Staff           



                                                  ONLY"           

 

     Saline            No                       Notes:           [facility]



     Flush 0.9%      5-21                               Same as:           l



               16:52:                               BD             [facility]



                                                Posiflush           



                                                  Sterile           

 

     Keppra            No                       Notes:           [facility]



               5-21                               Same as           l



               16:52:                               Keppra           [facility]



               00                                 Mix with           



                                                  100 mL NS,           



                                                  LR or D5W           



                                                   ***           



                                                  MEDICATION           



                                                  WASTE ***           



                                                  Product           



                                                  Size:  500           



                                                  mg Product           



                                                  Wasted:           



                                                  ___ mg           

 

     Saline            No                       Notes:           [facility]



     Flush 0.9%      5-21                               Same as:           l



               16:52:                               BD             [place]



               00                                 Posiflush           



                                                  Ster[place]           

 

     Keppra            No                       Notes:           [facility]



               5-21                               Same as           l



               16:52:                               Keppra           [place]



               00                                 Mix with           



                                                  100 mL NS,           



                                                  LR or D5W           



                                                   ***           



                                                  MEDICATION           



                                                  WASTE ***           



                                                  Product           



                                                  Size:  500           



                                                  mg Product           



                                                  Wasted:           



                                                  ___ mg           

 

     phenytoin      2018      Yes            100mg      Take 100           [facility]

vers



     Extended      0-26                               mg by           ity of



     100 mg      00:00:                               mouth.           [place]



     capsule      [address] [place] [address]

 

     atorvastati      2018      Yes            40mg      Take 40 mg           

[facility]



     n 40 mg      0-26                               by mouth.           [facility] of



     tablet      00:00:                                              [address]

 

     levETIRAcet      2018      Yes            750mg      Take 750           U

nivers



     am 750 mg      0-26                               mg by           ity of



     tablet      00:00:                               mouth.           [address]

 

     phenytoin      2018      Yes            100mg      Take 100           [facility]

vers



     Extended      0-26                               mg by           ity of



     100 mg      00:00:                               mouth.           [place]



     capsule                                                      [facility]

 

     atorvastati      2018      Yes            40mg      Take 40 mg           

[facility]



     n 40 mg      0-26                               by mouth.           ity of



     tablet      00:00:                                              [address]

 

     levETIRAcet      2018      Yes            750mg      Take 750           U

nivers



     am 750 mg      0-26                               mg by           ity of



     tablet      00:00:                               mouth.           [address]

 

     phenytoin      2018      Yes            100mg      Take 100           [facility]

vers



     Extended      0-26                               mg by           ity of



     100 mg      00:00:                               mouth.           [place]



     capsule                                                      [facility]



                                                                 Branch

 

     atorvastati      2018      Yes            40mg      Take 40 mg           

[facility]



     n 40 mg      0-26                               by mouth.           [facility] of



     tablet      00:00:                                              [place]



               00                                                Medical



                                                                 Branch

 

     levETIRAcet      2018      Yes            750mg      Take 750           U

nivers



     am 750 mg      0-26                               mg by           ity of



     tablet      00:00:                               mouth.           [place]



               00                                                Medical



                                                                 Branch

 

     phenytoin      -      Yes            100mg      Take 100           [facility]

vers



     Extended      0-26                               mg by           ity of



     100 mg      00:00:                               mouth.           [place]



     capsule                                                      Medical



                                                                 Branch

 

     atorvastati      2018      Yes            40mg      Take 40 mg           

[facility]



     n 40 mg      0-26                               by mouth.           [facility] of



     tablet      00:00:                                              [place]



                                                               [facility]



                                                                 Branch

 

     levETIRAcet      2018      Yes            750mg      Take 750           U

nivers



     am 750 mg      0-26                               mg by           ity of



     tablet      00:00:                               mouth 2           [place]



                                                (two)           Medical



                                                  times           [facility]



                                                  daily.           

 

     phenytoin      2018      Yes            100mg      Take 100           [facility]

vers



     Extended      0-26                               mg by           ity of



     100 mg      00:00:                               mouth 4           [place]



     capsule                                       (four)           Medical



                                                  times           [facility]



                                                  daily.           

 

     atorvastati      2018      Yes            40mg      Take 40 mg           

[facility]



     n 40 mg      0-26                               by mouth.           [facility] of



     tablet      00:00:                                              [place]



                                                               [facility]



                                                                 [facility]

 

     levETIRAcet      2018      Yes            750mg      Take 750           U

nivers



     am 750 mg      0-26                               mg by           ity of



     tablet      00:00:                               mouth 2           [place]



                                                (two)           Medical



                                                  times           [facility]



                                                  daily.           

 

     phenytoin      2018      Yes            100mg      Take 100           [facility]

vers



     Extended      0-26                               mg by           ity of



     100 mg      00:00:                               mouth 4           [place]



     capsule                                       (four)           Medical



                                                  times           [facility]



                                                  daily.           

 

     atorvastati      2018      Yes            40mg      Take 40 mg           

[facility]



     n 40 mg      0-26                               by mouth.           ity of



     tablet      00:00:                                              [place]



                                                               Medical



                                                                 Branch

 

     levETIRAcet      2018      Yes            750mg      Take 750           U

nivers



     am 750 mg      0-26                               mg by           ity of



     tablet      00:00:                               mouth 2           [place]



                                                (two)           Medical



                                                  times           [facility]



                                                  daily.           

 

     phenytoin      2018      Yes            100mg      Take 100           [facility]

vers



     Extended      0-26                               mg by           ity of



     100 mg      00:00:                               mouth 4           [place]



     capsule                                       (four)           Medical



                                                  times           [facility]



                                                  daily.           

 

     atorvastati      2018      Yes            40mg      Take 40 mg           

[facility]



     n 40 mg      0-26                               by mouth.           ity of



     tablet      00:00:                                              [place]



                                                               Medical



                                                                 Branch

 

     levETIRAcet      2018      Yes            750mg      Take 750           U

nivers



     am 750 mg      0-26                               mg by           ity of



     tablet      00:00:                               mouth 2           [place]



                                                (two)           Medical



                                                  times           [place]



                                                  daily.           

 

     phenytoin      2018      Yes            100mg      Take 100           Uni

vers



     Extended      0-26                               mg by           ity of



     100 mg      00:00:                               mouth 4           [place]



     capsule                                       (four)           Medical



                                                  times           [place]



                                                  daily.           

 

     atorvastati      2018      Yes            40mg      Take 40 mg           

[facility]



     n 40 mg      0-26                               by mouth.           ity of



     tablet      00:00:                                              [place]



                                                               Medical



                                                                 Branch

 

     atorvastati      2018      Yes            40mg      Take 40 mg           

[facility]



     n 40 mg      0-26                               by mouth.           ity of



     tablet      00:00:                                              [place]



                                                               Medical



                                                                 Branch

 

     levETIRAcet      2018      Yes            750mg      Take 750           U

nivers



     am 750 mg      0-26                               mg by           ity of



     tablet      00:00:                               mouth 2           [place]



                                                (two)           Medical



                                                  times           [place]



                                                  daily.           

 

     phenytoin      2018      Yes            100mg      Take 100           Uni

vers



     Extended      0-26                               mg by           ity of



     100 mg      00:00:                               mouth 4           [place]



     capsule                                       (four)           Medical



                                                  times           [place]



                                                  daily.           

 

     atorvastati      2018      Yes            40mg      Take 40 mg           

[facility]



     n 40 mg      0-26                               by mouth.           ity of



     tablet      00:00:                                              [place]



                                                               Medical



                                                                 Branch

 

     levETIRAcet      2018      Yes            750mg      Take 750           U

nivers



     am 750 mg      0-26                               mg by           ity of



     tablet      00:00:                               mouth 2           [place]



                                                (two)           Medical



                                                  times           [place]



                                                  daily.           

 

     phenytoin      2018      Yes            100mg      Take 100           Uni

vers



     Extended      0-26                               mg by           ity of



     100 mg      00:00:                               mouth 4           [place]



     capsule                                       (four)           Medical



                                                  times           [place]



                                                  daily.           

 

     atorvastati      2018      Yes            40mg      Take 40 mg           

[facility]



     n 40 mg      0-26                               by mouth.           ity of



     tablet      00:00:                                              [place]



                                                               Medical



                                                                 Branch

 

     levETIRAcet      2018      Yes            750mg      Take 750           U

nivers



     am 750 mg      0-26                               mg by           ity of



     tablet      00:00:                               mouth.           [address]



                                                                 Branch

 

     phenytoin      2018      Yes            100mg      Take 100           Uni

vers



     Extended      0-26                               mg by           ity of



     100 mg      00:00:                               mouth.           [place]



     capsule                                                      Medical



                                                                 Branch

 

     atorvastati      2018      Yes            40mg      Take 40 mg           

[facility]



     n 40 mg      0-26                               by mouth.           ity of



     tablet      00:00:                                              [place]



                                                               Medical



                                                                 Branch

 

     atorvastati      2018      Yes            40mg      Take 40 mg           

[facility]



     n 40 mg      0-26                               by mouth.           ity of



     tablet      00:00:                                              [place]



                                                               Medical



                                                                 Branch

 

     atorvastati      2018      Yes            40mg      Take 40 mg           

[facility]



     n 40 mg      0-26                               by mouth.           ity of



     tablet      00:00:                                              [place]



                                                               Medical



                                                                 Branch

 

     atorvastati      2018      Yes            40mg      Take 40 mg           

[facility]



     n 40 mg      0-26                               by mouth.           ity of



     tablet      00:00:                                              [place]



                                                               Medical



                                                                 Branch

 

     atorvastati      2018      Yes            40mg      Take 40 mg           

[facility]



     n 40 mg      0-26                               by mouth.           ity of



     tablet      00:00:                                              [place]



                                                               Medical



                                                                 Branch

 

     levETIRAcet      2018      Yes            750mg      Take 750           U

nivers



     am 750 mg      0-26                               mg by           ity of



     tablet      00:00:                               mouth.           [address]



                                                                 Branch

 

     phenytoin      2018      Yes            100mg      Take 100           Uni

vers



     Extended      0-26                               mg by           ity of



     100 mg      00:00:                               mouth.           [place]



     capsule                                                      Medical



                                                                 Branch

 

     atorvastati      2018      Yes            40mg      Take 40 mg           

[facility]



     n 40 mg      0-26                               by mouth.           ity of



     tablet      00:00:                                              [place]



                                                               Medical



                                                                 Branch

 

     levETIRAcet      2018      Yes            750mg      Take 750           U

nivers



     am 750 mg      0-26                               mg by           ity of



     tablet      00:00:                               mouth.           [address]



                                                                 Branch

 

     levETIRAcet      2018 2020- No             1500mg      Take 1,500        

   [facility]



     am 750 mg      0-26 11-02                          mg by           ity of



     tablet      00:00: 00:00                          mouth 2           [place]



               00   :00                           (two)           Medical



                                                  times           [facility]



                                                  daily.           

 

     phenytoin      2018 2020- No             100mg      Take 100           Un

[facility]



     Extended      0-26 11-01                          mg by           ity of



     100 mg      00:00: 00:00                          mouth 4           [place]



     capsule      00   :00                           (four)           Medical



                                                  times           [facility]



                                                  daily.           







Immunizations







           Ordered    Filled Immunization Date       Status     Comments   [facility]

e



           Immunization Name Name                                        

 

           Pneumococcal            2020 Completed             [facility] o

f



           Polysaccharide,            00:00:00                         [place] [facility]

ical



           PPSV23 (PNEUMOVAX)                                             Branch

 

           Pneumococcal            2020 Completed             [facility] o

f



           Polysaccharide,            00:00:00                         [place] [facility]

ical



           PPSV23 (PNEUMOVAX)                                             Branch

 

           Pneumococcal            2020 Completed             [facility] o

f



           Polysaccharide,            00:00:00                         [place] [facility]

ical



           PPSV23 (PNEUMOVAX)                                             Branch

 

           Pneumococcal            2020 Completed             [facility] o

f



           Polysaccharide,            00:00:00                         [place] [facility]

ical



           PPSV23 (PNEUMOVAX)                                             Branch

 

           Pneumococcal            2020 Completed             [facility] o

f



           Polysaccharide,            00:00:00                         [place] [facility]

ical



           PPSV23 (PNEUMOVAX)                                             Branch

 

           Pneumococcal            2019-10-05 Completed             [facility] o

f



           Polysaccharide,            00:00:00                         [place] [facility]

ical



           PPSV23 (PNEUMOVAX)                                             Branch

 

           Pneumococcal            2019-10-05 Completed             [facility] o

f



           Polysaccharide,            00:00:00                         [place] [facility]

ical



           PPSV23 (PNEUMOVAX)                                             Branch

 

           Pneumococcal            2019-10-05 Completed             [facility] o

f



           Polysaccharide,            00:00:00                         [place] [facility]

ical



           PPSV23 (PNEUMOVAX)                                             Branch

 

           Pneumococcal            2019-10-05 Completed             [facility] o

f



           Polysaccharide,            00:00:00                         [place] [facility]

ical



           PPSV23 (PNEUMOVAX)                                             Branch

 

           Pneumococcal            2019-10-05 Completed             [facility] o

f



           Polysaccharide,            00:00:00                         [place] [facility]

ical



           PPSV23 (PNEUMOVAX)                                             Branch

 

           Pneumococcal 13            2018 Completed             Universit

y of



           Conjugate, PCV13            00:00:00                         [place] [facility]

dical



           (Prevnar 13)                                             Branch

 

           Pneumococcal 13            2018 Completed             [facility]

y of



           Conjugate, PCV13            00:00:00                         [place] [facility]

d[facility]



           (Prevnar 13)                                             Branch

 

           Pneumococcal 13            2018 Completed             [facility]

y of



           Conjugate, PCV13            00:00:00                         [place] [facility]

d[facility]



           (Prevnar 13)                                             Branch

 

           Pneumococcal 13            2018 Completed             [facility]

y of



           Conjugate, PCV13            00:00:00                         [place] [facility]

dical



           (Prevnar 13)                                             Branch

 

           Pneumococcal 13            2018 Completed             [facility]

y of



           Conjugate, PCV13            00:00:00                         [place] [facility]

dical



           (Prevnar 13)                                             [place]







Vital Signs







             Vital Name   Observation Time Observation Value Comments     Source

 

             Systolic blood 2022   111 mm[Hg]                [facility]



             pressure     08:20:00                               [facility]

 

             Diastolic blood 2022   95 mm[Hg]                 [facility]

[facility]



             pressure     08:20:00                               [facility]

 

             Heart rate   2022   90 /min                   [facility]



                          08:20:00                               [facility]

 

             Respiratory rate 2022   23 /min                   [facility]



                          08:20:00                               [facility]

 

             Oxygen saturation 2022   100 /min                  [facility]



             in Arterial blood 08:20:00                               [facility]

[facility]



             by Pulse oximetry                                        [facility]

 

             Body temperature 2022   36.67 Veronica                 [facility]



                          04:46:00                               [facility]

 

             Body height  2022   157.5 cm                  [facility]



                          04:46:00                               [facility]

 

             Body weight  2022   49.896 kg                 [facility]



                          04:46:00                               [facility]

 

             BMI          2022   20.12 kg/m2               [facility]



                          04:46:00                               [facility]

 

             WEIGHT       2021   45.3 kg                   



                          04:45:00                               

 

             HEIGHT       2021   152.4 cm                  



                          07:00:00                               

 

             WEIGHT       2021   44.3 kg                   



                          07:00:00                               

 

             HEIGHT       2021   152.4 cm                  



                          21:45:00                               

 

             WEIGHT       2021   45.36 kg                  



                          21:45:00                               

 

             WEIGHT       2021   45.3 kg                   



                          04:45:00                               

 

             HEIGHT       2021   152.4 cm                  



                          07:00:00                               

 

             WEIGHT       2021   44.3 kg                   



                          07:00:00                               

 

             HEIGHT       2021   152.4 cm                  



                          21:45:00                               

 

             WEIGHT       2021   45.36 kg                  



                          21:45:00                               

 

             Systolic blood 2021   157 mm[Hg]                [facility]



             pressure     02:00:00                               [facility]

 

             Diastolic blood 2021   86 mm[Hg]                 [facility]

f



             pressure     02:00:00                               [facility]

 

             Heart rate   2021   82 /min                   [facility]



                          02:00:00                               [facility]

 

             Body temperature 2021   37.06 Veronica                 [facility]



                          02:00:00                               [facility]

 

             Respiratory rate 2021   25 /min                   [facility]



                          02:00:00                               [facility]

 

             Oxygen saturation 2021   96 /min                   [facility]



             in Arterial blood 02:00:00                               [facility]

[facility]



             by Pulse oximetry                                        [place]

 

             Body weight  2021-01-10   43.092 kg                 [facility]



                          21:16:00                               [facility]

 

             BMI          2021-01-10   17.38 kg/m2               [facility]



                          21:16:00                               [facility]

 

             Systolic blood 2020   99 mm[Hg]                 [facility]



             pressure     17:54:00                               [facility]

 

             Diastolic blood 2020   69 mm[Hg]                 [facility]

f



             pressure     17:54:00                               [facility]

 

             Heart rate   2020   77 /min                   [facility]



                          17:54:00                               [facility]

 

             Body temperature 2020   37 Veronica                    [facility]



                          17:54:00                               [facility]

 

             Respiratory rate 2020   15 /min                   [facility]



                          17:54:00                               [facility]

 

             Oxygen saturation 2020   91 /min      notified nurse [facility]

[facility]



             in Arterial blood 17:54:00                               [facility]

[facility]



             by Pulse oximetry                                        [place]

 

             Body height  2020   157.5 cm                  [facility]



                          20:52:00                               [facility]

 

             Body weight  2020   45.36 kg                  [facility]



                          20:52:00                               [facility]

 

             BMI          2020   18.29 kg/m2               [facility]



                          20:52:00                               [facility]

 

             Systolic blood 2020   99 mm[Hg]                 [facility]



             pressure     17:54:00                               [facility]

 

             Diastolic blood 2020   69 mm[Hg]                 [facility]

f



             pressure     17:54:00                               [facility]

 

             Heart rate   2020   77 /min                   [facility]



                          17:54:00                               [facility]

 

             Body temperature 2020   37 Veronica                    [facility]



                          17:54:00                               [facility]

 

             Respiratory rate 2020   15 /min                   [facility]



                          17:54:00                               [facility]

 

             Oxygen saturation 2020   91 /min      notified nurse [facility]

[facility]



             in Arterial blood 17:54:00                               [facility]

[facility]



             by Pulse oximetry                                        [place]

 

             Body height  2020   157.5 cm                  [facility]



                          20:52:00                               [facility]

 

             Body weight  2020   45.36 kg                  [facility]



                          20:52:00                               [facility]

 

             BMI          2020   18.29 kg/m2               [facility]



                          20:52:00                               [facility]

 

             Systolic blood 2020   107 mm[Hg]                [facility]



             pressure     00:24:12                               [facility]

 

             Diastolic blood 2020   81 mm[Hg]                 [facility]

f



             pressure     00:24:12                               [facility]

 

             Heart rate   2020   89 /min                   [facility]



                          00:24:12                               [facility]

 

             Respiratory rate 2020   16 /min                   [facility]



                          00:24:12                               [facility]

 

             Body temperature 2020   37.22 Veronica                 [facility]



                          23:45:26                               [facility]

 

             Body height  2020   157.5 cm                  [facility]



                          23:12:00                               [facility]

 

             Body weight  2020   45.36 kg                  [facility]



                          23:12:00                               [facility]

 

             BMI          2020   18.29 kg/m2               [facility]



                          23:12:00                               [facility]

 

             Oxygen saturation 2020   93 /min                   [facility]



             in Arterial blood 23:12:00                               [facility]

[facility]



             by Pulse oximetry                                        [facility]

 

             Systolic blood 2020   107 mm[Hg]                [facility]



             pressure     00:24:12                               [facility]

 

             Diastolic blood 2020   81 mm[Hg]                 [facility]

f



             pressure     00:24:12                               [facility]

 

             Heart rate   2020   89 /min                   [facility]



                          00:24:12                               [facility]

 

             Respiratory rate 2020   16 /min                   [facility]



                          00:24:12                               [facility]

 

             Body temperature 2020   37.22 Veronica                 [facility]



                          23:45:26                               [facility]

 

             Body height  2020   157.5 cm                  [facility]



                          23:12:00                               [facility]

 

             Body weight  2020   45.36 kg                  [facility]



                          23:12:00                               [facility]

 

             BMI          2020   18.29 kg/m2               [facility]



                          23:12:00                               [facility]

 

             Oxygen saturation 2020   93 /min                   [facility]



             in Arterial blood 23:12:00                               [facility]

[facility]



             by Pulse oximetry                                        [facility]

 

             Systolic blood 2020   111 mm[Hg]                [facility]



             pressure     23:00:00                               [facility]

 

             Diastolic blood 2020   83 mm[Hg]                 [facility]

f



             pressure     23:00:00                               [facility]

 

             Heart rate   2020   100 /min                  [facility]



                          23:00:00                               [facility]

 

             Respiratory rate 2020   29 /min                   [facility]



                          23:00:00                               [facility]

 

             Oxygen saturation 2020   97 /min                   [facility]



             in Arterial blood 23:00:00                               [facility]

[facility]



             by Pulse oximetry                                        [facility]

 

             Body weight  2020   49.896 kg                 [facility]



                          22:09:00                               [facility]

 

             BMI          2020   19.49 kg/m2               [facility]



                          22:09:00                               [facility]

 

             Body temperature 2020   36.56 Veronica                 [facility]



                          22:08:00                               [facility]

 

             Systolic blood 2020   111 mm[Hg]                [facility]



             pressure     23:00:00                               [facility]

 

             Diastolic blood 2020   83 mm[Hg]                 [facility]

f



             pressure     23:00:00                               [facility]

 

             Heart rate   2020   100 /min                  [facility]



                          23:00:00                               [facility]



                                                                 [facility]

 

             Respiratory rate 2020   29 /min                   [facility] 





                          23:00:00                               [facility]

 

             Oxygen saturation 2020   97 /min                   [facility]



             in Arterial blood 23:00:00                               [facility]

[facility]



             by Pulse oximetry                                        [place]

 

             Body weight  2020   49.896 kg                 [facility]



                          22:09:00                               [facility]

 

             BMI          2020   19.49 kg/m2               [facility] 



                          22:09:00                               [facility]

 

             Body temperature 2020   36.56 Veronica                 [facility]



                          22:08:00                               [facility]

 

             Systolic blood 2019   113 mm[Hg]                [facility]



             pressure     20:40:00                               [facility]

 

             Diastolic blood 2019   88 mm[Hg]                 [facility]

[facility]



             pressure     20:40:00                               [facility]

 

             Heart rate   2019   93 /min                   [facility]



                          20:40:00                               [facility]

 

             Respiratory rate 2019   24 /min                   [facility] 





                          20:40:00                               [facility]

 

             Oxygen saturation 2019   100 /min                  [facility]



             in Arterial blood 20:30:00                               [place] [facility]

[facility]



             by Pulse oximetry                                        [place]

 

             Body temperature 2019   36.94 Veroniac                 [facility]



                          16:30:00                               [facility]

 

             Body weight  2019   47.628 kg                 [facility]



                          15:30:00                               [facility]

 

             Systolic (mm Hg) 2021                             [facility]

[facility]



                          19:07:00                               

 

             Diastolic (mm Hg) 2021                             [facility]

[facility]



                          19:07:00                               

 

             Heart Rate   2021                             [facility]

n



                          19:07:00                               

 

             Respitory Rate 2021                             [facility]

[facility]



                          19:07:00                               

 

             Weight       2021                             [facility]

n



                          19:07:00                               

 

             Systolic (mm Hg) 2020-10-23                             [facility]

[facility]



                          15:48:00                               

 

             Diastolic (mm Hg) 2020-10-23                             [facility]

[facility]



                          15:48:00                               

 

             Heart Rate   2020-10-23                             [facility]

n



                          15:48:00                               

 

             Respitory Rate 2020-10-23                             [facility]

[facility]



                          15:48:00                               

 

             Height       2020-10-23   154.94 cm                 [facility]

n



                          15:48:00                               

 

             Weight       2020-10-23                             [facility]

n



                          15:48:00                               

 

             BMI Calculated 2020-10-23                             [facility]

[facility]



                          15:48:00                               

 

             Systolic (mm Hg) 2020                             [facility]

[facility]



                          18:44:00                               

 

             Diastolic (mm Hg) 2020                             [facility]

[facility]



                          18:44:00                               

 

             Heart Rate   2020                             [facility]

n



                          18:44:00                               

 

             Respitory Rate 2020                             [facility]

[facility]



                          18:44:00                               

 

             Height       2020   157.48 cm                 [facility]

n



                          18:44:00                               

 

             Weight       2020                             [facility]

n



                          18:44:00                               

 

             BMI Calculated 2020                             [facility]

[facility]



                          18:44:00                               

 

             Systolic (mm Hg) 2019                             [facility]

[facility]



                          19:02:00                               

 

             Diastolic (mm Hg) 2019                             [facility]

[facility]



                          19:02:00                               

 

             Respitory Rate 2019                             [facility]

[facility]



                          19:02:00                               

 

             Systolic (mm Hg) 2019                             [facility]

[facility]



                          17:00:00                               

 

             Diastolic (mm Hg) 2019                             [facility]

[facility]



                          17:00:00                               

 

             Respitory Rate 2019                             [facility]

[facility]



                          17:00:00                               

 

             Temperature Oral 2019   98.8 F                    [facility]

[facility]



             (F)          17:00:00                               

 

             Systolic (mm Hg) 2019                             [facility]

[facility]



                          16:00:00                               

 

             Diastolic (mm Hg) 2019                             [facility]

[facility]



                          16:00:00                               

 

             Respitory Rate 2019                             [facility]

[facility]



                          16:00:00                               

 

             Temperature Oral 2019   97.9 F                    [facility]

[facility]



             (F)          13:00:00                               

 

             Temperature Oral 2019   98.0 F                    [facility]

[facility]



             (F)          10:39:00                               

 

             Height       2019   160.02 cm                 [facility]

n



                          01:29:00                               

 

             Weight       2019                             [facility]

n



                          01:29:00                               

 

             Height       2019   160.02 cm                 [facility]

n



                          12:21:00                               

 

             Height       2019   160.02 cm                 [facility]

n



                          08:29:00                               

 

             BMI Calculated 2019                             [facility]

[facility]



                          05:05:00                               

 

             Weight       2019                             [facility]

n



                          05:05:00                               

 

             BMI Calculated 2019                             [facility]

[facility]



                          16:42:00                               

 

             Weight       2019                             [facility]

n



                          16:42:00                               

 

             Heart Rate   2019                             [facility]

n



                          16:42:00                               







Procedures







                Procedure       Date / Time     Performing Clinician Source



                                Performed                       

 

                XR CHEST 1 VW   2022 05:50:08 Pichardo, Arjun [facility]

 

                URINALYSIS      2022 05:47:00 Arjun Pichardo [facility]

 

                URINE DRUG (IMMUNOASSAY) - 2022 05:47:00 Arjun Pichardo

[facility]



                COMPREHENSIVE DRUG SCREEN                                 [facility]

[facility]



                W/O REFLEX                                      

 

                CT HEAD WO CONTRAST 2022 05:13:59 Arjun Pichardo [facility]

 

                XR CHEST 1 VW   2022 05:09:00 Arjun Pichardo [facility]

 

                PHENYTOIN       2022 04:59:00 Arjun Pichardo [facility]

 

                TROPONIN I      2022 04:53:00 Arjun Pichardo [facility]

 

                COMP. METABOLIC PANEL 2022 04:53:00 Arjun Pichardo [facility]



                ([id number])                                         [facility]

 

                ETHANOL         2022 04:53:00 Arjun Pichardo [facility]

 

                CBC WITH DIFF   2022 04:53:00 Arjun Pichardo [facility]

 

                PROTHROMBIN TIME / INR 2022 04:53:00 Arjun Pichardo [facility]

 

                ACTIVATED PARTIAL THRMPLAS 2022 04:53:00 Arjun Pichardo

[facility]

 

                N-TERMINAL PRO-BNP 2022 04:53:00 Arjun Pichardo [facility]

 

                COVID-19 (ID NOW RAPID 2022 04:53:00 Arjun Pichardo [facility]



                TESTING)                                        [facility]

 

                AC PANEL 21 + LACTIC ACID 2022 04:50:00 [facility]

 

                NOTICE OF PRIVACY 2022 04:39:18 Doctor Unassigned, [facility]



                PRACTICES                       [facility] [place]

 

                CONSENT/REFUSAL FOR 2022 04:38:57 Doctor Unassana cristina, [facility]



                DIAGNOSIS AND TREATMENT                 [facility] 

[place]

 

                POCT GLUCOSE (AUTOMATED) 2022 04:37:00 Arjun Pichardo [facility]

 

                CT PELVIS WO CONTRAST 2021 00:57:34 FAUSTINA Sibley [facility]

 

                COVID-19 (ID NOW RAPID 2021-01-10 23:39:00 FAUSTINA Sibley [facility]



                TESTING)                                        [facility]

 

                XR LUMBAR SPINE 3 VW 2021-01-10 23:13:23 FAUSTINA Sibley [facility]

 

                XR FEMUR 2 VW LEFT 2021-01-10 22:39:58 FAUSTINA Sibley [facility]

 

                XR HIPS 2 VW LEFT 2021-01-10 22:39:58 FAUSTINA Sibley [facility]

 

                CONSENT/REFUSAL FOR 2021-01-10 21:05:48 Doctor Unassigned, [facility]



                DIAGNOSIS AND TREATMENT                 [place]         [facility] 

Branch

 

                OSMOLALITY SERUM 2020 10:41:00 Iván Harrington

[facility]

 

                PHENYTOIN FREE  2020 10:41:00 Iván Harrington [facility]

 

                SEDIMENTATION RATE 2020 10:41:00 Iván Harrington [facility]

 

                BASIC METABOLIC PANEL (NA, 2020 10:40:00 Nelida Harrington ea [facility]



                K, CL, CO2, GLUCOSE, BUN,                 [facility]



                CREATININE, CA)                                 

 

                CBC WITH DIFF   2020 10:40:00 Iván Harrington [facility]

 

                OSMOLALITY URINE 2020 04:10:00 Iván Harrington [facility]

 

                GALV/CLC ONLY - URINE DRUG 2020 04:10:00 Nelida Harrington ea [facility]



                (IMMUNOASSAY) -                 [facility]



                COMPREHENSIVE DRUG SCREEN                                 

 

                CREATININE, URINE RANDOM 2020 04:10:00 Iván Harrington

 [facility]

 

                UREA NITROGEN, URINE 2020 04:10:00 Iván Harrington

[facility]

 

                SODIUM, URINE RANDOM 2020 04:10:00 Iván Harrington [facility]

 

                CT HEAD WO CONTRAST 2020 23:16:27 Carol Ngo [facility]

 

                AC PANEL 21 + LACTIC ACID 2020 22:56:00 Carol Ngo

 [facility]

 

                XR CHEST 1 VW   2020 21:43:14 Carol Ngo [facility]

 

                CREATINE KINASE 2020 21:13:00 Carol Ngo [facility]

 

                LIPASE          2020 21:13:00 Carol Ngo [facility]

 

                HEPATIC FUNCTION PANEL 2020 21:13:00 Carol Ngo [facility]



                ([id number]) (ALB,T.PRO,BILI                                 Medical 

Branch



                T,BU/BC,ALT,AST,ALK PHOS)                                 

 

                BASIC METABOLIC PANEL (NA, 2020 21:13:00 Carol Ngo [facility]



                K, CL, CO2, GLUCOSE, BUN,                                 Medica

l Branch



                CREATININE, CA)                                 

 

                PHENYTOIN       2020 21:13:00 Carol Ngo [facility]

 

                CBC WITH DIFF   2020 21:12:00 Carol Ngo [facility]

 

                URINALYSIS      2020 21:12:00 Carol Ngo [facility]

 

                COVID-19 (ID NOW RAPID 2020 21:12:00 Carol Ngo [facility]



                TESTING)                                        [facility] [place]

 

                LACTIC ACID WHOLE BLOOD 2020 21:03:00 [facility]

 

                EMERGENCY DEPARTMENT 2020 05:01:00 Doctor Unassigned, [facility]



                DOCUMENTS                       [facility]         [facility]

 

                US RETROPERITONEAL 2020-10-19 17:30:11 Requisition, Paper [facility]



                COMPLETE                                        Medical Branch

 

                ASSIGNMENT OF BENEFITS 2020-10-19 15:54:32 Doctor Unassigned, [facility]



                                                [place]         [facility]

 

                CBC WITH DIFFERENTIAL 2020 22:22:00 Arjun Pichardo [facility]

 

                HEPATIC FUNCTION PANEL 2020 22:21:00 Arjun Pichardo [facility]



                ([id number]) (ALB,T.PRO,BILI                                 Medical 

Branch



                T,BU/BC,ALT,AST,ALK PHOS)                                 

 

                BASIC METABOLIC PANEL (NA, 2020 22:21:00 Arjun Pichardo [facility]



                K, CL, CO2, GLUCOSE, BUN,                                 Medica

l Branch



                CREATININE, CA)                                 

 

                PHENYTOIN       2020 22:21:00 Arjun Pichardo [facility]

 

                POCT GLUCOSE (AUTOMATED) 2020 22:15:00 Arjun Pichardo [facility]

 

                PHYSICIAN ORDERS 2020 06:01:00 Doctor Unassigned, [facility]

[place] [facility] [place]



                                                [place]         [facility]

 

                ASSIGNMENT OF BENEFITS 2019 15:21:26 Doctor Unassigned, [facility] Name         [facility]

 

                URINALYSIS      2019 17:51:00 Meena García  [facility]

 

                BLOOD CULTURE SCREEN 2019 17:40:00 Meena García  [facility]

 

                LACTIC ACID WHOLE BLOOD 2019 17:40:00 Meena García  [facility]

 

                CT HEAD WO CONTRAST 2019 15:53:02 Meena García  [facility]

 

                XR CHEST 1 VW   2019 15:52:21 Meena García  [facility]

 

                TROPONIN I      2019 15:33:00 Meena García  [facility]

 

                COMP. METABOLIC PANEL 2019 15:33:00 Meena García  [facility]



                ([id number])                                         [facility]

 

                PHENYTOIN       2019 15:33:00 Meena García  [facility]

 

                PROTHROMBIN TIME / INR 2019 15:33:00 Meena García  [facility]

 

                ACTIVATED PARTIAL THRMPLAS 2019 15:33:00 Meena García  [facility]

 

                BLOOD CULTURE SCREEN 2019 15:32:00 Meena García  [facility]

 

                CBC WITH DIFFERENTIAL 2019 15:32:00 Meena García  [facility]

 

                Hip replacement                                 [facility]

 

                Operation on lumbar spine                                 [facility]







Encounters







        Start   End     Encounter Admission Attending Care    Care    Encounter 

Source



        Date/Time Date/Time Type    Type    Clinicians Facility Department ID   

   

 

        2022         Outpatient         Raul Simmons HCAPM   HCAPM   [id number]

[id number] [facility]



        12:57:19                                                 02      [facility]

 

        2022         Outpatient 3       [id number]  ENCPL   REF     [id number]

 ENCPL



        21:56:57                                                 0304    

 

        2022         Outpatient 3       [id number]  ENCPL   REF     [id number]

 ENCPL



        12:28:24                                                 0302    

 

        2021-10-31         Emergency                 [facility]    [id number] 

Univers



        20:34:59                                                         ity of



                                                                        [facility]

 

        2021-10-30         Emergency                 [facility]    [id number] 

Univers



        16:18:49                                                         ity [facility]

 

        2021-10-30         Emergency                 [facility]    [id number] 

Univers



        02:17:58                                                         ity [facility]

 

        2021-10-28         Emergency                 [facility]    [id number] 

Univers



        21:46:44                                                         ity [facility]

 

        2021-10-28         Emergency                 [facility]    [id number] 

Univers



        18:23:54                                                         [facility]

 

        2021         Inpatient         Jackson Barba HCAPM   JUDI     [id number]

-20 [facility]



        08:30:00                                                   [facility]

 

        2020         Inpatient EL      Jackson Barba HCAPM   DAYS    [id number]

-20 [facility]



        13:00:00                                                   [facility]

 

        2022 2022-03-15 Inpatient 3       [facility] ENCPL   CVA     [id number]

3-2022 ENCPL



        18:03:00 13:08:00                 fahad,                    0307    



                                        Era                         

 

        2022 Outpatient         Raul Simmons HCAPM   RADI    Y14

[id number] [facility]



        12:12:00 12:12:00                                         [id number]  [facility]

 

        2022 Inpatient U       BRITTNI,  [facility]        

[facility]



        03:23:00 17:11:00                 DIANA                           

 

        2022 Emergency X       MARINO [facility]    ERT     [id number]

[id number] Univers



        22:40:00 03:02:00                 ARJUN                         [facility]

 

        2022 Emergency         Marino [facility]    [id number]

[id number] Univers



        22:40:00 03:02:00                 Arjun [place] 350.1.13.10         i

ty of



                                                [place] 4.2.7.2.686         [facility]  [phone]         [address]

 

        2021-10-29 2021-10-29 Outpatient                 [facility]    [facility]    [id number]

[id number] [facility]



        13:45:00 13:45:00                                         08      l



                                                                        Julius

 

        2021 Emergency ER              [facility]    Emergency [id number]

[id number] [facility]



        21:27:00 21:27:00                                                 

 

        2021 Outpatient                 nullFlavo [facility]     [id number]

[id number] [facility]



        18:45:00 04:59:59                         r       Neurology 07      l



                                                        Avi Becerraann

 

        2021 Ambulatory                 nullFlavo MNA     [id number]

[id number] [facility]



        16:45:00 16:45:00 Pre-Reg                 r       Neurology 06      l



                                                        Avi         [place]

 

        2021-01-10 2021-01-10 Emergency         Maryjo, K [facility]    1.2.840.114 [id number]

[id number] Univers



        15:14:00 20:16:00                 Grisel   [place] 350.1.13.10         i

ty of



                                                [place] 4.2.7.2.686         [facility]  [phone]         [address]

 

        2020 [facility]         Radiology [facility]    1.2.840.114 [id number]

[id number] Univers



        17:10:00 23:59:00 Encounter                 [place] 350.1.13.10        

 ity of



                                                [place] 4.2.7.2.686         [facility]  [phone]         [address]

 

        2020 [facility]         Radiology [facility]    1.2.840.114 [id number]

[id number] 



        17:10:00 23:59:00 Encounter                 [place] 350.1.13.10        

 



                                                [place] 4.2.7.[address] [place] [address]  [phone]         



                                                        [facility]             

 

        2020 Outpatient R       RADIOLOGY [facility]    [id number]

1N-20 Univers



        17:10:00 17:10:00                                         [id number]  ity of



                                                                        [facility]

 

        2020 Outpatient R       RADIOLOGY [facility]    [id number]

[id number] Univers



        00:00:00 00:00:00                                                 ity of



                                                                        [facility]

 

        2020-11-10 2020-11-10 Refill          Violeta Carter [facility] 1.2.840.114 

[id number] Univers



        00:00:00 00:00:00                         Y HEALTH 350.1.13.10         i

ty of



                                                [facility] 4.2.7.2.686         [place]

[place]



                                                        [phone]         [address]

 

        2020-11-10 2020-11-10 Refill          Violeta Carter [facility] 1.2.840.114 

[id number] 



        00:00:00 00:00:00                         Y HEALTH 350.1.13.10         



                                                CLINICS 4.2.7.2.686         



                                                        [phone]         



                                                        [facility]             

 

        2020 Emergency         Carol Ngo  1.2.8

40.114 [id number] 

[facility]



        14:48:00 17:06:00                 Violeta Carter   350.1.13.10       

  ity [facility] 4.2.7.2.686         [place]

as



                                                        [phone]         [address]

 

        2020 Emergency         Carol Ngo  1.2.8

40.114 [id number] 



        14:48:00 17:06:00                 Violeta Carter   350.1.13.10       

  



                                                [facility] 4.2.7.2.686         



                                                        [phone]         



                                                        [facility]             

 

        2020-10-23 2020-10-24 Outpatient                 nullFlavo [facility]     [id number]

[id number] [facility]



        15:15:00 04:59:59                         r       Neurology 05      l



                                                        [place]         [place]

 

        2020-10-19 2020-10-19 [facility]    1.2.840.114 [id number]

[id number] [facility]



        10:45:00 23:59:00 Encounter         Gerber Duffy 350.1.13.10        

 ity [facility] 4.2.7.2.686         [place]

[place]



                                                [place]  [phone]         [address]

 

        2020-10-19 2020-10-19 [facility]    1.2.840.114 [id number]

[id number] 



        10:45:00 23:59:00 Encounter         Gerber Duffy 350.1.13.10        

 



                                                [place] 4.2.7.2.686         



                                                [place]  [phone]         



                                                        806             

 

        2020-10-19 2020-10-19 Outpatient R       AMANDA, [facility]    [id number]

[id number] Univers



        10:45:00 10:45:00                 GERBER                   2010  ity of



                                                                        [facility]

 

        2020-10-19 2020-10-19 Outpatient R       AMANDA [facility]    [id number]

[id number] Univers



        00:00:00 00:00:00                 GERBER                           ity [facility]

 

        2020-10-19 2020-10-19 Orders          Doctor  MARCOS    1.2.840.114 [id number]

02 Univers



        00:00:00 00:00:00 Only            Unassigned, DALLIN   350.1.13.10       

  ity of



                                        [facility] 4.2.7.2.686         [place]

as



                                                        [phone]         Medi

alfonzo



                                                        [address]             [place]

 

        2020-10-19 2020-10-19 Orders          Doctor  MARCOS    1.2.840.114 [id number]

02 



        00:00:00 00:00:00 Only            Unassigned, DALLIN   350.1.13.10       

  



                                        [facility] 4.2.7.2.686         



                                                        [phone]         



                                                        009             

 

        2020 Outpatient                 nullFlavo [facility]     [id number]

[id number] [facility]



        15:00:00 04:59:59                         r       Neurology 04      l



                                                        [facility]

 

        2020 Ambulatory                 nullFlavo [facility]     [id number]

[id number] [facility]



        15:00:00 15:00:00 Pre-Reg                 r       Neurology 03      l



                                                        Avi         [place]

 

        2020 Emergency X       FAUSTINA SIBLEY [facility]    ERT     [id number]

[id number] Univers



        10:20:43 12:42:00                                                 ity of



                                                                        [facility]

 

        2020 Emergency         FAUSTINA Sibley [facility]    1.2.840.114 [id number]

[id number] Univers



        18:10:43 19:50:00                 Grisel Duffy 350.1.13.10         i

ty of



                                                [place] 4.2.7.2.686         [facility]  [phone]         Medi

alfonzo



                                                        084             [place]

 

        2020 Emergency         FAUSTINA Sibley [facility]    1.2.840.114 [id number]

[id number] 



        18:10:43 19:50:00                 Grisel Duffy 350.1.13.10         



                                                [place] 4.2.7.2.686         



                                                [place]  [phone]         



                                                        [facility]             

 

        2020 Outpatient                 nullFlavo [facility]     [id number]

[id number] [facility]



        18:15:00 04:59:59                         r       Neurology 01      l



                                                        [place]         [place]

 

        2020 Emergency         Celine Vera R [facility]    1.2.840.

114 [id number] 

[facility]



        17:07:29 21:14:00                 Arjun Pichardo 350.1.13.10  

       ity of



                                                [place] 4.2.7.2.686         [facility]  [phone]         [address]

 

        2020 Emergency         Celine Vera R [facility]    1.2.840.

114 [id number] 



        17:07:29 21:14:00                 Arjun Pichardo 350.1.13.10  

       



                                                [place] 4.2.7.2.686         



                                                [place]  [phone]         



                                                        [facility]             

 

        2020 Outpatient                 nullFlavo [facility]     [id number]

[id number] [facility]



        20:30:00 04:59:59                         r       Neurology 02      l



                                                        [place]         [place]

 

        2020 Outpatient                 nullFlavo [facility]     [id number]

[id number] [facility]



        18:30:00 04:59:59                         r       Neurology 00      l



                                                        [facility]

 

        2020 Technician         Ronald Corona [facility]    1.2.840.114 [id number]

[id number] 



        12:41:09 12:56:09 Visit           Lab Main Tati 350.1.13.10         



                                                [place] 4.2.7.2.686         



                                                [place] [phone]         



                                                [address]                 

 

        2020 Technician         Ronald Corona Lab Main [facility]    1.2.8

40.114 [id number] 

[facility]



        12:41:09 12:56:09 Visit           Gerber Patel 350.1.13.10 

        ity of



                                                [place] 4.2.7.2.686         [place]

[place]



                                                [place] [phone]         Me

dical



                                                [address]                 

 

        2020 Orders          Doctor RODRÍGUEZ    1.2.840.114 [id number]

93 



        00:00:00 00:00:00 Only            Unassigned, DALLIN   350.1.13.10       

  



                                        [facility] 4.2.7.2.686         



                                                        [phone]         



                                                        009             

 

        2020 Orders          Doctor  MARCOS    1.2.840.114 [id number]

93 Univers



        00:00:00 00:00:00 Only            Unassigned, DALLIN   350.1.13.10       

  ity of



                                        [facility] 4.2.7.2.686         [place]

as



                                                        [phone]         Medi

alfonzo



                                                        009             Branch

 

        2019 Emergency X       PICHARDO, [facility]    ERT     [id number]

[id number] Univers



        21:56:59 02:36:00                 ARJUN montoya [facility]

 

        2019 [facility]patient R       [facility]    [id number]

[id number] [facility]



        11:44:11 11:44:00                 GERBER montoya [facility]

 

        2019 Technician         1, Adc Lab [facility]    1.2.840.114 

[id number] 



        12:34:19 13:04:45 Visit                   Tati 350.1.13.10         



                                                [place] 4.2.7.2.686         



                                                [place]  [phone]         



                                                        353             

 

        2019 Technician         1, Adc Lab [facility]    1.2.840.114 

[id number] [facility]



        12:34:19 13:04:45 Visit           Gerber Patel 350.1.13.10 

        ity of



                                                [place] 4.2.7.2.686         [facility]  [phone]         [address]

 

        2019 Technician         1, Adc Lab [facility]    1.2.840.114 

[id number] [facility]



        10:21:56 10:36:56 Visit           Gerber Patel 350.1.13.10 

        ity of



                                                [place] 4.2.7.2.686         [facility]  [phone]         [address]

 

        2019 Orders          Doctor RODRÍGUEZ    1.2.840.114 [id number]

01 Univers



        00:00:00 00:00:00 Only            Unassigned, DALLIN   350.1.13.10       

  ity of



                                        [facility] 4.2.7.2.686         [place]

as



                                                        [phone]         Medi

alfonzo



                                                        009             Branch

 

        2019 Emergency         Raquel  [facility]    1.2.046.410 [id number]

[id number] Univers



        10:22:50 16:11:00                 Meena Duffy 350.1.13.10         i

 verónica Nicholas 4.2.7.2.686         [place]

[place]



                                                [place]  [phone]         Medi

alfonzo



                                                        084             Branch

 

        2019 Inpatient                 [facility] [id number]

[id number] [facility]



        16:38:00 21:00:00                         [facility] 67      l



                                                        [facility]

 

        2019 Outpatient                 [facility]    JUDI     [id number]   

 [facility]



        13:09:00 13:09:00                                                 

 

        2019 Inpatient E               [facility]    CAR     [id number]    

[facility]



        19:19:00 11:28:00                                                 







Results







           Test Description Test Time  Test Comments Results    Result     [facility]

e



                                                       Comments   

 

           - CT HEAD/BRAIN 2022            *********************          

  



           W/O CONT   12:54:00              *********************            



                                            [facility]            



                                                : 1960            



                                                 Sex:             



                                            F********************            



                                            *********************            



                                            *******************            



                                            Name: RONA NELSONJOSEMERYL            



                                            [facility]            



                                                         :            



                                            1960 Age/S: 61            



                                            / F         [id number]            



                                            [facility] [place]            



                                                Unit #:            



                                            [id number]     Loc:            



                                                        [place] [address]              



                                              Phys: Raul Simmons MD                    



                                                                  



                                                        Acct:            



                                            [id number]  Dis            



                                            Date:                 



                                            Status: REG REF            



                                                                  



                                                PHONE #:            



                                            [phone]     Exam            



                                            Date: 2022            



                                            1248                  



                                               FAX #:             



                                                  Reason: CHANGE            



                                            IN VISION             



                                                                  



                                             EXAMS:               



                                                                  



                                                      CPT:            



                                               [id number] CT            



                                            HEAD/BRAIN W/O CONT            



                                                                  



                                            [id number]                 



                                               EXAM:  - CT            



                                            HEAD/BRAIN W/O CONT            



                                                        LOCATION:            



                                            T18                   



                                            HISTORY: 61            



                                            years-year old Female            



                                            with CHANGE IN VISION            



                                                                  



                                            TECHNIQUE:            



                                            Computerized            



                                            tomography images            



                                            from the skull base            



                                            to the       vertex            



                                            were obtained.            



                                            Coronal and sagittal            



                                            reformatted images            



                                            are       provided.            



                                                         This            



                                            exam was performed            



                                            according to our            



                                            departmental            



                                            dose-optimization            



                                            program, which            



                                            includes automated            



                                            exposure control,            



                                              adjustment of the            



                                            mA and/or kV            



                                            according to patient            



                                            size and/or use of            



                                               iterative            



                                            reconstruction            



                                            technique             



                                              COMPARISON: None            



                                                       FINDINGS:            



                                                          Brain:            



                                            The brain parenchymal            



                                            architecture is            



                                            unremarkable. There            



                                            is       severe            



                                            diffuse cerebral            



                                            atrophy and            



                                            periventricular/subco            



                                            rtical white            



                                            matter hypodensities            



                                            that likely represent            



                                            sequelae of chronic            



                                                microvascular            



                                            ischemia. There is no            



                                            evidence of an acute            



                                            territorial            



                                            infarct. There are            



                                            multiple punctate            



                                            hypodensities of the            



                                            right basal            



                                            ganglia from remote            



                                            infarcts.             



                                              Hemorrhage: There            



                                            is no CT evidence of            



                                            acute intracranial            



                                            hemorrhage.            



                                                Mass/edema: There            



                                            is no CT evidence of            



                                            mass effect, midline            



                                            shift, or             



                                            parenchymal edema.            



                                                                  



                                            Ventricles: Severe Ex            



                                            vacuo dilstion of the            



                                            ventricles secondary            



                                            to       parencymal            



                                            tissue loss.            



                                                  Bones: There is            



                                            no evidence of acute            



                                            displaced calvarial            



                                            fracture.             



                                              Sinuses: The            



                                            visualized portions            



                                            of the paranasal            



                                            sinuses and mastoid            



                                                air cells are            



                                            free of significant            



                                            opacification.            



                                                   Other/Soft            



                                            Tissues:              



                                            Unremarkable.            



                                                    IMPRESSION:            



                                                            1.            



                                            No CT evidence of            



                                            acute intracranial            



                                            abnormality.            



                                                 PAGE  1            



                                                         Signed            



                                            Report                



                                                (CONTINUED)            



                                            Name: RONA NELSON            



                                            [facility]            



                                                         :            



                                            1960 Age/S: 61            



                                            / F         [id number]            



                                            [facility] [place]            



                                                Unit #:            



                                            [id number]     Loc:            



                                                        [place] [address]              



                                              Phys: Raul Simmons MD                    



                                                                  



                                                        Acct:            



                                            [id number]  Dis            



                                            Date:                 



                                            Status: REG REF            



                                                                  



                                                PHONE #:            



                                            [phone]     Exam            



                                            Date: 2022            



                                            1248                  



                                               FAX #:             



                                                  Reason: CHANGE            



                                            IN VISION             



                                                                  



                                             EXAMS:               



                                                                  



                                                      CPT:            



                                               [id number] CT            



                                            HEAD/BRAIN W/O CONT            



                                                                  



                                            [address]                 



                                            <Continued>            



                                            There are multiple            



                                            punctate              



                                            hypodensities of the            



                                            right basal ganglia            



                                                  from remote            



                                            infarcts as well as            



                                            extensive parenchymal            



                                            changes from            



                                            chronic microvascular            



                                            disease..             



                                                   **             



                                            Electronically Signed            



                                            by SILVIA Yates            



                                            on 2022 at 1254            



                                            **                    



                                              Reported and signed            



                                            by: Farooq Yates M.D.            



                                                                  



                                                        CC: Shell Patel MD;            



                                            Raul Simmons MD            



                                                                  



                                                                  



                                                                  



                                                                  



                                               Technologist:Bharat Hunt, RT(R)(CT)            



                                                 CTDI:            



                                            DLP:        Trnscb            



                                            Date/Time: 2022            



                                            ([id number]) [email or web address]            



                                                                  



                                            Orig Print D/T: S:            



                                            2022 (8717)            



                                            PAGE  2               



                                                    Signed Report            



                                                                  



                                                                  









                    PHENYTOIN           2022 06:39:21 









                      Test Item  Value      Reference Range Interpretation Comme

nts









             PHENYTOIN (test code = <3.0         10.0-20.0    L            [place]

t hemolysis



             [id number])                                         

 

             KAYLIN (test code = KAYLIN) Toxic Range: ? ? ? ? ? ?                     

      



                          ? ? 0-3 Months ? ? ? ? ?                           



                          ? ? Greater than 14 ug/mL                           



                          ? ? 3 Months - 150 Years                           



                          ? ? Greater than 20 ug/mL                           

 

             Lab Interpretation (test code = Abnormal                           

    



             06133-9)                                            



[facility]. METABOLIC PANEL ([id number])2022 
05:57:53





             Test Item    Value        Reference Range Interpretation Comments

 

             NA (test code = 123 mmol/L   135-145      L            



             3010542368)                                         

 

             K (test code = 4.4 mmol/L   3.5-5.0                   



             4509732809)                                         

 

             CL (test code = 90 mmol/L           L            



             2717021990)                                         

 

             CO2 TOTAL (test code = 10 mmol/L    23-31        L            



             6386000290)                                         

 

             AGAP (test code =              2-16         H            



             5776107743)                                         

 

             BUN (test code = 9 mg/dL      7-23                      



             0031895607)                                         

 

             GLUCOSE (test code = 207 mg/dL           H            



             2134707766)                                         

 

             CREATININE (test code = 0.62 mg/dL   0.50-1.04                 



             7912044651)                                         

 

             TOTAL BILI (test code = 0.7 mg/dL    0.1-1.1                   



             1396874922)                                         

 

             CALCIUM (test code = 8.3 mg/dL    8.6-10.6     L            



             3036969553)                                         

 

             T PROTEIN (test code = 7.0 g/dL     6.3-8.2                   



             0582011930)                                         

 

             ALBUMIN (test code = 4.5 g/dL     3.5-5.0                   



             8173388904)                                         

 

             ALK PHOS (test code = 106 U/L                          



             3340828035)                                         

 

             ALTv (test code = 150 U/L      5-35         H            



             1742-6)                                             

 

             AST(SGOT) (test code = 41 U/L       13-40        H            



             8996399033)                                         

 

             eGFR (test code =              mL/min/1.73m2              



             4797933853)                                         

 

             KAYLIN (test code = KAYLIN) Association of                           



                          Glomerular Filtration                           



                          Rate (GFR) and Staging                           



                          of Kidney Disease*                           



                          +---------------------                           



                          --+-------------------                           



                          --+-------------------                           



                          ------+| GFR                           



                          (mL/min/1.73 m2) ?|                           



                          With Kidney Damage ?|                           



                          ?Without Kidney                           



                          Damage+---------------                           



                          --------+-------------                           



                          --------+-------------                           



                          ------------+| ?>90 ?                           



                          ? ? ? ? ? ? ? ?|                           



                          ?Stage one ? ? ? ? ?|                           



                          ? Normal ? ? ? ? ? ? ?                           



                          ?+--------------------                           



                          ---+------------------                           



                          ---+------------------                           



                          -------+| ?60-89 ? ? ?                           



                          ? ? ? ? ?| ?Stage two                           



                          ? ? ? ? ?| ? Decreased                           



                          GFR ? ? ? ?                            



                          +---------------------                           



                          --+-------------------                           



                          --+-------------------                           



                          ------+| ?30-59 ? ? ?                           



                          ? ? ? ? ?| ?Stage                           



                          three ? ? ? ?| ? Stage                           



                          three ? ? ? ? ?                           



                          +---------------------                           



                          --+-------------------                           



                          --+-------------------                           



                          ------+| ?15-29 ? ? ?                           



                          ? ? ? ? ?| ?Stage four                           



                          ? ? ? ? | ? Stage four                           



                          ? ? ? ? ?                              



                          ?+--------------------                           



                          ---+------------------                           



                          ---+------------------                           



                          -------+| ?<15 (or                           



                          dialysis) ? ?| ?Stage                           



                          five ? ? ? ? | ? Stage                           



                          five ? ? ? ? ?                           



                          ?+--------------------                           



                          ---+------------------                           



                          ---+------------------                           



                          -------+ *Each stage                           



                          assumes the associated                           



                          GFR level has been in                           



                          effect for at least                           



                          three months. ?Stages                           



                          1 to 5, with or                           



                          without kidney                           



                          disease, indicate                           



                          chronic kidney                           



                          disease. Notes:                           



                          Determination of                           



                          stages one and two                           



                          (with eGFR                             



                          >59mL/min/1.73 m2)                           



                          requires estimation of                           



                          kidney damage for at                           



                          least three months as                           



                          defined by structural                           



                          or functional                           



                          abnormalities of the                           



                          kidney, manifested by                           



                          either:Pathological                           



                          abnormalities or                           



                          Markers of kidney                           



                          damage (including                           



                          abnormalities in the                           



                          composition of the                           



                          blood or urine or                           



                          abnormalities in                           



                          imaging tests).                           

 

             Lab Interpretation Abnormal                               



             (test code = 26571-5)                                        



[facility]





             Test Item    Value        Reference    Interpretation Comments



                                       Range                     

 

             TROPONIN I (test 0.042 ng/mL  See_Comment  H             [Automated



             code = 8387541704)                                        message] 

The



                                                                 system which



                                                                 generated this



                                                                 result



                                                                 transmitted



                                                                 reference range

:



                                                                 <=0.034. The



                                                                 reference range



                                                                 was not used to



                                                                 interpret this



                                                                 result as



                                                                 normal/abnormal

.

 

             KAYLIN (test code = Reference (Normal)                           



             KAYLIN)         Range (defined by                           



                          the 99th percentile                           



                          reference limit): <=                           



                          0.034 ng/mL Note:                           



                          Cardiac troponin                           



                          begins to rise 3-4                           



                          hours after the                           



                          onset of ischemia.                           



                          Repeat in 4-6 hours                           



                          if the sample was                           



                          drawn within 3-4                           



                          hours of the onset                           



                          of the symptom and                           



                          found normal.                           



                          Diagnosis of                           



                          myocardial injury is                           



                          made with acute                           



                          changes in cTn                           



                          concentrations with                           



                          at least one serial                           



                          sample above the                           



                          99th percentile                           



                          upper reference                           



                          limit (URL), taken                           



                          together with the                           



                          patient's clinical                           



                          presentation.                           



                          Biotin has been                           



                          reported to cause a                           



                          negative bias,                           



                          interpret results                           



                          relative to                            



                          patient's use of                           



                          biotin.                                

 

             Lab Interpretation Abnormal                               



             (test code =                                        



             13109-8)                                            



[id number]44:51





             Test Item    Value        Reference Range Interpretation Comments

 

             NT-proBNP (test code 6600 pg/mL   See_Comment  H             [Autom

ated



             = 0716289246)                                        message] The



                                                                 system which



                                                                 generated this



                                                                 result



                                                                 transmitted



                                                                 reference range

:



                                                                 <=125. The



                                                                 reference range



                                                                 was not used to



                                                                 interpret this



                                                                 result as



                                                                 normal/abnormal

.

 

             KAYLIN (test code = KAYLIN) Biotin has been                           



                          reported to                            



                          cause a negative                           



                          bias, interpret                           



                          results relative                           



                          to patient's use                           



                          of biotin.                             

 

             Lab Interpretation Abnormal                               



             (test code = 47448-9)                                        



[facility]





             Test Item    Value        Reference Range Interpretation Comments

 

             ALCOHOL (test code = <10          mg/dL                     



             7260514018)                                         

 

             KAYLIN (test code = KAYLIN) <10 [id number]                           



                          Toxic>100 Depression of                           



                          CNS>400 Fatalities                           



                          Reported                               



[facility] WITH [id number] 05:33:54





             Test Item    Value        Reference Range Interpretation Comments

 

             WBC (test code =              See_Comment  H             [Automated



             0790-2)                                             message] The



                                                                 system which



                                                                 generated this



                                                                 result transmit

christin



                                                                 reference range

:



                                                                 4.30 - 11.10



                                                                 10*3/?L. The



                                                                 reference range



                                                                 was not used to



                                                                 interpret this



                                                                 result as



                                                                 normal/abnormal

.

 

             RBC (test code =              See_Comment                [Automated



             619-8)                                              message] The



                                                                 system which



                                                                 generated this



                                                                 result transmit

christin



                                                                 reference range

:



                                                                 3.93 - 5.25



                                                                 10*6/?L. The



                                                                 reference range



                                                                 was not used to



                                                                 interpret this



                                                                 result as



                                                                 normal/abnormal

.

 

             HGB (test code = 14.7 g/dL    11.6-15.0                 



             718-7)                                              

 

             HCT (test code = 43.1 %       35.7-45.2                 



             4544-3)                                             

 

             MCV (test code = 100.0 fL     80.6-95.5    H            



             787-2)                                              

 

             MCH (test code = 34.1 pg      25.9-32.8    H            



             785-6)                                              

 

             MCHC (test code = 34.1 g/dL    31.6-35.1                 



             786-4)                                              

 

             RDW-SD (test code = 49.1 fL      39.0-49.9                 



             46570-4)                                            

 

             RDW-CV (test code = 13.3 %       12.0-15.5                 



             788-0)                                              

 

             PLT (test code =              See_Comment  H             [Automated



             777-3)                                              message] The



                                                                 system which



                                                                 generated this



                                                                 result transmit

christin



                                                                 reference range

:



                                                                 166 - 358 10*3/

?L.



                                                                 The reference



                                                                 range was not u

sed



                                                                 to interpret th

is



                                                                 result as



                                                                 normal/abnormal

.

 

             MPV (test code = 9.6 fL       9.5-12.9                  



             01526-3)                                            

 

             NRBC/100 WBC (test              See_Comment                [Automat

ed



             code = 5214300359)                                        message] 

The



                                                                 system which



                                                                 generated this



                                                                 result transmit

christin



                                                                 reference range

:



                                                                 0.0 - 10.0 /100



                                                                 WBCs. The



                                                                 reference range



                                                                 was not used to



                                                                 interpret this



                                                                 result as



                                                                 normal/abnormal

.

 

             NRBC x10^3 (test code <0.01        See_Comment                [Auto

mated



             = [id number])                                        message] The



                                                                 system which



                                                                 generated this



                                                                 result transmit

christin



                                                                 reference range

:



                                                                 10*3/?L. The



                                                                 reference range



                                                                 was not used to



                                                                 interpret this



                                                                 result as



                                                                 normal/abnormal

.

 

             GRAN MAT (NEUT) % 83.9 %                                 



             (test code = 770-8)                                        

 

             IMM GRAN % (test code 0.90 %                                 



             = 1149772997)                                        

 

             LYMPH % (test code = 8.5 %                                  



             736-9)                                              

 

             MONO % (test code = 6.4 %                                  



             5905-5)                                             

 

             EOS % (test code = 0.0 %                                  



             713-8)                                              

 

             BASO % (test code = 0.3 %                                  



             706-2)                                              

 

             GRAN MAT x10^3(ANC) 20.24 10*3/uL 1.88-7.09    H            



             (test code =                                        



             2018254234)                                         

 

             IMM GRAN x10^3 (test 0.21 10*3/uL 0.00-0.06    H            



             code = 8806669411)                                        

 

             LYMPH x10^3 (test code 2.04 10*3/uL 1.32-3.29                 



             = 731-0)                                            

 

             MONO x10^3 (test code 1.54 10*3/uL 0.33-0.92    H            



             = 742-7)                                            

 

             EOS x10^3 (test code = <0.03        0.03-0.39    L            



             711-2)                                              

 

             BASO x10^3 (test code 0.08 10*3/uL 0.01-0.07    H            



             = 704-7)                                            

 

             Lab Interpretation Abnormal                               



             (test code = 11358-0)                                        



[facility]RM[facility] [id number] 
05:23:27





             Test Item    Value        Reference Range Interpretation Comments

 

             APTT Patient (test              See_Comment                [Automat

ed



             code = 3173-2)                                        message] The



                                                                 system which



                                                                 generated this



                                                                 result



                                                                 transmitted



                                                                 reference range

:



                                                                 23 - 38 Seconds

.



                                                                 The reference



                                                                 range was not



                                                                 used to interpr

et



                                                                 this result as



                                                                 normal/abnormal

.

 

             KAYLIN (test code = KAYLIN) The [facility] patient                           



                          population mean                           



                          normal value for                           



                          aPTT is 30                             



                          seconds.                               

 

             Lab Interpretation Normal                                 



             (test code = 45280-6)                                        



[facility] / [id number] 05:21:07





             Test Item    Value        Reference Range Interpretation Comments

 

             PROTIME PATIENT (test              See_Comment                [Auto

mated message]



             code = 5964-2)                                        The system wh

ich



                                                                 generated this 

result



                                                                 transmitted ref

erence



                                                                 range: 12.0 - 1

4.7



                                                                 Seconds. The re

ference



                                                                 range was not u

sed to



                                                                 interpret this 

result



                                                                 as normal/abnor

mal.

 

             INR (test code = 6301-6)                                        Nor

mal INR <1.1;



                                                                 Warfarin Therap

eutic



                                                                 range 2.0 to 3.

0 or



                                                                 2.5 to 3.5, dep

ending



                                                                 upon the indica

tions.

 

             Lab Interpretation (test Normal                                 



             code = 36982-8)                                        



[facility]:09





             Test Item    Value        Reference Range Interpretation Comments

 

             POCT GLU (test code = 3612201249) 211 mg/dL           H      

      

 

             Lab Interpretation (test code = Abnormal                           

    



             11430-2)                                            



[facility]ISOL[facility]2021 
06:27:00





             Test Item    Value        Reference Range Interpretation Comments

 

             CULTURE (HIGINIOAKER) (test No fungus isolated                          

 



             code = 1095)                                        



POCT-GLUCOSE [id number] 11:44:00





             Test Item    Value        Reference Range Interpretation Comments

 

             POC-GLUCOSE METER 111 mg/dL           H            : Notified

 RN/MD:



             (RODOLFO) (test code =                                        TESTED

 AT [facility] [id number])                                               [facility],



                                                                 [id number]:



                                                                 /Techni

leno ID



                                                                 = [id number] for Joni Castaneda



BLOOD [id number] 09:00:00





             Test Item    Value        Reference Range Interpretation Comments

 

             CULTURE (BEAKER) (test No growth in 5 days                         

  



             code = 1095)                                        



The specimen volume collected for this blood culture was below the optimum (10 
mL per bottle or 20 mL total).  Use of lower volumes may adversely affect 
recovery and/or detection times of some organisms.BLOOD [id number] 
09:00:00





             Test Item    Value        Reference Range Interpretation Comments

 

             CULTURE (BEAKER) (test No growth in 5 days                         

  



             code = 1095)                                        



The specimen volume collected for this blood culture was below the optimum (10 
mL per bottle or 20 mL total).  Use of lower volumes may adversely affect 
recovery and/or detection times of some organisms.POCT-GLUCOSE [id number] 
08:29:00





             Test Item    Value        Reference Range Interpretation Comments

 

             POC-GLUCOSE METER 76 mg/dL                         : TESTED A

T [facility] [address]



             (BEAKER) (test code =                                        [place],



             1538)                                               [address]:



                                                                 /Techni

leno ID =



                                                                 [id number] for Joni White



BASIC METABOLIC [id number] 05:44:00





             Test Item    Value        Reference Range Interpretation Comments

 

             SODIUM (BEAKER) 128 meq/L    136-145      L            



             (test code = 381)                                        

 

             POTASSIUM (BEAKER) 3.7 meq/L    3.5-5.1                   



             (test code = 379)                                        

 

             CHLORIDE (BEAKER) 96 meq/L            L            



             (test code = 382)                                        

 

             CO2 (BEAKER) (test 24 meq/L     22-29                     



             code = 355)                                         

 

             BLOOD UREA NITROGEN 6 mg/dL      7-21         L            



             (BEAKER) (test code                                        



             = 354)                                              

 

             CREATININE (BEAKER) 0.42 mg/dL   0.57-1.25    L            



             (test code = 358)                                        

 

             GLUCOSE RANDOM 84 mg/dL                         



             (BEAKER) (test code                                        



             = 652)                                              

 

             CALCIUM (BEAKER) 8.2 mg/dL    8.4-10.2     L            



             (test code = 697)                                        

 

             EGFR (BEAKER) (test 153 mL/min/1.73                           ESTIM

ATED GFR IS



             code = 1092) sq m                                   NOT AS ACCURATE

 AS



                                                                 CREATININE



                                                                 CLEARANCE IN



                                                                 PREDICTING



                                                                 GLOMERULAR



                                                                 FILTRATION RATE

.



                                                                 ESTIMATED GFR I

S



                                                                 NOT APPLICABLE 

FOR



                                                                 DIALYSIS PATIEN

TS.



 ID - PIAYA [id number] 05:44:00





             Test Item    Value        Reference Range Interpretation Comments

 

             MAGNESIUM (BEAKER) (test code = 1.5 mg/dL    1.6-2.6      L        

    



             627)                                                



 ID - JAVIER [id number] 05:44:00





             Test Item    Value        Reference Range Interpretation Comments

 

             PHOSPHORUS (BEAKER) (test code = 2.7 mg/dL    2.3-4.7              

     



             604)                                                



 ID - JAVIER LPOCT-GLUCOSE [id number] 22:07:00





             Test Item    Value        Reference Range Interpretation Comments

 

             POC-GLUCOSE METER 117 mg/dL           H            : TESTED A

T [facility] [address]



             (BEAKER) (test code =                                        [facility]

[facility],



             1538)                                               [id number]:



                                                                 /Techni

leno ID



                                                                 = [id number] for [place]

NNIS,



                                                                 JUDY



POCT-GLUCOSE [id number] 17:55:00





             Test Item    Value        Reference Range Interpretation Comments

 

             POC-GLUCOSE METER 105 mg/dL                        : TESTED A

T [facility] [address]



             (BEAKER) (test code =                                        [facility]

[facility],



             1538)                                               [id number]:



                                                                 /Techni

leno ID



                                                                 = [id number] for [place]

INSONU,



                                                                 SHWETA



CORTISOL,60 [id number] 13:19:00





             Test Item    Value        Reference Range Interpretation Comments

 

             CORTISOL BASELINE NETWORKED 15.4 mcg/dL                            



             (BEAKER) (test code = 2307)                                        

 

             CORTISOL 30 MINUTE NETWORKED 23.0 mcg/dL                           

 



             (BEAKER) (test code = 2308)                                        

 

             CORTISOL, 60 MINUTE (BEAKER) 26.2 ug/dL                            

 



             (test code = 1805)                                        



    ACTH STIMULATION TEST INTERPRETATION GUIDELINES(Synonyms: Cortrosyn Test, 
Cosyntropin or Corticotropin Stimulation Test)Adenocorticotropic hormone 
(ACTH)is a tropic hormone, made in the pituitary gland, which travels trhough 
the bloodstream and stimulates the cortex of the adrenal glands to release 
cortisol. Cortisol is a primary hormone, which aids the body's metabolism of 
fats, carbohydrates, and protein as well as sodium and potassium regulation.ACTH
Stimulation Test: Exogenous administrationof biologically active ACTH stimulates
the secretion of cortisol from the adrenal gland. This test is used to evaluate 
adrenal function by measuring cortisol levels at baseline and at 30 and 60 
minutesafter the administration of 250 micrograms of cosyntropin (Cortrosyn). 
Patients who have received exogenous corticosteroids immediately prior to 
performing the ACTH Stimulation Test will often have elevated baseline cortisol 
levels, which may lead to erroneous interpretation of test results. The notable 
exception is with dexamethasone.Normal Response: An increase in cortisol after 
stimulation by ACTHis normal. Post-stimulation cortisol concentration should be 
greater than 20 mcg/dL or the rate of rise from baseline cortisol should be 
greater than or equal to 9 mcg/dL.Patients with sepsis or septicshock: According
to a study by Padma et al (WILLIAM 2000,283(8):1038-45), the ACTH Stimulation Test
provides important prognostic information. This study defined 3 groups of 
patients with sepsis or septic shock:  1. Good Survival: Low basal cortisol 
(&lt;or=34 mcg/dL) and high ACTH response     (&gt;9mcg/dL)  2.  Intermediate 
Survival: Low basal cortisol (&lt;34 mcg/dL) and low response      to ACTH (&l
t;or=9 mcg/dL) OR      High basal cortisol (&gt;34 mcg/dL) or high ACTH response
(&gt;9 mcg/dL)  3.Poor Survival: High basal cortisol (&gt;34 mcg/dL) and low 
ACTH response      (&lt;or=9 mcg/dL).Treatment of patients with relative adrenal
dysfunction may be indicated based on test results and the clinical condition of
the patient. Additional information, including treatment recommendations, is 
available in critically ill patients, approved by the Pharmacy, Nutrition, and 
Therapeutics Committee on 12/10/2004 and available through the Pharmacy Policy 
and Procedure Section on The Source. ID - EMILY MCORTISOL,30 MIN
2021 13:18:00





             Test Item    Value        Reference Range Interpretation Comments

 

             CORTISOL BASELINE NETWORKED 15.4 mcg/dL                            



             (BEAKER) (test code = 2307)                                        

 

             CORTISOL, 30 MINUTE (BEAKER) 23.0 ug/dL                            

 



             (test code = 1804)                                        



    ACTH STIMULATION TEST INTERPRETATION GUIDELINES(Synonyms: Cortrosyn Test, 
Cosyntropin or Corticotropin Stimulation Test)Adenocorticotropic hormone 
(ACTH)is a tropic hormone, made in the pituitary gland, which travels trhough 
the bloodstream and stimulates the cortex of the adrenal glands to release 
cortisol. Cortisol is a primary hormone, which aids the body's metabolism of 
fats, carbohydrates, and protein as well as sodium and potassium regulation.ACTH
Stimulation Test: Exogenous administrationof biologically active ACTH stimulates
the secretion of cortisol from the adrenal gland. This test is used to evaluate 
adrenal function by measuring cortisol levels at baseline and at 30 and 60 
minutesafter the administration of 250 micrograms of cosyntropin (Cortrosyn). 
Patients who have received exogenous corticosteroids immediately prior to 
performing the ACTH Stimulation Test will often have elevated baseline cortisol 
levels, which may lead to erroneous interpretation of test results. The notable 
exception is with dexamethasone.Normal Response: An increase in cortisol after 
stimulation by ACTHis normal. Post-stimulation cortisol concentration should be 
greater than 20 mcg/dL or the rate of rise from baseline cortisol should be 
greater than or equal to 9 mcg/dL.Patients with sepsis or septicshock: According
to a study by Padma et al (WILLIAM 2000,283(8):1038-45), the ACTH Stimulation Test
provides important prognostic information. This study defined 3 groups of 
patients with sepsis or septic shock:  1. Good Survival: Low basal cortisol 
(&lt;or=34 mcg/dL) and high ACTH response     (&gt;9mcg/dL)  2.  Intermediate 
Survival: Low basal cortisol (&lt;34 mcg/dL) and low response      to ACTH (&l
t;or=9 mcg/dL) OR      High basal cortisol (&gt;34 mcg/dL) or high ACTH response
(&gt;9 mcg/dL)  3.Poor Survival: High basal cortisol (&gt;34 mcg/dL) and low 
ACTH response      (&lt;or=9 mcg/dL).Treatment of patients with relative adrenal
dysfunction may be indicated based on test results and the clinical condition of
the patient. Additional information, including treatment recommendations, is 
available in critically ill patients, approved by the Pharmacy, Nutrition, and 
Therapeutics Committee on 12/10/2004 and available through the Pharmacy Policy 
and Procedure Section on The Source. ID - EMERSONCORTISOL,BASELINE
2021 12:15:00





             Test Item    Value        Reference Range Interpretation Comments

 

             CORTISOL, BASELINE (BEAKER) (test 15.4 ug/dL                       

      



             code = 1803)                                        



    ACTH STIMULATION TEST INTERPRETATION GUIDELINES(Synonyms: Cortrosyn Test, 
Cosyntropin or Corticotropin Stimulation Test)Adenocorticotropic hormone 
(ACTH)is a tropic hormone, made in the pituitary gland, which travels trhough 
the bloodstream and stimulates the cortex of the adrenal glands to release 
cortisol. Cortisol is a primary hormone, which aids the body's metabolism of 
fats, carbohydrates, and protein as well as sodium and potassium regulation.ACTH
Stimulation Test: Exogenous administrationof biologically active ACTH stimulates
the secretion of cortisol from the adrenal gland. This test is used to evaluate 
adrenal function by measuring cortisol levels at baseline and at 30 and 60 
minutesafter the administration of 250 micrograms of cosyntropin (Cortrosyn). 
Patients who have received exogenous corticosteroids immediately prior to 
performing the ACTH Stimulation Test will often have elevated baseline cortisol 
levels, which may lead to erroneous interpretation of test results. The notable 
exception is with dexamethasone.Normal Response: An increase in cortisol after 
stimulation by ACTHis normal. Post-stimulation cortisol concentration should be 
greater than 20 mcg/dL or the rate of rise from baseline cortisol should be 
greater than or equal to 9 mcg/dL.Patients with sepsis or septicshock: According
to a study by Padma et al (WILLIAM 2000,283(8):1038-45), the ACTH Stimulation Test
provides important prognostic information. This study defined 3 groups of 
patients with sepsis or septic shock:  1. Good Survival: Low basal cortisol 
(&lt;or=34 mcg/dL) and high ACTH response     (&gt;9mcg/dL)  2.  Intermediate 
Survival: Low basal cortisol (&lt;34 mcg/dL) and low response      to ACTH (&l
t;or=9 mcg/dL) OR      High basal cortisol (&gt;34 mcg/dL) or high ACTH response
(&gt;9 mcg/dL)  3.Poor Survival: High basal cortisol (&gt;34 mcg/dL) and low 
ACTH response      (&lt;or=9 mcg/dL).Treatment of patients with relative adrenal
dysfunction may be indicated based on test results and the clinical condition of
the patient. Additional information, including treatment recommendations, is 
available in critically ill patients, approved by the Pharmacy, Nutrition, and 
Therapeutics Committee on 12/10/2004 and available through the Pharmacy Policy 
and Procedure Section on The Source. ID - EMILY MPOCT-GLUCOSE METER
2021 12:00:00





             Test Item    Value        Reference Range Interpretation Comments

 

             POC-GLUCOSE METER 95 mg/dL                         : TESTED A

T [facility] [address]



             (HIGINIOERASTO) (test code =                                        [place],



             1538)                                               [id number]:



                                                                 /Techni

leno ID =



                                                                 [id number] for SHWETA WHEELER



BASIC METABOLIC [id number] 08:39:00





             Test Item    Value        Reference Range Interpretation Comments

 

             SODIUM (BEAKER) 129 meq/L    136-145      L            



             (test code = 381)                                        

 

             POTASSIUM (BEAKER) 3.8 meq/L    3.5-5.1                   



             (test code = 379)                                        

 

             CHLORIDE (BEAKER) 96 meq/L            L            



             (test code = 382)                                        

 

             CO2 (BEAKER) (test 24 meq/L     22-29                     



             code = 355)                                         

 

             BLOOD UREA NITROGEN 5 mg/dL      7-21         L            



             (BEAKER) (test code                                        



             = 354)                                              

 

             CREATININE (BEAKER) 0.50 mg/dL   0.57-1.25    L            



             (test code = 358)                                        

 

             GLUCOSE RANDOM 83 mg/dL                         



             (BEAKER) (test code                                        



             = 652)                                              

 

             CALCIUM (BEAKER) 8.1 mg/dL    8.4-10.2     L            



             (test code = 697)                                        

 

             EGFR (BEAKER) (test 125 mL/min/1.73                           ESTIM

ATED GFR IS



             code = 1092) sq m                                   NOT AS ACCURATE

 AS



                                                                 CREATININE



                                                                 CLEARANCE IN



                                                                 PREDICTING



                                                                 GLOMERULAR



                                                                 FILTRATION RATE

.



                                                                 ESTIMATED GFR I

S



                                                                 NOT APPLICABLE 

FOR



                                                                 DIALYSIS PATIEN

TS.



 ID - EMILY [id number] 08:39:00





             Test Item    Value        Reference Range Interpretation Comments

 

             MAGNESIUM (BEAKER) (test code = 1.7 mg/dL    1.6-2.6               

    



             627)                                                



 ID - EMILY [id number] 08:39:00





             Test Item    Value        Reference Range Interpretation Comments

 

             PHOSPHORUS (BEAKER) (test code = 3.1 mg/dL    2.3-4.7              

     



             604)                                                



 ID - EMILY MHEMOGLOBIN AND [id number] 08:20:00





             Test Item    Value        Reference Range Interpretation Comments

 

             HEMOGLOBIN (BEAKER) (test code = 11.2 GM/DL   11.2-15.7            

     



             410)                                                

 

             HEMATOCRIT (BEAKER) (test code = 31.8 %       34.1-44.9    L       

     



             411)                                                



 ID - 6000POCT-GLUCOSE [id number] 08:07:00





             Test Item    Value        Reference Range Interpretation Comments

 

             POC-GLUCOSE METER 89 mg/dL                         : TESTED A

T [facility] [address]



             (BEAKER) (test code =                                        [facility] [place],



             1538)                                               [address]:



                                                                 /Techni

leno ID =



                                                                 [id number] for SHWETA WHEELER



[id number] 07:39:00





             Test Item    Value        Reference Range Interpretation Comments

 

             CORTISOL, TOTAL (BEAKER) (test code 9.8 ug/dL    3.7-19.4          

        



             = 2755)                                             



 COD[facility] DIAZ [facility] W/PLT COUNT &amp; AUTO [id number] 07:05:00





             Test Item    Value        Reference Range Interpretation Comments

 

             WHITE BLOOD CELL COUNT (BEAKER) 9.8 K/ L     3.5-10.5              

    



             (test code = 775)                                        

 

             RED BLOOD CELL COUNT (BEAKER) 2.93 M/ L    3.93-5.22    L          

  



             (test code = 761)                                        

 

             HEMOGLOBIN (BEAKER) (test code = 9.9 GM/DL    11.2-15.7    L       

     



             410)                                                

 

             HEMATOCRIT (BEAKER) (test code = 27.9 %       34.1-44.9    L       

     



             411)                                                

 

             MEAN CORPUSCULAR VOLUME (BEAKER) 95.2 fL      79.4-94.8    H       

     



             (test code = 753)                                        

 

             MEAN CORPUSCULAR HEMOGLOBIN 33.8 pg      25.6-32.2    H            



             (BEAKER) (test code = 751)                                        

 

             MEAN CORPUSCULAR HEMOGLOBIN CONC 35.5 GM/DL   32.2-35.5            

     



             (BEAKER) (test code = 752)                                        

 

             RED CELL DISTRIBUTION WIDTH 13.2 %       11.7-14.4                 



             (BEAKER) (test code = 412)                                        

 

             PLATELET COUNT (BEAKER) (test 255 K/CU MM  150-450                 

  



             code = 756)                                         

 

             MEAN PLATELET VOLUME (BEAKER) 10.5 fL      9.4-12.3                

  



             (test code = 754)                                        

 

             NUCLEATED RED BLOOD CELLS 0 /100 WBC   0-0                       



             (BEAKER) (test code = 413)                                        

 

             NEUTROPHILS RELATIVE PERCENT 73 %                                  

 



             (BEAKER) (test code = 429)                                        

 

             LYMPHOCYTES RELATIVE PERCENT 12 %                                  

 



             (BEAKER) (test code = 430)                                        

 

             MONOCYTES RELATIVE PERCENT 12 %                                   



             (BEAKER) (test code = 431)                                        

 

             EOSINOPHILS RELATIVE PERCENT 3 %                                   

 



             (BEAKER) (test code = 432)                                        

 

             BASOPHILS RELATIVE PERCENT 0 %                                    



             (BEAKER) (test code = 437)                                        

 

             NEUTROPHILS ABSOLUTE COUNT 7.11 K/ L    1.56-6.13    H            



             (BEAKER) (test code = 670)                                        

 

             LYMPHOCYTES ABSOLUTE COUNT 1.14 K/ L    1.18-3.74    L            



             (BEAKER) (test code = 414)                                        

 

             MONOCYTES ABSOLUTE COUNT (BEAKER) 1.17 K/ L    0.24-0.36    H      

      



             (test code = 415)                                        

 

             EOSINOPHILS ABSOLUTE COUNT 0.27 K/ L    0.04-0.36                 



             (BEAKER) (test code = 416)                                        

 

             BASOPHILS ABSOLUTE COUNT (BEAKER) 0.04 K/ L    0.01-0.08           

      



             (test code = 417)                                        

 

             IMMATURE GRANULOCYTES-RELATIVE 0 %          0-1                    

   



             PERCENT (BEAKER) (test code =                                      

  



             2801)                                               



BASIC METABOLIC [id number] 00:32:00





             Test Item    Value        Reference Range Interpretation Comments

 

             SODIUM (BEAKER) 128 meq/L    136-145      L            



             (test code = 381)                                        

 

             POTASSIUM (BEAKER) 4.1 meq/L    3.5-5.1                   



             (test code = 379)                                        

 

             CHLORIDE (BEAKER) 95 meq/L            L            



             (test code = 382)                                        

 

             CO2 (BEAKER) (test 24 meq/L     22-29                     



             code = 355)                                         

 

             BLOOD UREA NITROGEN 6 mg/dL      7-21         L            



             (BEAKER) (test code                                        



             = 354)                                              

 

             CREATININE (BEAKER) 0.53 mg/dL   0.57-1.25    L            



             (test code = 358)                                        

 

             GLUCOSE RANDOM 100 mg/dL                        



             (BEAKER) (test code                                        



             = 652)                                              

 

             CALCIUM (BEAKER) 7.7 mg/dL    8.4-10.2     L            



             (test code = 697)                                        

 

             EGFR (BEAKER) (test 117 mL/min/1.73                           ESTIM

ATED GFR IS



             code = 1092) sq m                                   NOT AS ACCURATE

 AS



                                                                 CREATININE



                                                                 CLEARANCE IN



                                                                 PREDICTING



                                                                 GLOMERULAR



                                                                 FILTRATION RATE

.



                                                                 ESTIMATED GFR I

S



                                                                 NOT APPLICABLE 

FOR



                                                                 DIALYSIS PATIEN

TS.



 ID - EMILY MPOCT-GLUCOSE [id number] 21:23:00





             Test Item    Value        Reference Range Interpretation Comments

 

             POC-GLUCOSE METER 138 mg/dL           H            : TESTED A

T [facility] [address]



             (BEAKER) (test code =                                        [facility],



             1538)                                               [address]:



                                                                 /Techni

leno ID



                                                                 = [id number] for An

sah,



                                                                 Irina



POCT-GLUCOSE [id number] 17:58:00





             Test Item    Value        Reference Range Interpretation Comments

 

             POC-GLUCOSE METER 123 mg/dL           H            : TESTED A

T [facility] [address]



             (BEAKER) (test code =                                        [facility]

[facility],



             1538)                                               [id number]:



                                                                 /Techni

leno ID



                                                                 = [id number] for Ca

vitt,



                                                                 Eminae



OSMOLALITY, [id number] 16:27:00





             Test Item    Value        Reference Range Interpretation Comments

 

             OSMOLALITY, SERUM (BEAKER) (test 260 mOsm/kg  275-295      L       

     



             code = 615)                                         



BASIC METABOLIC [id number] 16:01:00





             Test Item    Value        Reference Range Interpretation Comments

 

             SODIUM (BEAKER) 126 meq/L    136-145      L            



             (test code = 381)                                        

 

             POTASSIUM (BEAKER) 3.4 meq/L    3.5-5.1      L            



             (test code = 379)                                        

 

             CHLORIDE (BEAKER) 91 meq/L            L            



             (test code = 382)                                        

 

             CO2 (BEAKER) (test 23 meq/L     22-29                     



             code = 355)                                         

 

             BLOOD UREA NITROGEN 6 mg/dL      7-21         L            



             (BEAKER) (test code                                        



             = 354)                                              

 

             CREATININE (BEAKER) 0.51 mg/dL   0.57-1.25    L            



             (test code = 358)                                        

 

             GLUCOSE RANDOM 105 mg/dL                        



             (BEAKER) (test code                                        



             = 652)                                              

 

             CALCIUM (BEAKER) 8.4 mg/dL    8.4-10.2                  



             (test code = 697)                                        

 

             EGFR (BEAKER) (test 123 mL/min/1.73                           ESTIM

ATED GFR IS



             code = 1092) sq m                                   NOT AS ACCURATE

 AS



                                                                 CREATININE



                                                                 CLEARANCE IN



                                                                 PREDICTING



                                                                 GLOMERULAR



                                                                 FILTRATION RATE

.



                                                                 ESTIMATED GFR I

S



                                                                 NOT APPLICABLE 

FOR



                                                                 DIALYSIS PATIEN

TS.



 ID - [id number] 16:01:00





             Test Item    Value        Reference Range Interpretation Comments

 

             MAGNESIUM (BEAKER) (test code = 2.4 mg/dL    1.6-2.6               

    



             627)                                                



 ID - [id number] 16:01:00





             Test Item    Value        Reference Range Interpretation Comments

 

             PHOSPHORUS (BEAKER) (test code = 2.2 mg/dL    2.3-4.7      L       

     



             604)                                                



 ID - [id number] 15:13:00





             Test Item    Value        Reference Range Interpretation Comments

 

             FERRITIN (BEAKER) (test code = 351.59 ng/mL 5..00  H         

   



             361)                                                



 ID - RMPOCT-GLUCOSE [id number] 13:25:00





             Test Item    Value        Reference Range Interpretation Comments

 

             POC-GLUCOSE METER 134 mg/dL           H            : TESTED A

[facility] [address]



             (BEAKER) (test code =                                        [address] [facility],



             1538)                                               [id number]:



                                                                 /Techni

leno ID



                                                                 = [id number] for Ca

vitt,



                                                                 Eminae



IRON, TIBC, % SAT. (WITHOUT FERRITIN)2021 12:33:00





             Test Item    Value        Reference Range Interpretation Comments

 

             IRON (BEAKER) (test code = 547) 37.0 ug/dL   40.0-160.0   L        

    

 

             TOTAL IRON BINDING CAPACITY 196 ug/dL    250-450      L            



             (BEAKER) (test code = 769)                                        

 

             IRON % SATURATION (2) (BEAKER) 19 %         20-55        L         

   



             (test code = 2590)                                        



 ID - PIAYA LCT, CHEST, WITH [id number] 12:30:00Unlisted Reason 
for Exam - Click Yes and Enter Reason Below-&gt;YesUnlisted Reason for 
Exam-&gt;smoker with weight loss, hyponatremia; eval for lung malignancy
************************************************************[facility]        : 1960        
Sex: F************************************************************FINAL REPORT 
PATIENT ID:   [id number] TECHNIQUE: CT of the chest, abdomen, and pelvis WITH 
intravenous contrast and WITHOUT oral contrast. Dose modulation, iterative 
reconstruction, and/or weight-based adjustment of the mA/kV was utilized to 
reduce the radiation dose to as low as reasonably achievable. INDICATION: 
Unlisted Reason for Examsmoker with weight loss, hyponatremia; eval for lung 
malignancy. History of alcohol abuse. Evaluate for occult malignancy, 
pancreatitis COMPARISON: None.  FINDINGS: LINES/TUBES: None. LUNGS AND AIRWAYS: 
Mild right and moderate left basilar atelectasis. There are groundglass 
opacities which are scattered throughout the lungs remain the periphery.PLEURA: 
Small bilateral pleural effusionsHEART AND MEDIASTINUM: A right thyroid nodule 
measures 0.9 cm. This is likely clinically insignificant, and no further imaging
is recommended. No significant mediastinal, hilar, or axillary lymphadenopathy. 
The heart and pericardium are within normal limits. Marked calcification of the 
coronary arteries. Moderate calcification of the descending thoracic aorta and 
arch branch vessels. HEPATOBILIARY: There is likely a Riedel's lobe. A 
hyperenhancing focus at the periphery of segment VII measures 0.6 cm on axial 
image 49. Gallbladder is unremarkable. No biliary ductal dilatation.SPLEEN: No 
splenomegaly.PANCREAS: No focal masses or ductal dilatation. ADRENALS: No adre
nal nodules.KIDNEYS/URETERS: No hydronephrosis, stones, or masses. A left upper 
pole renal hypodensity measures 0.8 cm and is too small to further characterize.
PELVIC ORGANS/BLADDER: There is focal thickening of the right lateral bladder 
wall which measures up to 1 cm in thickness and 4.4 cm in length. Mild 
thickening of the left lateral urinary bladder wall. PERITONEUM/RETROPERITONEUM:
No free air or fluid.LYMPH NODES: No lymphadenopathy.VESSELS: There is occlusion
of both superficial femoral arteries. There is marked calcification of the 
abdominal pelvic vasculature with moderate, less than 50% narrowing of the 
distal abdominal aorta. GI TRACT: No distention or wall thickening. BONES AND 
SOFT TISSUES: Age-indeterminate, moderate compression deformity of the inferior 
endplate of T10. Chronic right posterior ribs 9, 10, 11, and 12 fractures. Old, 
healed left lateral ninth rib fracture. Subacute, healing right L2 and L3 
transverse process fractures. Age-indeterminate, approximately 50% compression 
deformity of the superior endplate of L1 with mild retropulsion. Prior posterior
fusion at L5-S1.Old, unhealed left greater trochanter fracture. The bones are 
diffusely demineralized. IMPRESSION: 1.The groundglass opacities in the lungs 
are a pattern which would be typical for COVID-19 pneumonia. Other causes of 
multifocal pneumonia are also in the differential. Interstitial lung diseases 
are also possible including acute interstitial pneumonitis. Consider a follow-up
CT in 3-6 months to document resolution and exclude a neoplasm. 2.There is focal
thickening of the right lateral bladder wall from an indeterminate cause. There 
is less prominent, mild focal thickening of the left lateral urinarybladder 
wall. A urology consultation is recommended for further evaluation and possible 
cystoscopy. 3.Small bilateral pleural effusions with mild right and moderate 
left basilar atelectasis. 4.A hyperenhancing focus at the periphery of segment 
VII measures 0.6 cm and is most likely either perfusional,a flash filling 
hemangioma, or focal nodular hyperplasia. If the patient does not have a known 
malignancy or known liver disease, no routine follow-up imaging is recommended. 
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 6.Age-indeterminate 
compression fractures of T10 and L1. There is mild retropulsion at L1. Signed: 
Bhavana Velarde MDReport Verified Date/Time:  2021 12:30:56 Reading Location: 
[facility] C013Y CT Body Reading Room      Electronically signed by: BHAVANA VELARDE MD on 2021 12:30 PMCT, [id number] 12:30:00Unlisted Reason for
Exam - Click Yes and Enter Reason Below-&gt;YesUnlisted Reason for 
Exam-&gt;unexplained weight loss, hyponatremia, history of alcohol abuse; 
evaluate for occult malignancy, pancreaetiitsWill this procedure require oral 
contrast?-&gt;No************************************************************[facility]        : 1960
       Sex: F************************************************************FINAL 
REPORT PATIENT ID:   [id number] TECHNIQUE: CT of the chest, abdomen, and pelvis W
ITH intravenous contrast and WITHOUT oral contrast. Dose modulation, iterative 
reconstruction, and/or weight-based adjustment of the mA/kV was utilized to 
reduce the radiation dose to as low as reasonably achievable. INDICATION: 
Unlisted Reason for Examsmoker with weight loss, hyponatremia; eval for lung 
malignancy. History of alcohol abuse. Evaluate for occult malignancy, 
pancreatitis COMPARISON: None.  FINDINGS: LINES/TUBES: None. LUNGS AND AIRWAYS: 
Mild right and moderate left basilar atelectasis. There are groundglass 
opacities which are scattered throughout the lungs remain the periphery.PLEURA: 
Small bilateral pleural effusionsHEART AND MEDIASTINUM: A right thyroid nodule 
measures 0.9 cm. This is likely clinically insignificant, and no further imaging
is recommended. No significant mediastinal, hilar, or axillary lymphadenopathy. 
The heart and pericardium are within normal limits. Marked calcification of the 
coronary arteries. Moderate calcification of the descending thoracic aorta and 
arch branch vessels. HEPATOBILIARY: There is likely a Riedel's lobe. A 
hyperenhancing focus at the periphery of segment VII measures 0.6 cm on axial 
image 49. Gallbladder is unremarkable. No biliary ductal dilatation.SPLEEN: No 
splenomegaly.PANCREAS: No focal masses or ductal dilatation. ADRENALS: No adre
nal nodules.KIDNEYS/URETERS: No hydronephrosis, stones, or masses. A left upper 
pole renal hypodensity measures 0.8 cm and is too small to further characterize.
PELVIC ORGANS/BLADDER: There is focal thickening of the right lateral bladder 
wall which measures up to 1 cm in thickness and 4.4 cm in length. Mild 
thickening of the left lateral urinary bladder wall. PERITONEUM/RETROPERITONEUM:
No free air or fluid.LYMPH NODES: No lymphadenopathy.VESSELS: There is occlusion
of both superficial femoral arteries. There is marked calcification of the 
abdominal pelvic vasculature with moderate, less than 50% narrowing of the 
distal abdominal aorta. GI TRACT: No distention or wall thickening. BONES AND 
SOFT TISSUES: Age-indeterminate, moderate compression deformity of the inferior 
endplate of T10. Chronic right posterior ribs 9, 10, 11, and 12 fractures. Old, 
healed left lateral ninth rib fracture. Subacute, healing right L2 and L3 
transverse process fractures. Age-indeterminate, approximately 50% compression 
deformity of the superior endplate of L1 with mild retropulsion. Prior posterior
fusion at L5-S1.Old, unhealed left greater trochanter fracture. The bones are 
diffusely demineralized. IMPRESSION: 1.The groundglass opacities in the lungs 
are a pattern which would be typical for COVID-19 pneumonia. Other causes of 
multifocal pneumonia are also in the differential. Interstitial lung diseases 
are also possible including acute interstitial pneumonitis. Consider a follow-up
CT in 3-6 months to document resolution and exclude a neoplasm. 2.There is focal
thickening of the right lateral bladder wall from an indeterminate cause. There 
is less prominent, mild focal thickening of the left lateral urinarybladder 
wall. A urology consultation is recommended for further evaluation and possible 
cystoscopy. 3.Small bilateral pleural effusions with mild right and moderate 
left basilar atelectasis. 4.A hyperenhancing focus at the periphery of segment 
VII measures 0.6 cm and is most likely either perfusional,a flash filling 
hemangioma, or focal nodular hyperplasia. If the patient does not have a known 
malignancy or known liver disease, no routine follow-up imaging is recommended. 
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 6.Age-indeterminate 
compression fractures of T10 and L1. There is mild retropulsion at L1. Signed: 
Bhavana Velarde MDReport Verified Date/Time:  2021 12:30:56 Reading Location: 
[facility] C013Y CT Body Reading Room      Electronically signed by: BHAVANA VELARDE MD on 2021 12:30 PMBASIC METABOLIC [id number] 08:56:00





             Test Item    Value        Reference Range Interpretation Comments

 

             SODIUM (BEAKER) 124 meq/L    136-145      L            



             (test code = 381)                                        

 

             POTASSIUM (BEAKER) 3.6 meq/L    3.5-5.1                   



             (test code = 379)                                        

 

             CHLORIDE (BEAKER) 90 meq/L            L            



             (test code = 382)                                        

 

             CO2 (BEAKER) (test 24 meq/L     22-29                     



             code = 355)                                         

 

             BLOOD UREA NITROGEN 4 mg/dL      7-21         L            



             (BEAKER) (test code                                        



             = 354)                                              

 

             CREATININE (BEAKER) 0.52 mg/dL   0.57-1.25    L            



             (test code = 358)                                        

 

             GLUCOSE RANDOM 76 mg/dL                         



             (BEAKER) (test code                                        



             = 652)                                              

 

             CALCIUM (BEAKER) 8.8 mg/dL    8.4-10.2                  



             (test code = 697)                                        

 

             EGFR (BEAKER) (test 120 mL/min/1.73                           ESTIM

ATED GFR IS



             code = 1092) sq m                                   NOT AS ACCURATE

 AS



                                                                 CREATININE



                                                                 CLEARANCE IN



                                                                 PREDICTING



                                                                 GLOMERULAR



                                                                 FILTRATION RATE

.



                                                                 ESTIMATED GFR I

S



                                                                 NOT APPLICABLE 

FOR



                                                                 DIALYSIS PATIEN

TS.



 ID - PIAYA [id number] 08:56:00





             Test Item    Value        Reference Range Interpretation Comments

 

             MAGNESIUM (BEAKER) (test code = 1.4 mg/dL    1.6-2.6      L        

    



             627)                                                



 ID - JAVIER [id number] 08:56:00





             Test Item    Value        Reference Range Interpretation Comments

 

             PHOSPHORUS (BEAKER) (test code = 2.2 mg/dL    2.3-4.7      L       

     



             604)                                                



 ID Vane HERRERA LHEPATIC FUNCTION [id number] 08:56:00





             Test Item    Value        Reference Range Interpretation Comments

 

             TOTAL PROTEIN (BEAKER) (test code = 6.9 gm/dL    6.0-8.3           

        



             770)                                                

 

             ALBUMIN (BEAKER) (test code = 1145) 3.5 g/dL     3.5-5.0           

        

 

             BILIRUBIN TOTAL (BEAKER) (test code 0.5 mg/dL    0.2-1.2           

        



             = 377)                                              

 

             BILIRUBIN DIRECT (BEAKER) (test 0.3 mg/dL    0.1-0.5               

    



             code = 706)                                         

 

             ALKALINE PHOSPHATASE (BEAKER) (test 125 U/L                  

        



             code = 346)                                         

 

             AST (SGOT) (BEAKER) (test code = 33 U/L       5-34                 

     



             353)                                                

 

             ALT (SGPT) (BEAKER) (test code = 22 U/L       6-55                 

     



             347)                                                



 ID - JAVIER [id number] 08:56:00





             Test Item    Value        Reference Range Interpretation Comments

 

             LIPASE (BEAKER) (test code = 749) 37 U/L       8-78                

      



 ID - JAVIER PRITCHARDMOLALITY, [id number] 07:33:00





             Test Item    Value        Reference Range Interpretation Comments

 

             OSMOLALITY URINE 366 mOsm/kg  See_Comment                [Automated

 message]



             (BEAKER) (test code =                                        The sy

stem which



             614)                                                generated this 

result



                                                                 transmitted ref

erence



                                                                 range: 50-1,200



                                                                 mOsm/kg. The



                                                                 reference range

 was



                                                                 not used to int

erpret



                                                                 this result as



                                                                 normal/abnormal

.



CBC W/PLT COUNT &amp; AUTO [id number] 05:51:00





             Test Item    Value        Reference Range Interpretation Comments

 

             WHITE BLOOD CELL COUNT (BEAKER) 13.0 K/ L    3.5-10.5     H        

    



             (test code = 775)                                        

 

             RED BLOOD CELL COUNT (BEAKER) 3.87 M/ L    3.93-5.22    L          

  



             (test code = 761)                                        

 

             HEMOGLOBIN (BEAKER) (test code = 12.8 GM/DL   11.2-15.7            

     



             410)                                                

 

             HEMATOCRIT (BEAKER) (test code = 37.2 %       34.1-44.9            

     



             411)                                                

 

             MEAN CORPUSCULAR VOLUME (BEAKER) 96.1 fL      79.4-94.8    H       

     



             (test code = 753)                                        

 

             MEAN CORPUSCULAR HEMOGLOBIN 33.1 pg      25.6-32.2    H            



             (BEAKER) (test code = 751)                                        

 

             MEAN CORPUSCULAR HEMOGLOBIN CONC 34.4 GM/DL   32.2-35.5            

     



             (BEAKER) (test code = 752)                                        

 

             RED CELL DISTRIBUTION WIDTH 13.3 %       11.7-14.4                 



             (BEAKER) (test code = 412)                                        

 

             PLATELET COUNT (BEAKER) (test 237 K/CU MM  150-450                 

  



             code = 756)                                         

 

             MEAN PLATELET VOLUME (BEAKER) 9.9 fL       9.4-12.3                

  



             (test code = 754)                                        

 

             NUCLEATED RED BLOOD CELLS 0 /100 WBC   0-0                       



             (BEAKER) (test code = 413)                                        

 

             NEUTROPHILS RELATIVE PERCENT 78 %                                  

 



             (BEAKER) (test code = 429)                                        

 

             LYMPHOCYTES RELATIVE PERCENT 11 %                                  

 



             (BEAKER) (test code = 430)                                        

 

             MONOCYTES RELATIVE PERCENT 8 %                                    



             (BEAKER) (test code = 431)                                        

 

             EOSINOPHILS RELATIVE PERCENT 2 %                                   

 



             (BEAKER) (test code = 432)                                        

 

             BASOPHILS RELATIVE PERCENT 0 %                                    



             (BEAKER) (test code = 437)                                        

 

             NEUTROPHILS ABSOLUTE COUNT 10.17 K/ L   1.56-6.13    H            



             (BEAKER) (test code = 670)                                        

 

             LYMPHOCYTES ABSOLUTE COUNT 1.44 K/ L    1.18-3.74                 



             (BEAKER) (test code = 414)                                        

 

             MONOCYTES ABSOLUTE COUNT (BEAKER) 1.06 K/ L    0.24-0.36    H      

      



             (test code = 415)                                        

 

             EOSINOPHILS ABSOLUTE COUNT 0.23 K/ L    0.04-0.36                 



             (BEAKER) (test code = 416)                                        

 

             BASOPHILS ABSOLUTE COUNT (BEAKER) 0.02 K/ L    0.01-0.08           

      



             (test code = 417)                                        

 

             IMMATURE GRANULOCYTES-RELATIVE 1 %          0-1                    

   



             PERCENT (BEAKER) (test code =                                      

  



             2801)                                               



CREATININE, RANDOM [id number] 04:29:00





             Test Item    Value        Reference Range Interpretation Comments

 

             CREATININE URINE (BEAKER) (test 32.5 mg/dL                         

    



             code = 375)                                         



Reference Range: No NormalsOperator ID - PIAYA LSODIUM, RANDOM [id number] 
04:29:00





             Test Item    Value        Reference Range Interpretation Comments

 

             SODIUM URINE (BEAKER) (test code = 111 meq/L                       

       



             243)                                                



Reference Range: No NormalsOperator ID - JAVIER LPOCT-GLUCOSE [id number] 
22:15:00





             Test Item    Value        Reference Range Interpretation Comments

 

             POC-GLUCOSE METER 124 mg/dL           H            : TESTED A

[facility] [address]



             (BEAKER) (test code =                                        [address] [place],



             1538)                                               [address]:



                                                                 /Techni

leno ID



                                                                 = [id number] for ALEXYS YANCEY



VITAMIN D, [id number] 19:16:00





             Test Item    Value        Reference Range Interpretation Comments

 

             VITAMIN D 25-OH (BEAKER) (test 24.3 ng/mL   6.6-49.9               

   



             code = 2764)                                        



Effective 10/11/2017: Reference Range ChangeNew: 6.6-49.9 ng/mL   Previous: 
13.0-47.8 ng/mLRecommended Vitamin D Target Range: 30.0-40.0 ng/mLOperator ID - 
DBHIV-1 ANTIGEN WITH HIV-1/2 [id number] 19:16:00





             Test Item    Value        Reference Range Interpretation Comments

 

             HIV-1 ANTIGEN WITH HIV 1\T\2 Nonreactive  Nonreactive              

 



             ANTIBODY (2) (MatrixVisionAKER) (test code                                   

     



             = 2586)                                             



 ID - DBVITAMIN [id number] 18:03:00





             Test Item    Value        Reference Range Interpretation Comments

 

             VITAMIN B12 (BEAKER) (test code = > pg/mL      213-816      H      

      



             774)                                                



 ID - DBOSMOLALITY, [id number] 17:38:00





             Test Item    Value        Reference Range Interpretation Comments

 

             OSMOLALITY, SERUM (BEAKER) (test 259 mOsm/kg  275-295      L       

     



             code = 615)                                         



HEPARIN ASSAY - LOW MOLECULAR [id number] 17:16:00





             Test Item    Value        Reference Range Interpretation Comments

 

             LOVENOX-ANTI 10A (BEAKER) (test 0.29 u/ml    0.60-2.00    L        

    



             code = 1605)                                        



Anti-Factor 10-A Level (Heparin Assay for Low Molecular Weight 
Heparin)Monitoring Guidelines: Blood samples should be obtained 4 hours post 
subcutaneous injection (time of Peak level) Therapeutic Peak Levels: 0.6-1.0 
units/mL  twice daily enoxaparin 1.0-2.0 units/mL  once daily enoxaparinRef: 
CHEST 2012;141:e24s-e43sPOCT-GLUCOSE [id number] 12:04:00





             Test Item    Value        Reference Range Interpretation Comments

 

             POC-GLUCOSE METER 81 mg/dL                         : TESTED A

[facility] [address]



             (BEAKER) (test code =                                        [place],



             1538)                                               [id number]:



                                                                 /Techni

leno ID =



                                                                 [id number] for Harmony Bruno



CBC W/PLT COUNT &amp; AUTO [id number] 09:21:00





             Test Item    Value        Reference Range Interpretation Comments

 

             WHITE BLOOD CELL COUNT (BEAKER) 17.8 K/ L    3.5-10.5     H        

    



             (test code = 775)                                        

 

             RED BLOOD CELL COUNT (BEAKER) 3.10 M/ L    3.93-5.22    L          

  



             (test code = 761)                                        

 

             HEMOGLOBIN (BEAKER) (test code = 10.5 GM/DL   11.2-15.7    L       

     



             410)                                                

 

             HEMATOCRIT (BEAKER) (test code = 29.5 %       34.1-44.9    L       

     



             411)                                                

 

             MEAN CORPUSCULAR VOLUME (BEAKER) 95.2 fL      79.4-94.8    H       

     



             (test code = 753)                                        

 

             MEAN CORPUSCULAR HEMOGLOBIN 33.9 pg      25.6-32.2    H            



             (BEAKER) (test code = 751)                                        

 

             MEAN CORPUSCULAR HEMOGLOBIN CONC 35.6 GM/DL   32.2-35.5    H       

     



             (BEAKER) (test code = 752)                                        

 

             RED CELL DISTRIBUTION WIDTH 13.4 %       11.7-14.4                 



             (BEAKER) (test code = 412)                                        

 

             PLATELET COUNT (BEAKER) (test 218 K/CU MM  150-450                 

  



             code = 756)                                         

 

             MEAN PLATELET VOLUME (BEAKER) 10.0 fL      9.4-12.3                

  



             (test code = 754)                                        

 

             NUCLEATED RED BLOOD CELLS 0 /100 WBC   0-0                       



             (BEAKER) (test code = 413)                                        



(CELLAVISION MANUAL DIFF)2021 09:21:00





             Test Item    Value        Reference Range Interpretation Comments

 

             NEUTROPHILS - REL 89 %                                   



             (CELLAVISION)(BEAKER) (test code =                                 

       



             2816)                                               

 

             LYMPHOCYTES - REL 8 %                                    



             (CELLAVISION)(BEAKER) (test code =                                 

       



             2817)                                               

 

             MONOCYTES - REL 3 %                                    



             (CELLAVISION)(BEAKER) (test code =                                 

       



             2818)                                               

 

             NEUTROPHILS - ABS 15.84 K/ul   1.56-6.13    H            



             (CELLAVISION)(BEAKER) (test code =                                 

       



             2830)                                               

 

             LYMPHOCYTES - ABS 1.42 K/ul    1.18-3.74                 



             (CELLAVISION)(BEAKER) (test code =                                 

       



             2831)                                               

 

             MONOCYTES - ABS 0.53 K/uL    0.24-0.36    H            



             (CELLAVISION)(BEAKER) (test code =                                 

       



             2832)                                               

 

             TOTAL COUNTED (BEAKER) (test code 100                              

      



             = 1351)                                             

 

             RBC MORPHOLOGY (BEAKER) (test code Normal                          

       



             = 762)                                              

 

             WBC MORPHOLOGY (BEAKER) (test code Normal                          

       



             = 487)                                              

 

             PLT MORPHOLOGY (BEAKER) (test code Normal                          

       



             = 486)                                              

 

             ARTIFACT (CELLAVISION)(BEAKER) Present                             

   



             (test code = 3432)                                        

 

             PLATELET CONCENTRATION Adequate                               



             (CELLAVISION)(BEAKER) (test code =                                 

       



             3438)                                               



 ID - Rabia OverholtUser comments: Slide comments:POCT-GLUCOSE METER
2021 09:13:00





             Test Item    Value        Reference Range Interpretation Comments

 

             POC-GLUCOSE METER 87 mg/dL                         : TESTED A

[facility] [address]



             (BEAKER) (test code =                                        [place],



             1538)                                               [id number]:



                                                                 /Techni

leno ID =



                                                                 [id number] for Harmony Bruno



MR, BRAIN, [id number] 08:27:00Unlisted Reason for Exam - Click Yes and Enter
Reason Below-&gt;No Deos the patient have an implanted electronic device?-&gt;No
************************************************************[facility]        : 1960        
Sex: F************************************************************FINAL REPORT 
PATIENT ID:   [id number] MR, BRAIN, WITH \T\ WITHOUT CONTRAST INDICATION:Seizure, 
abnormal neuro exam TECHNIQUE: Multiplanar, multisequence MR imaging of the 
brain was obtained before and after uneventful administration of gadolinium 
contrast. COMPARISON: 2018 FINDINGS:Severe global volume loss. 
Passive expansion of the ventricular and sulcal CSF spaces. Relatively mild, 
predominantly periventricular white matter disease. No recent infarct or 
hemorrhage. Midline is normally positioned. No migrational anomaly. Postcontrast
sequences demonstrate no abnormal parenchymal, leptomeningeal or dural 
enhancement. No abnormality of the skull base or calvarium is present. The 
visualized paranasal sinuses, mastoid air cells, and orbits are within normal 
limits.   IMPRESSION:  Advanced involutional changes. No migrational anomaly or 
acute structural abnormality to explain seizure activity. Signed: JR Duffy Robert MDReport Verified Date/Time:  2021 08:27:21 Reading Location: 
[address] Neuro Reading Room    Electronically signed by: KRYSTAL MARCANO
[facility] on 2021 08:27 AMPOCT-GLUCOSE [id number] 05:44:00





             Test Item    Value        Reference Range Interpretation Comments

 

             POC-GLUCOSE METER 91 mg/dL                         : TESTED A

[facility] [address]



             (BEAKER) (test code =                                        CINTHIA ZAMUDIO [facility],



             1538)                                               [id number]:



                                                                 /Techni

leno ID =



                                                                 [id number] for ALEXYS KELLY



BASIC METABOLIC [id number] 05:36:00





             Test Item    Value        Reference Range Interpretation Comments

 

             SODIUM (BEAKER) 130 meq/L    136-145      L            



             (test code = 381)                                        

 

             POTASSIUM (BEAKER) 2.8 meq/L    3.5-5.1      L            



             (test code = 379)                                        

 

             CHLORIDE (BEAKER) 93 meq/L            L            



             (test code = 382)                                        

 

             CO2 (BEAKER) (test 27 meq/L     22-29                     



             code = 355)                                         

 

             BLOOD UREA NITROGEN 4 mg/dL      7-21         L            



             (BEAKER) (test code                                        



             = 354)                                              

 

             CREATININE (BEAKER) 0.43 mg/dL   0.57-1.25    L            



             (test code = 358)                                        

 

             GLUCOSE RANDOM 90 mg/dL                         



             (BEAKER) (test code                                        



             = 652)                                              

 

             CALCIUM (BEAKER) 8.3 mg/dL    8.4-10.2     L            



             (test code = 697)                                        

 

             EGFR (RODOLFO) (test 149 mL/min/1.73                           ESTIM

ATED GFR IS



             code = 1092) sq m                                   NOT AS ACCURATE

 AS



                                                                 CREATININE



                                                                 CLEARANCE IN



                                                                 PREDICTING



                                                                 GLOMERULAR



                                                                 FILTRATION RATE

.



                                                                 ESTIMATED GFR I

S



                                                                 NOT APPLICABLE 

FOR



                                                                 DIALYSIS PATIEN

TS.



 ID - EMILY MPOCT-GLUCOSE [id number] 18:27:00





             Test Item    Value        Reference Range Interpretation Comments

 

             POC-GLUCOSE METER 81 mg/dL                         : TESTED A

[facility] [address]



             (RODOLFO) (test code =                                        [place],



             1538)                                               [id number]:



                                                                 /Techni

leno ID =



                                                                 [id number] for PATRICIASOPHIA BARILLAS



EEG W VID 12-26 HR CONTINUOUS MONITORING (VEEG)2021 15:39:00Reason for 
exam:-&gt;status epilepticus
************************************************************[facility]EE        : 1960        
Sex: F************************************************************[facility]'S Video EEG REPORT   NAME: Rona Nelson MRN: [id number] DATE(s) OF TEST:
2021- 2021 DATE OF REPORT: 2021 ACC: [id number] EE Start
time/date: 9:49 AM, 2021 Stop time/date: 9:42 AM, 2021  ICD-10: R56.9 
CPT Code: 36040   HISTORY:  61 F with PMH of epilepsy, HTN, HFrEF, tobacco and 
alcohol use, prior R temporal stroke admitted with status epilepticus on . 
 MEDICATIONS THAT COULD AFFECT EEG: Keppra, dilantin, vimpat     TECHNICAL 
SUMMARY:  [facility] with 32 input 
channels reviewed with bipolar and referential montages using the modified 
combinatorial system nomenclature.     DESCRIPTION OF RECORD: During the 
maximally alert state no posterior dominant rhythm was seen. Background was co
ntinuous, reactive to external stimuli and predominantly represented by 2-6 Hz 
frequency with superimposed faster beta activity. Asymmetry was appreciated 
especially in right temporal region where faster beta activity was lacking. 
Normal sleep architecture was not noted.    INTERICTAL EPILEPTIFORM DISCHARGES: 
Occasional sharp wave discharges was noted in the right temporal region with 
maximum electronegativity at F8/FT10 and a times at P8.   EVENTS/SEIZURES:  
, 17:56:16- 17:56:35 EEG: Mehdi wave discharges at 0.5 Hz were noted in the
right temporal region (F8,T8,P8)  which became more rhythmic with further 
evolution in frequency (3-4 Hz) and amplitude before abruptly ending at 
17:56:35. Clinical: No clinical correlate observed on the video   HV: 
Hyperventilation was not done.     PHOTIC STIMULATION: Photic stimulation was 
not done.     ELECTROCARDIOGRAM EVENTS: Normal Sinus Rhythm   IMPRESSION: 
Abnormal Video EEG in coma due to: 1.   Electrographic seizure, Ictal EEG 
pattern, regional, right temporal. No clinical signs.  2.   Continuous, slow 
generalized, delta/theta range, reactive 3.Continuous slow, lateralized, right 
hemisphere, maximal in the right temporal region 4.   Occasionalsharp waves, 
regional, right temporal.    CLINICAL CORRELATION:  This EEG record is noted for
interitcal epileptiform discharges in the right temporal region indicate 
underlying focal cortical irritability and increased risk of seizures further 
supported by an electrographic seizure that was captured arising from that 
region with no clear clinical signs. There is also evidence of a moderate degree
ofencephalopathy, non-specific in etiology. Focal slowing in right temporal 
region is indicative of focal cortical dysfunction. NICU team was notified of 
above findings.     Natalie Garcia MD Neurophysiology Fellow, PGY 5   I have 
personally reviewed this EEG and the report and I agree with the above note.   
Santosh Bernal MD Clinical Neurophysiology/Epilepsy Attending         
Electronically signed by: SANTOSH BERNAL MD on 2021 03:39 PMPHENYTOIN 
LEVEL, TOTAL AND [id number] 14:59:00





             Test Item    Value        Reference Range Interpretation Comments

 

             PHENYTOIN (DILANTIN) 31.0 ug/mL   10.0-20.0    H            Test pe

rformed at



             ([facility]) (test code                                        [facility]



             = 605)                                              Diagnostic



                                                                 Laboratories

 

             PHENYTOIN FREE 1.77 mcg/ml  1.00-2.00                 



             (BEAKER) (test code                                        



             = 847)                                              



POCT-GLUCOSE [id number] 12:22:00





             Test Item    Value        Reference Range Interpretation Comments

 

             POC-GLUCOSE METER 150 mg/dL           H            : TESTED A

[facility] [address]



             ([facility]) (test code                                        [facility] 

[facility],



             = 1538)                                             [id number]:



                                                                 /Techni

leno ID =



                                                                 [id number] for Makeda paredes



                                                                 9pca2), Meryem



[id number] 09:29:00





             Test Item    Value        Reference Range Interpretation Comments

 

             MAGNESIUM (AKER) (test code = 2.1 mg/dL    1.6-2.6               

    



             627)                                                



 ID - JASMIN [id number] 09:29:00





             Test Item    Value        Reference Range Interpretation Comments

 

             POTASSIUM ([facility]) (test code = 4.0 meq/L    3.5-5.1               

    



             379)                                                



 ID - JASMIN CRAD, CHEST, 1 VIEW, NON [id number] 09:00:00Reason for 
exam:-&gt;intubationShould this be performed at the bedside?-&gt;Yes
************************************************************[facility] DANAKIARA        : 1960        
Sex: F************************************************************FINAL REPORT 
PATIENT ID:   [id number]  Chest one view. Clinical history: intubation Comparison:
2021 Discussion: A frontal chest is provided. Cardiomediastinal 
contours are unchanged.   Lines and tubes are in stable position.   There are 
left greater than right bibasilar opacities, which may reflect atelectasis or 
consolidation. No pneumothorax or large effusion. Signed: Biju Cabrera MDReport 
Verified Date/Time:  2021 09:00:01 Reading Location: [facility] B1 C013X Ortho 
Consult Reading Room        Electronically signed by: BIJU CABRERA M.D. on 
2021 09:00 AMPHENYTOIN LEVEL, [id number] 05:02:00





             Test Item    Value        Reference Range Interpretation Comments

 

             PHENYTOIN (DILANTIN) (BEAKER) 19.0 ug/mL   10.0-20.0               

  



             (test code = 605)                                        



 ID - JAVIER LBASIC METABOLIC [id number] 04:36:00





             Test Item    Value        Reference Range Interpretation Comments

 

             SODIUM (BEAKER) 131 meq/L    136-145      L            



             (test code = 381)                                        

 

             POTASSIUM (BEAKER) 3.0 meq/L    3.5-5.1      L            



             (test code = 379)                                        

 

             CHLORIDE (BEAKER) 98 meq/L                         



             (test code = 382)                                        

 

             CO2 (BEAKER) (test 24 meq/L     22-29                     



             code = 355)                                         

 

             BLOOD UREA NITROGEN 8 mg/dL      7-21                      



             (BEAKER) (test code                                        



             = 354)                                              

 

             CREATININE (BEAKER) 0.51 mg/dL   0.57-1.25    L            



             (test code = 358)                                        

 

             GLUCOSE RANDOM 128 mg/dL           H            



             (BEAKER) (test code                                        



             = 652)                                              

 

             CALCIUM (BEAKER) 7.4 mg/dL    8.4-10.2     L            



             (test code = 697)                                        

 

             EGFR (BEAKER) (test 123 mL/min/1.73                           ESTIM

ATED GFR IS



             code = 1092) sq m                                   NOT AS ACCURATE

 AS



                                                                 CREATININE



                                                                 CLEARANCE IN



                                                                 PREDICTING



                                                                 GLOMERULAR



                                                                 FILTRATION RATE

.



                                                                 ESTIMATED GFR I

S



                                                                 NOT APPLICABLE 

FOR



                                                                 DIALYSIS PATIEN

TS.



 ID - JAVIER [id number] 04:27:00





             Test Item    Value        Reference Range Interpretation Comments

 

             MAGNESIUM (BEAKER) (test code = 1.6 mg/dL    1.6-2.6               

    



             627)                                                



 ID - JAVIER [id number] 04:27:00





             Test Item    Value        Reference Range Interpretation Comments

 

             PHOSPHORUS (BEAKER) (test code = 3.0 mg/dL    2.3-4.7              

     



             604)                                                



 ID - PIJOCELYNN LHIGH SENSITIVITY TROPONIN -07-89 04:15:00





             Test Item    Value        Reference Range Interpretation Comments

 

             HIGH SENSITIVITY 366 pg/ml    See_Comment  H             [Automated

 message]



             TROPONIN I (test code                                        The sy

stem which



             = [id number])                                          generated this 

result



                                                                 transmitted ref

erence



                                                                 range: <=17. Th

e



                                                                 reference range

 was



                                                                 not used to int

erpret



                                                                 this result as



                                                                 normal/abnormal

.



 ID - PIJOCELYNN LThe  STAT High Sensitivity Troponin-I results 
should be used in conjunction with other diagnostic information such as ECG, 
clinical observations and information, and patient symptoms to aid in the 
diagnosis of MI.BLOOD GAS, [id number] 04:01:00





             Test Item    Value        Reference Range Interpretation Comments

 

             PH ARTERIAL (BEAKER) (test code = 7.46         7.35-7.45    H      

      



             383)                                                

 

             PCO2 ARTERIAL (BEAKER) (test code 36 mm Hg     35-45               

      



             = 384)                                              

 

             PO2 ARTERIAL (BEAKER) (test code = 197 mm Hg    80-90        H     

       



             385)                                                

 

             O2 SATURATION ARTERIAL (BEAKER) 99.4 %       96.0-97.0    H        

    



             (test code = 386)                                        

 

             HCO3 ARTERIAL (BEAKER) (test code 25 mmol/L    21-29               

      



             = 388)                                              

 

             BASE EXCESS ARTERIAL (BEAKER) 1.2 mmol/L   -2.0-3.0                

  



             (test code = 387)                                        

 

             PATIENT TEMPERATURE (BEAKER) (test 37.2                            

       



             code = 1818)                                        

 

             FIO2 (BEAKER) (test code = 1819) 70.0                              

     



CBC W/PLT COUNT &amp; AUTO [id number] 03:56:00





             Test Item    Value        Reference Range Interpretation Comments

 

             WHITE BLOOD CELL COUNT (BEAKER) 20.7 K/ L    3.5-10.5     H        

    



             (test code = 775)                                        

 

             RED BLOOD CELL COUNT (BEAKER) 3.03 M/ L    3.93-5.22    L          

  



             (test code = 761)                                        

 

             HEMOGLOBIN (BEAKER) (test code = 10.2 GM/DL   11.2-15.7    L       

     



             410)                                                

 

             HEMATOCRIT (BEAKER) (test code = 28.8 %       34.1-44.9    L       

     



             411)                                                

 

             MEAN CORPUSCULAR VOLUME (BEAKER) 95.0 fL      79.4-94.8    H       

     



             (test code = 753)                                        

 

             MEAN CORPUSCULAR HEMOGLOBIN 33.7 pg      25.6-32.2    H            



             (BEAKER) (test code = 751)                                        

 

             MEAN CORPUSCULAR HEMOGLOBIN CONC 35.4 GM/DL   32.2-35.5            

     



             (BEAKER) (test code = 752)                                        

 

             RED CELL DISTRIBUTION WIDTH 13.7 %       11.7-14.4                 



             (BEAKER) (test code = 412)                                        

 

             PLATELET COUNT (BEAKER) (test 239 K/CU MM  150-450                 

  



             code = 756)                                         

 

             MEAN PLATELET VOLUME (BEAKER) 9.7 fL       9.4-12.3                

  



             (test code = 754)                                        

 

             NUCLEATED RED BLOOD CELLS 0 /100 WBC   0-0                       



             (BEAKER) (test code = 413)                                        

 

             NEUTROPHILS RELATIVE PERCENT 85 %                                  

 



             (BEAKER) (test code = 429)                                        

 

             LYMPHOCYTES RELATIVE PERCENT 7 %                                   

 



             (BEAKER) (test code = 430)                                        

 

             MONOCYTES RELATIVE PERCENT 7 %                                    



             (BEAKER) (test code = 431)                                        

 

             EOSINOPHILS RELATIVE PERCENT 0 %                                   

 



             (BEAKER) (test code = 432)                                        

 

             BASOPHILS RELATIVE PERCENT 0 %                                    



             (BEAKER) (test code = 437)                                        

 

             NEUTROPHILS ABSOLUTE COUNT 17.58 K/ L   1.56-6.13    H            



             (BEAKER) (test code = 670)                                        

 

             LYMPHOCYTES ABSOLUTE COUNT 1.41 K/ L    1.18-3.74                 



             (BEAKER) (test code = 414)                                        

 

             MONOCYTES ABSOLUTE COUNT (BEAKER) 1.46 K/ L    0.24-0.36    H      

      



             (test code = 415)                                        

 

             EOSINOPHILS ABSOLUTE COUNT 0.02 K/ L    0.04-0.36    L            



             (BEAKER) (test code = 416)                                        

 

             BASOPHILS ABSOLUTE COUNT (BEAKER) 0.05 K/ L    0.01-0.08           

      



             (test code = 417)                                        

 

             IMMATURE GRANULOCYTES-RELATIVE 1 %          0-1                    

   



             PERCENT (BEAKER) (test code =                                      

  



             2801)                                               



POCT-GLUCOSE [id number] 21:25:00





             Test Item    Value        Reference Range Interpretation Comments

 

             POC-GLUCOSE METER 126 mg/dL           H            : TESTED A

T [facility] [address]



             (BEAKER) (test code =                                        CINTHIA COSTELLO,



             1538)                                               [address]:



                                                                 /Techni

leno ID



                                                                 = [id number] for ITZEL ESCAMILLA



POCT-GLUCOSE [id number] 17:56:00





             Test Item    Value        Reference Range Interpretation Comments

 

             POC-GLUCOSE METER 126 mg/dL           H            : TESTED A

T [facility] [address]



             (BEAKER) (test code                                        [facility],



             = 1538)                                             [address]:



                                                                 /Techni

leno ID =



                                                                 [id number] for Destiny seals



                                                                 (pca2)Janie



RAPID DRUG SCREEN, [id number] 17:04:00





             Test Item    Value        Reference Range Interpretation Comments

 

             BARBITURATE URINE (BEAKER) (test Negative     Negative             

     



             code = 725)                                         

 

             BENZODIAZEPINE SCREEN URINE (BEAKER) Positive     Negative     A   

         



             (test code = 726)                                        

 

             COCAINE (METAB.) SCREEN (BEAKER) Negative     Negative             

     



             (test code = 1164)                                        

 

             METHADONE SCREEN (BEAKER) (test code Negative     Negative         

         



             = 1436)                                             

 

             OPIATE SCREEN URINE (BEAKER) (test Positive     Negative     A     

       



             code = 734)                                         

 

             CANNABINOID SCREEN URINE (BEAKER) Positive     Negative     A      

      



             (test code = 727)                                        

 

             AMPH/METHAMPH SCREEN (BEAKER) (test Negative     Negative          

        



             code = 1438)                                        

 

             PHENCYCLIDINE SCREEN URINE (BEAKER) Negative     Negative          

        



             (test code = 608)                                        

 

             PH UA (BEAKER) (test code = 467) 6.5          5.0-8.0              

     



DRUG                CUTOFF CONC.Cocaine               300 ng/mL  Cannabinoid    
       50 ng/mLBenzodiazepine        200 ng/mLBarbiturate           200 
ng/mLPhencyclidine          25 ng/mLOpiate         300 ng/mLMethadone           
 300 ng/mLAmphetamine/         1000 ng/mL  MethamphetamineThis assay provides an
unconfirmed qualitative test result for the clinical management of patients in 
emergency situations. Chain of custody not maintained. Some over-the-counter 
medications, as well as adulterants, may cause inaccurate results. Clinical 
correlation should be applied. A more comprehensivedrug screen or confirmation 
of a detected drug may be performed upon request. ID - CDPMAGNESIUM
2021 15:59:00





             Test Item    Value        Reference Range Interpretation Comments

 

             MAGNESIUM (BEAKER) 2.0 mg/dL    1.6-2.6                   Specimen 

slightly



             (test code = 627)                                        hemolyzed



 ID - JAVIER LHIGH SENSITIVITY TROPONIN -[id number] 15:20:00





             Test Item    Value        Reference Range Interpretation Comments

 

             HIGH SENSITIVITY 694 pg/ml    See_Comment  HH            [Automated

 message]



             TROPONIN I (test code                                        The sy

stem which



             = 3861199)                                          generated this 

result



                                                                 transmitted ref

erence



                                                                 range: <=17. Th

e



                                                                 reference range

 was



                                                                 not used to int

erpret



                                                                 this result as



                                                                 normal/abnormal

.



 ID - JAVIER LThe  STAT High Sensitivity Troponin-I results 
should be used in conjunction with other diagnostic information such as ECG, 
clinical observations and information, and patient symptoms to aid in the 
diagnosis of MI.[id number] 15:18:00





             Test Item    Value        Reference Range Interpretation Comments

 

             SODIUM (RODOLFO) (test code = 381) 129 meq/L    136-145      L      

      



 ID - JAVIER LPOCT-GLUCOSE [id number] 12:30:00





             Test Item    Value        Reference Range Interpretation Comments

 

             POC-GLUCOSE METER 126 mg/dL           H            : TESTED A

T [facility] [address]



             (RODOLFO) (test code                                        [facility] 

[facility],



             = 1538)                                             [address]:



                                                                 /Techni

leno ID =



                                                                 [id number] for Destiny seals



                                                                 (pca2)Janie



SARS-COV2/RT-PCR ([facility]; REF LAB[facility])2021 12:13:00





             Test Item    Value        Reference Range Interpretation Comments

 

             SARS-COV2/RT-PCR (test Negative     Not Detected, Negative,        

      



             code = 7473574)              See external report for              



                                       linked test               

 

             SARS-COV-2 PERFORMING LAB [facility] LENNY                             



             (test code = 4426770)                                        



Negative result for this test determines that SARS-CoV-2 RNA was not present in 
the specimen above the Limit of Detection (LOD).  However, Negative results do 
not preclude SARS-CoV-2 infection and should not be used as the sole basis for 
treatment or patient management decisions. Negative results mustbe combined with
clinical observations, patient history, and epidemiological information. A false
negative result may occur if a specimen is improperly collected, transported or 
handled.  A false negative result should be considered if patient's recent 
exposures or clinical presentation indicate that COVID-19 (SARS-CoV-2) is likely
and diagnostic tests for other causes of illness are negative.  Re-testing 
should be considered in cases of suspected false negatives.The limit of 
detection for this assay is 800 copies/mL.This SARS CoV-2 test is a real-time 
RT-PCR test intended for the qualitative detection of nucleic acid from 
SARS-CoV-2 in a nasopharyngeal swab specimen collected from individuals susp
ected of COVID-19 by their healthcare provider.This test has not been Food and 
Drug Administration (FDA) cleared or approved.  This is a modified version of an
approved Emergency Use Authorization (EUA) and is in the process of review by 
the FDA.   Once authorized by the FDA, the issued EUA will be effective until 
the declaration that circumstances exist justifying the authorization of the 
emergency use of in vitro diagnostic tests for detection and/or diagnosis of 
COVID-19 is terminated under Section 564(b)(2) of the Act or the EUA is revoked 
under Section 564(g) of the Act.Fact Sheet for Healthcare 
Providers:https://www.[employer]/sites/default/files/product/documents/Fact_Shee

u_NG_Rjyeumamo_Bwsh_VJOI-HwS-0.pdfFact Sheet for Healthcare 
Patients:https://www.[employer]/sites/default/files/product/
documents/Kygx_Tovma_Nakkvgql_Kqdg_NYDZ-GnP-6.pdfPerforming Laboratory:[facility], [place] [address]WTAXPOLMPQ W/ REFLEX 
URINE [id number] 11:01:00





             Test Item    Value        Reference Range Interpretation Comments

 

             COLOR (BEAKER) (test code = 470) Light Yellow                      

     

 

             CLARITY (BEAKER) (test code = Clear                                

  



             469)                                                

 

             SPECIFIC GRAVITY UA (BEAKER) 1.016        1.001-1.035              

 



             (test code = 468)                                        

 

             PH UA (BEAKER) (test code = 467) 6.5          5.0-8.0              

     

 

             PROTEIN UA (BEAKER) (test code = 50 mg/dL     Negative     A       

     



             464)                                                

 

             GLUCOSE UA (BEAKER) (test code = Negative     Negative             

     



             365)                                                

 

             KETONES UA (BEAKER) (test code = Negative     Negative             

     



             371)                                                

 

             BILIRUBIN UA (BEAKER) (test code Negative     Negative             

     



             = 462)                                              

 

             BLOOD UA (BEAKER) (test code = Trace        Negative     A         

   



             461)                                                

 

             NITRITE UA (BEAKER) (test code = Negative     Negative             

     



             465)                                                

 

             LEUKOCYTE ESTERASE UA (BEAKER) Large        Negative     A         

   



             (test code = 466)                                        

 

             UROBILINOGEN UA (BEAKER) (test 0.2 mg/dL    0.2-1.0                

   



             code = 463)                                         

 

             RBC UA (BEAKER) (test code = 0 /HPF                                

 



             519)                                                

 

             WBC UA (BEAKER) (test code = 114 /HPF                              

 



             520)                                                

 

             BACTERIA (BEAKER) (test code = None Seen                           

   



             517)                                                

 

             MUCUS (BEAKER) (test code = Rare                                   



             1574)                                               

 

             SQUAMOUS EPITHELIAL (BEAKER) 2 /HPF                                

 



             (test code = 516)                                        

 

             CRYSTALS, URINE (BEAKER) (test None Seen                           

   



             code = 1521)                                        

 

             SOURCE(BEAKER) (test code =                                        



             2795)                                               



 ID - [auto] ID - [auto] ID - techPROCALCITONIN
2021 09:32:00





             Test Item    Value        Reference Range Interpretation Comments

 

             PROCALCITONIN (BEAKER) (test code 0.09 ng/mL   <0.05        H      

      



             = 3036)                                             



SEPSIS RISK (ng/mL)Low:          0.05-0.50Intermediate: 0.51-2.00High:         
&gt;=2.01TSH/FREE T4 IF [id number] 09:17:00





             Test Item    Value        Reference Range Interpretation Comments

 

             THYROID STIMULATING HORMONE 4.395 uIU/mL 0.350-4.940               



             (BEAKER) (test code = 772)                                        



 ID - PIJOCELYNN LHIGH SENSITIVITY TROPONIN -19-30 09:10:00





             Test Item    Value        Reference Range Interpretation Comments

 

             HIGH SENSITIVITY 976 pg/ml    See_Comment  HH            [Automated

 message]



             TROPONIN I (test code                                        The sy

stem which



             = [id number])                                          generated this 

result



                                                                 transmitted ref

erence



                                                                 range: <=17. Th

e



                                                                 reference range

 was



                                                                 not used to int

erpret



                                                                 this result as



                                                                 normal/abnormal

.



 ID - JAVIER LThe  STAT High Sensitivity Troponin-I results 
should be used in conjunction with other diagnostic information such as ECG, 
clinical observations and information, and patient symptoms to aid in the 
diagnosis of MI.PHENYTOIN LEVEL, [id number] 09:01:00





             Test Item    Value        Reference Range Interpretation Comments

 

             PHENYTOIN (DILANTIN) (BEAKER) 25.3 ug/mL   10.0-20.0    H          

  



             (test code = 605)                                        



 ID - PIJOCELYNN [id number] 08:55:00





             Test Item    Value        Reference Range Interpretation Comments

 

             PREALBUMIN (BEAKER) (test code = 17 mg/dL     1445                

     



             586)                                                



 ID - JAVIER [id number] 08:52:00





             Test Item    Value        Reference Range Interpretation Comments

 

             ETHANOL (BEAKER) < mg/dL      See_Comment                [Automated

 message] The



             (test code = 400)                                        system whi

ch generated



                                                                 this result tra

nsmitted



                                                                 reference range

: <=10.



                                                                 The reference r

xenia was



                                                                 not used to int

erpret



                                                                 this result as



                                                                 normal/abnormal

.



 ID Vane HERRERA LLACTIC ACID, [id number] 08:51:00





             Test Item    Value        Reference Range Interpretation Comments

 

             LACTATE BLOOD ARTERIAL (2) 0.8 mmol/L   0.5-2.2                   



             (BEAKER) (test code = 2874)                                        



 ID Vane HERRERA LCBC W/PLT COUNT &amp; AUTO [id number] 08:33:00





             Test Item    Value        Reference Range Interpretation Comments

 

             WHITE BLOOD CELL COUNT (BEAKER) 19.1 K/ L    3.5-10.5     H        

    



             (test code = 775)                                        

 

             RED BLOOD CELL COUNT (BEAKER) 3.46 M/ L    3.93-5.22    L          

  



             (test code = 761)                                        

 

             HEMOGLOBIN (BEAKER) (test code = 11.6 GM/DL   11.2-15.7            

     



             410)                                                

 

             HEMATOCRIT (BEAKER) (test code = 32.8 %       34.1-44.9    L       

     



             411)                                                

 

             MEAN CORPUSCULAR VOLUME (BEAKER) 94.8 fL      79.4-94.8            

     



             (test code = 753)                                        

 

             MEAN CORPUSCULAR HEMOGLOBIN 33.5 pg      25.6-32.2    H            



             (BEAKER) (test code = 751)                                        

 

             MEAN CORPUSCULAR HEMOGLOBIN CONC 35.4 GM/DL   32.2-35.5            

     



             (BEAKER) (test code = 752)                                        

 

             RED CELL DISTRIBUTION WIDTH 13.7 %       11.7-14.4                 



             (BEAKER) (test code = 412)                                        

 

             PLATELET COUNT (BEAKER) (test 294 K/CU MM  150-450                 

  



             code = 756)                                         

 

             MEAN PLATELET VOLUME (BEAKER) 9.4 fL       9.4-12.3                

  



             (test code = 754)                                        

 

             NUCLEATED RED BLOOD CELLS 0 /100 WBC   0-0                       



             (BEAKER) (test code = 413)                                        

 

             NEUTROPHILS RELATIVE PERCENT 75 %                                  

 



             (BEAKER) (test code = 429)                                        

 

             LYMPHOCYTES RELATIVE PERCENT 15 %                                  

 



             (BEAKER) (test code = 430)                                        

 

             MONOCYTES RELATIVE PERCENT 10 %                                   



             (BEAKER) (test code = 431)                                        

 

             EOSINOPHILS RELATIVE PERCENT 0 %                                   

 



             (BEAKER) (test code = 432)                                        

 

             BASOPHILS RELATIVE PERCENT 0 %                                    



             (BEAKER) (test code = 437)                                        

 

             NEUTROPHILS ABSOLUTE COUNT 14.33 K/ L   1.56-6.13    H            



             (BEAKER) (test code = 670)                                        

 

             LYMPHOCYTES ABSOLUTE COUNT 2.77 K/ L    1.18-3.74                 



             (BEAKER) (test code = 414)                                        

 

             MONOCYTES ABSOLUTE COUNT (BEAKER) 1.85 K/ L    0.24-0.36    H      

      



             (test code = 415)                                        

 

             EOSINOPHILS ABSOLUTE COUNT 0.06 K/ L    0.04-0.36                 



             (BEAKER) (test code = 416)                                        

 

             BASOPHILS ABSOLUTE COUNT (BEAKER) 0.03 K/ L    0.01-0.08           

      



             (test code = 417)                                        

 

             IMMATURE GRANULOCYTES-RELATIVE 1 %          0-1                    

   



             PERCENT (BEAKER) (test code =                                      

  



             2801)                                               



(CELLAVISION MANUAL DIFF)2021 08:33:00





             Test Item    Value        Reference Range Interpretation Comments

 

             TOTAL COUNTED (BEAKER) (test code =                                

        



             1351)                                               

 

             WBC MORPHOLOGY (BEAKER) (test code = Normal                        

         



             487)                                                

 

             PLT MORPHOLOGY (BEAKER) (test code = Normal                        

         



             486)                                                

 

             ANISOCYTOSIS (BEAKER) (test code = 1+ few                          

       



             961)                                                

 

             MACROCYTES (BEAKER) (test code = 964) 1+ few                       

          



[id number] 07:32:00





             Test Item    Value        Reference Range Interpretation Comments

 

             MAGNESIUM (BEAKER) (test code = 1.6 mg/dL    1.6-2.6               

    



             627)                                                



 ID - JAVIER [id number] 07:32:00





             Test Item    Value        Reference Range Interpretation Comments

 

             PHOSPHORUS (BEAKER) (test code = 4.7 mg/dL    2.3-4.7              

     



             604)                                                



 ID - JAVIER LB-TYPE NATRIURETIC FACTOR (BNP)2021 07:27:00





             Test Item    Value        Reference Range Interpretation Comments

 

             B-TYPE NATRIURETIC PEPTIDE (BEAKER) 251 pg/mL    0-100        H    

        



             (test code = 700)                                        



 ID - PIJOCELYNN [id number] 07:13:00





             Test Item    Value        Reference Range Interpretation Comments

 

             SODIUM (BEAKER) (test code = 381) 129 meq/L    136-145      L      

      



 ID - PIAYA LCALCIUM, [id number] 06:47:00





             Test Item    Value        Reference Range Interpretation Comments

 

             CALCIUM IONIZED (BEAKER) (test 1.05 mmol/L  1.12-1.27    L         

   



             code = 698)                                         

 

             PH, BLOOD (BEAKER) (test code = 7.37                               

    



             1810)                                               



BLOOD GAS, [id number] 06:46:00





             Test Item    Value        Reference Range Interpretation Comments

 

             PH ARTERIAL (BEAKER) (test code = 7.37         7.35-7.45           

      



             383)                                                

 

             PCO2 ARTERIAL (BEAKER) (test code 48 mm Hg     35-45        H      

      



             = 384)                                              

 

             PO2 ARTERIAL (BEAKER) (test code = 167 mm Hg    80-90        H     

       



             385)                                                

 

             O2 SATURATION ARTERIAL (BEAKER) 99.0 %       96.0-97.0    H        

    



             (test code = 386)                                        

 

             HCO3 ARTERIAL (BEAKER) (test code 27 mmol/L    21-29               

      



             = 388)                                              

 

             BASE EXCESS ARTERIAL (BEAKER) 1.0 mmol/L   -2.0-3.0                

  



             (test code = 387)                                        

 

             PATIENT TEMPERATURE (BEAKER) (test 37.4                            

       



             code = 1818)                                        

 

             FIO2 (BEAKER) (test code = 1819) 70.0                              

     



COMPREHENSIVE METABOLIC [id number] 05:01:00





             Test Item    Value        Reference Range Interpretation Comments

 

             TOTAL PROTEIN 5.8 gm/dL    6.0-8.3      L            



             (BEAKER) (test code =                                        



             770)                                                

 

             ALBUMIN (BEAKER) 3.3 g/dL     3.5-5.0      L            



             (test code = 1145)                                        

 

             ALKALINE PHOSPHATASE 115 U/L                          



             (BEAKER) (test code =                                        



             346)                                                

 

             BILIRUBIN TOTAL 0.3 mg/dL    0.2-1.2                   



             (BEAKER) (test code =                                        



             377)                                                

 

             SODIUM (BEAKER) (test 129 meq/L    136-145      L            



             code = 381)                                         

 

             POTASSIUM (BEAKER) 4.0 meq/L    3.5-5.1                   



             (test code = 379)                                        

 

             CHLORIDE (BEAKER) 96 meq/L            L            



             (test code = 382)                                        

 

             CO2 (BEAKER) (test 25 meq/L     22-29                     



             code = 355)                                         

 

             BLOOD UREA NITROGEN 12 mg/dL     7-21                      



             (BEAKER) (test code =                                        



             354)                                                

 

             CREATININE (BEAKER) 0.62 mg/dL   0.57-1.25                 



             (test code = 358)                                        

 

             GLUCOSE RANDOM 117 mg/dL           H            



             (BEAKER) (test code =                                        



             652)                                                

 

             CALCIUM (BEAKER) 7.9 mg/dL    8.4-10.2     L            



             (test code = 697)                                        

 

             AST (SGOT) (BEAKER) 32 U/L       5-34                      



             (test code = 353)                                        

 

             ALT (SGPT) (BEAKER) 20 U/L       6-55                      



             (test code = 347)                                        

 

             EGFR (BEAKER) (test 98 mL/min/1.73                           ESTIMA

CHRISTIN GFR IS



             code = 1092) sq m                                   NOT AS ACCURATE

 AS



                                                                 CREATININE



                                                                 CLEARANCE IN



                                                                 PREDICTING



                                                                 GLOMERULAR



                                                                 FILTRATION RATE

.



                                                                 ESTIMATED GFR I

S



                                                                 NOT APPLICABLE 

FOR



                                                                 DIALYSIS PATIEN

TS.



 ID - PIAYA [id number] 04:44:00





             Test Item    Value        Reference Range Interpretation Comments

 

             PARTIAL THROMBOPLASTIN TIME 33.1 seconds 22.5-36.0                 



             (BEAKER) (test code = 760)                                        



PROTHROMBIN TIME/[id number] 04:43:00





             Test Item    Value        Reference Range Interpretation Comments

 

             PROTIME (BEAKER) 13.1 seconds 11.9-14.2                 



             (test code = 759)                                        

 

             INR (BEAKER) (test 1.01         See_Comment                [Automat

ed message]



             code = 370)                                         The system [employer]



                                                                 generated this 

result



                                                                 transmitted ref

erence



                                                                 range: <=5.90. 

The



                                                                 reference range

 was



                                                                 not used to int

erpret



                                                                 this result as



                                                                 normal/abnormal

.



RECOMMENDED COUMADIN/WARFARIN INR THERAPY RANGESSTANDARD DOSE: 2.0 - 3.0   
Includes: PROPHYLAXIS forvenous thrombosis, systemic embolization; TREATMENT for
venous thrombosis and/or pulmonary embolus.HIGH RISK: Target INR is 2.5-3.5 for 
patients with mechanical heart valves.RAD, ABDOMEN/KUB, 1 VIEW [id number] 
00:33:00Reason for exam:-&gt;feeding tube placementShould this be performed at 
the bedside?-&gt;Yes
********************************[facility]        : 1960        
Sex: F************************************************************FINAL REPORT 
PATIENT ID:   [id number] Abdomen one view Comparison: None. Reason for exam:  
feeding tube placement Findings: Supine view of the abdomen was performed. There
is an enteric tube with tip in the gastric body. There is a nonspecific and 
nonobstructive bowel gas pattern. There is lumbosacral spinal fixation hardware.
There is a mild age-indeterminate compression fracture deformity of L1; MRI or 
bone scan may be performed for further evaluation as clinically warranted. There
is a right total hip arthroplasty. There are vascular calcifications. There is a
retrocardiac opacity,which may represent atelectasis and/or pneumonia. Signed: 
Kole Gallardo MDReport Verified Date/Time: 2021 00:33:31     
Electronically signed by: KOLE GALLARDO MD on 2021 12:33 AMRAD, 
CHEST, 1 VIEW, NON [id number] 00:00:00Reason for exam:-&gt;CEREBROVASCULAR 
ACCIDENTShould this be performed at the bedside?-&gt;Yes
************************************************************[facility]        : 1960        
Sex: F************************************************************FINAL REPORT 
PATIENT ID:   [id number] RAD, CHEST, 1 VIEW, NON DEPT INDICATION: CEREBROVASCULAR 
ACCIDENT COMPARISON: 10/19/2018 10/20/2018 FINDINGS: Portable frontal view of 
the chest.   IMPRESSION:  Support Lines: ET tube terminates 4.5 cm above the 
jacquie. Enteric tube descends into theabdomen with tip overlying the proximal 
stomach. Lungs and pleura: Left lung base hazy opacities suggestive of 
atelectasis and/or small effusion. Right lung base scarring or atelectasis 
present. No pneumothorax.Heart and mediastinum: Within normal limits.Additional 
findings: Right ninth rib remote fracture deformity. Signed: Rahul To 
MDReport Verified Date/Time:  2021 00:00:35     Electronically signed by: 
RAHUL TO MD on 2021 12:00 AMBLOOD GAS, [id number] 
23:22:00





             Test Item    Value        Reference Range Interpretation Comments

 

             PH ARTERIAL (BEAKER) (test code = 7.36         7.35-7.45           

      



             383)                                                

 

             PCO2 ARTERIAL (BEAKER) (test code 39 mm Hg     35-45               

      



             = 384)                                              

 

             PO2 ARTERIAL (BEAKER) (test code 118 mm Hg    80-90        H       

     



             = 385)                                              

 

             O2 SATURATION ARTERIAL (BEAKER) 98.2 %       96.0-97.0    H        

    



             (test code = 386)                                        

 

             HCO3 ARTERIAL (BEAKER) (test code 21 mmol/L    21-29               

      



             = 388)                                              

 

             BASE EXCESS ARTERIAL (BEAKER) -3.7 mmol/L  -2.0-3.0     L          

  



             (test code = 387)                                        

 

             PATIENT TEMPERATURE (BEAKER) 36.9                                  

 



             (test code = 1818)                                        

 

             FIO2 (BEAKER) (test code = 1819) 70.0                              

     



CT, BRAIN/STROKE [id number] 22:15:00
************************************************************[facility]        : 1960        
Sex: F************************************************************FINAL REPORT 
PATIENT ID:   [id number]  EXAM: CT head without contrast. CLINICAL HISTORY: TIA, 
initial exam. COMPARISON: Head CT 10/18/2018. TECHNIQUE: CT images of the head 
were obtained without intravenous contrast.  This exam was performed according 
to our departmental dose optimization program which includes automated exposure 
control, adjustment of the mA and/or kV according to patient's size and/or use 
of iterative reconstructive technique. FINDINGS:There is generalized parenchymal
atrophy. 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 There is colpocephaly. 
There are mild white matter microvascular ischemic changes.There is no acute 
intracranial hemorrhage, extra-axial fluid collection, mass effect, herniation, 
hydrocephalus or large demarcated acute territorial infarct.  The basal cisterns
are patent. There are bilateral lens replacements.  The visualized paranasal 
sinuses andtympanomastoid cavities are clear.  The skull base and calvarium are 
intact. IMPRESSION: Dysgenesis of the corpus callosum.Mild white matter 
microvascular ischemic changes. No CT evidence of an acute intracranial process.
MRI may be performed for further evaluation if clinical suspicion for acute 
intracranial pathology persists. Discussed with neuro ICU resident at 
approximately 10:14 PM 2021. Signed: Kole Gallardo MDReport Verified 
Date/Time:  2021 22:15:30     Electronically signed by: KOLE GALLARDO MD on 2021 10:15 PMCOVID-19 (ID NOW RAPID TESTING)2021 
00:20:00





             Test Item    Value        Reference Range Interpretation Comments

 

             SARS-CoV-2 Rapid ID NOW Not Detected Not Detected              



             (test code = 34641-6)                                        

 

             KAYLIN (test code = KAYLIN) ID NOW COVID-19 Assay                        

   



                          is an isothermal                           



                          nucleic acid                           



                          amplification test                           



                          intended for the                           



                          qualitative detection                           



                          of nucleic acid from                           



                          SARS-CoV-2 viral RNA                           



                          in nasopharyngeal (NP)                           



                          specimens. It is used                           



                          under Emergency Use                           



                          Authorization (EUA) by                           



                          FDA. The limit of                           



                          detection (LOD) of the                           



                          assay is 125 Genome                           



                          Equivalents/mL. A                           



                          positive result is                           



                          indicative of the                           



                          presence of SARS-CoV-2                           



                          RNA. ?Clinical                           



                          correlation with                           



                          patient history and                           



                          other diagnostic                           



                          information is                           



                          necessary to determine                           



                          patient infection                           



                          status. A negative                           



                          (Not Detected) result                           



                          does not preclude                           



                          SARS-CoV-2 infection.                           



                          In patients with                           



                          clinical symptoms and                           



                          other tests that are                           



                          consistent with                           



                          SARS-CoV-2 infection,                           



                          negative results                           



                          should be treated as                           



                          presumptive negative                           



                          and a new specimen                           



                          should be tested with                           



                          alternative PCR                           



                          molecular test.                           



                          Invalid: Please                           



                          collect a new specimen                           



                          for repeat patient                           



                          testing if clinically                           



                          indicated.                             

 

             Lab Interpretation Normal                                 



             (test code = 63341-3)                                        



[facility]





             Test Item    Value        Reference Range Interpretation Comments

 

             SURG (test code = --------------------------                       

    



             SURG)        --------------------------                           



                          --------------------------                           



                          --------------RUN DATE:                           



                          21                               



                          [facility] [place] - LAB                           



                                             PAGE 1                           



                           RUN TIME: 1544                           



                                          Specimen                           



                          Inquiry                                



                          RUN USER: INTERFACE                           



                                                                 



                                                                 



                          --------------------------                           



                          --------------------------                           



                          --------------------------                           



                          --------------PATIENT:                           



                          RONA NELSON                           



                             ACCT #: [id number]                           



                          LOC:  L.DSU      U #:                           



                          [id number]                             



                                                                 



                          AGE/SX: 60/F         ROOM:                           



                                     RE/05/21[facility] DR:                           



                          Jackson Barba MD                           



                             :    60                           



                          BED:             DIS:                           



                                                                 



                                         STATUS: DEP                           



                          [facility]      TLOC:                           



                          --------------------------                           



                          --------------------------                           



                          --------------------------                           



                          -------------- SPEC #:                           



                          PMC:[id number]         RECD:                           



                               STATUS:                           



                          MIMI           REQ #:                           



                          [id number]                               



                                  ISAAK:                           



                               SUBM DR:                           



                          Jackson Barba MD                           



                              ENTERED:                           



                              SP TYPE:                           



                          SURG           OTHR DR:                           



                          Shell Patel MD                           



                             ORDERED:  SURG PATH LVL                           



                          5                                      



                                                                 



                                         COPIES TO:                           



                           Shell Patel MD                           



                          [address] [place] [address] [phone]    Jackson Barba MD   [address] [address] [address] [phone] HISTOLOGY:                           



                          TISSUE          ID    BLK                           



                           PCS EARLINE LEV PROCEDURE                           



                            DISPOSITION                           



                          _______________ ____                           



                          ______ ___ ___ ___                           



                          _______________                           



                          ___________   URINARY                           



                          BLADDER A      1                           



                             2                                   



                          PROCEDURES: SURG PATH LVL                           



                          5 () TISSUES:                           



                                    A. URINARY                           



                          BLADDER, NOS - BLADDER                           



                          MASS BIOPSY                            



                          CLINICAL HISTORY                           



                          HEMATURIA - R31.29                           



                             CPT CODES    CPT                           



                          CODE(S): 88744      ,                           



                               ,            ,                           



                             ,           ,                           



                           ,         FINAL DIAGNOSIS                           



                             Urinary bladder,                           



                          biopsy:       URINARY                           



                          PAPILLOMA        GROSS                           



                          DESCRIPTION    Bladder                           



                          mass biopsy.  Received in                           



                          formalin are nine                           



                          fragments of soft pink-tan                           



                            tissue, together                           



                          measuring 1.3 x 0.9 x 0.4                           



                          cm.  The entire specimen                           



                          is   submitted as A.                           



                          nilson/du       Grossing                           



                          performed at [facility]                           



                          Pathology, [facility] [facility], [address],                           



                                                                 



                             ** CONTINUED ON NEXT                           



                          PAGE **                                



                          --------------------------                           



                          --------------------------                           



                          --------------------------                           



                          --------------RUN DATE:                           



                          21                               



                          [facility] - LAB                           



                                             PAGE 2                           



                           RUN TIME: 1544                           



                                          Specimen                           



                          Inquiry                                



                          RUN USER: INTERFACE                           



                                                                 



                                                                 



                          --------------------------                           



                          --------------------------                           



                          --------------------------                           



                          --------------SPEC #:                           



                          PMC:[id number]        PATIENT:                           



                          RONA NELSON                           



                              #[id number]                           



                          (Continued)---------------                           



                          --------------------------                           



                          --------------------------                           



                          -------------------------                           



                                GROSS DESCRIPTION                           



                                (Continued)                           



                          [place], [place] [address].                           



                          Medical Director: Dylon Brar M.D.                           



                          MICROSCOPIC DESCRIPTION                           



                          Bladder mass biopsy.                           



                          Sections a mixture of                           



                          urothelial and squamous                           



                          mucosa.  The   biopsy                           



                          demonstrates a papillary                           



                          appearance.  No dysplasia                           



                          or malignancy is                           



                          identified.  These                           



                          findings are consistent                           



                          with a urinary                           



                          papilloma.----------------                           



                          --------------------------                           



                          --------------------------                           



                          ------------------------                           



                          Signed SIGNATURE ON FILE                           



                                                                 



                          Errol Brand 21                           



                          1543                                   



                          --------------------------                           



                          --------------------------                           



                          --------------------------                           



                          --------------                           



                                                                 



                                                                 



                               ** END OF REPORT **                           



COVID 19 INHOUSE [id number] 08:21:00





             Test Item    Value        Reference Range Interpretation Comments

 

             COVID 19 INHOUSE AG NEGATIVE     Negative                  Per manu

facturer,



             (test code =                                        negative result

s should



             [id number])                                        be treated aspr

esumptive



                                                                 and, if inconsi

stent with



                                                                 clinical signs



                                                                 andsymptoms or 

necessary



                                                                 for patient man

agement,



                                                                 should betested

 with an



                                                                 alternative mol

ecular



                                                                 assay. Negative

 resultsdo



                                                                 not preclude SA

RS-CoV-2



                                                                 infection and s

hould not



                                                                 be usedas the s

ole basis



                                                                 for patient man

agement



                                                                 decisions.  Neg

ative



                                                                 results should 

be



                                                                 considered in t

he context



                                                                 of apatient's r

ecent



                                                                 exposures, hist

ory,



                                                                 presence of cli

nicalsigns



                                                                 and symptoms co

nsistent



                                                                 with COVID-19.



- XR CHEST 1 -65-88 08:21:00
************************************************************[facility]        : 1960      
 Sex: F************************************************************ Name: 
RONA NELSON          [facility]                      : 0
1960 Age/S: 60  / F         [id number]              Unit #: 
[id number]     Loc:      [place] [address]                Phys: Jackson Barba MD             Acct: [id number]  Dis Date:               Status: REG [facility]    
PHONE #: [phone]     Exam Date: 2021                     FAX #:
Reason: SURGERY                                            EXAMS:            
CPT:           [id number] XR CHEST 1 V                               [id number]       
     Fluoro Time:            DAP (Gy m2):          Air Kerma (mGy):             
Single View Chest.     Location: S17                Clinical Indication: 
60-year-old with surgery               Comparison: None               Findings: 
An AP view of the chest was obtained. There is minimal       linear atelectasis 
or scar of the right costophrenic angle. Lungs are       otherwise clear. Heart 
size isnormal. No acute osseous abnormality. A       remote right ninth rib 
fracture is present.     Impression:                    Minimal right basilar 
atelectasis or scar.                    **Electronically Signed by SILVIA Humphrey **            **             on 2021 at 0821      **              
       Reported and signed by: Krystal Humphrey M.D.CC: Shell Patel MD; 
Jackson Barba MD                                        PAGE  1                  
    Signed Report               Name: RONA NELSON          Formerly [facility]                      : 1960 Age/S: 60  / F         [id number]              Unit #: [id number]     Loc:  [place] [address]       
        Phys: Jackson Barba MD         Acct: [id number]  Dis Date:            
  Status: REG LILA #: [phone]     Exam Date: 2021          
          FAX #:                  Reason: SURGERY                               
            EXAMS:        CPT:           [id number] XR CHEST 1 V                 
             [id number]             FluoroTime:            DAP (Gy m2):          Air 
Kerma (mGy):         &lt;Continued&gt; Technologist: Janis Lee, RT(R)    
                           Trnscb Date/Time: 2021 (821) [id number]       
          Orig Print D/T: S: 2021 (824)                                  
          PAGE  2                       Signed ReportBASIC METABOLIC PANEL
2021 07:51:00





             Test Item    Value        Reference Range Interpretation Comments

 

             SODIUM (test code = NA) 129 mmol/L   134-147      L            

 

             POTASSIUM (test code = 4.7 mmol/L   3.4-5.0      N            



             K)                                                  

 

             CHLORIDE (test code = 99 mmol/L    100-108      L            



             CL)                                                 

 

             CARBON DIOXIDE (test 24 mmol/L    21-32        N            



             code = CO2)                                         

 

             ANION GAP (test code = 6.0 GAP calc 4.0-15.0     N            



             GAP)                                                

 

             GLUCOSE (test code = 80 MG/DL            N            



             GLU)                                                

 

             BLOOD UREA NITROGEN 8 MG/DL      7-18         N            



             (test code = BUN)                                        

 

             GLOMERULAR FILTRATION >=60 max estimate >60                       



             RATE (test code = GFR) estGFR                                 

 

             CREATININE (test code = 0.5 MG/DL    0.6-1.0      L            



             CREAT)                                              

 

             CALCIUM (test code = CA) 8.8 MG/DL    8.5-10.1     N            



BASIC METABOLIC [id number] 15:40:00





             Test Item    Value        Reference Range Interpretation Comments

 

             SODIUM (test code = NA) 125 mmol/L   134-147      L            

 

             POTASSIUM (test code = 4.3 mmol/L   3.4-5.0      N            



             K)                                                  

 

             CHLORIDE (test code = 95 mmol/L    100-108      L            



             CL)                                                 

 

             CARBON DIOXIDE (test 23 mmol/L    21-32        N            



             code = CO2)                                         

 

             ANION GAP (test code = 7.0 GAP calc 4.0-15.0     N            



             GAP)                                                

 

             GLUCOSE (test code = 74 MG/DL            N            



             GLU)                                                

 

             BLOOD UREA NITROGEN 9 MG/DL      7-18         N            



             (test code = BUN)                                        

 

             GLOMERULAR FILTRATION >=60 max estimate >60                       



             RATE (test code = GFR) estGFR                                 

 

             CREATININE (test code = 0.5 MG/DL    0.6-1.0      L            



             CREAT)                                              

 

             CALCIUM (test code = CA) 9.0 MG/DL    8.5-10.1     N            



PROTHROMBIN [id number] 14:15:00





             Test Item    Value        Reference Range Interpretation Comments

 

             PT PATIENT (test code = PTP) 9.5 SECONDS  9.3-12.9     N           

 

 

             INTERNATIONAL NORMAL RATIO 0.84 INR Unit 0.8-1.2      N            



             (test code = INR)                                        



THROMBOPLASTIN TIME [id number] 14:15:00





             Test Item    Value        Reference Range Interpretation Comments

 

             THROMBOPLASTIN TIME PARTIAL 29.5 SECONDS 26-35        N            



             (test code = PTT)                                        



CBC W/AUTO [id number] 14:03:00





             Test Item    Value        Reference Range Interpretation Comments

 

             WHITE BLOOD CELL (test code = 10.2 K/mm3   3.5-11.0     N          

  



             WBC)                                                

 

             RED BLOOD CELL (test code = 4.46 M/mm3   4.70-6.10    L            



             RBC)                                                

 

             HEMOGLOBIN (test code = HGB) 14.9 G/DL    10.4-14.9    N           

 

 

             HEMATOCRIT (test code = HCT) 43.6 %       31.5-44.1    N           

 

 

             MEAN CELL VOLUME (test code = 97.8 Fl      84.5-98.6    N          

  



             MCV)                                                

 

             MEAN CELL HGB (test code = MCH) 33.4 pg      27.0-34.2    N        

    

 

             MEAN CELL HGB CONCETRATION 34.2 G/DL    31.5-34.0    H            



             (test code = MCHC)                                        

 

             RED CELL DISTRIBUTION WIDTH 14.4 SD      11.5-14.5    N            



             (test code = RDW)                                        

 

             PLATELET COUNT (test code = 329 K/mm3    150-450      N            



             PLT)                                                

 

             MEAN PLATELET VOLUME (test code 9.20 fL      7.0-10.5     N        

    



             = MPV)                                              

 

             NEUTROPHIL % (test code = NT%) 62.8 %       40-76        N         

   

 

             IMMATURE GRANULOCYTE % (test 0.4 %        0.0-5.0      N           

 



             code = IG%)                                         

 

             LYMPHOCYTE % (test code = LY%) 25.1 %       20.5-51.1    N         

   

 

             MONOCYTE % (test code = MO%) 10.0 %       1.7-9.3      H           

 

 

             EOSINOPHIL % (test code = EO%) 1.4 %        0.0-6.0      N         

   

 

             BASOPHIL % (test code = BA%) 0.3 %        0.0-2.0      N           

 

 

             NUCLEATED RBC % (test code = 0.0 /100WBC% 0.0-1.0      N           

 



             NRBC%)                                              

 

             NEUTROPHIL # (test code = NT#) 6.4 K/mm3    1.8-7.6      N         

   

 

             IMMATURE GRANULOCYTE # (test 0.04 x10 3/uL 0.00-0.03    H          

  



             code = IG#)                                         

 

             LYMPHOCYTE # (test code = LY#) 2.6 K/mm3    0.6-3.2      N         

   

 

             MONOCYTE # (test code = MO#) 1.0 K/mm3    0.3-1.1      N           

 

 

             EOSINOPHIL # (test code = EO#) 0.1 K/mm3    0.0-0.4      N         

   

 

             BASOPHIL # (test code = BA#) 0.0 K/mm3    0.0-0.1      N           

 

 

             NUCLEATED RBC # (test code = 0.0 K/mm3    0.0-0.1      N           

 



             NRBC#)                                              

 

             MANUAL DIFF REQUIRED (test code NO DIFF/SCN  CRITERIA              

    



             = MDIFF)                                            



CT Head W/O [id number] 14:07:21 No acute intracranial hemorrhage or mass
effect. 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 
andtemporal horns of the lateral ventricles. Partially empty sella. Preliminary
Report Dictated by Resident: Mac Coy MD., have 
reviewed this study and agree with theabove report.CT HEAD WO CONTRAST HISTORY: 
Seizure. COMPARISON: CT head without contrast 2019 TECHNIQUE: ?Noncontrast
CT imaging of the head was performed and coronaland sagittal reconstructions 
were obtained and reviewed. FINDINGS: Agenesis of the corpus callosum is noted. 
Asymmetric dilatation of theposterior body, occipital and temporal horns of the 
lateral ventricle,similar to prior. The cerebral sulci are normal in caliber 
andconfiguration. No midline shift or pathologicalextra-axial fluidcollection is
present. The basal cisterns are unremarkable. There is no acute intracranial 
hemorrhage or significant mass effect. Noparenchymal attenuation abnormality. 
The gray-white matter differentiationis preserved. Partially empty sella is 
seen. The mastoid air cells and paranasal air sinuses are clear. 
Groundglassappearance of the skull is noted, might be related to metabolic ?b
onedisease. The calvarium and central skull base are intact. High riding right 
jugular bulb. [facility], Radiant Results Inft User - 2020  8:08 AM CSTCT HEAD 
WO CONTRASTHISTORY: Seizure.COMPARISON: CT head without contrast 
2019TECHNIQUE:  Noncontrast CT imaging of the head was performed and coron
[place] sagittal reconstructions were obtained and reviewed.FINDINGS:Agenesis of 
the corpus callosum is noted. Asymmetric dilatation of theposterior body, 
occipital and temporal horns of the lateral ventricle,similar to prior. The 
cerebral sulci are normal in caliber andconfiguration. No midline shift or 
pathological extra-axial fluidcollection is present. The basal cisterns are 
unremarkable.There is no acute intracranial hemorrhage or significant mass 
effect. Noparenchymal attenuation abnormality. The gray-white matter 
differentiationis preserved. Partially empty sella is seen.The mastoid air cells
and paranasal air sinuses are clear. Groundglassappearance of the skull is 
noted, might be related tometabolic  bonedisease. The calvarium and central 
skull base are intact.High riding right jugular bulb.IMPRESSIONNo acute 
intracranial hemorrhage or mass effect.Corpus callosal agenesis and dilation of
the posterior body, occipital, andtemporal horns of the lateral 
ventricles.Partially empty sella.Preliminary Report Dictated by Resident: Mac Santana MD., have reviewed thisstudy and agree with 
theabove report.[facility] [id number] 
13:55:00





             Test Item    Value        Reference Range Interpretation Comments

 

             PHENY FREE (test code 1.1 ug/mL    1-2                       



             = [id number])                                        

 

             KAYLIN (test code = KAYLIN) Toxic Range: ? ? ? ?                         

  



                          Greater than 2.5 ug/mL                           



                           Test developed and                           



                          characteristics                           



                          determined by [facility]                           



                          Laboratory Services.                           

 

             Lab Interpretation Normal                                 



             (test code = 99095-7)                                        



[facility] Center[facility] WITH [id number] 12:39:00





             Test Item    Value        Reference Range Interpretation Comments

 

             WBC (test code =              See_Comment  H             [Automated



             6690-2)                                             message] The



                                                                 system which



                                                                 generated this



                                                                 result transmit

christin



                                                                 reference range

:



                                                                 4.30 - 11.10



                                                                 10*3/?L. The



                                                                 reference range



                                                                 was not used to



                                                                 interpret this



                                                                 result as



                                                                 normal/abnormal

.

 

             RBC (test code =              See_Comment                [Automated



             789-8)                                              message] The



                                                                 system which



                                                                 generated this



                                                                 result transmit

christin



                                                                 reference range

:



                                                                 3.93 - 5.25



                                                                 10*6/?L. The



                                                                 reference range



                                                                 was not used to



                                                                 interpret this



                                                                 result as



                                                                 normal/abnormal

.

 

             HGB (test code = 13.3 g/dL    11.6-15                   



             718-7)                                              

 

             HCT (test code = 37.1 %       35.7-45.2                 



             4544-3)                                             

 

             MCV (test code = 93.7 fL      80.6-95.5                 



             787-2)                                              

 

             MCH (test code = 33.6 pg      25.9-32.8    H            



             785-6)                                              

 

             MCHC (test code = 35.8 g/dL    31.6-35.1    H            



             786-4)                                              

 

             RDW-SD (test code = 45.0 fL      39-49.9                   



             13473-6)                                            

 

             RDW-CV (test code = 13.2 %       12-15.5                   



             788-0)                                              

 

             PLT (test code =              See_Comment                [Automated



             777-3)                                              message] The



                                                                 system which



                                                                 generated this



                                                                 result transmit

christin



                                                                 reference range

:



                                                                 166 - 358 10*3/

?L.



                                                                 The reference



                                                                 range was not u

sed



                                                                 to interpret th

is



                                                                 result as



                                                                 normal/abnormal

.

 

             MPV (test code = 9.4 fL       9.5-12.9     L            



             45032-0)                                            

 

             NRBC/100 WBC (test              See_Comment                [Automat

ed



             code = [id number])                                        message] 

The



                                                                 system which



                                                                 generated this



                                                                 result transmit

christin



                                                                 reference range

:



                                                                 0.0 - 10.0 /100



                                                                 WBCs. The



                                                                 reference range



                                                                 was not used to



                                                                 interpret this



                                                                 result as



                                                                 normal/abnormal

.

 

             NRBC x10^3 (test code <0.01        See_Comment                [Auto

mated



             = [id number])                                        message] The



                                                                 system which



                                                                 generated this



                                                                 result transmit

christin



                                                                 reference range

:



                                                                 10*3/?L. The



                                                                 reference range



                                                                 was not used to



                                                                 interpret this



                                                                 result as



                                                                 normal/abnormal

.

 

             GRAN MAT (NEUT) % 84.1 %                                 



             (test code = 770-8)                                        

 

             IMM GRAN % (test code 0.50 %                                 



             = [id number])                                        

 

             LYMPH % (test code = 7.1 %                                  



             736-9)                                              

 

             MONO % (test code = 8.0 %                                  



             5905-5)                                             

 

             EOS % (test code = 0.1 %                                  



             713-8)                                              

 

             BASO % (test code = 0.2 %                                  



             706-2)                                              

 

             GRAN MAT x10^3(ANC) 18.51 10*3/uL 1.88-7.09    H            



             (test code =                                        



             3681155525)                                         

 

             IMM GRAN x10^3 (test 0.12 10*3/uL 0-0.06       H            



             code = 2075369169)                                        

 

             LYMPH x10^3 (test code 1.56 10*3/uL 1.32-3.29                 



             = 731-0)                                            

 

             MONO x10^3 (test code 1.75 10*3/uL 0.33-0.92    H            



             = 742-7)                                            

 

             EOS x10^3 (test code = <0.03        0.03-0.39    L            



             711-2)                                              

 

             BASO x10^3 (test code 0.05 10*3/uL 0.01-0.07                 



             = 704-7)                                            

 

             JAYCEE CELLS (test code 2+           See_Comment  A             [Auto

mated



             = 7790-9)                                           message] The



                                                                 system which



                                                                 generated this



                                                                 result transmit

christin



                                                                 reference range

:



                                                                 (none). The



                                                                 reference range



                                                                 was not used to



                                                                 interpret this



                                                                 result as



                                                                 normal/abnormal

.

 

             Lab Interpretation Abnormal                               



             (test code = 35061-3)                                        



[id number]





             Test Item    Value        Reference Range Interpretation Comments

 

             OSMOLALITY (test code =              See_Comment  L             [Au

tomated message]



             0319739748)                                         The system [employer]



                                                                 generated this 

result



                                                                 transmitted ref

erence



                                                                 range: 278 - 30

5



                                                                 mOsm/kg. The



                                                                 reference range

 was



                                                                 not used to int

erpret



                                                                 this result as



                                                                 normal/abnormal

.

 

             Lab Interpretation (test Abnormal                               



             code = 66177-7)                                        



[facility] [id number] 
12:09:00





             Test Item    Value        Reference Range Interpretation Comments

 

             ESR (test code =              See_Comment                [Automated

 message]



             2848104490)                                         The system [employer]



                                                                 generated this 

result



                                                                 transmitted ref

erence



                                                                 range: 0 - 20 m

m/HR.



                                                                 The reference r

xenia



                                                                 was not used to



                                                                 interpret this 

result



                                                                 as normal/abnor

mal.

 

             Lab Interpretation (test Normal                                 



             code = 00159-5)                                        



[facility] METABOLIC PANEL (NA, K, CL, CO2, 
GLUCOSE, BUN, CREATININE, CA)2020 11:24:00





             Test Item    Value        Reference Range Interpretation Comments

 

             NA (test code = 128 mmol/L   135-145      L            



             5723082852)                                         

 

             K (test code = 3.3 mmol/L   3.5-5        L            



             1483904497)                                         

 

             CL (test code = 99 mmol/L                        



             9526708953)                                         

 

             CO2 TOTAL (test code = 16 mmol/L    23-31        L            



             6868417704)                                         

 

             AGAP (test code =              2-16                      



             1951893595)                                         

 

             BUN (test code = 8 mg/dL      7-23                      



             1633894098)                                         

 

             GLUCOSE (test code = 76 mg/dL                         



             1398340758)                                         

 

             CREATININE (test code = 0.35 mg/dL   0.5-1.04     L            



             0730029904)                                         

 

             CALCIUM (test code = 8.4 mg/dL    8.6-10.6     L            



             0221925500)                                         

 

             eGFR Calculation              mL/min/1.73m2              



             (Non-)                                        



             (test code =                                        



             4750310725)                                         

 

             eGFR Calculation              mL/min/1.73m2              



             (African American)                                        



             (test code =                                        



             9766831888)                                         

 

             KAYLIN (test code = KAYLIN) Association of                           



                          Glomerular Filtration                           



                          Rate (GFR) and Staging                           



                          of Kidney Disease*                           



                          +---------------------                           



                          --+-------------------                           



                          --+-------------------                           



                          ------+| GFR                           



                          (mL/min/1.73 m2) ?|                           



                          With Kidney Damage ?|                           



                          ?Without Kidney                           



                          Damage+---------------                           



                          --------+-------------                           



                          --------+-------------                           



                          ------------+| ?>90 ?                           



                          ? ? ? ? ? ? ? ?|                           



                          ?Stage one ? ? ? ? ?|                           



                          ? Normal ? ? ? ? ? ? ?                           



                          ?+--------------------                           



                          ---+------------------                           



                          ---+------------------                           



                          -------+| ?60-89 ? ? ?                           



                          ? ? ? ? ?| ?Stage two                           



                          ? ? ? ? ?| ? Decreased                           



                          GFR ? ? ? ?                            



                          +---------------------                           



                          --+-------------------                           



                          --+-------------------                           



                          ------+| ?30-59 ? ? ?                           



                          ? ? ? ? ?| ?Stage                           



                          three ? ? ? ?| ? Stage                           



                          three ? ? ? ? ?                           



                          +---------------------                           



                          --+-------------------                           



                          --+-------------------                           



                          ------+| ?15-29 ? ? ?                           



                          ? ? ? ? ?| ?Stage four                           



                          ? ? ? ? | ? Stage four                           



                          ? ? ? ? ?                              



                          ?+--------------------                           



                          ---+------------------                           



                          ---+------------------                           



                          -------+| ?<15 (or                           



                          dialysis) ? ?| ?Stage                           



                          five ? ? ? ? | ? Stage                           



                          five ? ? ? ? ?                           



                          ?+--------------------                           



                          ---+------------------                           



                          ---+------------------                           



                          -------+ *Each stage                           



                          assumes the associated                           



                          GFR level has been in                           



                          effect for at least                           



                          three months. ?Stages                           



                          1 to 5, with or                           



                          without kidney                           



                          disease, indicate                           



                          chronic kidney                           



                          disease. Notes:                           



                          Determination of                           



                          stages one and two                           



                          (with eGFR                             



                          >59mL/min/1.73 m2)                           



                          requires estimation of                           



                          kidney damage for at                           



                          least three months as                           



                          defined by structural                           



                          or functional                           



                          abnormalities of the                           



                          kidney, manifested by                           



                          either:Pathological                           



                          abnormalities or                           



                          Markers of kidney                           



                          damage (including                           



                          abnormalities in the                           



                          composition of the                           



                          blood or urine or                           



                          abnormalities in                           



                          imaging tests).                           

 

             Lab Interpretation Abnormal                               



             (test code = 23565-5)                                        



[facility] (IMMUNOASSAY) - 
COMPREHENSIVE DRUG [id number] 05:25:00





             Test Item    Value        Reference Range Interpretation Comments

 

             AMPHET (test code = Negative     Negative                  



             2918783951)                                         

 

             ALYCIA U (test code = Negative     Negative                  



             3419698942)                                         

 

             BENZO U (test code = Negative     Negative                  



             6484623614)                                         

 

             Cocaine Metabolite (test Negative     Negative                  



             code = 2234401934)                                        

 

             METHADONE (test code = Negative     Negative                  



             3373822002)                                         

 

             OPIATES (test code = Negative     Negative                  



             9054965883)                                         

 

             PCP (test code = Negative     Negative                  



             3123536337)                                         

 

             THC (test code = Presumptive Positive Negative     A            



             4840250799)                                         

 

             KAYLIN (test code = KAYLIN) Urine Drug Cutoff                           



                          Ranges Cocaine: ? ? ?                           



                          ? ? ? 150                              



                          ng/mLBenzodiazepines:                           



                          ? ? 200                                



                          ng/mLMethadone: ? ? ?                           



                          ? ? 300                                



                          ng/mLAmphetamine: ? ?                           



                          ? ? 1,000                              



                          ng/mLOpiates: ? ? ? ?                           



                          ? ? 300                                



                          ng/mLCannabinoids: ?                           



                          ? ? ?50                                



                          ng/mLPhencyclidine: ?                           



                          ? ? 25                                 



                          ng/mLBarbiturates: ?                           



                          ? ? ?200 ng/mL  The                           



                          results are to be                           



                          used only for medical                           



                          (i.e., treatment)                           



                          purposes. Unconfirmed                           



                          screening results                           



                          must not be used for                           



                          non-medical purposes                           



                          (e.g., employment                           



                          testing, legal                           



                          testing).                              

 

             Lab Interpretation (test Abnormal                               



             code = 70086-7)                                        



[facility] CenterCREAT[facility]INE, URINE [id number] 05:04:00





             Test Item    Value        Reference Range Interpretation Comments

 

             CREAT U (test code = 9645218980) 29.2 mg/dL                        

     



[facility] CenterSODIUM, URINE [id number] 05:04:00





             Test Item    Value        Reference Range Interpretation Comments

 

             NA URINE (test code = 4829001888) 103 mmol/L                       

      



[facility] CenterUREA NITROGEN, URINE [id number] 05:04:00





             Test Item    Value        Reference Range Interpretation Comments

 

             UREA N UR (test code = 2924747501) 324 mg/dL                       

       



[facility] CenterOSMOLALITY [id number] 04:57:00





             Test Item    Value        Reference Range Interpretation Comments

 

             OSMO U (test code =              See_Comment                [Automa

christin message]



             8729248789)                                         The system whic

h



                                                                 generated this 

result



                                                                 transmitted ref

erence



                                                                 range: 50-1,100



                                                                 mOsm/kg. The re

ference



                                                                 range was not u

sed to



                                                                 interpret this 

result



                                                                 as normal/abnor

mal.

 

             Lab Interpretation (test Normal                                 



             code = 75381-9)                                        



[facility] 1 [id number] 23:40:22 No acute 
cardiopulmonary process. Bibasilar atelectasis. Preliminary Report Dictated by 
Resident: Sherif Morgan MD., have reviewed this study and
agree with theabove report.PROCEDURE: XR CHEST 1 VW CLINICAL INDICATION: sob  
TECHNIQUE: Frontal chest radiograph was obtained. COMPARISON: 2019 
FINDINGS: The lungs show no acute infiltrate. There is bibasilar atelectasis 
(leftslightly worse than right). No pleural effusion or pneumothorax is seen.  
The heart is normal in size.A coronary stent is noted. No acute bony abnormality
is noted. Moderate right posterior ninth ribfracture. [facility], Radiant Results Inft
User - 2020  5:41 PM CSTPROCEDURE: XR CHEST 1 VWCLINICAL INDICATION: sob 
TECHNIQUE: Frontal chest radiograph was obtained.COMPARISON: 
2019FINDINGS:The lungs show no acute infiltrate. There is bibasilar 
atelectasis (leftslightly worse than right). No pleural effusion or pneumothorax
is seen. The heart is normal in size. A coronary stent is noted.No acute bony 
abnormality is noted. Moderate right posterior ninth ribfracture.IMPRESSIONNo 
acute cardiopulmonaryprocess. Bibasilar atelectasis.Preliminary Report Dictated 
by Resident: Sherif Shelley MD., have reviewed this study 
and agree with theabove report.[facility] CenterAC [facility] 21 + 
LACTIC [id number] 23:02:00





             Test Item    Value        Reference Range Interpretation Comments

 

             PH (test code =              7.32-7.42                 



             9527082309)                                         

 

             PCO2 CARLOS ENRIQUE (test code =              See_Comment  L             [Auto

mated



             9509932571)                                         message] The sy

stem



                                                                 which generated



                                                                 this result



                                                                 transmitted



                                                                 reference range

: 41



                                                                 - 51 mmHg. The



                                                                 reference range

 was



                                                                 not used to



                                                                 interpret this



                                                                 result as



                                                                 normal/abnormal

.

 

             PO2 CARLOS ENRIQUE (test code =              See_Comment                [Autom

ated



             8459665902)                                         message] The sy

stem



                                                                 which generated



                                                                 this result



                                                                 transmitted



                                                                 reference range

: 25



                                                                 - 40 mmHg. The



                                                                 reference range

 was



                                                                 not used to



                                                                 interpret this



                                                                 result as



                                                                 normal/abnormal

.

 

             HCO3 CARLOS ENRIQUE (test code =              See_Comment  L             [Auto

mated



             3171618526)                                         message] The sy

stem



                                                                 which generated



                                                                 this result



                                                                 transmitted



                                                                 reference range

: 24



                                                                 - 28 mEq/L. The



                                                                 reference range

 was



                                                                 not used to



                                                                 interpret this



                                                                 result as



                                                                 normal/abnormal

.

 

             AC VBE(BEAKER) (test              mEq/L                     



             code = 2719505645)                                        

 

             THB CARLOS ENRIQUE (test code = 15.4 g/dL    12-16                     



             3177398620)                                         

 

             %O2HB CARLOS ENRIQUE (test code = 66.0 %       52-63        H            



             2450788928)                                         

 

             %COHB CARLOS ENRIQUE (test code = 2.6 %        0-1.5        H            



             4507880198)                                         

 

             %METHB CARLOS ENRIQUE (test code = 0.3 %        0.4-1.5      L            



             2528451258)                                         

 

             VOL%O2 CARLOS ENRIQUE (test code = 14.2 %       6-12         H            



             0836692959)                                         

 

             NA (test code = 130 mmol/L   135-145      L            



             4048883646)                                         

 

             K+ (test code = 4.5 mmol/L   3.5-5                     



             5708763417)                                         

 

             AC CA IONZ (test code = 4.10 mg/dL   4.5-5.3      L            



             9702093409)                                         

 

             GLUCOSE (test code = 131 mg/dL           H            



             4910294487)                                         

 

             LACTIC ACID (test code 3.39 mmol/L                            



             = 0712247719)                                        

 

             Lab Interpretation Abnormal                               



             (test code = 24312-8)                                        



[facility]





             Test Item    Value        Reference Range Interpretation Comments

 

             PHENYTOIN (test code = 10.5 ug/mL   10-20                     



             5639907425)                                         

 

             KAYLIN (test code = KAYLIN) Toxic Range: ? ? ?                           



                          ? ? ? ? ? 0-3                           



                          Months ? ? ? ? ? ?                           



                          ? Greater than 14                           



                          ug/mL ? ? 3 Months                           



                          - 150 Years ? ?                           



                          Greater than 20                           



                          ug/mL                                  

 

             Lab Interpretation (test Normal                                 



             code = 28529-4)                                        



[facility] 
22:07:00





             Test Item    Value        Reference Range Interpretation Comments

 

             SARS-CoV-2 Rapid ID NOW Not Detected Not Detected              



             (test code = 13394-8)                                        

 

             KAYLIN (test code = KAYLIN) ID NOW COVID-19 Assay                        

   



                          is an isothermal                           



                          nucleic acid                           



                          amplification test                           



                          intended for the                           



                          qualitative detection                           



                          of nucleic acid from                           



                          SARS-CoV-2 viral RNA                           



                          in nasopharyngeal (NP)                           



                          specimens. It is used                           



                          under Emergency Use                           



                          Authorization (EUA) by                           



                          FDA. The limit of                           



                          detection (LOD) of the                           



                          assay is 125 Genome                           



                          Equivalents/mL. A                           



                          positive result is                           



                          indicative of the                           



                          presence of SARS-CoV-2                           



                          RNA. ?Clinical                           



                          correlation with                           



                          patient history and                           



                          other diagnostic                           



                          information is                           



                          necessary to determine                           



                          patient infection                           



                          status. A negative                           



                          (Not Detected) result                           



                          does not preclude                           



                          SARS-CoV-2 infection.                           



                          In patients with                           



                          clinical symptoms and                           



                          other tests that are                           



                          consistent with                           



                          SARS-CoV-2 infection,                           



                          negative results                           



                          should be treated as                           



                          presumptive negative                           



                          and a new specimen                           



                          should be tested with                           



                          alternative PCR                           



                          molecular test.                           



                          Invalid: Please                           



                          collect a new specimen                           



                          for repeat patient                           



                          testing if clinically                           



                          indicated.                             

 

             Lab Interpretation Normal                                 



             (test code = 64963-4)                                        



[facility] (ALB, T.PRO, BILI T, 
BU/BC, ALT, AST, ALK PHOS)2020 21:54:00





             Test Item    Value        Reference Range Interpretation Comments

 

             TOTAL BILI (test code = 6681293996) 0.5 mg/dL    0.1-1.1           

        

 

             BILI UNCON (test code = 3966346317) 0.4 mg/dL    0.1-1.1           

        

 

             BILI CONJ (test code = 5136265754) 0.0 mg/dL    0-0.3              

       

 

             T PROTEIN (test code = 3909451508) 7.6 g/dL     6.3-8.2            

       

 

             ALBUMIN (test code = 8243874907) 4.4 g/dL     3.5-5                

     

 

             ALK PHOS (test code = 5451732388) 171 U/L             H      

      

 

             ALTv (test code = 1742-6) 37 U/L       5-35         H            

 

             AST(SGOT) (test code = 7879823925) 90 U/L       13-40        H     

       

 

             Lab Interpretation (test code = Abnormal                           

    



             05027-7)                                            



[facility] (NA, K, CL, CO2, 
GLUCOSE, BUN, CREATININE, CA)2020 21:47:00





             Test Item    Value        Reference Range Interpretation Comments

 

             NA (test code = 126 mmol/L   135-145      L            



             0907905525)                                         

 

             K (test code = 3.6 mmol/L   3.5-5                     



             0397150219)                                         

 

             CL (test code = 94 mmol/L           L            



             2456136025)                                         

 

             CO2 TOTAL (test code = 11 mmol/L    23-31        L            



             9681200841)                                         

 

             AGAP (test code =              2-16         H            



             9320644184)                                         

 

             BUN (test code = 13 mg/dL     7-23                      



             7675386200)                                         

 

             GLUCOSE (test code = 219 mg/dL           H            



             7657607576)                                         

 

             CREATININE (test code = 0.53 mg/dL   0.5-1.04                  



             6484638981)                                         

 

             CALCIUM (test code = 8.9 mg/dL    8.6-10.6                  



             4122394276)                                         

 

             eGFR Calculation              mL/min/1.73m2              



             (Non-)                                        



             (test code =                                        



             1286577198)                                         

 

             eGFR Calculation              mL/min/1.73m2              



             (African American)                                        



             (test code =                                        



             3237751877)                                         

 

             KAYLIN (test code = KAYLIN) Association of                           



                          Glomerular Filtration                           



                          Rate (GFR) and Staging                           



                          of Kidney Disease*                           



                          +---------------------                           



                          --+-------------------                           



                          --+-------------------                           



                          ------+| GFR                           



                          (mL/min/1.73 m2) ?|                           



                          With Kidney Damage ?|                           



                          ?Without Kidney                           



                          Damage+---------------                           



                          --------+-------------                           



                          --------+-------------                           



                          ------------+| ?>90 ?                           



                          ? ? ? ? ? ? ? ?|                           



                          ?Stage one ? ? ? ? ?|                           



                          ? Normal ? ? ? ? ? ? ?                           



                          ?+--------------------                           



                          ---+------------------                           



                          ---+------------------                           



                          -------+| ?60-89 ? ? ?                           



                          ? ? ? ? ?| ?Stage two                           



                          ? ? ? ? ?| ? Decreased                           



                          GFR ? ? ? ?                            



                          +---------------------                           



                          --+-------------------                           



                          --+-------------------                           



                          ------+| ?30-59 ? ? ?                           



                          ? ? ? ? ?| ?Stage                           



                          three ? ? ? ?| ? Stage                           



                          three ? ? ? ? ?                           



                          +---------------------                           



                          --+-------------------                           



                          --+-------------------                           



                          ------+| ?15-29 ? ? ?                           



                          ? ? ? ? ?| ?Stage four                           



                          ? ? ? ? | ? Stage four                           



                          ? ? ? ? ?                              



                          ?+--------------------                           



                          ---+------------------                           



                          ---+------------------                           



                          -------+| ?<15 (or                           



                          dialysis) ? ?| ?Stage                           



                          five ? ? ? ? | ? Stage                           



                          five ? ? ? ? ?                           



                          ?+--------------------                           



                          ---+------------------                           



                          ---+------------------                           



                          -------+ *Each stage                           



                          assumes the associated                           



                          GFR level has been in                           



                          effect for at least                           



                          three months. ?Stages                           



                          1 to 5, with or                           



                          without kidney                           



                          disease, indicate                           



                          chronic kidney                           



                          disease. Notes:                           



                          Determination of                           



                          stages one and two                           



                          (with eGFR                             



                          >59mL/min/1.73 m2)                           



                          requires estimation of                           



                          kidney damage for at                           



                          least three months as                           



                          defined by structural                           



                          or functional                           



                          abnormalities of the                           



                          kidney, manifested by                           



                          either:Pathological                           



                          abnormalities or                           



                          Markers of kidney                           



                          damage (including                           



                          abnormalities in the                           



                          composition of the                           



                          blood or urine or                           



                          abnormalities in                           



                          imaging tests).                           

 

             Lab Interpretation Abnormal                               



             (test code = 39032-3)                                        



[id number] 21[facility]47:00





             Test Item    Value        Reference Range Interpretation Comments

 

             LIPASE (test code = 5501051531) 122 U/L      0-220                 

    

 

             Lab Interpretation (test code = Normal                             

    



             98331-5)                                            



[facility]CREAT[facility] [id number] 21:47:00





             Test Item    Value        Reference Range Interpretation Comments

 

             CK (test code = 1649423888) 45 U/L                           

 

             Lab Interpretation (test code = Normal                             

    



             64012-0)                                            



[facility]





             Test Item    Value        Reference Range Interpretation Comments

 

             APPEARANCE (test code = Hazy         Clear        A            



             6979750311)                                         

 

             COLOR (test code = Yellow       Yellow                    



             8644660038)                                         

 

             PH (test code =              4.8-8.0                   



             0670047400)                                         

 

             SP GRAVITY (test code =              1.003-1.030               



             6596437735)                                         

 

             GLU U QUAL (test code = 50 mg/dL     Normal       A            



             7462724428)                                         

 

             BLOOD (test code = 1+           Negative     A            



             7893518100)                                         

 

             KETONES (test code = 5 mg/dL      Negative     A            



             6365716393)                                         

 

             PROTEIN (test code = 100 mg/dL    Negative     A            



             2887-8)                                             

 

             UROBILIN (test code = Normal       Normal                    



             6792853543)                                         

 

             BILIRUBIN (test code = Negative     Negative                  



             9600101281)                                         

 

             NITRITE (test code = Negative     Negative                  



             8974216783)                                         

 

             LEUK BI (test code = 500/uL       Negative     A            



             2553834483)                                         

 

             RBC/HPF (test code =              See_Comment                [Autom

ated message]



             9917039482)                                         The system [employer]



                                                                 generated this



                                                                 result transmit

christin



                                                                 reference range

: 0 -



                                                                 3 HPF. The refe

rence



                                                                 range was not u

sed



                                                                 to interpret th

is



                                                                 result as



                                                                 normal/abnormal

.

 

             WBC/HPF (test code =              See_Comment  H             [Autom

ated message]



             9531430360)                                         The system [employer]



                                                                 generated this



                                                                 result transmit

christin



                                                                 reference range

: 0 -



                                                                 5 HPF. The refe

rence



                                                                 range was not u

sed



                                                                 to interpret th

is



                                                                 result as



                                                                 normal/abnormal

.

 

             BACTERIA (test code = Few          Negative     A            



             6206855358)                                         

 

             SQ EPITH (test code = <1           HPF                       



             1972566316)                                         

 

             WBC CLUMPS (test code =              See_Comment  H             [Au

tomated message]



             8984361813)                                         The system [employer]



                                                                 generated this



                                                                 result transmit

christin



                                                                 reference range

: <=1



                                                                 HPF. The refere

nce



                                                                 range was not u

sed



                                                                 to interpret th

is



                                                                 result as



                                                                 normal/abnormal

.

 

             HYAL CAST (test code =              See_Comment                [Aut

omated message]



             1694139224)                                         The system [employer]



                                                                 generated this



                                                                 result transmit

christin



                                                                 reference range

: <=2



                                                                 LPF. The refere

nce



                                                                 range was not u

sed



                                                                 to interpret th

is



                                                                 result as



                                                                 normal/abnormal

.

 

             Lab Interpretation (test Abnormal                               



             code = 85079-3)                                        



[facility] with Atnbrdqbtjdd1312-14-65 21:24:00





             Test Item    Value        Reference Range Interpretation Comments

 

             WBC (test code =              See_Comment  H             [Automated



             0290-2)                                             message] The



                                                                 system which



                                                                 generated this



                                                                 result transmit

christin



                                                                 reference range

:



                                                                 4.30 - 11.10



                                                                 10*3/?L. The



                                                                 reference range



                                                                 was not used to



                                                                 interpret this



                                                                 result as



                                                                 normal/abnormal

.

 

             RBC (test code =              See_Comment                [Automated



             789-8)                                              message] The



                                                                 system which



                                                                 generated this



                                                                 result transmit

christin



                                                                 reference range

:



                                                                 3.93 - 5.25



                                                                 10*6/?L. The



                                                                 reference range



                                                                 was not used to



                                                                 interpret this



                                                                 result as



                                                                 normal/abnormal

.

 

             HGB (test code = 14.0 g/dL    11.6-15                   



             718-7)                                              

 

             HCT (test code = 39.9 %       35.7-45.2                 



             4544-3)                                             

 

             MCV (test code = 95.9 fL      80.6-95.5    H            



             787-2)                                              

 

             MCH (test code = 33.7 pg      25.9-32.8    H            



             785-6)                                              

 

             MCHC (test code = 35.1 g/dL    31.6-35.1                 



             786-4)                                              

 

             RDW-SD (test code = 47.7 fL      39-49.9                   



             93177-2)                                            

 

             RDW-CV (test code = 13.5 %       12-15.5                   



             788-0)                                              

 

             PLT (test code =              See_Comment  H             [Automated



             777-3)                                              message] The



                                                                 system which



                                                                 generated this



                                                                 result transmit

christin



                                                                 reference range

:



                                                                 166 - 358 10*3/

?L.



                                                                 The reference



                                                                 range was not u

sed



                                                                 to interpret th

is



                                                                 result as



                                                                 normal/abnormal

.

 

             MPV (test code = 9.3 fL       9.5-12.9     L            



             62468-8)                                            

 

             NRBC/100 WBC (test              See_Comment                [Automat

ed



             code = 4044679968)                                        message] 

The



                                                                 system which



                                                                 generated this



                                                                 result transmit

christin



                                                                 reference range

:



                                                                 0.0 - 10.0 /100



                                                                 WBCs. The



                                                                 reference range



                                                                 was not used to



                                                                 interpret this



                                                                 result as



                                                                 normal/abnormal

.

 

             NRBC x10^3 (test code <0.01        See_Comment                [Auto

mated



             = [id number])                                        message] The



                                                                 system which



                                                                 generated this



                                                                 result transmit

christin



                                                                 reference range

:



                                                                 10*3/?L. The



                                                                 reference range



                                                                 was not used to



                                                                 interpret this



                                                                 result as



                                                                 normal/abnormal

.

 

             GRAN MAT (NEUT) % 68.6 %                                 



             (test code = 770-8)                                        

 

             IMM GRAN % (test code 0.50 %                                 



             = [id number])                                        

 

             LYMPH % (test code = 22.1 %                                 



             736-9)                                              

 

             MONO % (test code = 8.0 %                                  



             5905-5)                                             

 

             EOS % (test code = 0.4 %                                  



             713-8)                                              

 

             BASO % (test code = 0.4 %                                  



             706-2)                                              

 

             GRAN MAT x10^3(ANC) 10.30 10*3/uL 1.88-7.09    H            



             (test code =                                        



             4161100912)                                         

 

             IMM GRAN x10^3 (test 0.08 10*3/uL 0-0.06       H            



             code = 7874136177)                                        

 

             LYMPH x10^3 (test code 3.33 10*3/uL 1.32-3.29    H            



             = 731-0)                                            

 

             MONO x10^3 (test code 1.21 10*3/uL 0.33-0.92    H            



             = 742-7)                                            

 

             EOS x10^3 (test code = 0.06 10*3/uL 0.03-0.39                 



             711-2)                                              

 

             BASO x10^3 (test code 0.06 10*3/uL 0.01-0.07                 



             = 704-7)                                            

 

             Lab Interpretation Abnormal                               



             (test code = 40317-1)                                        



[id number]





             Test Item    Value        Reference Range Interpretation Comments

 

             LACTIC ACID (test code = 7.37 mmol/L                            



             5184176785)                                         



[facility]2020[facility] 18:08:18
Bilateral nonobstructive subcentimeter 5 mm lower pole calculi. Layering 
sediment/debris within the urinary bladder. Minimal cortical thinning seen 
bilaterally. Preserved corticomedullarydifferentiation and normal renal 
echogenicity.ULTRASOUND RENAL INDICATION: Microscopic hematuria COMPARISON: 
2017. FINDINGS: Right kidney measures 9.4 x 4.0 x 4.4 cm. There is normal 
renal corticalechogenicity and corticomedullary differentiation. Mild cortical 
thinning.No hydronephrosis. 4 mm echogenic focus within the right interpolar 
regionprobably represents a small nonobstructive calculus. There is qualitat
ivelynormal perfusion on color doppler interrogation. Left kidney measures 8.4 x
3.9 x 4.0 cm. Thereis normal renal corticalechogenicity and corticomedullary 
differentiation. 2 mm echogenic lesionwithin the left lower renal pole probably 
represents a small nonobstructivecalculus. No hydronephrosis. There is 
qualitatively normal perfusion oncolor doppler interrogation. Partially 
decompressed bladder contains multiple low-level echoesconcerning for layering 
sediment.  [facility], Radiant Results [facility] User - 10/19/2020  1:09 PM CDTULTRASOUND 
RENALINDICATION: Microscopic hematuriaCOMPARISON: 2017.FINDINGS:Right kidney
measures 9.4 x 4.0 x 4.4 cm. There is normal renal corticalechogenicity and 
corticomedullary differentiation. Mild cortical thinning.No hydronephrosis. 4 mm
echogenic focus within the right interpolar regionprobably represents a small 
nonobstructive calculus. There is qualitativelynormal perfusion on color doppler
interrogation.Left kidney measures 8.4 x 3.9 x 4.0 cm. There is normal renal 
corticalechogenicity and corticomedullary differentiation. 2 mm echogenic 
lesionwithin the left lower renal pole probably represents a small 
nonobstructivecalculus. No hydronephrosis. There is qualitatively normal 
perfusion oncolor doppler interrogation.Partially decompressed bladder contains 
multiple low-level echoesconcerning for layering sediment. IMPRESSIONBilateral 
nonobstructive subcentimeter 5 mm lower pole calculi.Layering sediment/debris 
within the urinary bladder.Minimal cortical thinning seen bilaterally. Preserved
corticomedullarydifferentiation and normal renal echogenicity.[facility] HmetcbEBXFMSLUXT6063-23-63 18:33:0066.0[facility]annTO[facility]ICOLOG[facility]
2020 18:33:005.6[facility] HwmifaqXHRHCURGJJ2771-02-64 18:33:009.1[facility] 
AvaxwubJLPHUORXMM2464-72-65 18:33:0066.0[facility] IbeptzzDKHDMHCTKU1930-54-54 
18:33:005.6[facility] EuhrqkcQKWEPKOSWZ0973-81-22 18:33:009.1[facility]
XXMBAZKHU3857-14-40 23:52:00





             Test Item    Value        Reference Range Interpretation Comments

 

             PHENYTOIN (test code = 9.2 ug/mL    10-20        L            



             1247793741)                                         

 

             KAYLIN (test code = KAYLIN) Toxic Range: ? ? ?                           



                          ? ? ? ? ? 0-3                           



                          Months ? ? ? ? ? ?                           



                          ? Greater than 14                           



                          ug/mL ? ? 3 Months                           



                          - 150 Years ? ?                           



                          Greater than 20                           



                          ug/mL                                  

 

             Lab Interpretation (test Abnormal                               



             code = 73381-5)                                        



[facility] Metabolic Panel (NA, K, CL, CO2, 
GLUCOSE, BUN, CREATININE, CA)2020 23:26:00





             Test Item    Value        Reference Range Interpretation Comments

 

             NA (test code = 127 mmol/L   135-145      L            



             4793145834)                                         

 

             K (test code = 4.0 mmol/L   3.5-5                     



             4085889174)                                         

 

             CL (test code = 96 mmol/L           L            



             9277841155)                                         

 

             CO2 TOTAL (test code = 18 mmol/L    23-31        L            



             6159701893)                                         

 

             AGAP (test code =              2-16                      



             9638370174)                                         

 

             BUN (test code = 14 mg/dL     7-23                      



             6052071745)                                         

 

             GLUCOSE (test code = 148 mg/dL           H            



             2494279446)                                         

 

             CREATININE (test code = 0.57 mg/dL   0.5-1.04                  



             4683924920)                                         

 

             CALCIUM (test code = 8.9 mg/dL    8.6-10.6                  



             3271463633)                                         

 

             eGFR Calculation              mL/min/1.73m2              



             (Non-)                                        



             (test code =                                        



             2529357598)                                         

 

             eGFR Calculation              mL/min/1.73m2              



             (African American)                                        



             (test code =                                        



             8846196786)                                         

 

             KAYLIN (test code = KAYLIN) Association of                           



                          Glomerular Filtration                           



                          Rate (GFR) and Staging                           



                          of Kidney Disease*                           



                          +---------------------                           



                          --+-------------------                           



                          --+-------------------                           



                          ------+| GFR                           



                          (mL/min/1.73 m2) ?|                           



                          With Kidney Damage ?|                           



                          ?Without Kidney                           



                          Damage+---------------                           



                          --------+-------------                           



                          --------+-------------                           



                          ------------+| ?>90 ?                           



                          ? ? ? ? ? ? ? ?|                           



                          ?Stage one ? ? ? ? ?|                           



                          ? Normal ? ? ? ? ? ? ?                           



                          ?+--------------------                           



                          ---+------------------                           



                          ---+------------------                           



                          -------+| ?60-89 ? ? ?                           



                          ? ? ? ? ?| ?Stage two                           



                          ? ? ? ? ?| ? Decreased                           



                          GFR ? ? ? ?                            



                          +---------------------                           



                          --+-------------------                           



                          --+-------------------                           



                          ------+| ?30-59 ? ? ?                           



                          ? ? ? ? ?| ?Stage                           



                          three ? ? ? ?| ? Stage                           



                          three ? ? ? ? ?                           



                          +---------------------                           



                          --+-------------------                           



                          --+-------------------                           



                          ------+| ?15-29 ? ? ?                           



                          ? ? ? ? ?| ?Stage four                           



                          ? ? ? ? | ? Stage four                           



                          ? ? ? ? ?                              



                          ?+--------------------                           



                          ---+------------------                           



                          ---+------------------                           



                          -------+| ?<15 (or                           



                          dialysis) ? ?| ?Stage                           



                          five ? ? ? ? | ? Stage                           



                          five ? ? ? ? ?                           



                          ?+--------------------                           



                          ---+------------------                           



                          ---+------------------                           



                          -------+ *Each stage                           



                          assumes the associated                           



                          GFR level has been in                           



                          effect for at least                           



                          three months. ?Stages                           



                          1 to 5, with or                           



                          without kidney                           



                          disease, indicate                           



                          chronic kidney                           



                          disease. Notes:                           



                          Determination of                           



                          stages one and two                           



                          (with eGFR                             



                          >59mL/min/1.73 m2)                           



                          requires estimation of                           



                          kidney damage for at                           



                          least three months as                           



                          defined by structural                           



                          or functional                           



                          abnormalities of the                           



                          kidney, manifested by                           



                          either:Pathological                           



                          abnormalities or                           



                          Markers of kidney                           



                          damage (including                           



                          abnormalities in the                           



                          composition of the                           



                          blood or urine or                           



                          abnormalities in                           



                          imaging tests).                           

 

             Lab Interpretation Abnormal                               



             (test code = 03706-1)                                        



[facility] (ALB, T.PRO, BILI T, 
BU/BC, ALT, AST, ALK PHOS)2020 23:26:00





             Test Item    Value        Reference Range Interpretation Comments

 

             TOTAL BILI (test code = 6700308677) 0.3 mg/dL    0.1-1.1           

        

 

             BILI UNCON (test code = 6776098985) 0.4 mg/dL    0.1-1.1           

        

 

             BILI CONJ (test code = 7808843550) 0.0 mg/dL    0-0.3              

       

 

             T PROTEIN (test code = 7190671630) 7.5 g/dL     6.3-8.2            

       

 

             ALBUMIN (test code = 6689671484) 4.4 g/dL     3.5-5                

     

 

             ALK PHOS (test code = 2108814491) 189 U/L             H      

      

 

             ALTv (test code = 1742-6) 22 U/L       5-35                      

 

             AST(SGOT) (test code = 5522371275) 66 U/L       13-40        H     

       

 

             Lab Interpretation (test code = Abnormal                           

    



             77359-6)                                            



[facility] WITH [id number] 22:33:00





             Test Item    Value        Reference Range Interpretation Comments

 

             WBC (test code =              See_Comment  H             [Automated



             6690-2)                                             message] The sy

stem



                                                                 which generated



                                                                 this result



                                                                 transmitted



                                                                 reference range

:



                                                                 4.30 - 11.10



                                                                 10*3/?L. The



                                                                 reference range

 was



                                                                 not used to



                                                                 interpret this



                                                                 result as



                                                                 normal/abnormal

.

 

             RBC (test code =              See_Comment                [Automated



             789-8)                                              message] The sy

stem



                                                                 which generated



                                                                 this result



                                                                 transmitted



                                                                 reference range

:



                                                                 3.93 - 5.25



                                                                 10*6/?L. The



                                                                 reference range

 was



                                                                 not used to



                                                                 interpret this



                                                                 result as



                                                                 normal/abnormal

.

 

             HGB (test code = 13.4 g/dL    11.6-15                   



             718-7)                                              

 

             HCT (test code = 38.3 %       35.7-45.2                 



             4544-3)                                             

 

             MCV (test code = 95.5 fL      80.6-95.5                 



             787-2)                                              

 

             MCH (test code = 33.4 pg      25.9-32.8    H            



             785-6)                                              

 

             MCHC (test code = 35.0 g/dL    31.6-35.1                 



             786-4)                                              

 

             RDW-SD (test code = 49.3 fL      39-49.9                   



             42612-5)                                            

 

             RDW-CV (test code = 14.1 %       12-15.5                   



             788-0)                                              

 

             PLT (test code =              See_Comment                [Automated



             777-3)                                              message] The sy

stem



                                                                 which generated



                                                                 this result



                                                                 transmitted



                                                                 reference range

:



                                                                 166 - 358 10*3/

?L.



                                                                 The reference r

xenia



                                                                 was not used to



                                                                 interpret this



                                                                 result as



                                                                 normal/abnormal

.

 

             MPV (test code = 9.4 fL       9.5-12.9     L            



             92245-3)                                            

 

             NRBC/100 WBC (test              See_Comment                [Automat

ed



             code = 7328408416)                                        message] 

The system



                                                                 which generated



                                                                 this result



                                                                 transmitted



                                                                 reference range

:



                                                                 0.0 - 10.0 /100



                                                                 WBCs. The refer

ence



                                                                 range was not u

sed



                                                                 to interpret th

is



                                                                 result as



                                                                 normal/abnormal

.

 

             NRBC x10^3 (test code <0.01        See_Comment                [Auto

mated



             = [id number])                                        message] The s

ystem



                                                                 which generated



                                                                 this result



                                                                 transmitted



                                                                 reference range

:



                                                                 10*3/?L. The



                                                                 reference range

 was



                                                                 not used to



                                                                 interpret this



                                                                 result as



                                                                 normal/abnormal

.

 

             GRAN MAT (NEUT) % 70.6 %                                 



             (test code = 770-8)                                        

 

             IMM GRAN % (test code 0.60 %                                 



             = 5441644598)                                        

 

             LYMPH % (test code = 19.2 %                                 



             736-9)                                              

 

             MONO % (test code = 9.0 %                                  



             5905-5)                                             

 

             EOS % (test code = 0.3 %                                  



             713-8)                                              

 

             BASO % (test code = 0.3 %                                  



             706-2)                                              

 

             GRAN MAT x10^3(ANC) 8.16 10*3/uL 1.88-7.09    H            



             (test code =                                        



             6245821511)                                         

 

             IMM GRAN x10^3 (test 0.07 10*3/uL 0-0.06       H            



             code = 4062131246)                                        

 

             LYMPH x10^3 (test code 2.22 10*3/uL 1.32-3.29                 



             = 731-0)                                            

 

             MONO x10^3 (test code 1.04 10*3/uL 0.33-0.92    H            



             = 742-7)                                            

 

             EOS x10^3 (test code = 0.04 10*3/uL 0.03-0.39                 



             711-2)                                              

 

             BASO x10^3 (test code 0.04 10*3/uL 0.01-0.07                 



             = 704-7)                                            

 

             Lab Interpretation Abnormal                               



             (test code = 93320-6)                                        



[facility]





             Test Item    Value        Reference Range Interpretation Comments

 

             POCT GLU (test code = 3820134049) 158 mg/dL           H      

      

 

             Lab Interpretation (test code = Abnormal                           

    



             39138-0)                                            



[facility] 
IhffprrSYVAHBYXXW6708-76-24 16:52:0080.0[facility] JeukrjdQQSUWZQZGT8424-15-54 
16:52:0013.2[facility] DprcoreAMVVCFCXNP6048-91-82 16:52:0080.0[facility] [place]
REKNNQEWHU2252-96-12 18:24:00





             Test Item    Value        Reference Range Interpretation Comments

 

             APPEARANCE (test code = Clear        Clear                     



             4993375749)                                         

 

             COLOR (test code = Yellow       Yellow                    



             4539686881)                                         

 

             PH (test code =              4.8-8.0                   



             9731530298)                                         

 

             SP GRAVITY (test code =              1.003-1.030               



             5518222051)                                         

 

             GLU U QUAL (test code = Negative     Negative                  



             6338155031)                                         

 

             BLOOD (test code = Negative     Negative                  



             3988012425)                                         

 

             KETONES (test code = Negative     Negative                  



             3697289891)                                         

 

             PROTEIN (test code = Negative     Negative                  



             2887-8)                                             

 

             UROBILIN (test code = 0.2 mg/dL    See_Comment                [Auto

mated message]



             8802208036)                                         The system [employer]



                                                                 generated this



                                                                 result transmit

christin



                                                                 reference range

:



                                                                 0-1.0 mg/dL. Th

e



                                                                 reference range

 was



                                                                 not used to



                                                                 interpret this



                                                                 result as



                                                                 normal/abnormal

.

 

             BILIRUBIN (test code = Negative     Negative                  



             7558993941)                                         

 

             NITRITE (test code = Negative     Negative                  



             4309636142)                                         

 

             LEUK BI (test code = Negative     Negative                  



             6311290314)                                         

 

             RBC/HPF (test code =              See_Comment                [Autom

ated message]



             7758301407)                                         The system [employer]



                                                                 generated this



                                                                 result transmit

christin



                                                                 reference range

: 0 -



                                                                 3 HPF. The refe

rence



                                                                 range was not u

sed



                                                                 to interpret th

is



                                                                 result as



                                                                 normal/abnormal

.

 

             WBC/HPF (test code =              See_Comment                [Autom

ated message]



             8122649498)                                         The system [employer]



                                                                 generated this



                                                                 result transmit

christin



                                                                 reference range

: 0 -



                                                                 5 HPF. The refe

rence



                                                                 range was not u

sed



                                                                 to interpret th

is



                                                                 result as



                                                                 normal/abnormal

.

 

             BACTERIA (test code = Negative     Negative                  



             2868959904)                                         

 

             AMORPHOUS (test code = 1+           HPF                       



             1576133152)                                         

 

             SQ EPITH (test code =              HPF                       



             6310230011)                                         

 

             YEAST BUD (test code =              See_Comment  H             [Aut

omated message]



             5514461324)                                         The system [employer]



                                                                 generated this



                                                                 result transmit

christin



                                                                 reference range

: <=1



                                                                 HPF. The refere

nce



                                                                 range was not u

sed



                                                                 to interpret th

is



                                                                 result as



                                                                 normal/abnormal

.

 

             Lab Interpretation (test Abnormal                               



             code = 19857-5)                                        



[facility] CenterL[facility] Whole Vjlzs6988-54-49 17:47:00





             Test Item    Value        Reference Range Interpretation Comments

 

             LACTIC ACID (test code = 1.45 mmol/L  0.5-2.2                   



             3367979855)                                         

 

             Lab Interpretation (test code = Normal                             

    



             74081-5)                                            



[facility] [id number] 17:14:48 No acute 
cardiopulmonary abnormality. Mark GLASER MD., have reviewed this study and 
agree with the abovereport.EXAM: XR CHEST 1 VW HISTORY: seizure  COMPARISON: 
Chest x-ray 2017 FINDINGS: The lungs are clear. No focal consolidation, 
pleural effusion orpneumothorax is seen.  The cardiac silhouette is normal in 
size. No acute bony abnormality. Remote fracture/deformation of the 
rightposterior ninth rib. [facility], [facility] Results [employer] User - 2019 12:14 PM 
CDTEXAM: XR CHEST 1 VWHISTORY: seizure COMPARISON: Chest x-ray 
2017FINDINGS:The lungs are clear. No focal consolidation, pleural effusion 
orpneumothorax is seen. The cardiac silhouette is normal in size.No acute bony 
abnormality. Remote fracture/deformation of the rightposterior ninth 
rib.IMPRESSIONNo acute cardiopulmonary abnormality.Cherry GLASER MD., have 
reviewed this study and agree with the abovereport.[facility] CenterCT [facility] WO NLTLDJTW1121-55-39 16:43:42 Agenesis of corpus callosum and 
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 Robert GLASER MD., have reviewed this study and agree with the abovereport.CT HEAD WO CONTRAST
HISTORY:Seizure, new, abn neuro exam, nontraumatic  COMPARISON: None. 
TECHNIQUE:? Noncontrast CT imaging of the head was performed and coronaland 
sagittal reconstructions were obtained and reviewed. FINDINGS: Agenesis of 
corpus callosum and could possibly is manifested by asymmetricdilatation of the 
posteriorbody and temporal horns of the lateralventricles. The cerebral sulci 
are normal in caliber and configuration. Nomidline shift or pathological extra-
axial fluid collection is present. Thebasal cisterns are unremarkable. There is 
no acute intracranial hemorrhage or significant mass effect. Noparenchymal
attenuation abnormality. The gray-white matter differentiationis preserved. 
Partial empty sella configuration. The mastoid air cells and paranasal air 
sinuses are clear. The calvariumand central skull base are unremarkable. [facility], 
[facility] [employer] User - 2019 11:43 AM CDTCT HEAD WO CONTRASTHISTORY: 
Seizure, new, abn neuro exam, nontraumatic COMPARISON: None.TECHNIQUE:     
Noncontrast CT imaging of the head was performed and coronaland sagittal 
reconstructions were obtained and reviewed.FINDINGS:Agenesis of corpus callosum 
and could possibly is manifested by asymmetricdilatation of the posterior body 
and temporal horns of the lateralventricles. The cerebral sulci are normal in 
caliber and configuration. Nomidline shift or pathological extra-axial fluid 
collection is present. Thebasal cisterns are unremarkable.There is no acute 
intracranial hemorrhage or significant mass effect. Noparenchymal attenuation 
abnormality. The gray-white matter differentiationis preserved.Partial empty 
sella configuration.The mastoid air cells and paranasal air sinuses are clear. 
The calvariumand central skull base are unremarkable.IMPRESSIONAgenesis of 
corpus callosum and focal possibility noted.Partial empty sella configuration.IBeth MD., have reviewed this study and agree with the abovereport.
[facility] WITH [id number] 16:22:00





             Test Item    Value        Reference Range Interpretation Comments

 

             WBC (test code =              See_Comment  H            Previous



             6690-2)                                             preliminary



                                                                 verified result



                                                                 was 24.01 10*3/

?L



                                                                 on 2019 at



                                                                 1122 CDT



                                                                 [Automated



                                                                 message] The



                                                                 system which



                                                                 generated this



                                                                 result transmit

christin



                                                                 reference range

:



                                                                 4.30 - 11.10



                                                                 10*3/?L. The



                                                                 reference range



                                                                 was not used to



                                                                 interpret this



                                                                 result as



                                                                 normal/abnormal

.

 

             RBC (test code =              See_Comment  L            Previous



             789-8)                                              preliminary



                                                                 verified result



                                                                 was 3.48 10*6/?

L



                                                                 on 2019 at



                                                                 1122 CDT



                                                                 [Automated



                                                                 message] The



                                                                 system which



                                                                 generated this



                                                                 result transmit

christin



                                                                 reference range

:



                                                                 3.93 - 5.25



                                                                 10*6/?L. The



                                                                 reference range



                                                                 was not used to



                                                                 interpret this



                                                                 result as



                                                                 normal/abnormal

.

 

             HGB (test code = 10.7 g/dL    11.6-15      L            



             718-7)                                              

 

             HCT (test code = 32.7 %       35.7-45.2    L            Previous



             4544-3)                                             preliminary



                                                                 verified result



                                                                 was 32.6 % on



                                                                 2019 at 11

22



                                                                 CDT

 

             MCV (test code = 93.4 fL      80.6-95.5                 Previous



             787-2)                                              preliminary



                                                                 verified result



                                                                 was 93.7 fL on



                                                                 2019 at 11

22



                                                                 CDT

 

             MCH (test code = 30.6 pg      25.9-32.8                 Previous



             785-6)                                              preliminary



                                                                 verified result



                                                                 was 30.7 pg on



                                                                 2019 at 11

22



                                                                 CDT

 

             MCHC (test code = 32.7 g/dL    31.6-35.1                 Previous



             786-4)                                              preliminary



                                                                 verified result



                                                                 was 32.8 g/dL o

n



                                                                 2019 at 11

22



                                                                 CDT

 

             RDW-SD (test code = 69.0 fL      39-49.9      H            Previous



             55873-3)                                            preliminary



                                                                 verified result



                                                                 was 69.6 fL on



                                                                 2019 at 11

22



                                                                 CDT

 

             RDW-CV (test code = 20.2 %       12-15.5      H            Previous



             788-0)                                              preliminary



                                                                 verified result



                                                                 was 20.1 % on



                                                                 2019 at 11

22



                                                                 CDT

 

             PLT (test code =              See_Comment  H            Previous



             777-3)                                              preliminary



                                                                 verified result



                                                                 was 391 10*3/?L

 on



                                                                 2019 at 11

22



                                                                 CDT [Automated



                                                                 message] The



                                                                 system which



                                                                 generated this



                                                                 result transmit

christin



                                                                 reference range

:



                                                                 166 - 358 10*3/

?L.



                                                                 The reference



                                                                 range was not u

sed



                                                                 to interpret th

is



                                                                 result as



                                                                 normal/abnormal

.

 

             MPV (test code = 9.4 fL       9.5-12.9     L            



             20602-6)                                            

 

             NRBC/100 WBC (test              See_Comment                [Automat

ed



             code = 8881489734)                                        message] 

The



                                                                 system which



                                                                 generated this



                                                                 result transmit

christin



                                                                 reference range

:



                                                                 0.0 - 10.0 /100



                                                                 WBCs. The



                                                                 reference range



                                                                 was not used to



                                                                 interpret this



                                                                 result as



                                                                 normal/abnormal

.

 

             NRBC x10^3 (test code <0.01        See_Comment                [Auto

mated



             = 2558271553)                                        message] The



                                                                 system which



                                                                 generated this



                                                                 result transmit

christin



                                                                 reference range

:



                                                                 10*3/?L. The



                                                                 reference range



                                                                 was not used to



                                                                 interpret this



                                                                 result as



                                                                 normal/abnormal

.

 

             GRAN MAT (NEUT) % 63.6 %                                 



             (test code = 770-8)                                        

 

             IMM GRAN % (test code 0.80 %                                 



             = 4591339603)                                        

 

             LYMPH % (test code = 26.3 %                                 



             736-9)                                              

 

             MONO % (test code = 8.5 %                                  



             5905-5)                                             

 

             EOS % (test code = 0.5 %                                  



             713-8)                                              

 

             BASO % (test code = 0.3 %                                  



             706-2)                                              

 

             GRAN MAT x10^3(ANC) 15.24 10*3/uL 1.88-7.09    H            



             (test code =                                        



             5043300207)                                         

 

             IMM GRAN x10^3 (test 0.18 10*3/uL 0-0.06       H            



             code = 9762692588)                                        

 

             LYMPH x10^3 (test code 6.29 10*3/uL 1.32-3.29    H            



             = 731-0)                                            

 

             MONO x10^3 (test code 2.03 10*3/uL 0.33-0.92    H            



             = 742-7)                                            

 

             EOS x10^3 (test code = 0.11 10*3/uL 0.03-0.39                 



             711-2)                                              

 

             BASO x10^3 (test code 0.06 10*3/uL 0.01-0.07                 



             = 704-7)                                            

 

             Lab Interpretation Abnormal                               



             (test code = 08122-1)                                        



[facility]





             Test Item    Value        Reference Range Interpretation Comments

 

             TROPONIN I (test 0.004 ng/mL  See_Comment                [Automated



             code = 4396336439)                                        message] 

The



                                                                 system which



                                                                 generated this



                                                                 result



                                                                 transmitted



                                                                 reference range

:



                                                                 <=0.034. The



                                                                 reference range



                                                                 was not used to



                                                                 interpret this



                                                                 result as



                                                                 normal/abnormal

.

 

             KAYLIN (test code = Equal or Less than                           



             KAYLIN)         0.034                                  



                          ng/ml---Normal?Not                           



                          e: Cardiac                             



                          troponin begins to                           



                          rise 3-4 hours                           



                          after the onset of                           



                          ischemia. Repeat                           



                          in 4-6 hours if                           



                          the sample was                           



                          drawn within 3-4                           



                          hours of the onset                           



                          of the symptom and                           



                          found normal.                           



                          Between 0.035 and                           



                          0.120 ng/mL---                           



                          Borderline.                            



                          Questionable                           



                          myocardial injury                           



                          or necrosis?Note:                           



                          Serial measurement                           



                          may be necessary                           



                          to confirm or                           



                          exclude the                            



                          diagnosis of                           



                          myocardial injury                           



                          or necrosis;                           



                          Clinical                               



                          correlation                            



                          (symptoms, EKGs,                           



                          imaging studies,                           



                          and others)                            



                          required; Repeat                           



                          in 4-6 hours if                           



                          clinically                             



                          indicated.? Equal                           



                          or Higher than                           



                          0.121                                  



                          ng/mL---Abnormal.                           



                          Myocardial Injury                           



                          or Necrosis                            



                          Likely? Biotin has                           



                          been reported to                           



                          cause a negative                           



                          bias, interpret                           



                          results relative                           



                          to patient's use                           



                          of biotin.? ?                           

 

             Lab Interpretation Normal                                 



             (test code =                                        



             23628-9)                                            



[facility]





             Test Item    Value        Reference Range Interpretation Comments

 

             PHENYTOIN (test code = 4.4 ug/mL    10-20        L            



             8780770855)                                         

 

             KAYLIN (test code = KAYLIN) Toxic Range:? 0-3                           



                          Months? Greater                           



                          than 14 ug/mL? 3                           



                          Months - 150 Years?                           



                          Greater than 20                           



                          ug/mL                                  

 

             Lab Interpretation (test Abnormal                               



             code = 87211-8)                                        



[facility]. METABOLIC PANEL ([id number])2019 
16:04:00





             Test Item    Value        Reference Range Interpretation Comments

 

             NA (test code = 134 mmol/L   135-145      L            



             8500318666)                                         

 

             K (test code = 4.4 mmol/L   3.5-5                     



             8366871819)                                         

 

             CL (test code = 102 mmol/L                       



             0715046326)                                         

 

             CO2 TOTAL (test code = 13 mmol/L    23-31        L            



             4200477172)                                         

 

             AGAP (test code =              2-16         H            



             9400356184)                                         

 

             BUN (test code = 13 mg/dL     7-23                      



             5741898926)                                         

 

             GLUCOSE (test code = 144 mg/dL           H            



             4610819270)                                         

 

             CREATININE (test code = 0.50 mg/dL   0.5-1.04                  



             1751050647)                                         

 

             TOTAL BILI (test code = 0.3 mg/dL    0.1-1.1                   



             3009638262)                                         

 

             CALCIUM (test code = 9.0 mg/dL    8.6-10.6                  



             6269703899)                                         

 

             T PROTEIN (test code = 7.6 g/dL     6.3-8.2                   



             3625691559)                                         

 

             ALBUMIN (test code = 4.3 g/dL     3.5-5                     



             7935452635)                                         

 

             ALK PHOS (test code = 254 U/L             H            



             4604370777)                                         

 

             ALT(SGPT) (test code = 23 U/L       9-51                      



             5280310531)                                         

 

             AST(SGOT) (test code = 39 U/L       13-40                     



             3884592104)                                         

 

             eGFR Calculation              mL/min/1.73m2              



             (Non-)                                        



             (test code =                                        



             2658451660)                                         

 

             eGFR Calculation              mL/min/1.73m2              



             (African American)                                        



             (test code =                                        



             7671753880)                                         

 

             KAYLIN (test code = KAYLIN) Association of                           



                          Glomerular Filtration                           



                          Rate (GFR) and Staging                           



                          of Kidney                              



                          Disease*+-------------                           



                          ----------+-----------                           



                          ----------+-----------                           



                          --------------+| GFR                           



                          (mL/min/1.73 m2)?|                           



                          With Kidney                            



                          Damage?|?Without                           



                          Kidney                                 



                          Damage+---------------                           



                          --------+-------------                           



                          --------+-------------                           



                          ------------+|?>90?|?S                           



                          tage one?|?                            



                          Normal?+--------------                           



                          ---------+------------                           



                          ---------+------------                           



                          -------------+|?60-89?                           



                          |?Stage two?|?                           



                          Decreased GFR?                           



                          +---------------------                           



                          --+-------------------                           



                          --+-------------------                           



                          ------+|?30-59?|?Stage                           



                          three?|? Stage three?                           



                          +---------------------                           



                          --+-------------------                           



                          --+-------------------                           



                          ------+|?15-29?|?Stage                           



                          four? |? Stage                           



                          four?+----------------                           



                          -------+--------------                           



                          -------+--------------                           



                          -----------+|?<15 (or                           



                          dialysis)?|?Stage                           



                          five? |? Stage                           



                          five?+----------------                           



                          -------+--------------                           



                          -------+--------------                           



                          -----------+*Each                           



                          stage assumes the                           



                          associated GFR level                           



                          has been in effect for                           



                          at least three                           



                          months.?Stages 1 to 5,                           



                          with or without kidney                           



                          disease, indicate                           



                          chronic kidney                           



                          disease.Notes:                           



                          Determination of                           



                          stages one and two                           



                          (with eGFR                             



                          >59mL/min/1.73 m2)                           



                          requires estimation of                           



                          kidney damage for at                           



                          least three months as                           



                          defined by structural                           



                          or functional                           



                          abnormalities of the                           



                          kidney, manifested by                           



                          either:Pathological                           



                          abnormalities or                           



                          Markers of kidney                           



                          damage (including                           



                          abnormalities in the                           



                          composition of the                           



                          blood or urine or                           



                          abnormalities in                           



                          imaging tests).                           

 

             Lab Interpretation Abnormal                               



             (test code = 93109-4)                                        



[facility]





             Test Item    Value        Reference Range Interpretation Comments

 

             APTT Patient (test              See_Comment                [Automat

ed



             code = 3173-2)                                        message] The



                                                                 system which



                                                                 generated this



                                                                 result



                                                                 transmitted



                                                                 reference range

:



                                                                 23 - 38 Seconds

.



                                                                 The reference



                                                                 range was not



                                                                 used to interpr

et



                                                                 this result as



                                                                 normal/abnormal

.

 

             KAYLIN (test code = KAYLIN) The [facility] patient                           



                          population mean                           



                          normal value for                           



                          aPTT is 30                             



                          seconds.                               

 

             Lab Interpretation Normal                                 



             (test code = 81897-8)                                        



[facility] [id number] 16:01:00





             Test Item    Value        Reference Range Interpretation Comments

 

             PROTIME PATIENT (test              See_Comment                [Auto

mated message]



             code = 5964-2)                                        The system wh

ich



                                                                 generated this 

result



                                                                 transmitted ref

erence



                                                                 range: 12.0 - 1

4.7



                                                                 Seconds. The re

ference



                                                                 range was not u

sed to



                                                                 interpret this 

result



                                                                 as normal/abnor

mal.

 

             INR (test code = 6301-6)                                        Nor

mal INR <1.1;



                                                                 Warfarin Therap

eutic



                                                                 range 2.0 to 3.

0 or



                                                                 2.5 to 3.5, dep

ending



                                                                 upon the indica

tions.

 

             Lab Interpretation (test Normal                                 



             code = 91362-7)                                        



[facility] QJSYF3445-24-72 05:46:001.8[facility] 
HermannCHEM AGDQO8811-79-21 05:46:0088[facility] HermannCHEM CMUWL7175-79-11 
05:46:004[facility]annCHEM ATNZI6096-99-66 05:46:28825Btshrpac HermannCHEM 
QCVFK3215-04-17 05:46:0013.1[facility] HermannCHEM ROXMC0507-46-26 05:46:0025
[facility]annCHEM TCAPM9136-51-40 05:46:007.2[facility] HermannCHEM PANEL
2019 05:46:004.1[facility] HermannCHEM COHYC8329-94-56 05:46:0099[facility] 
HermannCHEM YBBVY1626-47-64 05:46:000.52[facility] HermannCHEM FKDEQ7849-93-28 
05:46:67950Mofylxfc HermannCHEM NQSFX8060-34-16 05:46:002.6[facility]
AUXEDWGXPH9989-00-27 05:46:000.9[facility] AgypjusQSWTIWSTGE7584-40-77 05:46:000.2
[facility] ZhovrvoUQLWSVYBFF2479-93-26 05:46:001.9[facility] HermannHEMATOLOGY
2019 05:46:007.6[facility] JdeqajiTONHSINSAR0829-01-23 05:46:0071.4[facility] 
LcvzxcbTJSLMOIMUT3741-69-91 05:46:0017.9[facility] IqtczsaDJDVNAAYVY6985-35-11 
05:46:000.3[facility] PygwgqwAEOUJPJZBM3045-75-96 05:46:008.3[facility]
TBDYRQDKQV6608-72-64 05:46:002.1[facility] TyjoxoiHDAZVCZMZF9061-44-02 05:46:00





             Test Item    Value        Reference Range Interpretation Comments

 

             PT (test code = PT) 12.8 s       12.0-14.7                 



[facility] SrfrrllTESMEWCJHY4780-63-54 05:46:00





             Test Item    Value        Reference Range Interpretation Comments

 

             INR (test code = INR) 0.98 1       0.85-1.17                 



[facility] GhynrqgRLFIDONBTW8009-27-51 05:46:0031.6[facility] HermannHEMATOLOGY
2019 05:46:0089.6[facility] NvknoxmXRTZHGEJBU4553-97-89 05:46:0010.7[facility]
IydxczcDYSZXFOMWR9244-98-65 05:46:00





             Test Item    Value        Reference Range Interpretation Comments

 

             MCH (test code = MCH) 30.4 pg      27.0-31.0                 



[facility] BwdtjyzQKQHCMRQGD5232-29-33 05:46:0034.0[facility] HermannHEMATOLOGY
2019 05:46:008.7[facility] WdcdoslMLBYZMKBCH2768-96-72 05:46:0019.4[facility] 
JuzzrkmXWZPLSGITD2936-75-19 05:46:35016Bjxzwdpe EdmcoajYSQJQXOLRR8303-40-60 
05:46:0010.6[facility] WehklliXWFMQHWWDG5028-39-18 05:46:003.52[facility]
PARATHYROID DYAMSIX9224-98-95 05:46:000.98[facility] HermannPARATHYROID PROFILE
2019 05:46:000.98[facility] HermannCHEM AIGFR0334-56-81 05:46:001.8[facility] 
HermannCHEM WAEWX2109-78-44 05:46:0088[facility]annCHEM DXACN1641-76-71 
05:46:004[facility]annCHEM MTQTF1033-12-61 05:46:82282Yviqtliz HermannCHEM 
WYWRW1779-96-73 05:46:0013.1[facility] HermannCHEM PIHEI6004-84-05 05:46:0025
[facility]annCHEM IBKPU2291-57-23 05:46:007.2[facility] HermannCHEM PANEL
2019 05:46:004.1[facility] HermannCHEM LMUMZ3736-24-48 05:46:0099[facility] 
[facility]ermannCHEM OYDPO4635-66-65 05:46:000.52[facility] HermannCHEM SAPLK6052-37-86 
05:46:90958Meqzmuqp HermannCHEM YENYY8762-54-57 05:46:002.6[facility]
DRECFITPJF9348-21-26 05:46:000.9[facility] SmhmayxOHFXKFDTRL6741-79-10 05:46:000.2
[facility] VxfgsxhOKDRMQJEGK1435-22-20 05:46:001.9[facility] HermannHEMATOLOGY
2019 05:46:007.6[facility] QtlwnojTUTQZWNYNP9839-56-50 05:46:0071.4[facility] 
UtoegnrPQMBVUUGOO8975-05-22 05:46:0017.9[facility] CpsrddkKIWBNYPLDP1265-22-16 
05:46:000.3[facility] IhlqeitRPULMZBBUA5365-94-64 05:46:008.3[facility]
CUYYYOCDNM3415-92-02 05:46:002.1[facility] DdktttrCIJBNEQTMR4420-61-21 05:46:00





             Test Item    Value        Reference Range Interpretation Comments

 

             PT (test code = PT) 12.8 s       12.0-14.7                 



[facility] YgkophsYSFWAAYGFB4328-63-23 05:46:00





             Test Item    Value        Reference Range Interpretation Comments

 

             INR (test code = INR) 0.98 1       0.85-1.17                 



[facility] MqfrsxjDOCRMZMCDY9283-80-19 05:46:0031.6[facility] HermannHEMATOLOGY
2019 05:46:0089.6[facility] KzarhkmGWZAIEJTRI3026-39-68 05:46:0010.7[facility]
OxefayiCKVCCQSBXK8431-82-09 05:46:00





             Test Item    Value        Reference Range Interpretation Comments

 

             MCH (test code = MCH) 30.4 pg      27.0-31.0                 



[facility] RifzuxnAOCIGRXALU0276-53-67 05:46:0034.0[facility] HermannHEMATOLOGY
2019 05:46:008.7[facility] YxesgzpLJBNIBUQBJ5735-22-36 05:46:0019.4[facility] 
GcilgfuDJXUIMMMSE2265-50-01 05:46:90977Obkkvwll MrxapcbYIVZCARATL0435-75-80 
05:46:0010.6[facility] LhdbtrmYYAUAXKIAK3682-83-79 05:46:003.52[facility]
PARATHYROID LOYZNGY7819-19-74 05:46:000.98[facility] HermannPARATHYROID PROFILE
2019 05:46:000.98[facility] HermannBLOOD BANK IUMEMEN3671-33-03 07:57:00
Negative (19 2:57 AM)[facility]annCHEM CJYRO3565-93-59 07:57:001.6
[facility] HermannCHEM PNXUY3393-89-86 07:57:001.9[facility] HermannELECTROLYTES
2019 07:57:0010.9[facility] NpuerufZOSLWRQDPTWY3438-52-64 07:57:48636
[facility] FihyifeMUGOXWGZCSLC1524-76-91 07:57:11535Ddbtnlpi HermannELECTROLYTES
2019 07:57:000.61[facility] YrnppbjSAFNNOURTRSY0768-40-47 07:57:003[facility] 
CnfvbgoOLEBUJMMKNDM1533-89-98 07:57:0026[facility] EdofkvwFNXWPALMZJMX5574-27-45 
07:57:0099[facility] SkqvaegWNSWOXJZXSBT0931-70-43 07:57:50838Hrnrottb Julius
IOLFHFEWLWBN7331-09-36 07:57:007.2[facility] KoleracHKCTGPVDZUPD4064-63-95 
07:57:003.9[facility] XeaigqwUIBBUYBXTE9704-79-23 07:57:31725Ivskokdk [place]
TJROKSTJSM7034-26-45 07:57:008.7[facility] ZbcmidmAAKGUXDCGJ5920-22-48 07:57:00
3.48[facility] YlrkgfqXREONRSIJK3483-82-05 07:57:0011.8[facility] HermannHEMATOLOGY
2019 07:57:0010.6[facility] FzeaemzUVLGWCDPAY2046-06-18 07:57:0019.3[facility]
NftljpoHYRZTUILHY0786-14-72 07:57:0089.7[facility] GhzwwyxSCIZMZCUIA5341-57-62 
07:57:0031.3[facility] EajuorfQAKLPVCUBZ2099-42-37 07:57:00





             Test Item    Value        Reference Range Interpretation Comments

 

             MCH (test code = MCH) 30.4 pg      27.0-31.0                 



[facility] EiiokrhGVGYZHCKBG6851-13-17 07:57:0033.8[facility]annHEMATOLOGY
2019 07:57:008.8[facility] ZcaauvxZDQJCKUHHE4502-93-53 07:57:001.1[facility] 
HgyvocrJLXLJNKEGB6609-52-67 07:57:000.4[facility] VfvthvdTPVFITHMSZ8770-43-72 
07:57:001.6[facility] OwfnpctUXFFPJKKZW7250-37-30 07:57:003.0[facility]
GMZFUZNDTD1733-32-03 07:57:000.3[facility] ZovqhngNNFMPVLQFZ6135-85-65 07:57:00
74.5[facility] GwxzcegXVEPOKHMES9661-08-93 07:57:0013.2[facility]annHEMATOLOGY
2019 07:57:009.0[facility] HermannPARATHYROID YLHWQBT9438-26-45 07:57:000.96
[facility] HermannPARATHYROID TMEPGLZ0233-44-77 07:57:000.94[facility] HermannBLOOD 
BANK GUJPEZX1946-59-59 07:57:00Negative (19 2:57 AM)[facility]annCHEM 
HKNJH7077-39-52 07:57:001.6[facility] HermannCHEM FVVDE0268-07-12 07:57:001.9
[facility] GgwssozZUHBMHSPUHHF0660-77-39 07:57:0010.9[facility] HermannELECTROLYTES
2019 07:57:34576Vzqotzzq NzuottfLNOJCDMRNCUB7568-79-38 07:57:51705Bcvwcyrj
NhotzvwOYMQGBMUQTHE1006-61-41 07:57:000.61[facility] OeevvubQRQNZAVAKLIY2807-68-36
07:57:003[facility] UtixcbqNVHZCWXBCRKE3192-55-54 07:57:0026[facility]
JJFDHRRXMZAX2450-39-14 07:57:0099[facility] DcvmxngMMQCUZEUANXC0028-92-81 07:57:00
104[facility] XkhnyqfGLRLBDURRUMS0627-58-36 07:57:007.2[facility]
DFSCINBUOUCV4515-90-73 07:57:003.9[facility] WwrpyaxQNCFLDAMPE8571-18-21 07:57:00
253[facility] OadjpktEOOKYMLZVI6574-65-94 07:57:008.7[facility]annHEMATOLOGY
2019 07:57:003.48[facility] HpnoklvBNWFEJRMPM8515-11-38 07:57:0011.8[facility]
OkakyhjQINZVFJWVR4079-24-30 07:57:0010.6[facility] OhdrblxJDULXUJVON3571-83-40 
07:57:0019.3[facility] UbmhgwnNRGJYNYMYE8487-78-22 07:57:0089.7[facility]
NETQJHKWAQ3106-95-49 07:57:0031.3[facility] CwwgajxDXNPDVUFHD6314-77-30 07:57:00





             Test Item    Value        Reference Range Interpretation Comments

 

             MCH (test code = MCH) 30.4 pg      27.0-31.0                 



[facility] NzlyyttDWORHNBSOB5691-61-22 07:57:0033.8[facility]annHEMATOLOGY
2019 07:57:008.8[facility] OzoinrsIEIJZNDZTP5701-66-77 07:57:001.1[facility] 
PcxpoxcHIZRHSEZVB1669-89-23 07:57:000.4[facility] UrfjbfsOSHTVHBXTR8436-63-38 
07:57:001.6[facility] WfgwfrnNLXWTDOGYC8376-11-36 07:57:003.0[facility]
EAMGPZHSOR1973-55-88 07:57:000.3[facility] YenriwmTYRVSJKUHE6237-22-14 07:57:00
74.5[facility] TfvvectTYIFZPBUEX5129-47-39 07:57:0013.2[facility]annHEMATOLOGY
2019 07:57:009.0[facility] HermannPARATHYROID QPCXGVP2190-46-59 07:57:000.96
[facility] HermannPARATHYROID GHIWEQB5555-50-66 07:57:000.94[facility]annCHEM 
BIKHB4563-95-56 22:03:002.2[facility] HermannCHEM PFZNI0544-12-27 22:03:003.0
[facility] HermannCHEM LPSZH5988-11-00 22:03:0085[facility]annCHEM PANEL
2019 22:03:55030Viqdlyro HermannCHEM ROLOJ4155-77-48 22:03:007.4[facility] 
HermannCHEM QQKXQ3207-06-59 22:03:0027[facility]annCHEM AVZYW0241-25-61 
22:03:0095[facility]annCHEM FTOIN1829-24-87 22:03:009.5[facility]annCHEM 
LLBSE3517-85-58 22:03:000.77[facility]annCHEM EEWUS6313-28-75 22:03:50887
[facility]annCHEM KNDJR1986-97-50 22:03:004[facility]annCHEM PANEL
2019 22:03:003.5[facility] HermannPARATHYROID YTCFFAG4307-31-40 22:03:001.00
[facility]annPARATHYROID THXSFDV3449-60-34 22:03:000.98[facility]annCHEM 
MYPUZ3463-06-88 22:03:002.2[facility] HermannCHEM MQBPG5832-52-37 22:03:003.0
[facility]annCHEM LLFYU8959-05-91 22:03:0085[facility]annCHEM PANEL
2019 22:03:16858Cqnskkxi HermannCHEM AZSYG0941-71-03 22:03:007.4[facility] 
HermannCHEM BQUFJ1217-64-87 22:03:0027[facility]annCHEM IJWGG7955-23-48 
22:03:0095[facility]annCHEM WKSYR2596-65-91 22:03:009.5[facility]annCHEM 
XLTEB2367-34-83 22:03:000.77[facility]annCHEM PZOQR7409-61-18 22:03:82256
[facility]annCHEM QEVUB1394-09-45 22:03:004[facility]annCHEM PANEL
2019 22:03:003.5[facility] HermannPARATHYROID DAYPVHX3167-49-53 22:03:001.00
[facility]annPARATHYROID RSWDAFW4183-32-09 22:03:000.98[facility]
BOXVRINEUQ5346-52-53 14:26:0029.4[facility] UwegwesKAWJHURDTN8419-55-29 14:26:00
9.7[facility] ZyhzmqxAVCKKIPBJG2433-81-00 14:26:0029.4[facility]annHEMATOLOGY
2019 14:26:009.7[facility] HiqkkcfLAQCEGPEVO4872-26-23 13:15:00





             Test Item    Value        Reference Range Interpretation Comments

 

             POC Activated Clotting Time (test code 271 s                       

           



             = POC Activated Clotting Time)                                     

   



[facility]ZSROUCOOI4532[facility]42-48 13:15:00





             Test Item    Value        Reference Range Interpretation Comments

 

             POC Activated Clotting Time (test code 271 s                       

           



             = POC Activated Clotting Time)                                     

   



[facility] FkvhiawPGRLHQWJRZ3747-59-04 13:05:00





             Test Item    Value        Reference Range Interpretation Comments

 

             POC Activated Clotting Time (test code 199 s                       

           



             = POC Activated Clotting Time)                                     

   



[facility] PjwjpgpJEVDTWCTQW2846-17-13 13:05:00





             Test Item    Value        Reference Range Interpretation Comments

 

             POC Activated Clotting Time (test code 199 s                       

           



             = POC Activated Clotting Time)                                     

   



[facility] HermannCHEM GUODY3951-89-72 06:38:002.8[facility] HermannCHEM PANEL
2019 06:38:00





             Test Item    Value        Reference Range Interpretation Comments

 

             A/G Ratio (test code = A/G Ratio) 0.6 1        0.7-1.6             

      



[facility] HermannCHEM HJOFH4096-93-37 06:38:000.2[facility] HermannCHEM PANEL
2019 06:38:000.5[facility] HermannCHEM DWGFR6007-27-56 06:38:000.7[facility] 
HermannCHEM LNLAL0502-34-16 06:38:001.8[facility] HermannCHEM LSUYT0339-01-07 
06:38:004.6[facility] HermannCHEM QIJSE7529-57-22 06:38:04390Wcidwglr HermannCHEM 
AEMNH6458-81-52 06:38:76467Bepwqkjx HermannCHEM LXLBE5210-14-26 06:38:0094
[facility] UbcndpiYMNZYCVPRU4093-21-58 06:38:000.4[facility]annHEMATOLOGY
2019 06:38:000.1[facility] ZauzhjfCFMTUGEJMZ8324-34-83 06:38:000.1[facility] 
HepbvjfGVNUHTTOLO8319-72-91 06:38:0089.0[facility] TsmgmzdHISRWTCMKX6674-22-74 
06:38:004.0[facility] EfabjqtZYPKZCYLNO8880-94-74 06:38:001.0[facility]
WKIJHLYYEU1252-19-75 06:38:002.0[facility] WmudldvZETKZBSTOH8935-97-78 06:38:001.0
[facility] DwglauxIYUBSHLPQA0558-44-97 06:38:001.0[facility]annHEMATOLOGY
2019 06:38:001.0[facility] OlvgafuHBHHPBBQTV5748-11-39 06:38:001.0[facility] 
CvyceyzMGIJFXPMUV7941-97-12 06:38:000.0[facility] JhcqkbkAJWHGNDBHZ7677-38-26 
06:38:001[facility] DvmutqtGGACLOMNZP8498-31-36 06:38:00Moderate *ABN*(19 
1:38 AM)[facility] CqfkicjTWNJPFMZET5327-59-82 06:38:009.4[facility]
FVYVLKCDAK9890-28-03 06:38:000.2[facility] JigtxiyVVZBVMAKXC6353-46-61 06:38:43899
[facility] IxmhyhoPNHQLIVPOF6448-43-29 06:38:009.2[facility]annHEMATOLOGY
2019 06:38:0019.6[facility] YwphsykKXWAAIGCRK8272-49-28 06:38:0034.9[facility]
SbibwdrENCODFGQBQ7421-66-46 06:38:00





             Test Item    Value        Reference Range Interpretation Comments

 

             MCH (test code = MCH) 30.9 pg      27.0-31.0                 



[facility] WjyudgmQXQXTHKZZS9598-32-57 06:38:0088.5[facility]annHEMATOLOGY
2019 06:38:0010.4[facility] HlyzsdkKGFHUCMZAY4003-27-31 06:38:003.06[facility]
HermannCHEM UZQLH8742-79-31 06:38:002.8[facility] HermannCHEM AGUIY0260-62-53 
06:38:00





             Test Item    Value        Reference Range Interpretation Comments

 

             A/G Ratio (test code = A/G Ratio) 0.6 1        0.7-1.6             

      



[facility] HermannCHEM IZAEA9415-25-55 06:38:000.2[facility] HermannCHEM PANEL
2019 06:38:000.5[facility] HermannCHEM RNVJF0351-15-61 06:38:000.7[facility] 
HermannCHEM GJMSP0985-77-19 06:38:001.8[facility] HermannCHEM SDREE7581-72-76 
06:38:004.6[facility] HermannCHEM XUXXZ6660-38-80 06:38:63025Gjxeciyt HermannCHEM 
PQWQD2124-63-15 06:38:39553Kxaoxsuw HermannCHEM BNILM2320-11-09 06:38:0094
[facility] LnicrpqRGGEPMCZHF0845-05-90 06:38:000.4[facility]annHEMATOLOGY
2019 06:38:000.1[facility] ZevzuccWPSAIYTGOE9507-28-05 06:38:000.1[facility] 
MtqieslDJLZUXFYJP6330-76-78 06:38:0089.0[facility] TdjtkezOTVRZCOOWD7097-92-67 
06:38:004.0[facility] WdlelklVGMTEISGQL3224-20-18 06:38:001.0[facility]
AKQKGKMZKV6959-12-84 06:38:002.0[facility] CajlhbqCKDVEUEMQS2515-44-48 06:38:001.0
[facility] FpmjovoQXZVVEMVVH3217-56-80 06:38:001.0[facility]annHEMATOLOGY
2019 06:38:001.0[facility] YthtdkeZNXEZCIKCS2944-51-15 06:38:001.0[facility] 
UygtbgjFBRRPWQUFL4459-06-02 06:38:000.0[facility] GbmexukLNREAHYBKC0461-46-11 
06:38:001[facility] CuadfzeOVHVNXRSCR9566-90-57 06:38:00Moderate *ABN*(19 
1:38 AM)[facility] BewxnxgHJROJHBXKR9495-59-99 06:38:009.4[facility]
MIDXKHAORL7972-39-09 06:38:000.2[facility] JbgqhcwXSGHSJRDTJ1570-55-18 06:38:63962
[facility] CxojpcpXMVNCIFZRQ9666-33-64 06:38:009.2[facility]annHEMATOLOGY
2019 06:38:0019.6[facility] TibrlqeWCTLMBCZHD8465-19-54 06:38:0034.9[facility]
NknbmnsGNVRWJZEVI7627-38-07 06:38:00





             Test Item    Value        Reference Range Interpretation Comments

 

             MCH (test code = MCH) 30.9 pg      27.0-31.0                 



[facility] ZghtomuVHQHQSPCKF5862-09-50 06:38:0088.5[facility]annHEMATOLOGY
2019 06:38:0010.4[facility] MgcbsstVOCGRDEKAU7655-25-49 06:38:003.06[facility]
HermannCHEM WVQFJ5798-47-11 08:55:00





             Test Item    Value        Reference Range Interpretation Comments

 

             A/G Ratio (test code = A/G Ratio) 0.6 1        0.7-1.6             

      



[facility] HermannCHEM IYIVV4777-44-50 08:55:002.8[facility] HermannCHEM PANEL
2019 08:55:06498Ekkqyunk HermannCHEM WOUDP3626-88-61 08:55:03827Mcgeqiqn 
HermannCHEM YFGLY8592-69-22 08:55:001.8[facility] HermannCHEM XCGDD8225-36-61 
08:55:004.6[facility] HermannCHEM WDKRD8541-59-77 08:55:000.2[facility]annCHEM 
XUAVW3630-54-68 08:55:000.7[facility] HermannCHEM GYEIY9993-13-68 08:55:71408
[facility] HermannCHEM IQZVK1760-87-01 08:55:000.5[facility]annCHEM PANEL
2019 08:55:00





             Test Item    Value        Reference Range Interpretation Comments

 

             A/G Ratio (test code = A/G Ratio) 0.6 1        0.7-1.6             

      



[facility] HermannCHEM QQBLF7584-76-33 08:55:002.8[facility]annCHEM PANEL
2019 08:55:61780Iflvngsw HermannCHEM YEJAM1834-47-61 08:55:78317Kvwxtgna 
HermannCHEM PZJNL3124-25-07 08:55:001.8[facility] HermannCHEM MIKFP2652-90-04 
08:55:004.6[facility] HermannCHEM WCQMR3423-91-78 08:55:000.2[facility] HermannCHEM 
NPPVN8957-11-47 08:55:000.7[facility] HermannCHEM MMPXN3441-02-28 08:55:48019
[facility] HermannCHEM EZGXV4933-25-93 08:55:000.5[facility]annHEMATOLOGY
2019 08:51:002.0[facility] RinmruyMDAIHXICKD0747-74-84 08:51:000.0[facility] 
AjrfurvPVIXMYORZS8330-60-54 08:51:002.0[facility] DquptclVUDACOJGFZ0394-57-40 
08:51:000.0[facility] RiysisoOFONUMFCEP1691-90-30 02:11:007.0[facility]
TDOGRPVFZN1106-97-98 02:11:001.0[facility] SiikkorDAPSDMFGDZ0478-15-09 02:11:003
[facility] QphorxwAXRHSGSFSV5685-45-15 02:11:000.0[facility]annHEMATOLOGY
2019 02:11:00Normal (19 9:11 PM)[facility] YcsdigsDFXCPUPPAS1836-56-63 
02:11:001+ *ABN*(19 9:11 PM)[facility] GzrtoomEWRPTJIFWD0589-24-26 02:11:00





             Test Item    Value        Reference Range Interpretation Comments

 

             Vanco Tr TND (test code = Vanco Tr 2130 1                          

       



             TND)                                                



[facility] MmgcozdAAYNBKEUJE7507-15-88 02:11:003.9[facility]annHEMATOLOGY
2019 02:11:007.0[facility] KcikqogTRAVKPKSJV0124-05-29 02:11:001.0[facility] 
FicpmxbOVVVDMIYHQ7952-54-70 02:11:003[facility] FezprnpSDCLOPXKNO4315-76-80 
02:11:000.0[facility] NcnycitDATZSFAULQ4090-95-90 02:11:00Normal (19 9:11 PM)
[facility] ZadqzbrXZTULPPJLL3647-78-24 02:11:001+ *ABN*(19 9:11 PM)[facility] 
LtipbbrYQCXXRGQER2443-97-47 02:11:00





             Test Item    Value        Reference Range Interpretation Comments

 

             Vanco Tr TND (test code = Vanco Tr 2130 1                          

       



             TND)                                                



[facility] QxyscxwLYYKMCTOCB8317-92-88 02:11:003.9[facility]annHEMATOLOGY
2019 21:58:08130Rghtrwfq YhstjqqXTXAEHOAIO5058-81-46 21:58:40513Ndevizzs 
FybigadJCPPUUYQNQ0048-88-98 21:16:002.0[facility] DlopwgoBYBPVWNAPM7503-44-47 
21:16:005.0[facility] QrbgobsEPBPPKMPZR7851-02-44 21:16:002[facility]
REUGDNFVSZ5859-46-34 21:16:00Normal (19 4:16 PM)[facility]annHEMATOLOGY
2019 21:16:001+ *ABN*(19 4:16 PM)[facility] AqivyegEKKJFZGXUF8675-15-36
21:16:002.0[facility] YgjwxkkAUNGPHRRCE4052-00-42 21:16:005.0[facility]
NPSGXNTPLY1989-88-66 21:16:002[facility] XvswkrlGRQIQXYXQL5251-76-25 21:16:00
Normal (19 4:16 PM)[facility] KbgueoiOMIMYYDSOL6300-56-87 21:16:001+ 
*ABN*(19 4:16 PM)[facility] HermannCHEM MXEZN4607-29-32 13:13:99461Ruxvxdrw 
HermannCHEM SBQSD3336-05-73 13:13:0010[facility] DhrjieuAITKYCOTPH7061-49-94 
13:13:00





             Test Item    Value        Reference Range Interpretation Comments

 

             PTT (test code = PTT) 43.0 s       22.9-35.8                 



[facility] ZkyotfmFYYQKAEWBU5561-66-47 13:13:00





             Test Item    Value        Reference Range Interpretation Comments

 

             INR (test code = INR) 1.62 1       0.85-1.17                 



[facility]EseyakrHWZEVFAFAL6355-49-10 13:13:00





             Test Item    Value        Reference Range Interpretation Comments

 

             PT (test code = PT) 18.9 s       12.0-14.7                 



[facility] CenterMgwfeamKPOYYDKYZS6364-67-11 13:13:55096CrsoapmyScenic Mountain Medical Center
2019 13:13:00Negative 9(19 8:13 AM)[facility]
2019 13:13:00





             Test Item    Value        Reference Range Interpretation Comments

 

             Pat Od Value (test code = Pat Od 0.109 1                           

     



             Value)                                              



[facility] CenterHahlptqRRGJBGXYIE4049-71-14 13:13:00





             Test Item    Value        Reference Range Interpretation Comments

 

             Pos CO Value (test code = Pos CO 0.400 1                           

     



             Value)                                              



[facility]mrzTDQPOTYZBQ5229-75-19 13:13:97704Mwadgxii HermannCHEM PANEL
2019 13:13:41342Axicbfrh HermannCHEM BMFKK8308-61-08 13:13:0010[facility]2019-[facility] 13:13:00





             Test Item    Value        Reference Range Interpretation Comments

 

             PTT (test code = PTT) 43.0 s       22.9-35.8                 



[facility] CenterCdhromvXKMZRCTJVK7990-06-73 13:13:00





             Test Item    Value        Reference Range Interpretation Comments

 

             INR (test code = INR) 1.62 1       0.85-1.17                 



[facility] CenterZefvpxeYEUWOZHOKZ7448-59-42 13:13:00





             Test Item    Value        Reference Range Interpretation Comments

 

             PT (test code = PT) 18.9 s       12.0-14.7                 



[facility] CenterSixcbgxHZSGDNXXWE8311-46-89 13:13:99813PkevnkmrScenic Mountain Medical Center
2019 13:13:00Negative 9(19 8:13 AM)[facility]
2019 13:13:00





             Test Item    Value        Reference Range Interpretation Comments

 

             Pat Od Value (test code = Pat Od 0.109 1                           

     



             Value)                                              



[facility] CenterHdslvfzMVZSQFJIAQ9897-41-12 13:13:00





             Test Item    Value        Reference Range Interpretation Comments

 

             Pos CO Value (test code = Pos CO 0.400 1                           

     



             Value)                                              



[facility] CuhwofvBRLYNRNSAJ9783-81-71 13:13:64859Pcvppxfp HermannBLOOD BANK 
FAQMANI3749-65-85 12:45:00Negative (19 7:45 AM)[facility]annBL[facility] BANK 
OJGSPUY5827-59-85 12:45:00Negative (19 7:45 AM)[facility]BL[facility] B[facility] 
AXBHKNA6466-40-82 12:10:00Product available (19 7:10 AM)[facility]
BLOOD BANK ZAGLPFH1502-34-00 12:10:00Product available (19 7:10 AM)[facility]ermannBLOOD BANK DTFSJSY2068-97-62 11:02:00Product available (19 6:02 AM)
[facility]annBLOOD BANK GMVKEGD5629-86-95 11:02:00Product available (19 
6:02 AM)[facility]CHE[facility] TBSCO4180-67-83 08:29:001.2[facility]
TBBXCSYHGH3611-81-47 08:29:00





             Test Item    Value        Reference Range Interpretation Comments

 

             PTT (test code = PTT) 45.6 s       22.9-35.8                 



[facility] NxmfrniWGJYTJHPGY1088-33-40 08:29:00





             Test Item    Value        Reference Range Interpretation Comments

 

             PT (test code = PT) 20.4 s       12.0-14.7                 



[facility] PtkqfxuLDUTBNUDJP1347-01-82 08:29:00





             Test Item    Value        Reference Range Interpretation Comments

 

             INR (test code = INR) 1.79 1       0.85-1.17                 



[facility]annCHE[facility] CWPMR1919-72-72 08:29:001.2[facility]annHEMATOLOGY
2019 08:29:00





             Test Item    Value        Reference Range Interpretation Comments

 

             PTT (test code = PTT) 45.6 s       22.9-35.8                 



[facility] IqotmdzQHIMBZHSDI7129-47-76 08:29:00





             Test Item    Value        Reference Range Interpretation Comments

 

             PT (test code = PT) 20.4 s       12.0-14.7                 



[facility] NwwyqcsZSDBARNEUA5215-63-70 08:29:00





             Test Item    Value        Reference Range Interpretation Comments

 

             INR (test code = INR) 1.79 1       0.85-1.17                 



[facility] NpcbswwLEWSWDAWJG3770-11-52 03:08:006.0[facility] HermannTOXICOLOGY
2019 03:08:006.0[facility] HermannCHEM VYTIC3072-96-27 17:52:651522Diytpwxk 
HermannCHEM GLSFT8055-29-40 17:52:42593Sjxxraus HermannCHEM MLQBS8221-19-24 
17:52:00





             Test Item    Value        Reference Range Interpretation Comments

 

             A/G Ratio (test code = A/G Ratio) 0.8 1        0.7-1.6             

      



[facility] HermannCHEM OBJWQ5826-95-81 17:52:0095[facility] HermannCHEM PANEL
2019 17:52:000.5[facility] HermannCHEM RVRSY9102-46-99 17:52:00





             Test Item    Value        Reference Range Interpretation Comments

 

             B/C Ratio (test code = B/C Ratio) 13                 

      



[facility] HermannCHEM LECBX4768-83-43 17:52:004.3[facility] HermannCHEM PANEL
2019 17:52:001.9[facility] HermannCHEM CVWJZ0844-63-01 17:52:002.4[facility] 
HermannCHEM CICRL1903-21-58 17:52:001.5[facility] SreepcjTFZTCMENCN8165-36-05 
17:52:0010.9[facility] HermannCHEM OUILX4429-68-19 17:52:374607Vaadwwpw Julius
CHEM AXOEU2226-59-40 17:52:43078Vfrzoklk HermannCHEM QNIID1867-31-39 17:52:00





             Test Item    Value        Reference Range Interpretation Comments

 

             A/G Ratio (test code = A/G Ratio) 0.8 1        0.7-1.6             

      



[facility] HermannCHEM RXTFC9370-52-75 17:52:0095[facility] HermannCHEM PANEL
2019 17:52:000.5[facility] HermannCHEM FUVQK6333-16-00 17:52:00





             Test Item    Value        Reference Range Interpretation Comments

 

             B/C Ratio (test code = B/C Ratio) 13                 

      



[facility] HermannCHEM UXTDI9957-87-77 17:52:004.3[facility] HermannCHEM PANEL
2019 17:52:001.9[facility] HermannCHEM FUQNJ9786-33-46 17:52:002.4[facility] 
HermannCHEM KPREM1954-38-02 17:52:001.5[facility] RtdzrgdNYVEDXUNXZ2805-52-65 
17:52:0010.9[facility] HermannCARDIAC LNAIYWV0045-10-09 05:38:001.74[facility] 
HermannCHEM JLCIE3037-08-84 05:38:002.2[facility] HermannCHEM YIURQ2500-58-77 
05:38:00





             Test Item    Value        Reference Range Interpretation Comments

 

             B/C Ratio (test code = B/C Ratio) 13                 

      



[facility] KrdxxksDVEEBHYGJO6281-37-49 05:38:00





             Test Item    Value        Reference Range Interpretation Comments

 

             Vanco Tr TND (test code = Vanco Tr 2030 1                          

       



             TND)                                                



[facility] SacjffjDVWRPPJZFZ3325-39-75 05:38:0023.6[facility] HermannCARDIAC ENZYMES
2019 05:38:001.74[facility] HermannCHEM LUMWL7929-29-39 05:38:002.2[facility] 
HermannCHEM HJPYJ6887-73-32 05:38:00





             Test Item    Value        Reference Range Interpretation Comments

 

             B/C Ratio (test code = B/C Ratio)                 

      



[facility] XnxrnbgIUPOEJZNZF3958-45-45 05:38:00





             Test Item    Value        Reference Range Interpretation Comments

 

             Vanco Tr TND (test code = Vanco Tr 2030 1                          

       



             TND)                                                



[facility] [id number] 05:38:0023.6[facility]annURINE CHEM
2019 21:36:0034.4[facility]annURINE [id number] 21:36:0015[facility] 
HermannURINE [id number] 21:36:0010[facility]annURINE [id number] 
21:36:0023.0[facility]annURINE [id number] 21:36:0034.4[facility]
URINE [id number] 21:36:0015[facility]annURINE [id number] 21:36:0010
[facility]annURINE [id number] 21:36:0023.0[facility]annHEMATOLOGY
2019 21:32:00





             Test Item    Value        Reference Range Interpretation Comments

 

             PTT (test code = PTT) 47.2 s       22.9-35.8                 



[facility] [id number] 21:32:0017.5[facility]annHEMATOLOGY
2019 21:32:00





             Test Item    Value        Reference Range Interpretation Comments

 

             PTT (test code = PTT) 47.2 s       22.9-35.8                 



[facility] [id number] 21:32:0017.5[facility] HermannCARDIAC ENZYMES
2019 13:40:000.234[facility]annCARDIAC [id number] 13:40:002.87
[facility] HermannCHEM [id number] 13:40:000.44[facility]annCARDIAC ENZYMES
2019 13:40:000.234[facility]annCARDIAC [id number] 13:40:002.87
[facility] HermannCHEM [id number] 13:40:000.44[facility]annCARDIAC ENZYMES
2019 07:02:004.06[facility]annCARDIAC [id number] 07:02:004.06
[facility]annCARDIAC [id number] 04:31:85251Abnhqpnu HermannCARDIAC 
[id number] 04:31:00





             Test Item    Value        Reference Range Interpretation Comments

 

             CK MB Index (test 2.6 1        See_Comment                [Automate

d message] The



             code = CK MB Index)                                        system w

[facility] generated this



                                                                 result transmit

christin



                                                                 reference range

: <=2.5. The



                                                                 reference range

 was not



                                                                 used to interpr

et this



                                                                 result as omero

l/abnormal.



[facility]annCARDIAC [id number] 04:31:007.5[facility]annCARDIAC 
[id number] 04:31:77316Qpdngjao HermannCARDIAC [id number] 04:31:00





             Test Item    Value        Reference Range Interpretation Comments

 

             CK MB Index (test 2.6 1        See_Comment                [Automate

d message] The



             code = CK MB Index)                                        system [employer] generated this



                                                                 result transmit

christin



                                                                 reference range

: <=2.5. The



                                                                 reference range

 was not



                                                                 used to interpr

et this



                                                                 result as omero

l/abnormal.



[facility]annCARDIAC [id number] 04:31:007.5[facility]annCARDIAC 
[id number] 01:30:00





             Test Item    Value        Reference Range Interpretation Comments

 

             CK MB Index (test 2.3 1        See_Comment                [Automate

d message] The



             code = CK MB Index)                                        system [employer] generated this



                                                                 result transmit

christin



                                                                 reference range

: <=2.5. The



                                                                 reference range

 was not



                                                                 used to interpr

et this



                                                                 result as omero

l/abnormal.



[facility] TESBPVT4883-53-26 01:30:007.2[facility] 
NROQGDI7897-33-54 01:30:25232Cpxqdqal MfztdwrZUVEYWINRA9782-90-81 01:30:001.0
[facility] NrekehaRWCZZXGTIX9296-22-21 01:30:00Normal (19 8:30 PM)[facility] 
VarmcmkKYPSBNTVNJ9644-24-86 01:30:004.7[facility]annCARD[facility]AC JMTMFHM7701-56-68
01:30:00





             Test Item    Value        Reference Range Interpretation Comments

 

             CK MB Index (test 2.3 1        See_Comment                [Automate

d message] The



             code = CK MB Index)                                        system [employer] generated this



                                                                 result transmit

christin



                                                                 reference range

: <=2.5. The



                                                                 reference range

 was not



                                                                 used to interpr

et this



                                                                 result as omero

l/abnormal.



[facility] VFSAJHL6512-80-12 01:30:007.2[facility] 
WXALGAE3785-79-48 01:30:10614Jzqrsivx EvfzeqnPLKFDZLRUN1942-75-89 01:30:001.0
[facility] ZbejzirNWGJGGWZYQ7821-84-41 01:30:00Normal (5/24/19 8:30 PM)[facility] 
InwpnfbNMAJLSPVKY4585-30-37 01:30:004.7[facility]annC[facility]AC PYRQWLU3801-65-29
19:12:63310Skwbrkmn HermannCARDIAC YYKPSVR9034-58-57 19:12:00





             Test Item    Value        Reference Range Interpretation Comments

 

             CK MB Index (test 0.9 1        See_Comment                [Automate

d message] The



             code = CK MB Index)                                        system w

[employer] generated this



                                                                 result transmit

christin



                                                                 reference range

: <=2.5. The



                                                                 reference range

 was not



                                                                 used to interpr

et this



                                                                 result as omero

l/abnormal.



[facility] FYGMOGX2216-14-78 19:12:001.3[facility]annCHEM PANEL
2019 19:12:00





             Test Item    Value        Reference Range Interpretation Comments

 

             B/C Ratio (test code = B/C Ratio) 11                 

      



[facility] BIHMJJP6682-40-63 19:12:79218Hpexidjw HermannCARDIAC 
XLQPLWI2581-75-64 19:12:00





             Test Item    Value        Reference Range Interpretation Comments

 

             CK MB Index (test 0.9 1        See_Comment                [Automate

d message] The



             code = CK MB Index)                                        system w

[employer] generated this



                                                                 result transmit

christin



                                                                 reference range

: <=2.5. The



                                                                 reference range

 was not



                                                                 used to interpr

et this



                                                                 result as omero

l/abnormal.



[facility] BKDXHHQ0958-63-25 19:12:001.3[facility]annCHEM PANEL
2019 19:12:00





             Test Item    Value        Reference Range Interpretation Comments

 

             B/C Ratio (test code = B/C Ratio) 11                 

      



[facility] QjkgsogCVEMEUQRNG9958-55-18 11:34:002.9[facility] HermannTOXICOLOGY
2019 11:34:002.9[facility] HermannCHEM PURQJ2326-07-10 11:00:54134Vepjdtnw 
HermannCHEM QMJOL4371-90-49 11:00:59009Xybhrttl DwsntteGCPLPTQUCQ2231-64-91 
10:29:000.1[facility] IpjwkqrPWRDVUOSOL9545-67-43 10:29:000.1[facility] [place]
BACTERIAL - BXWCDIAX0173-35-41 05:24:00Negative (19 12:24 AM)[facility]ermannBLOOD BANK DAUJUCQ1839-70-61 05:24:00Negative (19 12:24 AM)[facility] 
HermannBACTERIAL - CBIOQGYF0723-71-76 05:24:00Negative (19 12:24 AM)
[facility] HermannBL[facility] TGLAQVI1297-27-22 05:24:00Negative (19 12:24 AM)
[facility]annCHEM FGKAR3651-39-30 02:56:55159Xchekcqd HermannURINE CHEM
2019 02:56:69490Brmpwofk HermannCHEM YCXCX6696-95-78 02:56:03733Hmnnqgqr 
[facility]ermannURINE NHCY3719-78-58 02:56:43999Saftwogk LawjwdnMZDYFL3702-88-08 00:26:00





             Test Item    Value        Reference Range Interpretation Comments

 

             VLDL (test code = VLDL) 15 1                                   



[facility] OblsjogDUEOUV4973-24-90 00:26:0084[facility] RgzmzdpPLBCRC2680-19-90 
00:26:0042[facility] UyollndCRMDLH6129-91-76 00:26:00





             Test Item    Value        Reference Range Interpretation Comments

 

             CHD Risk (test code = CHD Risk) 3.36 1       3.90-5.80             

    



[facility] WyvibtbTFLNOU4923-53-51 00:26:0075[facility] BeykamnCDIOZL3650-94-24 
00:26:25130Eplmthmd UqqiebtWMMARERZKZ1815-32-87 00:26:006.2[facility]
MFJCCV8420-37-64 00:26:00





             Test Item    Value        Reference Range Interpretation Comments

 

             VLDL (test code = VLDL) 15 1                                   



[facility] CiexplaIZTPIN3822-89-53 00:26:0084[facility] ZbfvwpkALYCEI6516-56-64 
00:26:0042[facility] SrzqtykGXEPOW4211-45-52 00:26:00





             Test Item    Value        Reference Range Interpretation Comments

 

             CHD Risk (test code = CHD Risk) 3.36 1       3.90-5.80             

    



[facility] WqnrkcgEHJQJH3528-07-50 00:26:0075[facility] GnzhidiQREECV0344-92-46 
00:26:81102Cwagupqz PqvxlbbALKIZGBDZV5991-78-64 00:26:006.2[facility]annURINE
AND PPYVV3255-49-65 21:09:00Trace *ABN*(19 4:09 PM)[facility] 
AND HZZMP2372-60-30 21:09:00Negative (19 4:09 PM)[facility] 
FBLJS6829-65-99 21:09:00Negative (19 4:09 PM)[facility] 
BPJMH5384-20-34 21:09:00Trace *ABN*(19 4:09 PM)[facility] 
PVKOR0023-66-94 21:09:000.2[facility] AND JMLWP5064-72-96 21:09:00
Negative (19 4:09 PM)[facility] RQGDQ5539-63-95 21:09:00
Negative *NA*(19 4:09 PM)[facility] SZKAH9901-38-06 21:09:00
&lt;=1.005 *NA*(19 4:09 PM)[facility] HOBAB0878-42-92 
21:09:00Clear (19 4:09 PM)[facility] ORRJF9233-58-60 
21:09:00Negative (19 4:09 PM)[facility] GBTIR3690-64-25 
21:09:00





             Test Item    Value        Reference Range Interpretation Comments

 

             UA pH (test code = UA pH) 7.0 1        5.0-8.0                   



[facility] AND KSRVW1810-22-82 21:09:00Yellow *NA*(19 4:09 PM)
[facility] AVNHF2715-19-28 21:09:00Trace *ABN*(19 4:09 PM)
[facility] PDMOQ3757-16-52 21:09:00Negative (19 4:09 PM)
[facility] PAFVX3096-73-64 21:09:00Negative (19 4:09 PM)
[facility] ZUNLV2266-49-44 21:09:00Trace *ABN*(19 4:09 PM)
[facility] WQGBZ1084-58-21 21:09:000.2[facility] AND 
ILVCW2457-76-90 21:09:00Negative (19 4:09 PM)[facility] 
BWILA5106-45-90 21:09:00Negative *NA*(19 4:09 PM)[facility] 
OBALO0791-73-78 21:09:00&lt;=1.005 *NA*(19 4:09 PM)[facility] 
AND SSKRJ0124-19-26 21:09:00Clear (19 4:09 PM)[facility] 
CJSGQ9277-75-53 21:09:00Negative (19 4:09 PM)[facility] 
NDFOI8955-83-42 21:09:00





             Test Item    Value        Reference Range Interpretation Comments

 

             UA pH (test code = UA pH) 7.0 1        5.0-8.0                   



[facility] DSYJV1917-26-91 21:09:00Yellow *NA*(19 4:09 PM)
[facility]annCHEM BFBPT4287-59-36 17:10:000.5[facility] HermannCHEM PANEL
2019 17:10:000.5[facility] HermannMISCELLANEOUS LAB ORDER2018-10-31 07:41:00





             Test Item    Value        Reference Range Interpretation Comments

 

             SCAN RESULT (test code = 2815037)                                  

      



MISCELLANEOUS LAB ORDER2018-10-30 11:12:00





             Test Item    Value        Reference Range Interpretation Comments

 

             SCAN RESULT (test code = 6602269)                                  

      



CSF CULTURE + GRAM STAIN2018-10-27 17:06:00





             Test Item    Value        Reference Range Interpretation Comments

 

             CULTURE (BEAKER) (test code No growth                              



             = 1095)                                             

 

             GRAM STAIN RESULT (BEAKER) <1+ WBCs                               



             (test code = 1123)                                        

 

             GRAM STAIN RESULT (BEAKER) No organisms seen                       

    



             (test code = 69277)                                        



CBC W/PLT COUNT &amp; AUTO DIFFERENTIAL2018-10-26 10:29:00





             Test Item    Value        Reference Range Interpretation Comments

 

             WHITE BLOOD CELL COUNT (BEAKER) 9.6 K/ L     3.5-10.5              

    



             (test code = 775)                                        

 

             RED BLOOD CELL COUNT (BEAKER) 2.68 M/ L    3.93-5.22    L          

  



             (test code = 761)                                        

 

             HEMOGLOBIN (BEAKER) (test code = 9.3 GM/DL    11.2-15.7    L       

     



             410)                                                

 

             HEMATOCRIT (BEAKER) (test code = 27.5 %       34.1-44.9    L       

     



             411)                                                

 

             MEAN CORPUSCULAR VOLUME (BEAKER) 102.6 fL     79.4-94.8    H       

     



             (test code = 753)                                        

 

             MEAN CORPUSCULAR HEMOGLOBIN 34.7 pg      25.6-32.2    H            



             (BEAKER) (test code = 751)                                        

 

             MEAN CORPUSCULAR HEMOGLOBIN CONC 33.8 GM/DL   32.2-35.5            

     



             (BEAKER) (test code = 752)                                        

 

             RED CELL DISTRIBUTION WIDTH 14.9 %       11.7-14.4    H            



             (BEAKER) (test code = 412)                                        

 

             PLATELET COUNT (BEAKER) (test 382 K/CU MM  150-450                 

  



             code = 756)                                         

 

             MEAN PLATELET VOLUME (BEAKER) 9.6 fL       9.4-12.3                

  



             (test code = 754)                                        

 

             NUCLEATED RED BLOOD CELLS 0 /100 WBC   0-0                       



             (BEAKER) (test code = 413)                                        



(CELLAVISION MANUAL DIFF)2018-10-26 10:29:00





             Test Item    Value        Reference Range Interpretation Comments

 

             NEUTROPHILS - REL 73 %                                   



             (CELLAVISION)(BEAKER) (test code =                                 

       



             2816)                                               

 

             LYMPHOCYTES - REL 15 %                                   



             (CELLAVISION)(BEAKER) (test code =                                 

       



             2817)                                               

 

             MONOCYTES - REL 8 %                                    



             (CELLAVISION)(BEAKER) (test code =                                 

       



             2818)                                               

 

             EOSINOPHILS - REL 2 %                                    



             (CELLAVISION)(BEAKER) (test code =                                 

       



             2819)                                               

 

             BASOPHILS - REL 2 %                                    



             (CELLAVISION)(BEAKER) (test code =                                 

       



             2820)                                               

 

             NEUTROPHILS - ABS 7.01 K/ul    1.56-6.13    H            



             (CELLAVISION)(BEAKER) (test code =                                 

       



             2830)                                               

 

             LYMPHOCYTES - ABS 1.44 K/ul    1.18-3.74                 



             (CELLAVISION)(BEAKER) (test code =                                 

       



             2831)                                               

 

             MONOCYTES - ABS 0.77 K/uL    0.24-0.36    H            



             (CELLAVISION)(BEAKER) (test code =                                 

       



             2832)                                               

 

             EOSINOPHILS - ABS 0.19 K/uL    0.04-0.36                 



             (CELLAVISION)(BEAKER) (test code =                                 

       



             2834)                                               

 

             BASOPHILS - ABS 0.19 K/uL    0.01-0.08    H            



             (CELLAVISION)(BEAKER) (test code =                                 

       



             2835)                                               

 

             TOTAL COUNTED (BEAKER) (test code = 100                            

        



             1351)                                               

 

             WBC MORPHOLOGY (BEAKER) (test code Normal                          

       



             = 487)                                              

 

             PLT MORPHOLOGY (BEAKER) (test code Normal                          

       



             = 486)                                              

 

             ANISOCYTOSIS (BEAKER) (test code = 1+ few                          

       



             961)                                                

 

             POIKILOCYTES (BEAKER) (test code = 1+ few                          

       



             966)                                                

 

             ARTIFACT (CELLAVISION)(BEAKER) Present                             

   



             (test code = 3432)                                        

 

             PLATELET CONCENTRATION Adequate                               



             (CELLAVISION)(BEAKER) (test code =                                 

       



             3438)                                               



Received comment: User comments: Slide comments:FERRITIN2018-10-26 06:29:00





             Test Item    Value        Reference Range Interpretation Comments

 

             FERRITIN (BEAKER) (test code = 361) 357 ng/mL    5-275        H    

        



FOLATE, SERUM2018-10-26 06:29:00





             Test Item    Value        Reference Range Interpretation Comments

 

             FOLATE (BEAKER) (test code = 362) 10.3 ng/mL   >=7.0               

      



MAGNESIUM2018-10-26 06:16:00





             Test Item    Value        Reference Range Interpretation Comments

 

             MAGNESIUM (BEAKER) (test code = 1.4 mg/dL    1.6-2.6      L        

    



             627)                                                



BASIC METABOLIC PANEL2018-10-26 06:16:00





             Test Item    Value        Reference Range Interpretation Comments

 

             SODIUM (BEAKER) 134 meq/L    136-145      L            



             (test code = 381)                                        

 

             POTASSIUM (BEAKER) 3.3 meq/L    3.5-5.1      L            



             (test code = 379)                                        

 

             CHLORIDE (BEAKER) 102 meq/L                        



             (test code = 382)                                        

 

             CO2 (BEAKER) (test 22 meq/L     22-29                     



             code = 355)                                         

 

             BLOOD UREA NITROGEN 4 mg/dL      7-21         L            



             (BEAKER) (test code                                        



             = 354)                                              

 

             CREATININE (BEAKER) 0.64 mg/dL   0.57-1.25                 



             (test code = 358)                                        

 

             GLUCOSE RANDOM 107 mg/dL           H            



             (BEAKER) (test code                                        



             = 652)                                              

 

             CALCIUM (BEAKER) 8.6 mg/dL    8.4-10.2                  



             (test code = 697)                                        

 

             EGFR (BEAKER) (test 95 mL/min/1.73                           ESTIMA

CHRISTIN GFR IS



             code = 1092) sq m                                   NOT AS ACCURATE

 AS



                                                                 CREATININE



                                                                 CLEARANCE IN



                                                                 PREDICTING



                                                                 GLOMERULAR



                                                                 FILTRATION RATE

.



                                                                 ESTIMATED GFR I

S



                                                                 NOT APPLICABLE 

FOR



                                                                 DIALYSIS PATIEN

TS.



CREATINE KINASE (CK)2018-10-26 06:16:00





             Test Item    Value        Reference Range Interpretation Comments

 

             CREATINE KINASE TOTAL (BEAKER) (test 313 U/L             H   

         



             code = 380)                                         



IRON, TIBC, % SAT. (WITHOUT FERRITIN)2018-10-26 06:10:00





             Test Item    Value        Reference Range Interpretation Comments

 

             IRON (BEAKER) (test code = 547) 50 ug/dL                     

    

 

             TOTAL IRON BINDING CAPACITY 243 ug/dL    250-450      L            



             (BEAKER) (test code = 769)                                        

 

             IRON % SATURATION (2) (BEAKER) 21 %         20-55                  

   



             (test code = 2590)                                        



B-TYPE NATRIURETIC FACTOR (BNP)2018-10-26 05:50:00





             Test Item    Value        Reference Range Interpretation Comments

 

             B-TYPE NATRIURETIC PEPTIDE 1941 pg/mL   0-100        H            



             (BEAKER) (test code = 700)                                        



POCT-GLUCOSE METER2018-10-25 21:20:00





             Test Item    Value        Reference Range Interpretation Comments

 

             POC-GLUCOSE METER 184 mg/dL           H            TESTED AT 

[facility] [address]



             [facility] (test code =                                        [place]



             1538)                                               [address]



POCT-GLUCOSE METER2018-10-25 17:25:00





             Test Item    Value        Reference Range Interpretation Comments

 

             POC-GLUCOSE METER 189 mg/dL           H            TESTED AT 

[facility] [address]



             [facility] (test code =                                        [place]



             1538)                                               [address]



CBC W/PLT COUNT &amp; AUTO DIFFERENTIAL2018-10-25 10:14:00





             Test Item    Value        Reference Range Interpretation Comments

 

             WHITE BLOOD CELL COUNT (BEAKER) 9.1 K/ L     3.5-10.5              

    



             (test code = 775)                                        

 

             RED BLOOD CELL COUNT (BEAKER) 3.16 M/ L    3.93-5.22    L          

  



             (test code = 761)                                        

 

             HEMOGLOBIN (BEAKER) (test code = 10.9 GM/DL   11.2-15.7    L       

     



             410)                                                

 

             HEMATOCRIT (BEAKER) (test code = 32.0 %       34.1-44.9    L       

     



             411)                                                

 

             MEAN CORPUSCULAR VOLUME (BEAKER) 101.3 fL     79.4-94.8    H       

     



             (test code = 753)                                        

 

             MEAN CORPUSCULAR HEMOGLOBIN 34.5 pg      25.6-32.2    H            



             (BEAKER) (test code = 751)                                        

 

             MEAN CORPUSCULAR HEMOGLOBIN CONC 34.1 GM/DL   32.2-35.5            

     



             (BEAKER) (test code = 752)                                        

 

             RED CELL DISTRIBUTION WIDTH 14.5 %       11.7-14.4    H            



             (BEAKER) (test code = 412)                                        

 

             PLATELET COUNT (BEAKER) (test 354 K/CU MM  150-450                 

  



             code = 756)                                         

 

             MEAN PLATELET VOLUME (BEAKER) 10.0 fL      9.4-12.3                

  



             (test code = 754)                                        

 

             NUCLEATED RED BLOOD CELLS 0 /100 WBC   0-0                       



             (BEAKER) (test code = 413)                                        



(CELLAVISION MANUAL DIFF)2018-10-25 10:14:00





             Test Item    Value        Reference Range Interpretation Comments

 

             NEUTROPHILS - REL 64 %                                   



             (CELLAVISION)(BEAKER) (test code =                                 

       



             2816)                                               

 

             LYMPHOCYTES - REL 20 %                                   



             (CELLAVISION)(BEAKER) (test code =                                 

       



             2817)                                               

 

             MONOCYTES - REL 13 %                                   



             (CELLAVISION)(BEAKER) (test code =                                 

       



             2818)                                               

 

             EOSINOPHILS - REL 3 %                                    



             (CELLAVISION)(BEAKER) (test code =                                 

       



             2819)                                               

 

             NEUTROPHILS - ABS 5.82 K/ul    1.56-6.13                 



             (CELLAVISION)(BEAKER) (test code =                                 

       



             2830)                                               

 

             LYMPHOCYTES - ABS 1.82 K/ul    1.18-3.74                 



             (CELLAVISION)(BEAKER) (test code =                                 

       



             2831)                                               

 

             MONOCYTES - ABS 1.18 K/uL    0.24-0.36    H            



             (CELLAVISION)(BEAKER) (test code =                                 

       



             2832)                                               

 

             EOSINOPHILS - ABS 0.27 K/uL    0.04-0.36                 



             (CELLAVISION)(BEAKER) (test code =                                 

       



             2834)                                               

 

             TOTAL COUNTED (BEAKER) (test code = 100                            

        



             1351)                                               

 

             WBC MORPHOLOGY (BEAKER) (test code Normal                          

       



             = 487)                                              

 

             LARGE PLT(BEAKER) (test code = Present                             

   



             2156)                                               

 

             POLYCHROMATOPHILLIC RBCS(BEAKER) 1+ few                            

     



             (test code = 478)                                        

 

             ARTIFACT (CELLAVISION)(BEAKER) Present                             

   



             (test code = 3432)                                        

 

             PLATELET CONCENTRATION Adequate                               



             (CELLAVISION)(BEAKER) (test code =                                 

       



             3438)                                               



Received comment: User comments: Slide comments:MENINGITIS/ENCEPHALITIS PANEL
2018-10-25 09:52:00





             Test Item    Value        Reference Range Interpretation Comments

 

             ESCHERICHIA COLI K1 (test code = Not detected Not detected         

     



             2016)                                            

 

             HAEMOPHILUS INFLUENZAE (test Not detected Not detected             

 



             code = 7392542)                                        

 

             LISTERIA MONOCYTOGENES (test Not detected Not detected             

 



             code = 7210627)                                        

 

             NEISSERIA MENINGITIDIS (test Not detected Not detected             

 



             code = 7514752)                                        

 

             STREPTOCOCCUS AGALACTIAE (test Not detected Not detected           

   



             code = 1531588)                                        

 

             STREPTOCOCCUS PNEUMONIAE (test Not detected Not detected           

   



             code = 8730469)                                        

 

             CYTOMEGALOVIRUS (CMV) (test code Not detected Not detected         

     



             = 8993770)                                          

 

             ENTEROVIRUS (test code = Not detected Not detected              



             3385680)                                            

 

             HUMAN HERPESVIRUS 6 (HHV-6) Not detected Not detected              



             (test code = 1609864)                                        

 

             HERPES SIMPLEX VIRUS 1(HSV-1) Not detected Not detected            

  



             (test code = 7553708)                                        

 

             HERPES SIMPLEX VIRUS 2(HSV-2) Not detected Not detected            

  



             (test code = 8924325)                                        

 

             HUMAN PARECHOVIRUS (test code = Not detected Not detected          

    



             1498112)                                            

 

             VARICELLA-ZOSTER VIRUS (VZV) Not detected Not detected             

 



             (test code = 6245584)                                        

 

             CRYPTOCOCCUS NEOFORMANS/GATTII Not detected Not detected           

   



             (test code = 0330757)                                        



Other viruses and bacteria not targeted by this PCR panel cannot be excluded; 
therefore, clinical correlation and follow up of serology, culture results, and 
other molecular studies is required. The results are not intended to be used as 
the sole means for clinical diagnosis or patient management decisions. This 
sample was tested at the [facility] using the 
[employer] Film[employer] Meningitis Encephalitis Panel. It is FDA cleared and has been 
verified and approved by the [facility] for clinical
use. This laboratory is CLIA-certified and [employer] 
(CAP)-accredited to perform high complexity testing.MAGNESIUM2018-10-25 08:40:00





             Test Item    Value        Reference Range Interpretation Comments

 

             MAGNESIUM (BEAKER) (test code = 1.6 mg/dL    1.6-2.6               

    



             627)                                                



BASIC METABOLIC PANEL2018-10-25 08:40:00





             Test Item    Value        Reference Range Interpretation Comments

 

             SODIUM (BEAKER) 131 meq/L    136-145      L            



             (test code = 381)                                        

 

             POTASSIUM (BEAKER) 4.1 meq/L    3.5-5.1                   



             (test code = 379)                                        

 

             CHLORIDE (BEAKER) 98 meq/L                         



             (test code = 382)                                        

 

             CO2 (BEAKER) (test 24 meq/L     22-29                     



             code = 355)                                         

 

             BLOOD UREA NITROGEN 4 mg/dL      7-21         L            



             (BEAKER) (test code                                        



             = 354)                                              

 

             CREATININE (BEAKER) 0.61 mg/dL   0.57-1.25                 



             (test code = 358)                                        

 

             GLUCOSE RANDOM 117 mg/dL           H            



             (BEAKER) (test code                                        



             = 652)                                              

 

             CALCIUM (BEAKER) 9.0 mg/dL    8.4-10.2                  



             (test code = 697)                                        

 

             EGFR (BEAKER) (test 101 mL/min/1.73                           ESTIM

ATED GFR IS



             code = 1092) sq m                                   NOT AS ACCURATE

 AS



                                                                 CREATININE



                                                                 CLEARANCE IN



                                                                 PREDICTING



                                                                 GLOMERULAR



                                                                 FILTRATION RATE

.



                                                                 ESTIMATED GFR I

S



                                                                 NOT APPLICABLE 

FOR



                                                                 DIALYSIS PATIEN

TS.



CREATINE KINASE (CK)2018-10-25 08:40:00





             Test Item    Value        Reference Range Interpretation Comments

 

             CREATINE KINASE TOTAL (BEAKER) (test 678 U/L             H   

         



             code = 380)                                         



POCT-GLUCOSE METER2018-10-25 07:49:00





             Test Item    Value        Reference Range Interpretation Comments

 

             POC-GLUCOSE METER 144 mg/dL           H            TESTED AT 

[facility] [address]



             ([facility]) (test code =                                        [facility]



             1538)                                               [id number]



VDRL, CSF2018-10-25 03:16:00





             Test Item    Value        Reference Range Interpretation Comments

 

             SYPHILIS VDRL QUANTITATION CSF Nonreactive  Nonreactive            

   



             (BEAKER) (test code = 747)                                        



POCT-GLUCOSE METER2018-10-24 21:00:00





             Test Item    Value        Reference Range Interpretation Comments

 

             POC-GLUCOSE METER 169 mg/dL           H            TESTED AT 

[facility] [address]



             ([facility]) (test code =                                        [facility]



             1538)                                               [address]



POTASSIUM, RANDOM URINE2018-10-24 15:54:00





             Test Item    Value        Reference Range Interpretation Comments

 

             POTASSIUM URINE (BEAKER) (test 19.0 meq/L                          

   



             code = 195)                                         



Reference Range: No NormalsFL, LUMBAR PUNCTURE, FLUORO2018-10-24 14:00:00Patient
is intubated in ICUReason for exam:-&gt;seizures, rule out encephalitisFINAL 
REPORT PATIENT ID:   [id number]  Procedure: Lumbar puncture Modality: Fluoroscopy 
Sedation: 0.5 mg Xanax sublingual Anesthesia: 1% lidocaine local Indication: 
Encephalopathy Discussion: Details of the procedure, risks, benefits, 
alternatives, and questions were discussed with the patient after which informed
consent was obtained. The patient was sterilely prepped and draped. 1% lidocaine
was usedfor local anesthesia. Using fluoroscopic guidance, a 20-gauge needle was
introduced into the thecal sac at the L2/3 level. Approximately 17 cc of clear 
spinal fluid was removed and sent to the laboratory. There were no procedure 
related complications. Fluoro time was 0.4 minutes. Images obtained: Zero. 
Disposition: The patient was transferred back to their hospital room in 
unchanged condition. Impression: Successful fluoroscopy guided lumbar puncture. 
Signed: Marcos Jones Verified Date/Time:  10/24/2018 14:00:57 Reading 
Location: [address] Neuro Reading Room      Electronically signedby: MARCOS JONES M.D. on 10/24/2018 02:00 PMCSF CELL COUNT W/DIFFERENTIAL2018-10-24 
13:58:00





             Test Item    Value        Reference Range Interpretation Comments

 

             APPEARANCE CSF (BEAKER) (test code Clear        Clear              

       



             = 407)                                              

 

             COLOR CSF (BEAKER) (test code = Colorless    Colorless             

    



             408)                                                

 

             RBC CSF (BEAKER) (test code = 409) 0 /cu mm     0-5                

       

 

             WBC CSF (BEAKER) (test code = 0 /cu mm     <=5                     

  



             1020)                                               

 

             RBCS FRESH (BEAKER) (test code = 100% Fresh                        

     



             1444)                                               

 

             NUMBER OF CELLS DIFF'D (BEAKER) 0                                  

    



             (test code = 1591)                                        

 

             TUBE NUMBER CSF (BEAKER) (test EDTA Tube                           

   



             code = 2678)                                        



GLUCOSE, CSF2018-10-24 13:24:00





             Test Item    Value        Reference Range Interpretation Comments

 

             GLUCOSE CSF (BEAKER) (test code = 70 mg/dL     40-70               

      



             406)                                                



PROTEIN, CSF2018-10-24 13:24:00





             Test Item    Value        Reference Range Interpretation Comments

 

             PROTEIN CSF (BEAKER) (test code = 21 mg/dL     15-45               

      



             378)                                                



POCT-GLUCOSE METER2018-10-24 13:17:00





             Test Item    Value        Reference Range Interpretation Comments

 

             POC-GLUCOSE METER 99 mg/dL                         TESTED AT 

[facility] [address]



             (BEAKER) (test code =                                        [place] [id number])                                               



CBC W/PLT COUNT &amp; AUTO DIFFERENTIAL2018-10-24 08:39:00





             Test Item    Value        Reference Range Interpretation Comments

 

             WHITE BLOOD CELL COUNT (BEAKER) 7.6 K/ L     3.5-10.5              

    



             (test code = 775)                                        

 

             RED BLOOD CELL COUNT (BEAKER) 2.84 M/ L    3.93-5.22    L          

  



             (test code = 761)                                        

 

             HEMOGLOBIN (BEAKER) (test code = 9.6 GM/DL    11.2-15.7    L       

     



             410)                                                

 

             HEMATOCRIT (BEAKER) (test code = 28.0 %       34.1-44.9    L       

     



             411)                                                

 

             MEAN CORPUSCULAR VOLUME (BEAKER) 98.6 fL      79.4-94.8    H       

     



             (test code = 753)                                        

 

             MEAN CORPUSCULAR HEMOGLOBIN 33.8 pg      25.6-32.2    H            



             (BEAKER) (test code = 751)                                        

 

             MEAN CORPUSCULAR HEMOGLOBIN CONC 34.3 GM/DL   32.2-35.5            

     



             (BEAKER) (test code = 752)                                        

 

             RED CELL DISTRIBUTION WIDTH 14.2 %       11.7-14.4                 



             (BEAKER) (test code = 412)                                        

 

             PLATELET COUNT (BEAKER) (test 264 K/CU MM  150-450                 

  



             code = 756)                                         

 

             MEAN PLATELET VOLUME (BEAKER) 9.9 fL       9.4-12.3                

  



             (test code = 754)                                        

 

             NUCLEATED RED BLOOD CELLS 0 /100 WBC   0-0                       



             (BEAKER) (test code = 413)                                        



(CELLAVISION MANUAL DIFF)2018-10-24 08:39:00





             Test Item    Value        Reference Range Interpretation Comments

 

             NEUTROPHILS - REL 78 %                                   



             (CELLAVISION)(BEAKER) (test code =                                 

       



             2816)                                               

 

             LYMPHOCYTES - REL 14 %                                   



             (CELLAVISION)(BEAKER) (test code =                                 

       



             2817)                                               

 

             MONOCYTES - REL 5 %                                    



             (CELLAVISION)(BEAKER) (test code =                                 

       



             2818)                                               

 

             EOSINOPHILS - REL 2 %                                    



             (CELLAVISION)(BEAKER) (test code =                                 

       



             2819)                                               

 

             BASOPHILS - REL 1 %                                    



             (CELLAVISION)(BEAKER) (test code =                                 

       



             2820)                                               

 

             NEUTROPHILS - ABS 5.93 K/ul    1.56-6.13                 



             (CELLAVISION)(BEAKER) (test code =                                 

       



             2830)                                               

 

             LYMPHOCYTES - ABS 1.06 K/ul    1.18-3.74    L            



             (CELLAVISION)(BEAKER) (test code =                                 

       



             2831)                                               

 

             MONOCYTES - ABS 0.38 K/uL    0.24-0.36    H            



             (CELLAVISION)(BEAKER) (test code =                                 

       



             2832)                                               

 

             EOSINOPHILS - ABS 0.15 K/uL    0.04-0.36                 



             (CELLAVISION)(BEAKER) (test code =                                 

       



             2834)                                               

 

             BASOPHILS - ABS 0.08 K/uL    0.01-0.08                 



             (CELLAVISION)(BEAKER) (test code =                                 

       



             2835)                                               

 

             TOTAL COUNTED (BEAKER) (test code = 100                            

        



             1351)                                               

 

             RBC MORPHOLOGY (BEAKER) (test code Normal                          

       



             = 762)                                              

 

             WBC MORPHOLOGY (BEAKER) (test code Normal                          

       



             = 487)                                              

 

             PLT MORPHOLOGY (BEAKER) (test code Normal                          

       



             = 486)                                              

 

             ARTIFACT (CELLAVISION)(BEAKER) Present                             

   



             (test code = 3432)                                        

 

             PLATELET CONCENTRATION Adequate                               



             (CELLAVISION)(BEAKER) (test code =                                 

       



             3438)                                               



Received comment: User comments: Slide comments:POCT-GLUCOSE METER2018-10-24 
07:55:00





             Test Item    Value        Reference Range Interpretation Comments

 

             POC-GLUCOSE METER 121 mg/dL           H            TESTED AT 

[facility] [address]



             (BEAKER) (test code =                                        [place]



             1538)                                               [id number]



MAGNESIUM2018-10-24 07:06:00





             Test Item    Value        Reference Range Interpretation Comments

 

             MAGNESIUM (BEAKER) (test code = 1.6 mg/dL    1.6-2.6               

    



             627)                                                



BASIC METABOLIC PANEL2018-10-24 07:06:00





             Test Item    Value        Reference Range Interpretation Comments

 

             SODIUM (BEAKER) 132 meq/L    136-145      L            



             (test code = 381)                                        

 

             POTASSIUM (BEAKER) 3.2 meq/L    3.5-5.1      L            



             (test code = 379)                                        

 

             CHLORIDE (BEAKER) 99 meq/L                         



             (test code = 382)                                        

 

             CO2 (BEAKER) (test 25 meq/L     22-29                     



             code = 355)                                         

 

             BLOOD UREA NITROGEN 5 mg/dL      7-21         L            



             (BEAKER) (test code                                        



             = 354)                                              

 

             CREATININE (BEAKER) 0.55 mg/dL   0.57-1.25    L            



             (test code = 358)                                        

 

             GLUCOSE RANDOM 109 mg/dL           H            



             ([facility]) (test code                                        



             = 652)                                              

 

             CALCIUM (BEAKER) 8.4 mg/dL    8.4-10.2                  



             (test code = 697)                                        

 

             EGFR (BEAKER) (test 114 mL/min/1.73                           ESTIM

ATED GFR IS



             code = 1092) sq m                                   NOT AS ACCURATE

 AS



                                                                 CREATININE



                                                                 CLEARANCE IN



                                                                 PREDICTING



                                                                 GLOMERULAR



                                                                 FILTRATION RATE

.



                                                                 ESTIMATED GFR I

S



                                                                 NOT APPLICABLE 

FOR



                                                                 DIALYSIS PATIEN

TS.



CREATINE KINASE (CK)2018-10-24 07:06:00





             Test Item    Value        Reference Range Interpretation Comments

 

             CREATINE KINASE TOTAL (BEAKER) (test 1442 U/L            H   

         



             code = 380)                                         



BLOOD CULTURE2018-10-24 00:00:00





             Test Item    Value        Reference Range Interpretation Comments

 

             CULTURE (BEAKER) (test No growth in 5 days                         

  



             code = 1095)                                        



BLOOD CULTURE2018-10-24 00:00:00





             Test Item    Value        Reference Range Interpretation Comments

 

             CULTURE (BEAKER) (test No growth in 5 days                         

  



             code = 1095)                                        



POCT-GLUCOSE METER2018-10-23 21:29:00





             Test Item    Value        Reference Range Interpretation Comments

 

             POC-GLUCOSE METER 179 mg/dL           H            TESTED AT 

[facility] [address]



             ([facility]) (test code =                                        [place] [place]



             [address])                                               [id number]



POCT-GLUCOSE METER2018-10-23 17:41:00





             Test Item    Value        Reference Range Interpretation Comments

 

             POC-GLUCOSE METER 177 mg/dL           H            TESTED AT 

[place] [address]



             ([facility]) (test code =                                        [place] [facility]



             1538)                                               [address]



PROTHROMBIN TIME/INR2018-10-23 12:09:00





             Test Item    Value        Reference Range Interpretation Comments

 

             PROTIME ([facility]) (test code = 13.5 seconds 11.7-14.7               

  



             759)                                                

 

             INR ([facility]) (test code = 370) 1.0          <=5.9                  

   



RECOMMENDED COUMADIN/WARFARIN INR THERAPY RANGESSTANDARD DOSE: 2.0 - 3.0   
Includes: PROPHYLAXIS forvenous thrombosis, systemic embolization; TREATMENT for
venous thrombosis and/or pulmonary embolus.HIGH RISK: Target INR is 2.5-3.5 for 
patients with mechanical heart valves.POCT-GLUCOSE METER2018-10-23 11:23:00





             Test Item    Value        Reference Range Interpretation Comments

 

             POC-GLUCOSE METER 99 mg/dL                         TESTED AT 

[facility] [address]



             (BEAKER) (test code =                                        [place] [facility] [address]



             1538)                                               



B-TYPE NATRIURETIC FACTOR (BNP)2018-10-23 07:24:00





             Test Item    Value        Reference Range Interpretation Comments

 

             B-TYPE NATRIURETIC PEPTIDE 1445 pg/mL   0-100        H            



             (BEAKER) (test code = 700)                                        



MAGNESIUM2018-10-23 07:23:00





             Test Item    Value        Reference Range Interpretation Comments

 

             MAGNESIUM (BEAKER) (test code = 1.5 mg/dL    1.6-2.6      L        

    



             627)                                                



BASIC METABOLIC PANEL2018-10-23 07:23:00





             Test Item    Value        Reference Range Interpretation Comments

 

             SODIUM (BEAKER) 133 meq/L    136-145      L            



             (test code = 381)                                        

 

             POTASSIUM (BEAKER) 3.8 meq/L    3.5-5.1                   



             (test code = 379)                                        

 

             CHLORIDE (BEAKER) 96 meq/L            L            



             (test code = 382)                                        

 

             CO2 (BEAKER) (test 28 meq/L     22-29                     



             code = 355)                                         

 

             BLOOD UREA NITROGEN 4 mg/dL      7-21         L            



             (BEAKER) (test code                                        



             = 354)                                              

 

             CREATININE (BEAKER) 0.60 mg/dL   0.57-1.25                 



             (test code = 358)                                        

 

             GLUCOSE RANDOM 93 mg/dL                         



             (BEAKER) (test code                                        



             = 652)                                              

 

             CALCIUM (BEAKER) 8.6 mg/dL    8.4-10.2                  



             (test code = 697)                                        

 

             EGFR (BEAKER) (test 103 mL/min/1.73                           ESTIM

ATED GFR IS



             code = 1092) sq m                                   NOT AS ACCURATE

 AS



                                                                 CREATININE



                                                                 CLEARANCE IN



                                                                 PREDICTING



                                                                 GLOMERULAR



                                                                 FILTRATION RATE

.



                                                                 ESTIMATED GFR I

S



                                                                 NOT APPLICABLE 

FOR



                                                                 DIALYSIS PATIEN

TS.



CREATINE KINASE (CK)2018-10-23 07:23:00





             Test Item    Value        Reference Range Interpretation Comments

 

             CREATINE KINASE TOTAL (BEAKER) (test 2577 U/L            H   

         



             code = 380)                                         



POCT-GLUCOSE METER2018-10-23 07:19:00





             Test Item    Value        Reference Range Interpretation Comments

 

             POC-GLUCOSE METER 88 mg/dL                         TESTED AT 

[facility] [address]



             (BEAKER) (test code =                                        [address] [facility] [address]



             1538)                                               



CBC W/PLT COUNT &amp; AUTO DIFFERENTIAL2018-10-23 06:56:00





             Test Item    Value        Reference Range Interpretation Comments

 

             WHITE BLOOD CELL COUNT (BEAKER) 7.1 K/ L     3.5-10.5              

    



             (test code = 775)                                        

 

             RED BLOOD CELL COUNT (BEAKER) 2.77 M/ L    3.93-5.22    L          

  



             (test code = 761)                                        

 

             HEMOGLOBIN (BEAKER) (test code = 9.5 GM/DL    11.2-15.7    L       

     



             410)                                                

 

             HEMATOCRIT (BEAKER) (test code = 30.5 %       34.1-44.9    L       

     



             411)                                                

 

             MEAN CORPUSCULAR VOLUME (BEAKER) 110.1 fL     79.4-94.8    H       

     



             (test code = 753)                                        

 

             MEAN CORPUSCULAR HEMOGLOBIN 34.3 pg      25.6-32.2    H            



             (BEAKER) (test code = 751)                                        

 

             MEAN CORPUSCULAR HEMOGLOBIN CONC 31.1 GM/DL   32.2-35.5    L       

     



             (BEAKER) (test code = 752)                                        

 

             RED CELL DISTRIBUTION WIDTH 14.6 %       11.7-14.4    H            



             (BEAKER) (test code = 412)                                        

 

             PLATELET COUNT (BEAKER) (test 247 K/CU MM  150-450                 

  



             code = 756)                                         

 

             MEAN PLATELET VOLUME (BEAKER) 10.0 fL      9.4-12.3                

  



             (test code = 754)                                        

 

             NUCLEATED RED BLOOD CELLS 0 /100 WBC   0-0                       



             (BEAKER) (test code = 413)                                        

 

             NEUTROPHILS RELATIVE PERCENT 69 %                                  

 



             (BEAKER) (test code = 429)                                        

 

             LYMPHOCYTES RELATIVE PERCENT 18 %                                  

 



             (BEAKER) (test code = 430)                                        

 

             MONOCYTES RELATIVE PERCENT 11 %                                   



             (BEAKER) (test code = 431)                                        

 

             EOSINOPHILS RELATIVE PERCENT 1 %                                   

 



             (BEAKER) (test code = 432)                                        

 

             BASOPHILS RELATIVE PERCENT 1 %                                    



             (BEAKER) (test code = 437)                                        

 

             NEUTROPHILS ABSOLUTE COUNT 4.93 K/ L    1.56-6.13                 



             (BEAKER) (test code = 670)                                        

 

             LYMPHOCYTES ABSOLUTE COUNT 1.26 K/ L    1.18-3.74                 



             (BEAKER) (test code = 414)                                        

 

             MONOCYTES ABSOLUTE COUNT (BEAKER) 0.75 K/ L    0.24-0.36    H      

      



             (test code = 415)                                        

 

             EOSINOPHILS ABSOLUTE COUNT 0.10 K/ L    0.04-0.36                 



             (BEAKER) (test code = 416)                                        

 

             BASOPHILS ABSOLUTE COUNT (BEAKER) 0.05 K/ L    0.01-0.08           

      



             (test code = 417)                                        

 

             IMMATURE GRANULOCYTES-RELATIVE 0 %          0-1                    

   



             PERCENT (BEAKER) (test code =                                      

  



             2801)                                               



POCT-GLUCOSE METER2018-10-22 23:30:00





             Test Item    Value        Reference Range Interpretation Comments

 

             POC-GLUCOSE METER 181 mg/dL           H            TESTED AT 

[facility] [address]



             (BEAKER) (test code =                                        [address] [facility]



             1538)                                               [id number]



VANCOMYCIN LEVEL, TROUGH2018-10-22 20:16:00





             Test Item    Value        Reference Range Interpretation Comments

 

             VANCOMYCIN TROUGH (BEAKER) (test 12.1 ug/mL   10.0-20.0            

     



             code = 522)                                         



POTASSIUM2018-10-22 20:12:00





             Test Item    Value        Reference Range Interpretation Comments

 

             POTASSIUM (BEAKER) (test code = 4.0 meq/L    3.5-5.1               

    



             379)                                                



POCT-GLUCOSE METER2018-10-22 12:18:00





             Test Item    Value        Reference Range Interpretation Comments

 

             POC-GLUCOSE METER 201 mg/dL           H            TESTED AT 

[place] [id number]



             [facility] (test code =                                        [address] [facility]



             1538)                                               [id number]



POTASSIUM2018-10-22 10:47:00





             Test Item    Value        Reference Range Interpretation Comments

 

             POTASSIUM (BEAKER) (test code = 4.4 meq/L    3.5-5.1               

    



             379)                                                



MAGNESIUM2018-10-22 10:47:00





             Test Item    Value        Reference Range Interpretation Comments

 

             MAGNESIUM (BEAKER) (test code = 2.1 mg/dL    1.6-2.6               

    



             627)                                                



CREATINE KINASE (CK)2018-10-22 10:47:00





             Test Item    Value        Reference Range Interpretation Comments

 

             CREATINE KINASE TOTAL (BEAKER) (test 2936 U/L            H   

         



             code = 380)                                         



SPUTUM CULTURE + GRAM STAIN2018-10-22 09:31:00





             Test Item    Value        Reference Range Interpretation Comments

 

             CULTURE (BEAKER) See comment                            



             (test code = 1095)                                        

 

             GRAM STAIN RESULT 2+ WBCs                                



             (BEAKER) (test code =                                        



             1123)                                               

 

             GRAM STAIN RESULT 0-5 epithelial cells                           



             (BEAKER) (test code =                                        



             411389)                                             

 

             GRAM STAIN RESULT <1+ gram positive cocci                          

 



             (BEAKER) (test code = in clusters                            



             577151)                                             



&lt;1+ yeastNo Normal respiratory enriqueta presentPHOSPHORUS2018-10-22 07:17:00





             Test Item    Value        Reference Range Interpretation Comments

 

             PHOSPHORUS (BEAKER) (test code = 2.3 mg/dL    2.3-4.7              

     



             604)                                                



POCT-GLUCOSE METER2018-10-22 06:35:00





             Test Item    Value        Reference Range Interpretation Comments

 

             POC-GLUCOSE METER 87 mg/dL                         TESTED AT 

[facility] [address]



             (BEAKER) (test code =                                        [place] [address]



             1538)                                               



CBC W/PLT COUNT &amp; AUTO DIFFERENTIAL2018-10-22 04:15:00





             Test Item    Value        Reference Range Interpretation Comments

 

             WHITE BLOOD CELL COUNT (BEAKER) 10.4 K/ L    3.5-10.5              

    



             (test code = 775)                                        

 

             RED BLOOD CELL COUNT (BEAKER) 2.43 M/ L    3.93-5.22    L          

  



             (test code = 761)                                        

 

             HEMOGLOBIN (BEAKER) (test code = 8.4 GM/DL    11.2-15.7    L       

     



             410)                                                

 

             HEMATOCRIT (BEAKER) (test code = 24.2 %       34.1-44.9    L       

     



             411)                                                

 

             MEAN CORPUSCULAR VOLUME (BEAKER) 99.6 fL      79.4-94.8    H       

     



             (test code = 753)                                        

 

             MEAN CORPUSCULAR HEMOGLOBIN 34.6 pg      25.6-32.2    H            



             (BEAKER) (test code = 751)                                        

 

             MEAN CORPUSCULAR HEMOGLOBIN CONC 34.7 GM/DL   32.2-35.5            

     



             (BEAKER) (test code = 752)                                        

 

             RED CELL DISTRIBUTION WIDTH 14.5 %       11.7-14.4    H            



             (BEAKER) (test code = 412)                                        

 

             PLATELET COUNT (BEAKER) (test 210 K/CU MM  150-450                 

  



             code = 756)                                         

 

             MEAN PLATELET VOLUME (BEAKER) 9.7 fL       9.4-12.3                

  



             (test code = 754)                                        

 

             NUCLEATED RED BLOOD CELLS 0 /100 WBC   0-0                       



             (BEAKER) (test code = 413)                                        

 

             NEUTROPHILS RELATIVE PERCENT 76 %                                  

 



             (BEAKER) (test code = 429)                                        

 

             LYMPHOCYTES RELATIVE PERCENT 14 %                                  

 



             (BEAKER) (test code = 430)                                        

 

             MONOCYTES RELATIVE PERCENT 9 %                                    



             (BEAKER) (test code = 431)                                        

 

             EOSINOPHILS RELATIVE PERCENT 1 %                                   

 



             (BEAKER) (test code = 432)                                        

 

             BASOPHILS RELATIVE PERCENT 0 %                                    



             (BEAKER) (test code = 437)                                        

 

             NEUTROPHILS ABSOLUTE COUNT 7.96 K/ L    1.56-6.13    H            



             (BEAKER) (test code = 670)                                        

 

             LYMPHOCYTES ABSOLUTE COUNT 1.41 K/ L    1.18-3.74                 



             (BEAKER) (test code = 414)                                        

 

             MONOCYTES ABSOLUTE COUNT (BEAKER) 0.90 K/ L    0.24-0.36    H      

      



             (test code = 415)                                        

 

             EOSINOPHILS ABSOLUTE COUNT 0.09 K/ L    0.04-0.36                 



             (BEAKER) (test code = 416)                                        

 

             BASOPHILS ABSOLUTE COUNT (BEAKER) 0.04 K/ L    0.01-0.08           

      



             (test code = 417)                                        

 

             IMMATURE GRANULOCYTES-RELATIVE 0 %          0-1                    

   



             PERCENT (BEAKER) (test code =                                      

  



             2801)                                               



BASIC METABOLIC PANEL2018-10-22 04:10:00





             Test Item    Value        Reference Range Interpretation Comments

 

             SODIUM (BEAKER) 132 meq/L    136-145      L            



             (test code = 381)                                        

 

             POTASSIUM (BEAKER) 2.7 meq/L    3.5-5.1      L            



             (test code = 379)                                        

 

             CHLORIDE (BEAKER) 94 meq/L            L            



             (test code = 382)                                        

 

             CO2 (BEAKER) (test 25 meq/L     22-29                     



             code = 355)                                         

 

             BLOOD UREA NITROGEN 4 mg/dL      7-21         L            



             (BEAKER) (test code                                        



             = 354)                                              

 

             CREATININE (BEAKER) 0.55 mg/dL   0.57-1.25    L            



             (test code = 358)                                        

 

             GLUCOSE RANDOM 67 mg/dL            L            



             (BEAKER) (test code                                        



             = 652)                                              

 

             CALCIUM (BEAKER) 7.9 mg/dL    8.4-10.2     L            



             (test code = 697)                                        

 

             EGFR (BEAKER) (test 114 mL/min/1.73                           ESTIM

ATED GFR IS



             code = 1092) sq m                                   NOT AS ACCURATE

 AS



                                                                 CREATININE



                                                                 CLEARANCE IN



                                                                 PREDICTING



                                                                 GLOMERULAR



                                                                 FILTRATION RATE

.



                                                                 ESTIMATED GFR I

S



                                                                 NOT APPLICABLE 

FOR



                                                                 DIALYSIS PATIEN

TS.



MAGNESIUM2018-10-22 04:09:00





             Test Item    Value        Reference Range Interpretation Comments

 

             MAGNESIUM (BEAKER) (test code = 1.5 mg/dL    1.6-2.6      L        

    



             627)                                                



CREATINE KINASE (CK)2018-10-22 04:09:00





             Test Item    Value        Reference Range Interpretation Comments

 

             CREATINE KINASE TOTAL (BEAKER) (test 3396 U/L            H   

         



             code = 380)                                         



POCT-GLUCOSE METER2018-10-22 00:16:00





             Test Item    Value        Reference Range Interpretation Comments

 

             POC-GLUCOSE METER 79 mg/dL                         TESTED AT 

[facility] [address]



             (BEAKER) (test code =                                        [place] [address]



             3108)                                               



MAGNESIUM2018-10-21 22:48:00





             Test Item    Value        Reference Range Interpretation Comments

 

             MAGNESIUM (BEAKER) (test code = 1.9 mg/dL    1.6-2.6               

    



             627)                                                



CREATINE KINASE (CK)2018-10-21 22:48:00





             Test Item    Value        Reference Range Interpretation Comments

 

             CREATINE KINASE TOTAL (BEAKER) (test 3450 U/L            H   

         



             code = 380)                                         



MAGNESIUM2018-10-21 18:01:00





             Test Item    Value        Reference Range Interpretation Comments

 

             MAGNESIUM (BEAKER) (test code = 1.5 mg/dL    1.6-2.6      L        

    



             627)                                                



CREATINE KINASE (CK)2018-10-21 18:01:00





             Test Item    Value        Reference Range Interpretation Comments

 

             CREATINE KINASE TOTAL (BEAKER) (test 3451 U/L            H   

         



             code = 380)                                         



POCT-GLUCOSE METER2018-10-21 12:41:00





             Test Item    Value        Reference Range Interpretation Comments

 

             POC-GLUCOSE METER 89 mg/dL                         TESTED AT 

[facility] [address]



             (BEAKER) (test code =                                        [address] [facility] [address]



             1538)                                               



EEG MONITORING WITH VIDEO RECORDING EACH 24 HOURS2018-10-21 11:37:00billing for 
10/20/18Neurophysiology Continuous Video Electroencephalogram Report   DATE OF 
REPORT: 10/21/18 Date(s) of Study: 10/20- ACC: [id number] Start time: 0704 
hrs Stop time: 1101 hrs ICD-10:  R56.9 CPT Code: 13826   HISTORY: 59 yo female 
with PMHx of HTN, COPD found down at home with concern for seizures.   ME
DICATIONS THAT COULD AFFECT EEG: Levetiracetam   TECHNICAL SUMMARY: This is a 
digital video EEG recorded with 32 input channels reviewed with bipolar and 
referential montages using the modified combinatorial system nomenclature.   
DESCRIPTION OF RECORD: The posterior dominant rhythm was 6-7 Hz and moderately 
modulated with a frequency-amplitude gradient that was mildly disorganized. The 
early background rhythms consisted of 5-8 Hz and 3-4 Hz activities, with 14-16 
Hz activity seen frontally; as the recording progressed rhythms evolved to alpha
frequencies anteriorly.  There was a background asymmetry with intermittent 
periods of diffuse voltage attenuation seen over the right hemisphere, lasting 
up to 2.5 seconds. There was also 1 Hz lateralized, frontally predominant, 
periodic discharges seen onthe right as well, with a broad field to the left 
frontal region at times. There were no stage-II sleep architecture features 
seen.   HV: Not performed. PHOTIC STIMULATION: Not performed   EVENTS: No el
ectrographic seizures seen.    IMPRESSION: Abnormal cEEG due to: 1.         
Continuous slowing, generalized 2.         Background discontinuity, focal 
(right hemisphere) 3.         Lateralized periodicdischarges (LPDs) with frontal
predominance, right hemisphere   CLINICAL CORRELATION: This study is consistent 
with a nonspecific encephalopathy improving from moderate to mild over the 
recording. There remains a focal right hemispheric dysfunction with evidence of 
cortical irritability. The lateralized periodic discharges are a part of the 
ictal-interictal spectrum and could indicate a propensity for epileptic 
seizures. There were no electrographic seizures seen during this recording, 
which is similar to the prior recording.   Mary Ann Hennessy MD Epilepsy Fellow   
Akosua Rashid MD Clinical Neurophysiology Attending [facility] Neurophysiology    
  Electronically signed by: AKOSUA RASHID MD on 10/21/2018 11:37 AMB-TYPE 
NATRIURETIC FACTOR (BNP)2018-10-21 10:58:00





             Test Item    Value        Reference Range Interpretation Comments

 

             B-TYPE NATRIURETIC PEPTIDE (BEAKER) 689 pg/mL    0-100        H    

        



             (test code = 700)                                        



PROTHROMBIN TIME/INR2018-10-21 10:55:00





             Test Item    Value        Reference Range Interpretation Comments

 

             PROTIME (BEAKER) (test code = 14.2 seconds 11.7-14.7               

  



             759)                                                

 

             INR (BEAKER) (test code = 370) 1.1          <=5.9                  

   



RECOMMENDED COUMADIN/WARFARIN INR THERAPY RANGESSTANDARD DOSE: 2.0 - 3.0   
Includes: PROPHYLAXIS forvenous thrombosis, systemic embolization; TREATMENT for
venous thrombosis and/or pulmonary embolus.HIGH RISK: Target INR is 2.5-3.5 for 
patients with mechanical heart valves.APTT2018-10-21 10:55:00





             Test Item    Value        Reference Range Interpretation Comments

 

             PARTIAL THROMBOPLASTIN TIME 27.7 seconds 22.5-36.0                 



             (BEAKER) (test code = 760)                                        



BASIC METABOLIC PANEL2018-10-21 10:52:00





             Test Item    Value        Reference Range Interpretation Comments

 

             SODIUM (BEAKER) 140 meq/L    136-145                   



             (test code = 381)                                        

 

             POTASSIUM (BEAKER) 3.8 meq/L    3.5-5.1                   



             (test code = 379)                                        

 

             CHLORIDE (BEAKER) 107 meq/L                        



             (test code = 382)                                        

 

             CO2 (BEAKER) (test 22 meq/L     22-29                     



             code = 355)                                         

 

             BLOOD UREA NITROGEN 6 mg/dL      7-21         L            



             (BEAKER) (test code                                        



             = 354)                                              

 

             CREATININE (BEAKER) 0.56 mg/dL   0.57-1.25    L            



             (test code = 358)                                        

 

             GLUCOSE RANDOM 92 mg/dL                         



             (BEAKER) (test code                                        



             = 652)                                              

 

             CALCIUM (BEAKER) 7.9 mg/dL    8.4-10.2     L            



             (test code = 697)                                        

 

             EGFR (BEAKER) (test 111 mL/min/1.73                           ESTIM

ATED GFR IS



             code = 1092) sq m                                   NOT AS ACCURATE

 AS



                                                                 CREATININE



                                                                 CLEARANCE IN



                                                                 PREDICTING



                                                                 GLOMERULAR



                                                                 FILTRATION RATE

.



                                                                 ESTIMATED GFR I

S



                                                                 NOT APPLICABLE 

FOR



                                                                 DIALYSIS PATIEN

TS.



MAGNESIUM2018-10-21 10:51:00





             Test Item    Value        Reference Range Interpretation Comments

 

             MAGNESIUM (BEAKER) (test code = 2.3 mg/dL    1.6-2.6               

    



             627)                                                



CREATINE KINASE (CK)2018-10-21 10:51:00





             Test Item    Value        Reference Range Interpretation Comments

 

             CREATINE KINASE TOTAL (BEAKER) (test 2419 U/L            H   

         



             code = 380)                                         



VANCOMYCIN LEVEL, TROUGH2018-10-21 08:57:00





             Test Item    Value        Reference Range Interpretation Comments

 

             VANCOMYCIN TROUGH (BEAKER) (test 14.4 ug/mL   10.0-20.0            

     



             code = 522)                                         



CBC W/PLT COUNT &amp; AUTO DIFFERENTIAL2018-10-21 07:07:00





             Test Item    Value        Reference Range Interpretation Comments

 

             WHITE BLOOD CELL COUNT (BEAKER) 10.8 K/ L    3.5-10.5     H        

    



             (test code = 775)                                        

 

             RED BLOOD CELL COUNT (BEAKER) 2.29 M/ L    3.93-5.22    L          

  



             (test code = 761)                                        

 

             HEMOGLOBIN (BEAKER) (test code = 7.8 GM/DL    11.2-15.7    L       

     



             410)                                                

 

             HEMATOCRIT (BEAKER) (test code = 23.1 %       34.1-44.9    L       

     



             411)                                                

 

             MEAN CORPUSCULAR VOLUME (BEAKER) 100.9 fL     79.4-94.8    H       

     



             (test code = 753)                                        

 

             MEAN CORPUSCULAR HEMOGLOBIN 34.1 pg      25.6-32.2    H            



             (BEAKER) (test code = 751)                                        

 

             MEAN CORPUSCULAR HEMOGLOBIN CONC 33.8 GM/DL   32.2-35.5            

     



             (BEAKER) (test code = 752)                                        

 

             RED CELL DISTRIBUTION WIDTH 15.1 %       11.7-14.4    H            



             (BEAKER) (test code = 412)                                        

 

             PLATELET COUNT (BEAKER) (test 208 K/CU MM  150-450                 

  



             code = 756)                                         

 

             MEAN PLATELET VOLUME (BEAKER) 10.2 fL      9.4-12.3                

  



             (test code = 754)                                        

 

             NUCLEATED RED BLOOD CELLS 0 /100 WBC   0-0                       



             (BEAKER) (test code = 413)                                        

 

             NEUTROPHILS RELATIVE PERCENT 80 %                                  

 



             (BEAKER) (test code = 429)                                        

 

             LYMPHOCYTES RELATIVE PERCENT 11 %                                  

 



             (BEAKER) (test code = 430)                                        

 

             MONOCYTES RELATIVE PERCENT 9 %                                    



             (BEAKER) (test code = 431)                                        

 

             EOSINOPHILS RELATIVE PERCENT 0 %                                   

 



             (BEAKER) (test code = 432)                                        

 

             BASOPHILS RELATIVE PERCENT 0 %                                    



             (BEAKER) (test code = 437)                                        

 

             NEUTROPHILS ABSOLUTE COUNT 8.61 K/ L    1.56-6.13    H            



             (BEAKER) (test code = 670)                                        

 

             LYMPHOCYTES ABSOLUTE COUNT 1.16 K/ L    1.18-3.74    L            



             (BEAKER) (test code = 414)                                        

 

             MONOCYTES ABSOLUTE COUNT (BEAKER) 0.94 K/ L    0.24-0.36    H      

      



             (test code = 415)                                        

 

             EOSINOPHILS ABSOLUTE COUNT 0.01 K/ L    0.04-0.36    L            



             (BEAKER) (test code = 416)                                        

 

             BASOPHILS ABSOLUTE COUNT (BEAKER) 0.03 K/ L    0.01-0.08           

      



             (test code = 417)                                        

 

             IMMATURE GRANULOCYTES-RELATIVE 1 %          0-1                    

   



             PERCENT (BEAKER) (test code =                                      

  



             2801)                                               



POCT-GLUCOSE METER2018-10-21 06:59:00





             Test Item    Value        Reference Range Interpretation Comments

 

             POC-GLUCOSE METER 88 mg/dL                         TESTED AT 

[facility] [address]



             (BEAKER) (test code =                                        [place] [address]



             1538)                                               



MAGNESIUM2018-10-21 06:01:00





             Test Item    Value        Reference Range Interpretation Comments

 

             MAGNESIUM (BEAKER) (test code = 1.8 mg/dL    1.6-2.6               

    



             627)                                                



BASIC METABOLIC PANEL2018-10-21 06:01:00





             Test Item    Value        Reference Range Interpretation Comments

 

             SODIUM (BEAKER) 141 meq/L    136-145                   



             (test code = 381)                                        

 

             POTASSIUM (BEAKER) 3.4 meq/L    3.5-5.1      L            



             (test code = 379)                                        

 

             CHLORIDE (BEAKER) 112 meq/L           H            



             (test code = 382)                                        

 

             CO2 (BEAKER) (test 23 meq/L     22-29                     



             code = 355)                                         

 

             BLOOD UREA NITROGEN 7 mg/dL      7-21                      



             (BEAKER) (test code                                        



             = 354)                                              

 

             CREATININE (BEAKER) 0.59 mg/dL   0.57-1.25                 



             (test code = 358)                                        

 

             GLUCOSE RANDOM 85 mg/dL                         



             (BEAKER) (test code                                        



             = 652)                                              

 

             CALCIUM (BEAKER) 8.0 mg/dL    8.4-10.2     L            



             (test code = 697)                                        

 

             EGFR (BEAKER) (test 105 mL/min/1.73                           ESTIM

ATED GFR IS



             code = 1092) sq m                                   NOT AS ACCURATE

 AS



                                                                 CREATININE



                                                                 CLEARANCE IN



                                                                 PREDICTING



                                                                 GLOMERULAR



                                                                 FILTRATION RATE

.



                                                                 ESTIMATED GFR I

S



                                                                 NOT APPLICABLE 

FOR



                                                                 DIALYSIS PATIEN

TS.



CREATINE KINASE (CK)2018-10-21 06:01:00





             Test Item    Value        Reference Range Interpretation Comments

 

             CREATINE KINASE TOTAL (BEAKER) (test 1530 U/L            H   

         



             code = 380)                                         



POCT-GLUCOSE METER2018-10-21 00:30:00





             Test Item    Value        Reference Range Interpretation Comments

 

             POC-GLUCOSE METER 83 mg/dL                         TESTED AT 

[facility] [address]



             (BEAKER) (test code =                                        [place] [facility] [address]



             1538)                                               



BASIC METABOLIC PANEL2018-10-20 22:03:00





             Test Item    Value        Reference Range Interpretation Comments

 

             SODIUM (BEAKER) 140 meq/L    136-145                   



             (test code = 381)                                        

 

             POTASSIUM (BEAKER) 3.7 meq/L    3.5-5.1                   



             (test code = 379)                                        

 

             CHLORIDE (BEAKER) 111 meq/L           H            



             (test code = 382)                                        

 

             CO2 (BEAKER) (test 21 meq/L     22-29        L            



             code = 355)                                         

 

             BLOOD UREA NITROGEN 8 mg/dL      7-21                      



             (BEAKER) (test code                                        



             = 354)                                              

 

             CREATININE (BEAKER) 0.61 mg/dL   0.57-1.25                 



             (test code = 358)                                        

 

             GLUCOSE RANDOM 101 mg/dL                        



             (BEAKER) (test code                                        



             = 652)                                              

 

             CALCIUM (BEAKER) 7.7 mg/dL    8.4-10.2     L            



             (test code = 697)                                        

 

             EGFR (BEAKER) (test 101 mL/min/1.73                           ESTIM

ATED GFR IS



             code = 1092) sq m                                   NOT AS ACCURATE

 AS



                                                                 CREATININE



                                                                 CLEARANCE IN



                                                                 PREDICTING



                                                                 GLOMERULAR



                                                                 FILTRATION RATE

.



                                                                 ESTIMATED GFR I

S



                                                                 NOT APPLICABLE 

FOR



                                                                 DIALYSIS PATIEN

TS.



MAGNESIUM2018-10-20 22:02:00





             Test Item    Value        Reference Range Interpretation Comments

 

             MAGNESIUM (BEAKER) (test code = 2.1 mg/dL    1.6-2.6               

    



             627)                                                



CREATINE KINASE (CK)2018-10-20 22:02:00





             Test Item    Value        Reference Range Interpretation Comments

 

             CREATINE KINASE TOTAL (BEAKER) (test 1124 U/L            H   

         



             code = 380)                                         



CBC W/PLT COUNT &amp; AUTO DIFFERENTIAL2018-10-20 22:00:00





             Test Item    Value        Reference Range Interpretation Comments

 

             WHITE BLOOD CELL COUNT (BEAKER) 11.9 K/ L    3.5-10.5     H        

    



             (test code = 775)                                        

 

             RED BLOOD CELL COUNT (BEAKER) 2.24 M/ L    3.93-5.22    L          

  



             (test code = 761)                                        

 

             HEMOGLOBIN (BEAKER) (test code = 7.8 GM/DL    11.2-15.7    L       

     



             410)                                                

 

             HEMATOCRIT (BEAKER) (test code = 22.6 %       34.1-44.9    L       

     



             411)                                                

 

             MEAN CORPUSCULAR VOLUME (BEAKER) 100.9 fL     79.4-94.8    H       

     



             (test code = 753)                                        

 

             MEAN CORPUSCULAR HEMOGLOBIN 34.8 pg      25.6-32.2    H            



             (BEAKER) (test code = 751)                                        

 

             MEAN CORPUSCULAR HEMOGLOBIN CONC 34.5 GM/DL   32.2-35.5            

     



             (BEAKER) (test code = 752)                                        

 

             RED CELL DISTRIBUTION WIDTH 15.2 %       11.7-14.4    H            



             (BEAKER) (test code = 412)                                        

 

             PLATELET COUNT (BEAKER) (test 204 K/CU MM  150-450                 

  



             code = 756)                                         

 

             MEAN PLATELET VOLUME (BEAKER) 9.6 fL       9.4-12.3                

  



             (test code = 754)                                        

 

             NUCLEATED RED BLOOD CELLS 0 /100 WBC   0-0                       



             (BEAKER) (test code = 413)                                        

 

             NEUTROPHILS RELATIVE PERCENT 80 %                                  

 



             (BEAKER) (test code = 429)                                        

 

             LYMPHOCYTES RELATIVE PERCENT 10 %                                  

 



             (BEAKER) (test code = 430)                                        

 

             MONOCYTES RELATIVE PERCENT 9 %                                    



             (BEAKER) (test code = 431)                                        

 

             EOSINOPHILS RELATIVE PERCENT 0 %                                   

 



             (BEAKER) (test code = 432)                                        

 

             BASOPHILS RELATIVE PERCENT 0 %                                    



             (BEAKER) (test code = 437)                                        

 

             NEUTROPHILS ABSOLUTE COUNT 9.52 K/ L    1.56-6.13    H            



             (BEAKER) (test code = 670)                                        

 

             LYMPHOCYTES ABSOLUTE COUNT 1.19 K/ L    1.18-3.74                 



             (BEAKER) (test code = 414)                                        

 

             MONOCYTES ABSOLUTE COUNT (BEAKER) 1.12 K/ L    0.24-0.36    H      

      



             (test code = 415)                                        

 

             EOSINOPHILS ABSOLUTE COUNT 0.00 K/ L    0.04-0.36    L            



             (BEAKER) (test code = 416)                                        

 

             BASOPHILS ABSOLUTE COUNT (BEAKER) 0.03 K/ L    0.01-0.08           

      



             (test code = 417)                                        

 

             IMMATURE GRANULOCYTES-RELATIVE 1 %          0-1                    

   



             PERCENT (BEAKER) (test code =                                      

  



             2801)                                               



HEMOGLOBIN2018-10-20 20:09:00





             Test Item    Value        Reference Range Interpretation Comments

 

             HEMOGLOBIN (BEAKER) (test code = 7.8 GM/DL    11.2-15.7    L       

     



             410)                                                



POCT-GLUCOSE METER2018-10-20 18:45:00





             Test Item    Value        Reference Range Interpretation Comments

 

             POC-GLUCOSE METER 120 mg/dL           H            TESTED AT 

[facility] [address]



             (BEAKER) (test code =                                        [address] [place]



             [address])                                               [address]



MAGNESIUM2018-10-20 16:19:00





             Test Item    Value        Reference Range Interpretation Comments

 

             MAGNESIUM (BEAKER) (test code = 2.4 mg/dL    1.6-2.6               

    



             627)                                                



BASIC METABOLIC PANEL2018-10-20 16:19:00





             Test Item    Value        Reference Range Interpretation Comments

 

             SODIUM (BEAKER) 138 meq/L    136-145                   



             (test code = 381)                                        

 

             POTASSIUM (BEAKER) 3.9 meq/L    3.5-5.1                   



             (test code = 379)                                        

 

             CHLORIDE (BEAKER) 111 meq/L           H            



             (test code = 382)                                        

 

             CO2 (BEAKER) (test 21 meq/L     22-29        L            



             code = 355)                                         

 

             BLOOD UREA NITROGEN 10 mg/dL     7-21                      



             (BEAKER) (test code                                        



             = 354)                                              

 

             CREATININE (BEAKER) 0.64 mg/dL   0.57-1.25                 



             (test code = 358)                                        

 

             GLUCOSE RANDOM 123 mg/dL           H            



             (BEAKER) (test code                                        



             = 652)                                              

 

             CALCIUM (BEAKER) 8.0 mg/dL    8.4-10.2     L            



             (test code = 697)                                        

 

             EGFR (BEAKER) (test 95 mL/min/1.73                           ESTIMA

CHRISTIN GFR IS



             code = 1092) sq m                                   NOT AS ACCURATE

 AS



                                                                 CREATININE



                                                                 CLEARANCE IN



                                                                 PREDICTING



                                                                 GLOMERULAR



                                                                 FILTRATION RATE

.



                                                                 ESTIMATED GFR I

S



                                                                 NOT APPLICABLE 

FOR



                                                                 DIALYSIS PATIEN

TS.



CREATINE KINASE (CK)2018-10-20 16:19:00





             Test Item    Value        Reference Range Interpretation Comments

 

             CREATINE KINASE TOTAL (BEAKER) (test 1032 U/L            H   

         



             code = 380)                                         



CBC W/PLT COUNT &amp; AUTO DIFFERENTIAL2018-10-20 13:20:00





             Test Item    Value        Reference Range Interpretation Comments

 

             WHITE BLOOD CELL 15.8 K/ L    3.5-10.5     H            



             COUNT (BEAKER) (test                                        



             code = 775)                                         

 

             RED BLOOD CELL COUNT 2.45 M/ L    3.93-5.22    L            



             (BEAKER) (test code =                                        



             761)                                                

 

             HEMOGLOBIN (BEAKER) 8.5 GM/DL    11.2-15.7    L            Signific

antly



             (test code = 410)                                        different 

than



                                                                 previous result

s

 

             HEMATOCRIT (BEAKER) 24.2 %       34.1-44.9    L            



             (test code = 411)                                        

 

             MEAN CORPUSCULAR 98.8 fL      79.4-94.8    H            



             VOLUME (BEAKER) (test                                        



             code = 753)                                         

 

             MEAN CORPUSCULAR 34.7 pg      25.6-32.2    H            



             HEMOGLOBIN (BEAKER)                                        



             (test code = 751)                                        

 

             MEAN CORPUSCULAR 35.1 GM/DL   32.2-35.5                 



             HEMOGLOBIN CONC                                        



             (BEAKER) (test code =                                        



             752)                                                

 

             RED CELL DISTRIBUTION 14.7 %       11.7-14.4    H            



             WIDTH (BEAKER) (test                                        



             code = 412)                                         

 

             PLATELET COUNT 229 K/CU MM  150-450                   



             (BEAKER) (test code =                                        



             756)                                                

 

             MEAN PLATELET VOLUME 9.3 fL       9.4-12.3     L            



             (BEAKER) (test code =                                        



             754)                                                

 

             NUCLEATED RED BLOOD 0 /100 WBC   0-0                       



             CELLS (BEAKER) (test                                        



             code = 413)                                         



(CELLAVISION MANUAL DIFF)2018-10-20 13:20:00





             Test Item    Value        Reference Range Interpretation Comments

 

             NEUTROPHILS - REL 87 %                                   



             (CELLAVISION)(BEAKER) (test code =                                 

       



             2816)                                               

 

             LYMPHOCYTES - REL 7 %                                    



             (CELLAVISION)(BEAKER) (test code =                                 

       



             2817)                                               

 

             MONOCYTES - REL 5 %                                    



             (CELLAVISION)(BEAKER) (test code =                                 

       



             2818)                                               

 

             BANDS - REL (CELLAVISION)(BEAKER) 1 %          0-10                

      



             (test code = 2826)                                        

 

             NEUTROPHILS - ABS 13.75 K/ul   1.56-6.13    H            



             (CELLAVISION)(BEAKER) (test code =                                 

       



             2830)                                               

 

             LYMPHOCYTES - ABS 1.11 K/ul    1.18-3.74    L            



             (CELLAVISION)(BEAKER) (test code =                                 

       



             2831)                                               

 

             MONOCYTES - ABS 0.79 K/uL    0.24-0.36    H            



             (CELLAVISION)(BEAKER) (test code =                                 

       



             2832)                                               

 

             BANDS - ABS (CELLAVISION)(BEAKER) 0.16 K/uL    0.00-0.80           

      



             (test code = 2840)                                        

 

             TOTAL COUNTED (BEAKER) (test code 100                              

      



             = 1351)                                             

 

             MANUAL NRBC  CELLS (BEAKER) 1 /100 WBC   0-0          H     

       



             (test code = 1353)                                        

 

             WBC MORPHOLOGY (BEAKER) (test code Normal                          

       



             = 487)                                              

 

             GIANT PLATELETS (BEAKER) (test Present                             

   



             code = 313)                                         

 

             HYPOCHROMIA (BEAKER) (test code = 1+ few                           

      



             963)                                                

 

             ANISOCYTOSIS (BEAKER) (test code = 1+ few                          

       



             961)                                                

 

             MACROCYTES (BEAKER) (test code = 1+ few                            

     



             964)                                                

 

             POIKILOCYTES (BEAKER) (test code = 1+ few                          

       



             966)                                                

 

             OVALOCYTES (BEAKER) (test code = 1+ few                            

     



             477)                                                

 

             ARTIFACT (CELLAVISION)(BEAKER) Present                             

   



             (test code = 3432)                                        

 

             PLATELET CONCENTRATION Adequate                               



             (CELLAVISION)(BEAKER) (test code =                                 

       



             3438)                                               



Received comment: User comments: Slide comments:HEMOGLOBIN AND HEMATOCRIT
2018-10-20 11:53:00





             Test Item    Value        Reference Range Interpretation Comments

 

             HEMOGLOBIN (BEAKER) (test code = 8.1 GM/DL    11.2-15.7    L       

     



             410)                                                

 

             HEMATOCRIT (BEAKER) (test code = 22.8 %       34.1-44.9    L       

     



             411)                                                



POCT-GLUCOSE METER2018-10-20 11:43:00





             Test Item    Value        Reference Range Interpretation Comments

 

             POC-GLUCOSE METER 126 mg/dL           H            TESTED AT 

[facility] [address]



             (BEAKER) (test code =                                        [place]



             1538)                                               [address]



MAGNESIUM2018-10-20 10:49:00





             Test Item    Value        Reference Range Interpretation Comments

 

             MAGNESIUM (BEAKER) (test code = 1.7 mg/dL    1.6-2.6               

    



             627)                                                



BASIC METABOLIC PANEL2018-10-20 10:49:00





             Test Item    Value        Reference Range Interpretation Comments

 

             SODIUM (BEAKER) 137 meq/L    136-145                   



             (test code = 381)                                        

 

             POTASSIUM (BEAKER) 3.8 meq/L    3.5-5.1                   



             (test code = 379)                                        

 

             CHLORIDE (BEAKER) 110 meq/L           H            



             (test code = 382)                                        

 

             CO2 (BEAKER) (test 20 meq/L     22-29        L            



             code = 355)                                         

 

             BLOOD UREA NITROGEN 11 mg/dL     7-21                      



             (BEAKER) (test code                                        



             = 354)                                              

 

             CREATININE (BEAKER) 0.64 mg/dL   0.57-1.25                 



             (test code = 358)                                        

 

             GLUCOSE RANDOM 119 mg/dL           H            



             (BEAKER) (test code                                        



             = 652)                                              

 

             CALCIUM (BEAKER) 8.0 mg/dL    8.4-10.2     L            



             (test code = 697)                                        

 

             EGFR (BEAKER) (test 95 mL/min/1.73                           ESTIMA

CHRISTIN GFR IS



             code = 1092) sq m                                   NOT AS ACCURATE

 AS



                                                                 CREATININE



                                                                 CLEARANCE IN



                                                                 PREDICTING



                                                                 GLOMERULAR



                                                                 FILTRATION RATE

.



                                                                 ESTIMATED GFR I

S



                                                                 NOT APPLICABLE 

FOR



                                                                 DIALYSIS PATIEN

TS.



CREATINE KINASE (CK)2018-10-20 10:49:00





             Test Item    Value        Reference Range Interpretation Comments

 

             CREATINE KINASE TOTAL (BEAKER) (test 983 U/L             H   

         



             code = 380)                                         



EEG MONITORING WITH VIDEO RECORDING EACH 24 HOURS2018-10-20 10:35:00status 
epilepticusNeurophysiology Continuous Video Electroencephalogram Report   DATE 
OF REPORT: 10/20/18 Date(s) of Study: 10/20/2018 ACC: [id number] Start time: 0304 
hrs Stop time: 0704 hrs ICD-10:  R56.9 Choose an item. CPT Code: 30605 Choose an
item.   HISTORY: 59 yo female with PMHx of HTN, COPD found down at home with 
concern for seizures.   MEDICATIONS THAT COULD AFFECT EEG: Levetiracetam   
TECHNICAL SUMMARY: This is a digital video EEG recorded with 32 input channels 
reviewed with bipolar and referential montages using the modified combinatorial 
system nomenclature.   DESCRIPTION OF RECORD: There was no posterior dominant 
rhythm and the frequency-amplitude gradient was moderately disorganized. The 
background rhythms consisted of 5-8 Hz and 3-4 Hz activities, with 14-16 Hz 
activity seen frontally. There was abackground asymmetry with intermittent 
periods of diffuse voltage attenuation seen over the right hemisphere, lasting 
up to 2.5 seconds. There was also 1 Hz lateralized, frontally predominant, 
periodicdischarges seen on the right as well, with a broad field to the left 
frontal region at times. There were no stage-II sleep architecture features 
seen.   HV: Not performed. PHOTIC STIMULATION: Not performed   EVENTS: No 
electrographic seizures seen.    IMPRESSION: Abnormal 4-hour EEG due to: 1.
Continuous slowing, generalized 2.         Background discontinuity, focal 
(right hemisphere) 3.    Lateralized periodic discharges (LPDs) with frontal 
predominance,  right hemisphere   CLINICAL CORRELATION: This study is consistent
with a nonspecific moderate encephalopathy, compounded by a focal right 
hemispheric dysfunction with evidence of cortical irritability. The lateralized 
periodic discharges are a part of the ictal-interictal spectrum and could 
indicate a propensity for epileptic seizures. There were no electrographic 
seizures seen during this recording, which is an improvement from the baseline 
record the day prior.   Mary Ann Hennessy MD Epilepsy Fellow   Akosua Rashid MD 
ClinicalNeurophysiology Attending [facility] Neurophysiology      Electronically 
signed by: AKOSUA RASHID MD on 10/20/2018 10:35 AMRAD, CHEST, 1 VIEW, NON 
DEPT2018-10-20 09:40:00Reason for exam:-&gt;Resp FailureShould this be performed
at the bedside?-&gt;YesFINAL REPORT PATIENT ID:   [id number] CHEST ONE VIEW 
HISTORY: Respiratory failure, status post intubation COMPARISON: 10/19/2018 at 
1127 hours FINDINGS: Single portable AP examination of the chest was performed. 
Since the prior study, the endotracheal tube has been advanced. Its tip is now 2
cm proximal to the jacquie. Right jugular catheter tip is in the SVC region. 
There is subsegmental atelectasis versus scar at the medial right lung base, 
similar to the prior study. Lungs otherwise clear. No pleural effusions or 
pneumothorax are identified. Cardiac shadow normal in size. Signed: Jacinda Amado
[facility] Verified Date/Time:  10/20/2018 09:40:13 Reading Location: [address]
Transitional Reading Room       Electronically signed by: JACINDA AMADO MD on 
10/20/2018 09:40 AMOSMOLALITY, SERUM2018-10-20 09:04:00





             Test Item    Value        Reference Range Interpretation Comments

 

             OSMOLALITY, SERUM (BEAKER) (test 287 mOsm/kg  275-295              

     



             code = 615)                                         



VANCOMYCIN LEVEL, TROUGH2018-10-20 08:16:00





             Test Item    Value        Reference Range Interpretation Comments

 

             VANCOMYCIN TROUGH (BEAKER) (test 13.8 ug/mL   10.0-20.0            

     



             code = 522)                                         



APTT2018-10-20 07:03:00





             Test Item    Value        Reference Range Interpretation Comments

 

             PARTIAL THROMBOPLASTIN TIME 52.6 seconds 22.5-36.0    H            



             (BEAKER) (test code = 760)                                        



TROPONIN -10-20 06:45:00





             Test Item    Value        Reference Range Interpretation Comments

 

             TROPONIN I (BEAKER) (test code = 0.80 ng/mL   0.00-0.03          

     



             397)                                                



Troponin I (TnI) levels must be interpreted in the context of the presenting 
symptoms and the clinical findings. Elevated TnI levels indicate myocardial 
damage, but are not specific for ischemic heart disease. Elevated TnI levels are
seen in patients with other cardiac conditions (including myocarditis and 
congestive heart failure), and slight TnI elevations occur in patients with 
other conditions, including sepsis, renal failure, acidosis, acute neurological 
disease, and persistent tachyarrhythmia.BASIC METABOLIC PANEL2018-10-20 06:20:00





             Test Item    Value        Reference Range Interpretation Comments

 

             SODIUM (BEAKER) 139 meq/L    136-145                   



             (test code = 381)                                        

 

             POTASSIUM (BEAKER) 3.4 meq/L    3.5-5.1      L            



             (test code = 379)                                        

 

             CHLORIDE (BEAKER) 110 meq/L           H            



             (test code = 382)                                        

 

             CO2 (BEAKER) (test 20 meq/L     22-29        L            



             code = 355)                                         

 

             BLOOD UREA NITROGEN 13 mg/dL     7-21                      



             (BEAKER) (test code                                        



             = 354)                                              

 

             CREATININE (BEAKER) 0.60 mg/dL   0.57-1.25                 



             (test code = 358)                                        

 

             GLUCOSE RANDOM 138 mg/dL           H            



             (BEAKER) (test code                                        



             = 652)                                              

 

             CALCIUM (BEAKER) 7.1 mg/dL    8.4-10.2     L            



             (test code = 697)                                        

 

             EGFR (BEAKER) (test 103 mL/min/1.73                           ESTIM

ATED GFR IS



             code = 1092) sq m                                   NOT AS ACCURATE

 AS



                                                                 CREATININE



                                                                 CLEARANCE IN



                                                                 PREDICTING



                                                                 GLOMERULAR



                                                                 FILTRATION RATE

.



                                                                 ESTIMATED GFR I

S



                                                                 NOT APPLICABLE 

FOR



                                                                 DIALYSIS PATIEN

TS.



POCT-GLUCOSE METER2018-10-20 06:19:00





             Test Item    Value        Reference Range Interpretation Comments

 

             POC-GLUCOSE METER 151 mg/dL           H            TESTED AT 

[facility] [address]



             (BEAKER) (test code =                                        [place]



             [address])                                               [id number]



MAGNESIUM2018-10-20 05:57:00





             Test Item    Value        Reference Range Interpretation Comments

 

             MAGNESIUM (BEAKER) (test code = 1.9 mg/dL    1.6-2.6               

    



             627)                                                



CREATINE KINASE (CK)2018-10-20 05:57:00





             Test Item    Value        Reference Range Interpretation Comments

 

             CREATINE KINASE TOTAL (BEAKER) (test 599 U/L             H   

         



             code = 380)                                         



CALCIUM, IONIZED2018-10-20 02:36:00





             Test Item    Value        Reference Range Interpretation Comments

 

             CALCIUM IONIZED (BEAKER) (test 1.04 mmol/L  1.12-1.27    L         

   



             code = 698)                                         

 

             PH, BLOOD (BEAKER) (test code = 7.40                               

    



             1810)                                               



BASIC METABOLIC PANEL2018-10-20 01:55:00





             Test Item    Value        Reference Range Interpretation Comments

 

             SODIUM (BEAKER) 139 meq/L    136-145                   



             (test code = 381)                                        

 

             POTASSIUM (BEAKER) 2.4 meq/L    3.5-5.1      LL           



             (test code = 379)                                        

 

             CHLORIDE (BEAKER) 115 meq/L           H            



             (test code = 382)                                        

 

             CO2 (BEAKER) (test 17 meq/L     22-29        L            



             code = 355)                                         

 

             BLOOD UREA NITROGEN 13 mg/dL     7-21                      



             (BEAKER) (test code                                        



             = 354)                                              

 

             CREATININE (BEAKER) 0.54 mg/dL   0.57-1.25    L            



             (test code = 358)                                        

 

             GLUCOSE RANDOM 125 mg/dL           H            



             (BEAKER) (test code                                        



             = 652)                                              

 

             CALCIUM (BEAKER) 5.7 mg/dL    8.4-10.2     LL           



             (test code = 697)                                        

 

             EGFR (BEAKER) (test 116 mL/min/1.73                           ESTIM

ATED GFR IS



             code = 1092) sq m                                   NOT AS ACCURATE

 AS



                                                                 CREATININE



                                                                 CLEARANCE IN



                                                                 PREDICTING



                                                                 GLOMERULAR



                                                                 FILTRATION RATE

.



                                                                 ESTIMATED GFR I

S



                                                                 NOT APPLICABLE 

FOR



                                                                 DIALYSIS PATIEN

TS.



TROPONIN -10-20 01:54:00





             Test Item    Value        Reference Range Interpretation Comments

 

             TROPONIN I (RODOLFO) (test code = 0.90 ng/mL   0.00-0.03          

     



             397)                                                








             Test Item    Value        Reference Range Interpretation Comments

 

             MAGNESIUM (RODOLFO) (test code = 1.6 mg/dL    1.6-2.6               

    



             627)                                                



POCT-GLUCOSE METER2018-10-20 00:34:00





             Test Item    Value        Reference Range Interpretation Comments

 

             POC-GLUCOSE METER 156 mg/dL           H            TESTED AT 

[facility] [address]



             (RODOLFO) (test code =                                        [place]



             [address]38)                                               [id number]



CREATINE KINASE (CK)2018-10-19 23:34:00





             Test Item    Value        Reference Range Interpretation Comments

 

             CREATINE KINASE TOTAL (RODOLFO) (test 630 U/L             H   

         



             code = 380)                                         



MR, BRAIN, WITHOUT CONTRAST2018-10-19 20:50:00FINAL REPORT PATIENT ID:   
[id number] MRI brain without contrast 10/19/2018 8:45 PM CLINICAL INDICATION: Eval
for CVA TECHNIQUE: Multiplanar, multisequence MR imaging of the brain was 
performed utilizingthe following imaging sequences: Axial T1, T2, FLAIR, GRE, 
and DWI; sagittal and coronal T1-weightedimages. COMPARISON: None available 
FINDINGS: There is an acute nonhemorrhagic infarct in the adjacent mesial right 
temporal lobe and thalamus.  There is no hematoma, mass, hydrocephalus, or 
extra-axialcollection. 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 There is 
bilateral colpocephaly. There are rare chronic microvascular changes in the 
supratentorial white matter. There is generalized parenchymal volume loss. 
Normal appearing flow-voids are present in the major intracranial vascular 
structures.The sellar and pineal regions, craniocervical junction, orbits, face,
and skull base are without worrisome finding. IMPRESSION: 1. Acute 
nonhemorrhagic mesial right temporal lobe/thalamic infarct. 2. Chronic findings 
as discussed. Signed: Seipel, Timothy MDReport Verified Date/Time:  10/19/2018 
20:50:25 Reading Location: [facility] Radiology Reading Room    
Electronically signed by: TIMOTHY J SEIPEL, M.D. on 10/19/2018 08:50 PMPHENYTOIN
LEVEL, TOTAL2018-10-19 20:45:00





             Test Item    Value        Reference Range Interpretation Comments

 

             PHENYTOIN (DILANTIN) ([facility]) 11.8 ug/mL   10.0-20.0               

  



             (test code = 605)                                        



Please obtain 2 hours after getting phenytoinPOCT-GLUCOSE METER2018-10-19 
19:01:00





             Test Item    Value        Reference Range Interpretation Comments

 

             POC-GLUCOSE METER 139 mg/dL           H            TESTED AT 

[facility] [address]



             ([facility]) (test code =                                        [place]



             [address])                                               [address]



APTT2018-10-19 18:50:00





             Test Item    Value        Reference Range Interpretation Comments

 

             PARTIAL THROMBOPLASTIN TIME 92.6 seconds 22.5-36.0    H            



             ([facility]) (test code = 760)                                        



TROPONIN -10-19 18:01:00





             Test Item    Value        Reference Range Interpretation Comments

 

             TROPONIN I (AKER) (test code = 1.72 ng/mL   0.00-0.03    HH      

     



             397)                                                








             Test Item    Value        Reference Range Interpretation Comments

 

             MAGNESIUM (BEAKER) (test code = 1.4 mg/dL    1.6-2.6      L        

    



             627)                                                



BASIC METABOLIC PANEL2018-10-19 17:38:00





             Test Item    Value        Reference Range Interpretation Comments

 

             SODIUM (BEAKER) 135 meq/L    136-145      L            



             (test code = 381)                                        

 

             POTASSIUM (BEAKER) 3.0 meq/L    3.5-5.1      L            



             (test code = 379)                                        

 

             CHLORIDE (BEAKER) 101 meq/L                        



             (test code = 382)                                        

 

             CO2 (BEAKER) (test 19 meq/L     22-29        L            



             code = 355)                                         

 

             BLOOD UREA NITROGEN 19 mg/dL     7-21                      



             (BEAKER) (test code                                        



             = 354)                                              

 

             CREATININE (BEAKER) 0.76 mg/dL   0.57-1.25                 



             (test code = 358)                                        

 

             GLUCOSE RANDOM 126 mg/dL           H            



             (BEAKER) (test code                                        



             = 652)                                              

 

             CALCIUM (BEAKER) 8.2 mg/dL    8.4-10.2     L            



             (test code = 697)                                        

 

             EGFR (BEAKER) (test 78 mL/min/1.73                           ESTIMA

CHRISTIN GFR IS



             code = 1092) sq m                                   NOT AS ACCURATE

 AS



                                                                 CREATININE



                                                                 CLEARANCE IN



                                                                 PREDICTING



                                                                 GLOMERULAR



                                                                 FILTRATION RATE

.



                                                                 ESTIMATED GFR I

S



                                                                 NOT APPLICABLE 

FOR



                                                                 DIALYSIS PATIEN

TS.



CREATINE KINASE (CK)2018-10-19 17:38:00





             Test Item    Value        Reference Range Interpretation Comments

 

             CREATINE KINASE TOTAL (BEAKER) (test 844 U/L             H   

         



             code = 380)                                         



APTT2018-10-19 15:49:00





             Test Item    Value        Reference Range Interpretation Comments

 

             PARTIAL THROMBOPLASTIN TIME > seconds    22.5-36.0    HH           



             (BEAKER) (test code = 760)                                        



EEG AWAKE AND DROWSY2018-10-19 14:47:00Reason for exam:-&gt;Seizures Should this
be performed at the bedside?-&gt;[facility]    DATE OF 
TEST: 10-19-18  START TIME: 10/19 at 08:33   END TIME: 10/19 at 08:56 DATE OF 
REPORT: 10-   EEICD-10: R56.9  CPT Code: 55338    HISTORY: 58 y
o female with PMHx of HTN, COPD found down at home with concern for seizures.   
MEDICATIONS THAT COULD AFFECT EEG: Levetiracetam  TECHNICAL SUMMARY:     This is
a digital video EEG recorded with 32 input channels reviewed with bipolar and 
referential montages using the modified combinatorial system nomenclature.      
DESCRIPTION OF RECORD:  During the maximally awake state there is a poorly 
sustained 6-7 Hz posterior dominant and anterior to posterior voltage/frequency 
gradient noted over the left hemisphere.  The background is composed primarily 
of 5-7 Hz with admixed 8-13 Hz.  Over the right hemisphere there are slower 3-4 
Hz frequencies as well as 1-3 Hz  frontal predominant spike and wave discharges 
lateralized to right hemisphere (Fp2/F4; LPDs lateralized right hemisphere) at 
times with a field to the left frontal region.  LPDs are time locked with clonic
movements of the left lower extremity, at times evolving into discrete events as
described below, consistent with electroclinical seizures, lasting up to 30 
seconds in duration.   HV: Not performed.     PHOTIC STIMULATION: Not performed 
 Events: at 08:38:32, 08:41:32, 08:45:49, 08:47:44, the patient has more 
frequent left-sided clonic leg movements associated with an evolution of R-sided
PLEDs into 5-6 Hz rhythmic discharges, maximal at F4, slowing over 15-30 seconds
to ~1 Hz discharges before returning to baseline LPDs.IMPRESSION:Abnormal awake 
&amp; drowsy EEG   1.      Lateralized periodic discharges over the right 
hemisphere (maximal Fp2/F4) with clinical correlate of left lower extremity 
clonic movements, occasionally evolving into brief focal seizures.2.     
Generalized theta range slowing3.     Focal right hemispheric delta slowing  
CLINICAL CORRELATION: Diffuse slowing as seen in this record supports an 
underlying mild-moderate encephalopathy of nonspecific etiology (hypoxia, 
infectious, metabolic/toxic).  Focal righthemispheric slowing is a nonspecific 
finding which can be associated with underlying structural abnormality of the 
involved region. Laterlized periodic discharges over the right hemisphere with 
clinical correlate of left lower extremity clonic movement, evolving in 
frequency over time, are consistent with intermittent electroclinical seizures. 
These findings were discussed with the NeuroICU team andthe patient was hooked 
up to continuous bedside monitoring.   Yomaira Toro MD Neurophysiology Fellow &
amp;#[id number]; Akosua Rashid MDClinical Neurophysiology Attending [facility]      Electronically signed by: AKOSUA RASHID MD on 10/19/2018 
02:47 PMAPTT2018-10-19 14:03:00





             Test Item    Value        Reference Range Interpretation Comments

 

             PARTIAL THROMBOPLASTIN TIME > seconds    22.5-36.0    HH           



             (BEAKER) (test code = 760)                                        



HEMOGLOBIN A1C2018-10-19 12:34:00





             Test Item    Value        Reference Range Interpretation Comments

 

             HEMOGLOBIN A1C (BEAKER) (test code = 5.6 %        4.3-6.1          

         



             368)                                                



POCT-GLUCOSE METER2018-10-19 12:14:00





             Test Item    Value        Reference Range Interpretation Comments

 

             POC-GLUCOSE METER 139 mg/dL           H            TESTED AT 

[facility] [address]



             (BEAKER) (test code =                                        [place]



             1538)                                               [id number]



RAD, CHEST, 1 VIEW, NON DEPT2018-10-19 12:10:00Reason for exam:-&gt;CVC 
placementShould this be performed at the bedside?-&gt;YesFINAL REPORT PATIENT 
ID:   [id number] CLINICAL HISTORY: CVC placement TECHNIQUE: 1 view of the chest. C
OMPARISON: 10/18/2018 IMPRESSION: There is a right jugular line in the SVC. The 
ETT projects 4 cm above the jacquie. There are no infiltrates or effusions. The 
heart is not enlarged. A chronic right ribfracture deformity is again seen. 
Signed: Julian Hull [facility] Verified Date/Time:  10/19/2018 12:10:39 Reading 
Location: [facility] Radiology Reading Room      Electronically signed by: 
JULIAN HULL M.D. on 10/19/2018 12:10 PMLACTIC ACID, ARTERIAL, WHOLE BLOOD
2018-10-19 10:33:00





             Test Item    Value        Reference Range Interpretation Comments

 

             LACTATE BLOOD 0.9 mmol/L   0.5-2.2                   Specimen sligh

tly



             ARTERIAL (2) (BEAKER)                                        hemoly

zed



             (test code = 2874)                                        



Effective 2016: Units/Reference Range ChangeNew: 0.5-2.2 mmol/L  Previous: 
5-20 mg/dLBLOOD GAS, ARTERIAL2018-10-19 10:18:00





             Test Item    Value        Reference Range Interpretation Comments

 

             PH ARTERIAL (BEAKER) (test code = 7.41         7.35-7.45           

      



             383)                                                

 

             PCO2 ARTERIAL (BEAKER) (test code 29 mmHg      35-45        L      

      



             = 384)                                              

 

             PO2 ARTERIAL (BEAKER) (test code 214 mmHg     80-90        H       

     



             = 385)                                              

 

             O2 SATURATION ARTERIAL (BEAKER) 99.5 %       96.0-97.0    H        

    



             (test code = 386)                                        

 

             HCO3 ARTERIAL (BEAKER) (test code 18 mmol/L    21-29        L      

      



             = 388)                                              

 

             BASE EXCESS ARTERIAL (BEAKER) -5.5 mmol/L  -2.0-3.0     L          

  



             (test code = 387)                                        

 

             PATIENT TEMPERATURE ([facility]) 38.0 C                                

 



             (test code = 1818)                                        

 

             FIO2 ([facility]) (test code = 1819) 50.0 %                            

     



TROPONIN -10-19 09:22:00





             Test Item    Value        Reference Range Interpretation Comments

 

             TROPONIN I ([facility]) (test code = 2.47 ng/mL   0.00-0.03          

     



             397)                                                








             Test Item    Value        Reference Range Interpretation Comments

 

             POC-GLUCOSE METER 141 mg/dL           H            TESTED AT 

[facility] [address]



             ([facility]) (test code =                                        [place] [facility]



             1538)                                               [id number]



APTT2018-10-19 04:30:00





             Test Item    Value        Reference Range Interpretation Comments

 

             PARTIAL THROMBOPLASTIN TIME 34.7 seconds 22.5-36.0                 



             ([facility]) (test code = 760)                                        



Prior to initiating heparinVITAMIN B122018-10-19 03:15:00





             Test Item    Value        Reference Range Interpretation Comments

 

             VITAMIN B12 ([facility]) (test code = 283 pg/mL    213-138             

      



             774)                                                



TROPONIN -10-19 02:29:00





             Test Item    Value        Reference Range Interpretation Comments

 

             TROPONIN I ([facility]) (test code = 2.03 ng/mL   0.00-0.03          

     



             397)                                                



Troponin I (TnI) levels must be interpreted in the context of the presenting 
symptoms and the clinical findings. Elevated TnI levels indicate myocardial 
damage, but are not specific for ischemic heart disease. Elevated TnI levels are
seen in patients with other cardiac conditions (including myocarditis and 
congestive heart failure), and slight TnI elevations occur in patients with 
other conditions, including sepsis, renal failure, acidosis, acute neurological 
disease, and persistent tachyarrhythmia.BASIC METABOLIC PANEL2018-10-19 02:07:00





             Test Item    Value        Reference Range Interpretation Comments

 

             SODIUM (BEAKER) 132 meq/L    136-145      L            



             (test code = 381)                                        

 

             POTASSIUM (BEAKER) 3.8 meq/L    3.5-5.1                   



             (test code = 379)                                        

 

             CHLORIDE (BEAKER) 98 meq/L                         



             (test code = 382)                                        

 

             CO2 (BEAKER) (test 18 meq/L     22-29        L            



             code = 355)                                         

 

             BLOOD UREA NITROGEN 16 mg/dL     7-21                      



             (BEAKER) (test code                                        



             = 354)                                              

 

             CREATININE (BEAKER) 0.74 mg/dL   0.57-1.25                 



             (test code = 358)                                        

 

             GLUCOSE RANDOM 139 mg/dL           H            



             (BEAKER) (test code                                        



             = 652)                                              

 

             CALCIUM (BEAKER) 8.8 mg/dL    8.4-10.2                  



             (test code = 697)                                        

 

             EGFR (BEAKER) (test 81 mL/min/1.73                           ESTIMA

CHRISTIN GFR IS



             code = 1092) sq m                                   NOT AS ACCURATE

 AS



                                                                 CREATININE



                                                                 CLEARANCE IN



                                                                 PREDICTING



                                                                 GLOMERULAR



                                                                 FILTRATION RATE

.



                                                                 ESTIMATED GFR I

S



                                                                 NOT APPLICABLE 

FOR



                                                                 DIALYSIS PATIEN

TS.



CBC W/PLT COUNT &amp; AUTO DIFFERENTIAL2018-10-19 01:35:00





             Test Item    Value        Reference Range Interpretation Comments

 

             WHITE BLOOD CELL COUNT (BEAKER) 26.7 K/ L    3.5-10.5     H        

    



             (test code = 775)                                        

 

             RED BLOOD CELL COUNT (BEAKER) 3.59 M/ L    3.93-5.22    L          

  



             (test code = 761)                                        

 

             HEMOGLOBIN (BEAKER) (test code = 12.3 GM/DL   11.2-15.7            

     



             410)                                                

 

             HEMATOCRIT (BEAKER) (test code = 35.6 %       34.1-44.9            

     



             411)                                                

 

             MEAN CORPUSCULAR VOLUME (BEAKER) 99.2 fL      79.4-94.8    H       

     



             (test code = 753)                                        

 

             MEAN CORPUSCULAR HEMOGLOBIN 34.3 pg      25.6-32.2    H            



             (BEAKER) (test code = 751)                                        

 

             MEAN CORPUSCULAR HEMOGLOBIN CONC 34.6 GM/DL   32.2-35.5            

     



             (BEAKER) (test code = 752)                                        

 

             RED CELL DISTRIBUTION WIDTH 14.4 %       11.7-14.4                 



             (BEAKER) (test code = 412)                                        

 

             PLATELET COUNT (BEAKER) (test 282 K/CU MM  150-450                 

  



             code = 756)                                         

 

             MEAN PLATELET VOLUME (BEAKER) 9.8 fL       9.4-12.3                

  



             (test code = 754)                                        

 

             NUCLEATED RED BLOOD CELLS 0 /100 WBC   0-0                       



             (BEAKER) (test code = 413)                                        

 

             NEUTROPHILS RELATIVE PERCENT 92 %                                  

 



             (BEAKER) (test code = 429)                                        

 

             LYMPHOCYTES RELATIVE PERCENT 3 %                                   

 



             (BEAKER) (test code = 430)                                        

 

             MONOCYTES RELATIVE PERCENT 3 %                                    



             (BEAKER) (test code = 431)                                        

 

             EOSINOPHILS RELATIVE PERCENT 0 %                                   

 



             (BEAKER) (test code = 432)                                        

 

             BASOPHILS RELATIVE PERCENT 0 %                                    



             (BEAKER) (test code = 437)                                        

 

             NEUTROPHILS ABSOLUTE COUNT 24.65 K/ L   1.56-6.13    H            



             (BEAKER) (test code = 670)                                        

 

             LYMPHOCYTES ABSOLUTE COUNT 0.81 K/ L    1.18-3.74    L            



             (BEAKER) (test code = 414)                                        

 

             MONOCYTES ABSOLUTE COUNT (BEAKER) 0.92 K/ L    0.24-0.36    H      

      



             (test code = 415)                                        

 

             EOSINOPHILS ABSOLUTE COUNT 0.02 K/ L    0.04-0.36    L            



             (BEAKER) (test code = 416)                                        

 

             BASOPHILS ABSOLUTE COUNT (BEAKER) 0.03 K/ L    0.01-0.08           

      



             (test code = 417)                                        

 

             IMMATURE GRANULOCYTES-RELATIVE 1 %          0-1                    

   



             PERCENT (BEAKER) (test code =                                      

  



             2801)                                               



LIPID PANEL2018-10-18 23:55:00





             Test Item    Value        Reference Range Interpretation Comments

 

             TRIGLYCERIDES (BEAKER) (test code = 102 mg/dL                      

        



             540)                                                

 

             CHOLESTEROL (BEAKER) (test code = 182 mg/dL                        

      



             631)                                                

 

             HDL CHOLESTEROL (BEAKER) (test code 48 mg/dL                       

        



             = 976)                                              

 

             LDL CHOLESTEROL CALCULATED (BEAKER) 114 mg/dL                      

        



             (test code = 633)                                        



Triglyceride Reference Range:   Low Risk         &lt;150   Borderline    150-199
  High Risk     200-499   Very High Risk  &gt;=500Cholesterol Reference Range:  
Low Risk         &lt;200   Borderline 200-239    High Risk        &gt;240HDL 
Cholesterol Reference Range:   Low Risk         &gt;=60   High Risk         
&lt;40LDL Cholesterol Reference Range:   Optimal          &lt;100   Near Optimal
 100-129   Borderline    130-159   High          160-189   Very High       
&gt;=190TSH/FREE T4 IF INDICATED2018-10-18 23:16:00





             Test Item    Value        Reference Range Interpretation Comments

 

             THYROID STIMULATING HORMONE 1.73 uIU/mL  0.35-4.94                 



             (BEAKER) (test code = 772)                                        



HEPATIC FUNCTION PANEL2018-10-18 22:57:00





             Test Item    Value        Reference Range Interpretation Comments

 

             TOTAL PROTEIN (BEAKER) (test code = 5.9 gm/dL    6.0-8.3      L    

        



             770)                                                

 

             ALBUMIN (BEAKER) (test code = 1145) 3.3 g/dL     3.5-5.0      L    

        

 

             BILIRUBIN TOTAL (BEAKER) (test code 0.5 mg/dL    0.2-1.2           

        



             = 377)                                              

 

             BILIRUBIN DIRECT (BEAKER) (test 0.3 mg/dL    0.1-0.5               

    



             code = 706)                                         

 

             ALKALINE PHOSPHATASE (BEAKER) (test 68 U/L                   

        



             code = 346)                                         

 

             AST (SGOT) (BEAKER) (test code = 36 U/L       5-34         H       

     



             353)                                                

 

             ALT (SGPT) (BEAKER) (test code = 16 U/L       6-55                 

     



             347)                                                



CREATINE KINASE (CK)2018-10-18 22:57:00





             Test Item    Value        Reference Range Interpretation Comments

 

             CREATINE KINASE TOTAL (BEAKER) (test 356 U/L             H   

         



             code = 380)                                         



ETHANOL2018-10-18 22:53:00





             Test Item    Value        Reference Range Interpretation Comments

 

             ETHANOL (BEAKER) (test code = 400) < mg/dL      <=10               

       



AMMONIA2018-10-18 22:48:00





             Test Item    Value        Reference Range Interpretation Comments

 

             AMMONIA (BEAKER) (test code = 348) 38  mol/L    18-72              

       



POCT-LACTIC ACID, VENOUS2018-10-18 22:36:00





             Test Item    Value        Reference Range Interpretation Comments

 

             POC-LACTIC ACID, 1.7 mmol/L   0.9-1.7                   TESTED AT [facility] [address]



             VENOUS (BEAKER) (test                                        [place]



             code = 2805)                                        [address]



PROCALCITONIN2018-10-18 21:24:00





             Test Item    Value        Reference Range Interpretation Comments

 

             PROCALCITONIN (BEAKER) (test code 2.11 ng/mL   <0.05        H      

      



             = 3036)                                             



SEPSIS RISK (ng/mL)Low:          0.05-0.50Intermediate: 0.51-2.00High:         
&gt;=2.01POCT-LACTIC ACID, VENOUS2018-10-18 20:31:00





             Test Item    Value        Reference Range Interpretation Comments

 

             POC-LACTIC ACID, 1.3 mmol/L   0.9-1.7                   TESTED AT B

[facility] [address]



             VENOUS (BEAKER) (test                                        [place]



             code = 2805)                                        [address]



URINALYSIS W/ MICROSCOPIC2018-10-18 20:15:00





             Test Item    Value        Reference Range Interpretation Comments

 

             COLOR (BEAKER) (test code = 470) Light Yellow                      

     

 

             CLARITY (BEAKER) (test code = Clear                                

  



             469)                                                

 

             SPECIFIC GRAVITY UA (BEAKER) 1.009        1.001-1.035              

 



             (test code = 468)                                        

 

             PH UA (BEAKER) (test code = 467) 6.5          5.0-8.0              

     

 

             PROTEIN UA (BEAKER) (test code = 30 mg/dL     Negative     A       

     



             464)                                                

 

             GLUCOSE UA (BEAKER) (test code = Negative     Negative             

     



             365)                                                

 

             KETONES UA (BEAKER) (test code = Negative     Negative             

     



             371)                                                

 

             BILIRUBIN UA (BEAKER) (test code Negative     Negative             

     



             = 462)                                              

 

             BLOOD UA (BEAKER) (test code = Small        Negative     A         

   



             461)                                                

 

             NITRITE UA (BEAKER) (test code = Negative     Negative             

     



             465)                                                

 

             LEUKOCYTE ESTERASE UA (BEAKER) Negative     Negative               

   



             (test code = 466)                                        

 

             UROBILINOGEN UA (BEAKER) (test 0.2 mg/dL    0.2-1.0                

   



             code = 463)                                         

 

             RBC UA (BEAKER) (test code = 0 /HPF                                

 



             519)                                                

 

             WBC UA (BEAKER) (test code = < /HPF                                

 



             520)                                                

 

             MUCUS (BEAKER) (test code = Rare                                   



             1574)                                               

 

             SQUAMOUS EPITHELIAL (BEAKER) < /HPF                                

 



             (test code = 516)                                        

 

             YEAST (BEAKER) (test code = Occasional                             



             1585)                                               

 

             SOURCE(BEAKER) (test code =                                        



             2674)                                               



RAD, CHEST, 1 VIEW, NON DEPT2018-10-18 20:12:00Reason for exam:-&gt;iontubatedIs
the patient pregnant?-&gt;N/AFINAL REPORT PATIENT ID:   [id number]  History: 
Intubation. Comparison: None. Findings: A single view of the chest is submitted.
The tip of an endotracheal tube is between the clavicles and jacquie. The ca
rdiomediastinal contours are unremarkable.  There is no focal consolidation, 
pneumothorax, large pleural effusion or evidence of overt pulmonary edema.   
There are deformities of the posterior right ninth and 10th ribs, age 
indeterminate. Please correlate to exclude the possibility of acute fracture. S
igned: Louis Luis MDReport Verified Date/Time:  10/18/2018 20:12:16 Reading 
Location: [address] Reading Room      Electronically signed by: 
LOUIS LUIS M.D. on 10/18/2018 08:12 PMPOCT-GLUCOSE METER2018-10-18 20:00:00





             Test Item    Value        Reference Range Interpretation Comments

 

             POC-GLUCOSE METER 124 mg/dL           H            TESTED AT 

[facility] [address]



             ([facility]) (test code =                                        [place]



             1538)                                               [address]



TROPONIN -10-18 19:59:00





             Test Item    Value        Reference Range Interpretation Comments

 

             TROPONIN I ([facility]) (test code = 1.70 ng/mL   0.00-0.03          

     



             397)                                                



Troponin I (TnI) levels must be interpreted in the context of the presenting 
symptoms and the clinical findings. Elevated TnI levels indicate myocardial 
damage, but are not specific for ischemic heart disease. Elevated TnI levels are
seen in patients with other cardiac conditions (including myocarditis and 
congestive heart failure), and slight TnI elevations occur in patients with 
other conditions, including sepsis, renal failure, acidosis, acute neurological 
disease, and persistent tachyarrhythmia.CBC W/PLT COUNT &amp; AUTO DIFFERENTIAL
2018-10-18 19:55:00





             Test Item    Value        Reference Range Interpretation Comments

 

             WHITE BLOOD CELL COUNT (BEAKER) 30.1 K/ L    3.5-10.5     H        

    



             (test code = 775)                                        

 

             RED BLOOD CELL COUNT (BEAKER) 3.55 M/ L    3.93-5.22    L          

  



             (test code = 761)                                        

 

             HEMOGLOBIN (BEAKER) (test code = 12.2 GM/DL   11.2-15.7            

     



             410)                                                

 

             HEMATOCRIT (BEAKER) (test code = 36.3 %       34.1-44.9            

     



             411)                                                

 

             MEAN CORPUSCULAR VOLUME (BEAKER) 102.3 fL     79.4-94.8    H       

     



             (test code = 753)                                        

 

             MEAN CORPUSCULAR HEMOGLOBIN 34.4 pg      25.6-32.2    H            



             (BEAKER) (test code = 751)                                        

 

             MEAN CORPUSCULAR HEMOGLOBIN CONC 33.6 GM/DL   32.2-35.5            

     



             (BEAKER) (test code = 752)                                        

 

             RED CELL DISTRIBUTION WIDTH 14.4 %       11.7-14.4                 



             (BEAKER) (test code = 412)                                        

 

             PLATELET COUNT (BEAKER) (test 314 K/CU MM  150-450                 

  



             code = 756)                                         

 

             MEAN PLATELET VOLUME (BEAKER) 9.4 fL       9.4-12.3                

  



             (test code = 754)                                        

 

             NUCLEATED RED BLOOD CELLS 0 /100 WBC   0-0                       



             (BEAKER) (test code = 413)                                        



(CELLAVISION MANUAL DIFF)2018-10-18 19:55:00





             Test Item    Value        Reference Range Interpretation Comments

 

             NEUTROPHILS - REL 91 %                                   



             (CELLAVISION)(BEAKER) (test code =                                 

       



             2816)                                               

 

             MONOCYTES - REL 2 %                                    



             (CELLAVISION)(BEAKER) (test code =                                 

       



             2818)                                               

 

             BASOPHILS - REL 1 %                                    



             (CELLAVISION)(BEAKER) (test code =                                 

       



             2820)                                               

 

             BANDS - REL (CELLAVISION)(BEAKER) 6 %          0-10                

      



             (test code = 2826)                                        

 

             NEUTROPHILS - ABS 27.39 K/ul   1.56-6.13    H            



             (CELLAVISION)(BEAKER) (test code =                                 

       



             2830)                                               

 

             MONOCYTES - ABS 0.60 K/uL    0.24-0.36    H            



             (CELLAVISION)(BEAKER) (test code =                                 

       



             2832)                                               

 

             BASOPHILS - ABS 0.30 K/uL    0.01-0.08    H            



             (CELLAVISION)(BEAKER) (test code =                                 

       



             2835)                                               

 

             BANDS - ABS (CELLAVISION)(BEAKER) 1.81 K/uL    0.00-0.80    H      

      



             (test code = 2840)                                        

 

             TOTAL COUNTED (BEAKER) (test code 100                              

      



             = 1351)                                             

 

             WBC MORPHOLOGY (BEAKER) (test code Normal                          

       



             = 487)                                              

 

             PLT MORPHOLOGY (BEAKER) (test code Normal                          

       



             = 486)                                              

 

             ANISOCYTOSIS (BEAKER) (test code = 1+ few                          

       



             961)                                                

 

             MACROCYTES (BEAKER) (test code = 1+ few                            

     



             964)                                                

 

             ARTIFACT (CELLAVISION)(BEAKER) Present                             

   



             (test code = 3432)                                        

 

             PLATELET CONCENTRATION Adequate                               



             (CELLAVISION)(BEAKER) (test code =                                 

       



             3438)                                               



Received comment: User comments: Slide comments:BASIC METABOLIC PANEL2018-10-18 
19:46:00





             Test Item    Value        Reference Range Interpretation Comments

 

             SODIUM (BEAKER) 130 meq/L    136-145      L            



             (test code = 381)                                        

 

             POTASSIUM (BEAKER) 4.4 meq/L    3.5-5.1                   



             (test code = 379)                                        

 

             CHLORIDE (BEAKER) 98 meq/L                         



             (test code = 382)                                        

 

             CO2 (BEAKER) (test 20 meq/L     22-29        L            



             code = 355)                                         

 

             BLOOD UREA NITROGEN 18 mg/dL     7-21                      



             (BEAKER) (test code                                        



             = 354)                                              

 

             CREATININE (BEAKER) 0.81 mg/dL   0.57-1.25                 



             (test code = 358)                                        

 

             GLUCOSE RANDOM 115 mg/dL           H            



             (BEAKER) (test code                                        



             = 652)                                              

 

             CALCIUM (BEAKER) 9.0 mg/dL    8.4-10.2                  



             (test code = 697)                                        

 

             EGFR (BEAKER) (test  mL/min/1.73                           INSUFFIC

IENT CLINICAL



             code = 1092) sq m                                   DATA TO CALCULA

TE



                                                                 ESTIMATED GFR.



MAGNESIUM2018-10-18 19:43:00





             Test Item    Value        Reference Range Interpretation Comments

 

             MAGNESIUM (BEAKER) (test code = 1.5 mg/dL    1.6-2.6      L        

    



             627)                                                



PT/APTT2018-10-18 19:38:00





             Test Item    Value        Reference Range Interpretation Comments

 

             PROTIME (BEAKER) (test code = 14.1 seconds 11.7-14.7               

  



             759)                                                

 

             INR (BEAKER) (test code = 370) 1.1          <=5.9                  

   

 

             PARTIAL THROMBOPLASTIN TIME 26.2 seconds 22.5-36.0                 



             (BEAKER) (test code = 760)                                        



RECOMMENDED COUMADIN/WARFARIN INR THERAPY RANGESSTANDARD DOSE: 2.0 - 3.0   
Includes: PROPHYLAXIS forvenous thrombosis, systemic embolization; TREATMENT for
venous thrombosis and/or pulmonary embolus.HIGH RISK: Target INR is 2.5-3.5 for 
patients with mechanical heart valves.BLOOD GAS, ARTERIAL2018-10-18 19:27:00





             Test Item    Value        Reference Range Interpretation Comments

 

             PH ARTERIAL (BEAKER) (test code = 7.27         7.35-7.45    L      

      



             383)                                                

 

             PCO2 ARTERIAL (BEAKER) (test code 42 mmHg      35-45               

      



             = 384)                                              

 

             PO2 ARTERIAL (BEAKER) (test code 452 mmHg     80-90        H       

     



             = 385)                                              

 

             O2 SATURATION ARTERIAL (BEAKER) 99.8 %       96.0-97.0    H        

    



             (test code = 386)                                        

 

             HCO3 ARTERIAL (BEAKER) (test code 19 mmol/L    21-29        L      

      



             = 388)                                              

 

             BASE EXCESS ARTERIAL (BEAKER) -7.5 mmol/L  -2.0-3.0     L          

  



             (test code = 387)                                        

 

             PATIENT TEMPERATURE (BEAKER) 36.0 C                                

 



             (test code = 1818)                                        

 

             FIO2 (BEAKER) (test code = 1819) 100.0 %                           

     



CARBOXYHEMOGLOBIN2018-10-18 19:27:00





             Test Item    Value        Reference Range Interpretation Comments

 

             CARBOXYHEMOGLOBIN (BEAKER) (test code = 2.5 %        0.0-5.0       

            



             695)                                                



CT, CTANGIO  BRAIN2018-10-18 19:02:00Reason for exam:-&gt;Symptoms onset less 
than 6 hours and NIHSS 6 or greaterFINAL REPORT PATIENT ID:   [id number] CTA 
carotids and brain 10/18/2018 6:57 PM CLINICAL INDICATION: Symptoms onset less 
than 6 hours and NIHSS 6 or greaterStroke evaluation COMPARISON: None available 
TECHNIQUE: Axial CT angiographic images of the upper chest, neck, and head were 
obtained, from which three-dimensional reconstructed images were created. 
Additional imaging series were created on an independent workstation using 
maximum intensity projection and volume rendered technique. This examinationwas 
performed according to our departmental dose optimization program, which 
includes automated exposure control, adjustment of the mA and/or kV according to
patient size, and/or use of iterated reconstruction technique. FINDINGS: There 
is no vessel occlusion in the intracranial or extracranial arterial vasculature.
There is high-grade stenosis at the origins of both vertebral arteries. The 
remainderof the intracranial and extracranial vertebrobasilar circulation is 
unremarkable. There is atherosclerotic plaque deposition in both carotid 
bifurcations and carotid siphons, without NASCET quantifiable cervical internal 
carotid artery stenosis or unremarkable intracranial ICA stenosis. There is no 
stenosis in either anterior, middle, or posterior cerebral artery. There is a 
left parietotemporal scalp hematoma. 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 The 
visualized skeleton is without worrisome finding. IMPRESSION: 1. No evidence for
acute vascular compromise. 2. Severe bilateral vertebral artery origin stenosis.
 3. Additional findings as discussed. IMPRESSION:     Signed: Seipel, Timothy MDReport Verified Date/Time:  10/18/2018 19:02:43 Reading Location: [facility] Radiology Reading Room    Electronically signed by: TIMOTHY J SEIPEL, M.D. 
on 10/18/2018 07:02 PMCT, CAROTID, ANGIO2018-10-18 19:02:00Reason for exam:-
&gt;Symptoms onset less than 6 hours and NIHSS 6 or greaterFINAL REPORT PATIENT 
ID:   [id number] CTA carotids and brain 10/18/2018 6:57 PM CLINICAL INDICATION: Sy
mptoms onset less than 6 hours and NIHSS 6 or greaterStroke evaluation 
COMPARISON: None available TECHNIQUE: Axial CT angiographic images of the upper 
chest, neck, and head were obtained, from which three-dimensional reconstructed 
images were created. Additional imaging series were created on an independent 
workstation using maximum intensity projection and volume rendered technique. 
This examinationwas performed according to our departmental dose optimization 
program, which includes automated exposure control, adjustment of the mA and/or 
kV according to patient size, and/or use of iterated reconstruction technique. 
FINDINGS: There is no vessel occlusion in the intracranial or extracranial 
arterial vasculature. There is high-grade stenosis at the origins of both 
vertebral arteries. The remainderof the intracranial and extracranial 
vertebrobasilar circulation is unremarkable. There is atherosclerotic plaque 
deposition in both carotid bifurcations and carotid siphons, without NASCET 
quantifiable cervical internal carotid artery stenosis or unremarkable 
intracranial ICA stenosis. There is no stenosis in either anterior, middle, or 
posterior cerebral artery. There is a left parietotemporal scalp hematoma. There
is corpus callosum dysgenesis. The visualized skeleton is without worrisome 
finding. IMPRESSION: 1. No evidence for acute vascular compromise. 2. Severe 
bilateral vertebral artery origin stenosis.  3. Additional findings as 
discussed. IMPRESSION:     Signed: Seipel, Timothy MDReport Verified Date/Time: 
10/18/2018 19:02:43 Reading Location: [facility] Radiology Reading Room    
Electronically signed by: TIMOTHY J SEIPEL, M.D. on 10/18/2018 07:02 [facility]STROKE PROTOC[facility]2018-10-18 18:30:00Reason for exam:-&gt;strokeFINAL REPORT 
PATIENT ID:   [id number] CT head without contrast 10/18/2018 6:26 PM CLINICAL 
HISTORY: stroke TECHNIQUE: Axial noncontrast CT images through the head were 
obtained. This examination was performed according to our departmental dose 
optimization program, which includes automated exposure control, adjustment of 
the mA and/or kV according to patient size, and/or use of iterated 
reconstruction technique. COMPARISON: None available FINDINGS: There is no 
hemorrhage, extra-axial collection, mass,hydrocephalus, or midline shift. There 
is corpus callosum dysgenesis. There are rare microvascular changes in the 
supratentorial white matter. There is atherosclerotic calcification of the 
intracranialarterial vasculature. There is generalized parenchymal volume loss. 
The visualized paranasal sinuses and mastoid air cells are well aerated.  There 
is a left parietotemporal scalp hematoma. The skull is intact. IMPRESSION: No 
intracranial hemorrhage or mass effect. Chronic appearing findings as discussed.
If concern for acute pathology persists, further evaluation with MRI is 
recommended. Findings were discussed with Dr. Cueto on 10/18/2018 at 1825. 
Signed: Seipel, Timothy MDReport Verified Date/Time:  10/18/2018 18:30:59 
Reading Location: [facility] Radiology Reading Room    Electronically signed 
by: TIMOTHY J SEIPEL, M.D. on 10/18/2018 06:30 PM

## 2022-04-14 NOTE — ER
Nurse's Notes                                                                                     

 [facility]                                                                 

Name: Rona Nelson                                                                              

Age: 61 yrs                                                                                       

Sex: Female                                                                                       

: 1960                                                                                   

MRN: [id number]                                                                                   

Arrival Date: 2022                                                                          

Time: 14:53                                                                                       

Account#: [id number]                                                                            

Bed Waiting                                                                                       

Private MD:                                                                                       

Diagnosis: Encounter for Medication Refill                                                        

                                                                                                  

Presentation:                                                                                     

                                                                                             

15:14 Chief complaint: Patient states: Needs refills for clobazam, Vimpat 50 MG, and Vimpat   ll1 

      200 MG. Has neurologist appt May 2nd. Coronavirus screen: Client denies travel out of       

      the [place] in the last 14 days. At this time, the client does not indicate any symptoms       

      associated with coronavirus-19. Ebola Screen: Patient denies travel to an                   

      Ebola-affected area in the 21 days before illness onset. Initial Sepsis Screen: Does        

      the patient meet any 2 criteria? No. Patient's initial sepsis screen is negative. Does      

      the patient have a suspected source of infection? No. Patient's initial sepsis screen       

      is negative. Risk Assessment: Do you want to hurt yourself or someone else? Patient         

      reports no desire to harm self or others. Onset of symptoms was 2022.             

15:14 Method Of Arrival: Ambulatory                                                           ll1 

15:14 Acuity: SANTA 4                                                                           ll1 

                                                                                                  

Triage Assessment:                                                                                

15:18 General: Appears in no apparent distress. Behavior is calm, cooperative, appropriate    ll1 

      for age. Pain: Denies pain. Neuro: No deficits noted.                                       

                                                                                                  

Historical:                                                                                       

- Allergies:                                                                                      

15:16 Hydrocodone-Acetaminophen;                                                              ll1 

- PMHx:                                                                                           

15:16 epilepsy; Hypertensive disorder; COPD; CVA; Seizure;                                    ll1 

- PSHx:                                                                                           

15:16 R hip replacement; back SX;                                                             ll1 

                                                                                                  

- Immunization history:: Client reports receiving the 2nd dose of the Covid vaccine.              

- Social history:: Smoking status: Patient reports the use of cigarette tobacco                   

  products, smokes one pack cigarettes per day.                                                   

                                                                                                  

                                                                                                  

Screening:                                                                                        

15:28 Abuse screen: Denies threats or abuse. Nutritional screening: No deficits noted.        ll1 

      Tuberculosis screening: No symptoms or risk factors identified. Fall Risk Total Rider       

      Fall Scale indicates No Risk (0-24 pts).                                                    

                                                                                                  

Assessment:                                                                                       

15:28 Reassessment: No changes from previously documented assessment. Patient and/or family   ll1 

      updated on plan of care and expected duration. Pain level reassessed. Patient is alert,     

      oriented x 3, equal unlabored respirations, skin warm/dry/pink.                             

                                                                                                  

Vital Signs:                                                                                      

15:14  / 97; Pulse 68; Resp 17; Temp 98.1(O); Pulse Ox 98% ; Weight 40.37 kg; Height 5  ll1 

      ft. 2 in. (157.48 cm); Pain 0/10;                                                           

15:14 Body Mass Index 16.28 (40.37 kg, 157.48 cm)                                             ll1 

                                                                                                  

ED Course:                                                                                        

14:53 Patient arrived in ED.                                                                  ds1 

15:16 Triage completed.                                                                       ll1 

15:18 Arm band placed on.                                                                     ll1 

15:22 Quoc Alexis PA is PHCP.                                                               [id number] 

15:22 Alireza Hooper MD is Attending Physician.                                             [id number] 

15:28 Patient has correct armband on for positive identification.                             ll1 

15:28 No provider procedures requiring assistance completed. Patient did not have IV access   ll1 

      during this emergency room visit.                                                           

                                                                                                  

Administered Medications:                                                                         

No medications were administered                                                                  

                                                                                                  

                                                                                                  

Outcome:                                                                                          

15:23 Discharge ordered by MD.                                                                [id number] 

15:28 Patient left the ED.                                                                    ll1 

15:28 Discharged to home ambulatory.                                                          ll1 

15:28 Condition: stable                                                                           

15:28 Discharge instructions given to patient, family, Instructed on discharge instructions,      

      follow up and referral plans. medication usage, Demonstrated understanding of               

      instructions, follow-up care, medications, Prescriptions given X 3.                         

                                                                                                  

Signatures:                                                                                       

Deborah Wang                                ds1                                                  

Quoc Alexis PA                        PA   jr8                                                  

Sascha Headley RN                       RN   ll1                                                  

                                                                                                  

Corrections: (The following items were deleted from the chart)                                    

15:18 15:16 Allergies: ACETAMINOPHEN; ll1                                                     ll1 

                                                                                                  

**************************************************************************************************

## 2022-04-14 NOTE — EDPHYS
Physician Documentation                                                                           

 [facility]                                                                 

Name: Rona Nelson                                                                              

Age: 61 yrs                                                                                       

Sex: Female                                                                                       

: 1960                                                                                   

MRN: [id number]                                                                                   

Arrival Date: 2022                                                                          

Time: 14:53                                                                                       

Account#: [id number]                                                                            

Bed Waiting                                                                                       

Private MD:                                                                                       

ED Physician Alireza Hooper                                                                      

HPI:                                                                                              

                                                                                             

15:32 This 61 yrs old Female presents to ER via Ambulatory with complaints of Prescription    jr8 

      Refill.                                                                                     

15:32 Patient came to the emergency room for medication refill for her 2 seizure medications. jr8 

      Stated that they have been trying to get into her neurologist but the closest               

      appointment that she has May 2. Will run out for before then and is worried that she        

      will have a seizure if she cannot continue her medications. Denies any symptoms at this     

      time.. Severity of symptoms: At their worst the symptoms were mild. The patient has not     

      recently seen a physician.                                                                  

                                                                                                  

Historical:                                                                                       

- Allergies:                                                                                      

15:16 Hydrocodone-Acetaminophen;                                                              ll1 

- PMHx:                                                                                           

15:16 epilepsy; Hypertensive disorder; COPD; CVA; Seizure;                                    ll1 

- PSHx:                                                                                           

15:16 R hip replacement; back SX;                                                             ll1 

                                                                                                  

- Immunization history:: Client reports receiving the 2nd dose of the Covid vaccine.              

- Social history:: Smoking status: Patient reports the use of cigarette tobacco                   

  products, smokes one pack cigarettes per day.                                                   

                                                                                                  

                                                                                                  

ROS:                                                                                              

15:32 Constitutional: Negative for fever, chills, and weight loss, Cardiovascular: Negative   jr8 

      for chest pain, palpitations, and edema, Respiratory: Negative for shortness of breath,     

      cough, wheezing, and pleuritic chest pain, Abdomen/GI: Negative for abdominal pain,         

      nausea, vomiting, diarrhea, and constipation, Neuro: Negative for headache, weakness,       

      numbness, tingling, and seizure.                                                            

15:32 All other systems are negative.                                                             

                                                                                                  

Exam:                                                                                             

15:32 Constitutional:  This is a well developed, well nourished patient who is awake, alert,  jr8 

      and in no acute distress. Cardiovascular:  Regular rate and rhythm with a normal S1 and     

      S2.  No gallops, murmurs, or rubs.  Normal PMI, no JVD.  No pulse deficits.                 

      Respiratory:  Lungs have equal breath sounds bilaterally, clear to auscultation and         

      percussion.  No rales, rhonchi or wheezes noted.  No increased work of breathing, no        

      retractions or nasal flaring. Abdomen/GI:  Soft, non-tender, with normal bowel sounds.      

      No distension or tympany.  No guarding or rebound.  No evidence of tenderness               

      throughout. Skin:  Warm, dry with normal turgor.  Normal color with no rashes, no           

      lesions, and no evidence of cellulitis. MS/ Extremity:  Pulses equal, no cyanosis.          

      Neurovascular intact.  Full, normal range of motion. Neuro:  Awake and alert, GCS 15,       

      oriented to person, place, time, and situation.  Cranial nerves II-XII grossly intact.      

      Motor strength 5/5 in all extremities.  Sensory grossly intact.  Cerebellar exam            

      normal.  Normal gait.                                                                       

                                                                                                  

Vital Signs:                                                                                      

15:14  / 97; Pulse 68; Resp 17; Temp 98.1(O); Pulse Ox 98% ; Weight 40.37 kg; Height 5  ll1 

      ft. 2 in. (157.48 cm); Pain 0/10;                                                           

15:14 Body Mass Index 16.28 (40.37 kg, 157.48 cm)                                             ll1 

                                                                                                  

MDM:                                                                                              

15:22 Patient medically screened.                                                             jr8 

15:32 Data reviewed: vital signs, nurses notes, and as a result, I will discharge patient.    jr8 

      Data interpreted: Pulse oximetry: on room air is 98 %. Interpretation: normal.              

      Counseling: I had a detailed discussion with the patient and/or guardian regarding: the     

      historical points, exam findings, and any diagnostic results supporting the                 

      discharge/admit diagnosis, the need for outpatient follow up, a neurologist, to return      

      to the emergency department if symptoms worsen or persist or if there are any questions     

      or concerns that arise at home.                                                             

                                                                                                  

Administered Medications:                                                                         

No medications were administered                                                                  

                                                                                                  

                                                                                                  

Disposition:                                                                                      

18:53 Co-signature as Attending Physician, Alireza Hooper MD I agree with the assessment and  maria isabel 

      plan of care.                                                                               

                                                                                                  

Disposition Summary:                                                                              

22 15:23                                                                                    

Discharge Ordered                                                                                 

      Location: Home                                                                          jr8 

      Problem: new                                                                            jr8 

      Symptoms: are unchanged                                                                 jr8 

      Condition: Stable                                                                       jr8 

      Diagnosis                                                                                   

        - Encounter for Medication Refill                                                     jr8 

      Followup:                                                                               jr8 

        - With: Private Physician                                                                  

        - When: As needed                                                                          

        - Reason: Recheck today's complaints, Continuance of care, Re-evaluation by your           

      physician                                                                                   

      Discharge Instructions:                                                                     

        - Discharge Summary Sheet                                                             jr8 

      Forms:                                                                                      

        - Medication Reconciliation Form                                                      jr8 

        - Thank You Letter                                                                    jr8 

        - Antibiotic Education                                                                jr8 

        - Prescription Opioid Use                                                             jr8 

      Prescriptions:                                                                              

        - Vimpat 200 mg Oral tablet                                                                

            - take 1 tablet by ORAL route 2 times per day; 60 tablet; Refills: 0, Product     jr8 

      Selection Permitted                                                                         

        - Vimpat 50 mg Oral tablet                                                                 

            - take 2 tablet by ORAL route 2 times per day; 120 tablet; Refills: 0, Product    jr8 

      Selection Permitted                                                                         

        - clobazam 10 mg Oral tablet                                                               

            - take 1.5 tablet by ORAL route every 12 hours; 60 tablet; Refills: 0, Product    jr8 

      Selection Permitted                                                                         

Signatures:                                                                                       

Alireza Hooper MD MD cha Roszak, Josh, PA PA   jr8                                                  

Sascha Headley RN                       RN   ll1                                                  

                                                                                                  

Corrections: (The following items were deleted from the chart)                                    

15:18 15:16 Allergies: ACETAMINOPHEN; ll1                                                     ll1 

                                                                                                  

**************************************************************************************************

## 2022-04-15 VITALS — DIASTOLIC BLOOD PRESSURE: 97 MMHG | OXYGEN SATURATION: 98 % | SYSTOLIC BLOOD PRESSURE: 170 MMHG | TEMPERATURE: 98.1 F
